# Patient Record
Sex: FEMALE | Race: WHITE | NOT HISPANIC OR LATINO | Employment: STUDENT | URBAN - METROPOLITAN AREA
[De-identification: names, ages, dates, MRNs, and addresses within clinical notes are randomized per-mention and may not be internally consistent; named-entity substitution may affect disease eponyms.]

---

## 2017-03-01 ENCOUNTER — TRANSFERRED RECORDS (OUTPATIENT)
Dept: HEALTH INFORMATION MANAGEMENT | Facility: CLINIC | Age: 22
End: 2017-03-01

## 2017-03-01 LAB — CHLAMYDIA - HIM PATIENT REPORTED: NEGATIVE

## 2017-04-14 ENCOUNTER — ALLIED HEALTH/NURSE VISIT (OUTPATIENT)
Dept: NURSING | Facility: CLINIC | Age: 22
End: 2017-04-14
Payer: COMMERCIAL

## 2017-04-14 DIAGNOSIS — Z11.1 SCREENING EXAMINATION FOR PULMONARY TUBERCULOSIS: Primary | ICD-10-CM

## 2017-04-14 PROCEDURE — 86580 TB INTRADERMAL TEST: CPT

## 2017-04-14 PROCEDURE — 99207 ZZC NO CHARGE NURSE ONLY: CPT

## 2017-04-14 NOTE — NURSING NOTE
The patient is asked the following questions today and these are her answers:    -Have you had a mantoux administered in the past 30 days?    No  -Have you had a previous positive Mantoux.  No  -Have you received BCG in the past.  No  -Have you had a live vaccine  (MMR, Varicella, OPV, Yellow Fever) in the last 6 weeks.  No  -Have you had and active  viral or bacterial infection in the past 6 weeks.  No  -Have you received corticosteroids or immunosuppressive agents in the past 6 weeks.  No  -Have you been diagnosed with HIV?  No  -Do you have a maglinancy?  No       Ana Adam MA

## 2017-04-14 NOTE — MR AVS SNAPSHOT
"              After Visit Summary   4/14/2017    Amy Harris    MRN: [id number]           Patient Information     Date Of Birth          1995        Visit Information        Provider Department      4/14/2017 1:45 PM AN ANCILLARY [facility]        Today's Diagnoses     Screening examination for pulmonary tuberculosis    -  1       Follow-ups after your visit        Your next 10 appointments already scheduled     Apr 17, 2017  8:00 AM CDT   Nurse Only with AN [facility]CILL[facility]   [facility]    [address] [facility] [address] [phone]              Who to contact     If you have questions or need follow up information about today's clinic visit or your schedule please contact [facility] directly at [phone].  Normal or non-critical lab and imaging results will be communicated to you by MyChart, letter or phone within 4 business days after the clinic has received the results. If you do not hear from us within 7 days, please contact the clinic through MyChart or phone. If you have a critical or abnormal lab result, we will notify you by phone as soon as possible.  Submit refill requests through [employer] or call your pharmacy and they will forward the refill request to us. Please allow 3 business days for your refill to be completed.          Additional Information About Your Visit        [facility] [employer] Information     [employer] lets you send messages to your doctor, view your test results, renew your prescriptions, schedule appointments and more. To sign up, go to [email or web address] . Click on \"Log in\" on the left side of the screen, which will take you to the Welcome page. Then click on \"Sign up Now\" on the right side of the page.     You will be asked to enter the access code listed below, as well as some personal information. Please follow the directions to create your username and password.     Your access code is: " [email or web address]  Expires: 2017  1:51 PM     Your access code will  in 90 days. If you need help or a new code, please call your [place] clinic or [phone].        Care EveryWhere ID     This is your Care EveryWhere ID. This could be used by other organizations to access your [facility] medical records  [id number]         Blood Pressure from Last 3 Encounters:   16 122/72   05/19/15 111/72   03/03/15 112/68    Weight from Last 3 Encounters:   16 118 lb (53.5 kg) (32 %)*   05/19/15 117 lb (53.1 kg) (34 %)*   03/03/15 112 lb 6.4 oz (51 kg) (25 %)*     * Growth percentiles are based on [facility] 2-20 Years data.              We Performed the Following     TB INTRADERMAL TEST        Primary Care Provider Office Phone # Fax #    Samra Case PA-C [phone]       [facility] [address] [facility] [address]        Thank you!     Thank you for choosing [facility]  for your care. Our goal is always to provide you with excellent care. Hearing back from our patients is one way we can continue to improve our services. Please take a few minutes to complete the written survey that you may receive in the mail after your visit with us. Thank you!             Your Updated Medication List - Protect others around you: Learn how to safely use, store and throw away your medicines at www.disposemymeds.org.          This list is accurate as of: 17  1:51 PM.  Always use your most recent med list.                   Brand Name Dispense Instructions for use    ADVIL 200 MG capsule   Generic drug:  ibuprofen      Take 200 mg by mouth every 4 hours as needed.       fluticasone 50 MCG/ACT spray    FLONASE    1 Package    Spray 2 sprays into both nostrils daily       MAXALT PO      Take 10 mg by mouth       norgestrel-ethinyl estradiol 0.3-30 MG-MCG per tablet    LO/OVRAL    84 tablet    Take 1 tablet by mouth daily Take continuously for extended cycling.        nortriptyline 25 MG capsule    PAMELOR     Take by mouth At Bedtime       TYLENOL PO      Take  by mouth as needed.

## 2017-04-17 ENCOUNTER — ALLIED HEALTH/NURSE VISIT (OUTPATIENT)
Dept: NURSING | Facility: CLINIC | Age: 22
End: 2017-04-17
Payer: COMMERCIAL

## 2017-04-17 DIAGNOSIS — Z11.1 SCREENING EXAMINATION FOR PULMONARY TUBERCULOSIS: Primary | ICD-10-CM

## 2017-04-17 LAB
PPDINDURATION: 0 MM (ref 0–5)
PPDREDNESS: 0 MM

## 2017-04-17 PROCEDURE — 99207 ZZC NO CHARGE NURSE ONLY: CPT

## 2017-04-17 NOTE — MR AVS SNAPSHOT
"              After Visit Summary   2017    Amy Harris    MRN: [id number]           Patient Information     Date Of Birth          1995        Visit Information        Provider Department      2017 8:00 AM AN ANCILLARY [facility]        Today's Diagnoses     Screening examination for pulmonary tuberculosis    -  1       Follow-ups after your visit        Who to contact     If you have questions or need follow up information about today's clinic visit or your schedule please contact [facility] directly at [phone].  Normal or non-critical lab and imaging results will be communicated to you by MyChart, letter or phone within 4 business days after the clinic has received the results. If you do not hear from us within 7 days, please contact the clinic through [facility] or phone. If you have a critical or abnormal lab result, we will notify you by phone as soon as possible.  Submit refill requests through [employer] or call your pharmacy and they will forward the refill request to us. Please allow 3 business days for your refill to be completed.          Additional Information About Your Visit        MyChart Information     [employer] lets you send messages to your doctor, view your test results, renew your prescriptions, schedule appointments and more. To sign up, go to [email or web address] . Click on \"Log in\" on the left side of the screen, which will take you to the Welcome page. Then click on \"Sign up Now\" on the right side of the page.     You will be asked to enter the access code listed below, as well as some personal information. Please follow the directions to create your username and password.     Your access code is: [email or web address]  Expires: 2017  1:51 PM     Your access code will  in 90 days. If you need help or a new code, please call your [facility] or [phone].        Care EveryWhere ID     This is your Care EveryWhere ID. This could be used " by other organizations to access your [place] medical records  [id number]         Blood Pressure from Last 3 Encounters:   03/25/16 122/72   05/19/15 111/72   03/03/15 112/68    Weight from Last 3 Encounters:   03/25/16 118 lb (53.5 kg) (32 %)*   05/19/15 117 lb (53.1 kg) (34 %)*   03/03/15 112 lb 6.4 oz (51 kg) (25 %)*     * Growth percentiles are based on [facility] 2-20 Years data.              Today, you had the following     No orders found for display       Primary Care Provider Office Phone # Fax #    Samra Case PA-C [phone]       [facility] [address] [facility] [address]        Thank you!     Thank you for choosing [facility]  for your care. Our goal is always to provide you with excellent care. Hearing back from our patients is one way we can continue to improve our services. Please take a few minutes to complete the written survey that you may receive in the mail after your visit with us. Thank you!             Your Updated Medication List - Protect others around you: Learn how to safely use, store and throw away your medicines at www.disposemymeds.org.          This list is accurate as of: 4/17/17  8:15 AM.  Always use your most recent med list.                   Brand Name Dispense Instructions for use    ADVIL 200 MG capsule   Generic drug:  ibuprofen      Take 200 mg by mouth every 4 hours as needed.       fluticasone 50 MCG/ACT spray    FLONASE    1 Package    Spray 2 sprays into both nostrils daily       MAXALT PO      Take 10 mg by mouth       norgestrel-ethinyl estradiol 0.3-30 MG-MCG per tablet    LO/OVRAL    84 tablet    Take 1 tablet by mouth daily Take continuously for extended cycling.       nortriptyline 25 MG capsule    PAMELOR     Take by mouth At Bedtime       TYLENOL PO      Take  by mouth as needed.

## 2017-08-10 DIAGNOSIS — N94.6 DYSMENORRHEA: ICD-10-CM

## 2017-08-10 RX ORDER — NORGESTREL-ETHINYL ESTRADIOL 0.3-0.03MG
TABLET ORAL
Qty: 28 TABLET | Refills: 0 | Status: SHIPPED | OUTPATIENT
Start: 2017-08-10 | End: 2017-10-02

## 2017-08-10 NOTE — LETTER
[facility]                                             [address] [place]  [address]    August 10, 2017    Amy Harris  [address] [facility] [address]    Dear Amy,       We recently received a refill request for CRYSELLE-28 0.3-30 MG-MCG per tablet   .  We have refilled this for a one time 30 day supply only because you are due for a:    Physical  office visit      Please call at your earliest convenience so that there will not be a delay with your future refills.          Thank you,   Your [facility] Care Team  [phone]

## 2017-10-02 ENCOUNTER — TELEPHONE (OUTPATIENT)
Dept: PEDIATRICS | Facility: CLINIC | Age: 22
End: 2017-10-02

## 2017-10-02 DIAGNOSIS — N94.6 DYSMENORRHEA: ICD-10-CM

## 2017-10-02 RX ORDER — NORGESTREL-ETHINYL ESTRADIOL 0.3-0.03MG
TABLET ORAL
Qty: 28 TABLET | Refills: 0 | Status: SHIPPED | OUTPATIENT
Start: 2017-10-02 | End: 2017-10-20

## 2017-10-02 NOTE — TELEPHONE ENCOUNTER
Patient calling. Her prescription for birth control was sent in for 30 days last month, but she needs it to be for 90 days.

## 2017-10-02 NOTE — TELEPHONE ENCOUNTER
Patient is overdue to appointment. 30 day supply was sent last month because she is over due. Last visit was 3/25/16.   Spoke with patient. She goes to school in [place]. She is coming back on October 20th.   Appointment made and RN sent one month tuan refill to get her through until she is seen.     No further questions or concerns at this time.  Kalpana Leblanc RN   [facility]

## 2017-10-02 NOTE — TELEPHONE ENCOUNTER
Patient is overdue to appointment. 30 day supply was sent last month because she is over due. Last visit was 3/25/16.   Spoke with patient. She goes to school in [place]. She is coming back on October 20th.   Appointment made and RN sent one month taun refill to get her through until she is seen.     No further questions or concerns at this time.  Kalpana Leblanc RN   [facility]

## 2017-10-16 NOTE — PATIENT INSTRUCTIONS
Recheck if acne is not improving after 1.5 months or if you have side effects from medication    Recheck 6 months if doing well, sooner if needed    Pap will be due age 23      Preventive Health Recommendations  Female Ages 18 to 25     Yearly exam:     See your health care provider every year in order to  o Review health changes.   o Discuss preventive care.    o Review your medicines if your doctor has prescribed any.      You should be tested each year for STDs (sexually transmitted diseases).       After age 20, talk to your provider about how often you should have cholesterol testing.      Starting at age 21, get a Pap test every three years. If you have an abnormal result, your doctor may have you test more often.      If you are at risk for diabetes, you should have a diabetes test (fasting glucose).     Shots:     Get a flu shot each year.     Get a tetanus shot every 10 years.     Consider getting the shot (vaccine) that prevents cervical cancer (Gardasil).    Nutrition:     Eat at least 5 servings of fruits and vegetables each day.    Eat whole-grain bread, whole-wheat pasta and brown rice instead of white grains and rice.    Talk to your provider about Calcium and Vitamin D.     Lifestyle    Exercise at least 150 minutes a week each week (30 minutes a day, 5 days a week). This will help you control your weight and prevent disease.    Limit alcohol to one drink per day.    No smoking.     Wear sunscreen to prevent skin cancer.    See your dentist every six months for an exam and cleaning.

## 2017-10-16 NOTE — PROGRESS NOTES
SUBJECTIVE:   CC: Amy Harris is an 22 year old woman who presents for preventive health visit.     Healthy Habits:    Answers for HPI/ROS submitted by the patient on 10/20/2017   Annual Exam:  Getting at least 3 servings of Calcium per day:: Yes  Bi-annual eye exam:: Yes  Dental care twice a year:: Yes  Sleep apnea or symptoms of sleep apnea:: None  Frequency of exercise:: 1 day/week  Diet:: Regular (no restrictions)  Taking medications regularly:: Yes  Medication side effects:: None  Additional concerns today:: No  PHQ-2 Score: 0  Duration of exercise:: 45-60 minutes       [facility]  Will abstract.  Concerns-acne---would like to try something for her acne. Has never tried anything before prescription.        Oral contraceptive pill-  Gets period every 3 months (take continuously) no concerns                Today's PHQ-2 Score:   PHQ-2 ( 1999 Pf[employer]) 10/20/2017 3/25/2016   Q1: Little interest or pleasure in doing things 0 0   Q2: Feeling down, depressed or hopeless 0 0   PHQ-2 Score 0 0   Q1: Little interest or pleasure in doing things Not at all -   Q2: Feeling down, depressed or hopeless Not at all -   PHQ-2 Score 0 -         Abuse: Current or Past(Physical, Sexual or Emotional)- No  Do you feel safe in your environment - Yes  Social History   Substance Use Topics     Smoking status: Never Smoker     Smokeless tobacco: Never Used      Comment: Dad smokes outside     Alcohol use No     The patient does not drink >3 drinks per day nor >7 drinks per week.    Reviewed orders with patient.  Reviewed health maintenance and updated orders accordingly - Yes  BP Readings from Last 3 Encounters:   10/20/17 124/78   03/25/16 122/72   05/19/15 111/72    Wt Readings from Last 3 Encounters:   10/20/17 118 lb (53.5 kg)   03/25/16 118 lb (53.5 kg) (32 %)*   05/19/15 117 lb (53.1 kg) (34 %)*     * Growth percentiles are based on CDC 2-20 Years data.                   Patient Active Problem List   Diagnosis     Dysmenorrhea     Menorrhagia     Tension headache     Migraine headache     Past Surgical History:   Procedure Laterality Date     NO HISTORY OF SURGERY         Social History   Substance Use Topics     Smoking status: Never Smoker     Smokeless tobacco: Never Used      Comment: Dad smokes outside     Alcohol use No     Family History   Problem Relation Age of Onset     Lipids Mother      borderline     DIABETES Maternal Grandmother      Circulatory Maternal Grandfather      brain aneurysm     Lipids Father      Prostate Cancer Paternal Grandfather      Asthma Brother      C.A.D. No family hx of      Hypertension No family hx of          Current Outpatient Prescriptions   Medication Sig Dispense Refill     norgestrel-ethinyl estradiol (CRYSELLE-28) 0.3-30 MG-MCG per tablet TAKE 1 TABLET BY MOUTH ONCE DAILY. TAKE CONTINUOUSLY FOR EXTENDED CYCLING 84 tablet 3     minocycline (MINOCIN/DYNACIN) 100 MG capsule Take 1 capsule (100 mg) by mouth 2 times daily With food. 60 capsule 5     Acetaminophen (TYLENOL PO) Take  by mouth as needed.       Ibuprofen (ADVIL) 200 MG capsule Take 200 mg by mouth every 4 hours as needed.             Mammogram not appropriate for this patient based on age.    Pertinent mammograms are reviewed under the imaging tab.  History of abnormal Pap smear: NO - under age 21, PAP not appropriate for age    Reviewed and updated as needed this visit by clinical staff  Tobacco  Allergies  Meds  Med Hx  Surg Hx  Fam Hx  Soc Hx        Reviewed and updated as needed this visit by Provider        Past Medical History:   Diagnosis Date     RAD (reactive airway disease)       Past Surgical History:   Procedure Laterality Date     NO HISTORY OF SURGERY       Obstetric History     No data available          ROS:  C: NEGATIVE for fever, chills, change in weight  I: NEGATIVE for worrisome rashes, moles or lesions  E: NEGATIVE for vision changes or  "irritation  ENT: NEGATIVE for ear, mouth and throat problems  R: NEGATIVE for significant cough or SOB  B: NEGATIVE for masses, tenderness or discharge  CV: NEGATIVE for chest pain, palpitations or peripheral edema  GI: NEGATIVE for nausea, abdominal pain, heartburn, or change in bowel habits  M: NEGATIVE for significant arthralgias or myalgia  N: NEGATIVE for weakness, dizziness or paresthesias  P: NEGATIVE for changes in mood or affect    OBJECTIVE:   /78  Pulse 83  Temp 99.5  F (37.5  C) (Oral)  Ht 5' 3\" (1.6 m)  Wt 118 lb (53.5 kg)  LMP 10/08/2017 (Approximate)  SpO2 99%  Breastfeeding? No  BMI 20.9 kg/m2  EXAM:  GENERAL: healthy, alert and no distress  EYES: Eyes grossly normal to inspection, PERRL and conjunctivae and sclerae normal  HENT: ear canals and TM's normal, nose and mouth without ulcers or lesions  NECK: no adenopathy, no asymmetry, masses, or scars and thyroid normal to palpation  RESP: lungs clear to auscultation - no rales, rhonchi or wheezes  CV: regular rate and rhythm, normal S1 S2, no S3 or S4, no murmur, click or rub, no peripheral edema and peripheral pulses strong  ABDOMEN: soft, nontender, no hepatosplenomegaly, no masses and bowel sounds normal  MS: no gross musculoskeletal defects noted, no edema  SKIN: no suspicious lesions or rashes.  Face-moderate acne especially in T-zones, pustular, somewhat oily skin  NEURO: Normal strength and tone, mentation intact and speech normal  PSYCH: mentation appears normal, affect normal/bright    ASSESSMENT/PLAN:   1. Encounter for routine adult health examination with abnormal findings      2. Dysmenorrhea  Doing well recheck 1 year  - norgestrel-ethinyl estradiol (CRYSELLE-28) 0.3-30 MG-MCG per tablet; TAKE 1 TABLET BY MOUTH ONCE DAILY. TAKE CONTINUOUSLY FOR EXTENDED CYCLING  Dispense: 84 tablet; Refill: 3    3. Special screening examination for chlamydial disease    - Chlamydia trachomatis PCR    4. Acne vulgaris  Discussed side " "effects/concerns  Discussed starting with topical antibiotic but patient prefers something stronger  Will wear sunscreen and take with food  Call with side effects or concerns  See patient instructions below for more plan.    - minocycline (MINOCIN/DYNACIN) 100 MG capsule; Take 1 capsule (100 mg) by mouth 2 times daily With food.  Dispense: 60 capsule; Refill: 5    COUNSELING:   Reviewed preventive health counseling, as reflected in patient instructions       Regular exercise       Healthy diet/nutrition       Vision screening       Hearing screening         reports that she has never smoked. She has never used smokeless tobacco.    Estimated body mass index is 20.9 kg/(m^2) as calculated from the following:    Height as of this encounter: 5' 3\" (1.6 m).    Weight as of this encounter: 118 lb (53.5 kg).         Patient Instructions   Recheck if acne is not improving after 1.5 months or if you have side effects from medication    Recheck 6 months if doing well, sooner if needed    Pap will be due age 23      Preventive Health Recommendations  Female Ages 18 to 25     Yearly exam:     See your health care provider every year in order to  o Review health changes.   o Discuss preventive care.    o Review your medicines if your doctor has prescribed any.      You should be tested each year for STDs (sexually transmitted diseases).       After age 20, talk to your provider about how often you should have cholesterol testing.      Starting at age 21, get a Pap test every three years. If you have an abnormal result, your doctor may have you test more often.      If you are at risk for diabetes, you should have a diabetes test (fasting glucose).     Shots:     Get a flu shot each year.     Get a tetanus shot every 10 years.     Consider getting the shot (vaccine) that prevents cervical cancer (Gardasil).    Nutrition:     Eat at least 5 servings of fruits and vegetables each day.    Eat whole-grain bread, whole-wheat pasta " and brown rice instead of white grains and rice.    Talk to your provider about Calcium and Vitamin D.     Lifestyle    Exercise at least 150 minutes a week each week (30 minutes a day, 5 days a week). This will help you control your weight and prevent disease.    Limit alcohol to one drink per day.    No smoking.     Wear sunscreen to prevent skin cancer.    See your dentist every six months for an exam and cleaning.          Counseling Resources:  ATP IV Guidelines  Pooled Cohorts Equation Calculator  Breast Cancer Risk Calculator  FRAX Risk Assessment  ICSI Preventive Guidelines  Dietary Guidelines for Americans, 2010  USDA's MyPlate  ASA Prophylaxis  Lung CA Screening    Inga Ravi PA-C  [facility]

## 2017-10-20 ENCOUNTER — OFFICE VISIT (OUTPATIENT)
Dept: FAMILY MEDICINE | Facility: CLINIC | Age: 22
End: 2017-10-20
Payer: COMMERCIAL

## 2017-10-20 VITALS
SYSTOLIC BLOOD PRESSURE: 124 MMHG | OXYGEN SATURATION: 99 % | TEMPERATURE: 99.5 F | HEART RATE: 83 BPM | HEIGHT: 63 IN | BODY MASS INDEX: 20.91 KG/M2 | DIASTOLIC BLOOD PRESSURE: 78 MMHG | WEIGHT: 118 LBS

## 2017-10-20 DIAGNOSIS — L70.0 ACNE VULGARIS: ICD-10-CM

## 2017-10-20 DIAGNOSIS — Z00.01 ENCOUNTER FOR ROUTINE ADULT HEALTH EXAMINATION WITH ABNORMAL FINDINGS: Primary | ICD-10-CM

## 2017-10-20 DIAGNOSIS — N94.6 DYSMENORRHEA: ICD-10-CM

## 2017-10-20 DIAGNOSIS — Z11.8 SPECIAL SCREENING EXAMINATION FOR CHLAMYDIAL DISEASE: ICD-10-CM

## 2017-10-20 PROCEDURE — 87491 CHLMYD TRACH DNA AMP PROBE: CPT | Performed by: PHYSICIAN ASSISTANT

## 2017-10-20 PROCEDURE — 99395 PREV VISIT EST AGE 18-39: CPT | Performed by: PHYSICIAN ASSISTANT

## 2017-10-20 RX ORDER — MINOCYCLINE HYDROCHLORIDE 100 MG/1
100 CAPSULE ORAL 2 TIMES DAILY
Qty: 60 CAPSULE | Refills: 5 | Status: SHIPPED | OUTPATIENT
Start: 2017-10-20 | End: 2018-06-07

## 2017-10-20 NOTE — MR AVS SNAPSHOT
After Visit Summary   10/20/2017    Amy Harris    MRN: [id number]           Patient Information     Date Of Birth          1995        Visit Information        Provider Department      10/20/2017 3:20 PM Inga Ravi PA-C [facility]        Today's Diagnoses     Encounter for routine adult health examination with abnormal findings    -  1    Dysmenorrhea        Special screening examination for chlamydial disease        Acne vulgaris          Care Instructions    Recheck if acne is not improving after 1.5 months or if you have side effects from medication    Recheck 6 months if doing well, sooner if needed    Pap will be due age 23      Preventive Health Recommendations  Female Ages 18 to 25     Yearly exam:     See your health care provider every year in order to  o Review health changes.   o Discuss preventive care.    o Review your medicines if your doctor has prescribed any.      You should be tested each year for STDs (sexually transmitted diseases).       After age 20, talk to your provider about how often you should have cholesterol testing.      Starting at age 21, get a Pap test every three years. If you have an abnormal result, your doctor may have you test more often.      If you are at risk for diabetes, you should have a diabetes test (fasting glucose).     Shots:     Get a flu shot each year.     Get a tetanus shot every 10 years.     Consider getting the shot (vaccine) that prevents cervical cancer (Gardasil).    Nutrition:     Eat at least 5 servings of fruits and vegetables each day.    Eat whole-grain bread, whole-wheat pasta and brown rice instead of white grains and rice.    Talk to your provider about Calcium and Vitamin D.     Lifestyle    Exercise at least 150 minutes a week each week (30 minutes a day, 5 days a week). This will help you control your weight and prevent disease.    Limit alcohol to one drink per day.    No smoking.     Wear  "sunscreen to prevent skin cancer.    See your dentist every six months for an exam and cleaning.          Follow-ups after your visit        Who to contact     If you have questions or need follow up information about today's clinic visit or your schedule please contact [facility] directly at [phone].  Normal or non-critical lab and imaging results will be communicated to you by MyChart, letter or phone within 4 business days after the clinic has received the results. If you do not hear from us within 7 days, please contact the clinic through MyChart or phone. If you have a critical or abnormal lab result, we will notify you by phone as soon as possible.  Submit refill requests through Inxero or call your pharmacy and they will forward the refill request to us. Please allow 3 business days for your refill to be completed.          Additional Information About Your Visit        Care EveryWhere ID     This is your Care EveryWhere ID. This could be used by other organizations to access your [place] medical records  [id number]        Your Vitals Were     Pulse Temperature Height Last Period Pulse Oximetry Breastfeeding?    83 99.5  F (37.5  C) (Oral) 5' 3\" (1.6 m) 10/08/2017 (Approximate) 99% No    BMI (Body Mass Index)                   20.9 kg/m2            Blood Pressure from Last 3 Encounters:   10/20/17 124/78   03/25/16 122/72   05/19/15 111/72    Weight from Last 3 Encounters:   10/20/17 118 lb (53.5 kg)   03/25/16 118 lb (53.5 kg) (32 %)*   05/19/15 117 lb (53.1 kg) (34 %)*     * Growth percentiles are based on CDC 2-20 Years data.              We Performed the Following     Chlamydia trachomatis PCR     MIGRAINE ACTION PLAN          Today's Medication Changes          These changes are accurate as of: 10/20/17  4:05 PM.  If you have any questions, ask your nurse or doctor.               Start taking these medicines.        Dose/Directions    minocycline 100 MG capsule   Commonly known as: "  MINOCIN/DYNACIN   Used for:  Acne vulgaris   Started by:  Inga Ravi PA-C        Dose:  100 mg   Take 1 capsule (100 mg) by mouth 2 times daily With food.   Quantity:  60 capsule   Refills:  5         These medicines have changed or have updated prescriptions.        Dose/Directions    norgestrel-ethinyl estradiol 0.3-30 MG-MCG per tablet   Commonly known as:  CRYSELLE-28   This may have changed:  See the new instructions.   Used for:  Dysmenorrhea   Changed by:  Inga Ravi PA-C        TAKE 1 TABLET BY MOUTH ONCE DAILY. TAKE CONTINUOUSLY FOR EXTENDED CYCLING   Quantity:  84 tablet   Refills:  3            Where to get your medicines      These medications were sent to [place] [address] [place] [facility] [address]  [address] [facility] [address]     Phone:  [phone]     minocycline 100 MG capsule    norgestrel-ethinyl estradiol 0.3-30 MG-MCG per tablet                Primary Care Provider Office Phone # Fax #    Samra Case PA-C [phone]       [address] [facility] [address]        Equal Access to Services     TERRA [facility] : Hadii sue love hadasho Soomaali, waaxda luqadaha, qaybta kaalmada adeegyada, teddy rousseau . So [facility] [phone].    ATENCIÓN: Si habla español, tiene a flynn disposición servicios gratuitos de asistencia lingüística. [facility] [phone].    We comply with applicable federal civil rights laws and [place] laws. We do not discriminate on the basis of race, color, national origin, age, disability, sex, sexual orientation, or gender identity.            Thank you!     Thank you for choosing [facility]  for your care. Our goal is always to provide you with excellent care. Hearing back from our patients is one way we can continue to improve our services. Please take a few minutes to complete the written survey that you may receive in the mail after your visit with us.  Thank you!             Your Updated Medication List - Protect others around you: Learn how to safely use, store and throw away your medicines at www.disposemymeds.org.          This list is accurate as of: 10/20/17  4:05 PM.  Always use your most recent med list.                   Brand Name Dispense Instructions for use Diagnosis    ADVIL 200 MG capsule   Generic drug:  ibuprofen      Take 200 mg by mouth every 4 hours as needed.        minocycline 100 MG capsule    MINOCIN/DYNACIN    60 capsule    Take 1 capsule (100 mg) by mouth 2 times daily With food.    Acne vulgaris       norgestrel-ethinyl estradiol 0.3-30 MG-MCG per tablet    CRYSELLE-28    84 tablet    TAKE 1 TABLET BY MOUTH ONCE DAILY. TAKE CONTINUOUSLY FOR EXTENDED CYCLING    Dysmenorrhea       TYLENOL PO      Take  by mouth as needed.

## 2017-10-20 NOTE — Clinical Note
Please abstract the following data from this visit with this patient into the appropriate field in Epic:  Chlamydia testing done on this date: 03/01/2017 [facility] results normal.

## 2017-10-20 NOTE — LETTER
[facility]  [address] [facility] [place] [address] [phone]      October 23, 2017    Amy Harris  [address] [place] [address]            Dear Amy,    It was a pleasure to see you at your recent office visit.  Your test results are listed below. Negative chlamydia test. Recheck 1 year sooner if you change partners.         If you have any questions or concerns, please call the clinic at [phone].     Sincerely,   Inga Raiv PA-C/jessee    Results for orders placed or performed in visit on 10/20/17   Chlamydia trachomatis PCR   Result Value Ref Range    Specimen Description Urine     Chlamydia Trachomatis PCR Negative NEG^Negative

## 2017-10-20 NOTE — NURSING NOTE
"Chief Complaint   Patient presents with     Physical     AFE, Pt is not fasting and did not have labs done     Recheck Medication     birth control med refill        Initial /78  Pulse 83  Temp 99.5  F (37.5  C) (Oral)  Ht 5' 3\" (1.6 m)  Wt 118 lb (53.5 kg)  LMP 10/08/2017 (Approximate)  SpO2 99%  Breastfeeding? No  BMI 20.9 kg/m2 Estimated body mass index is 20.9 kg/(m^2) as calculated from the following:    Height as of this encounter: 5' 3\" (1.6 m).    Weight as of this encounter: 118 lb (53.5 kg).  Medication Reconciliation: complete      Breezy Tijerina MA    "

## 2017-10-22 LAB
C TRACH DNA SPEC QL NAA+PROBE: NEGATIVE
SPECIMEN SOURCE: NORMAL

## 2017-10-22 NOTE — PROGRESS NOTES
Dear Amy,      It was a pleasure to see you at your recent office visit.  Your test results are listed below. Negative chlamydia test. Recheck 1 year sooner if you change partners.         If you have any questions or concerns, please call the clinic at [phone].    Sincerely,  Inga Ravi PA-C

## 2018-04-23 ENCOUNTER — HEALTH MAINTENANCE LETTER (OUTPATIENT)
Age: 23
End: 2018-04-23

## 2018-06-06 ENCOUNTER — TELEPHONE (OUTPATIENT)
Dept: FAMILY MEDICINE | Facility: CLINIC | Age: 23
End: 2018-06-06

## 2018-06-06 ENCOUNTER — ALLIED HEALTH/NURSE VISIT (OUTPATIENT)
Dept: NURSING | Facility: CLINIC | Age: 23
End: 2018-06-06
Payer: COMMERCIAL

## 2018-06-06 DIAGNOSIS — Z11.1 SCREENING EXAMINATION FOR PULMONARY TUBERCULOSIS: Primary | ICD-10-CM

## 2018-06-06 PROCEDURE — 86580 TB INTRADERMAL TEST: CPT

## 2018-06-06 NOTE — PROGRESS NOTES
The patient is asked the following questions today and these are her answers:    -Have you had a mantoux administered in the past 30 days?    No  -Have you had a previous positive Mantoux.  No  -Have you received BCG in the past.  N/A  -Have you had a live vaccine  (MMR, Varicella, OPV, Yellow Fever) in the last 6 weeks.  No  -Have you had and active  viral or bacterial infection in the past 6 weeks.  No  -Have you received corticosteroids or immunosuppressive agents in the past 6 weeks.  No  -Have you been diagnosed with HIV?  No  -Do you have a maglinancy?  No   BRUCE Avila

## 2018-06-06 NOTE — TELEPHONE ENCOUNTER
Reason for Call:  Form, our goal is to have forms completed with 72 hours, however, some forms may require a visit or additional information.    Type of letter, form or note:  school       Who is the form from?: Patient    Where did the form come from: Patient or family brought in       What clinic location was the form placed at?: [place]    Where the form was placed: 's Box    What number is listed as a contact on the form?: [phone]       Additional comments: Patient has a [employer] Physical Exam Form and Immunization Form both that need to be signed by the provider. Patient has a mantoux read appointment on 6/8/18 @ 11:30am, she said that if they are ready that day she will pick them up then, otherwise please call her when ready and she will come in and get them. Thank you    Call taken on 6/6/2018 at 11:33 AM by Verona Canas

## 2018-06-06 NOTE — MR AVS SNAPSHOT
"              After Visit Summary   6/6/2018    Amy Harris    MRN: [id number]           Patient Information     Date Of Birth          1995        Visit Information        Provider Department      6/6/2018 11:30 AM AN ANCILLARY [facility]        Today's Diagnoses     Screening examination for pulmonary tuberculosis    -  1       Follow-ups after your visit        Your next 10 appointments already scheduled     Jun 08, 2018 11:30 AM CDT   Nurse [facility] with An Rn   [facility]    [address] [facility] [address] [phone]              Who to contact     If you have questions or need follow up information about today's clinic visit or your schedule please contact [facility] directly at [phone].  Normal or non-critical lab and imaging results will be communicated to you by MyChart, letter or phone within 4 business days after the clinic has received the results. If you do not hear from us within 7 days, please contact the clinic through MyChart or phone. If you have a critical or abnormal lab result, we will notify you by phone as soon as possible.  Submit refill requests through Sproutel or call your pharmacy and they will forward the refill request to us. Please allow 3 business days for your refill to be completed.          Additional Information About Your Visit        Aqutohart Information     Sproutel lets you send messages to your doctor, view your test results, renew your prescriptions, schedule appointments and more. To sign up, go to [email or web address] . Click on \"Log in\" on the left side of the screen, which will take you to the Welcome page. Then click on \"Sign up Now\" on the right side of the page.     You will be asked to enter the access code listed below, as well as some personal information. Please follow the directions to create your username and password.     Your access code is: [email or web address]  Expires: " 2018 11:21 AM     Your access code will  in 90 days. If you need help or a new code, please call your [place] clinic or [phone].        Care EveryWhere ID     This is your Care EveryWhere ID. This could be used by other organizations to access your [place] medical records  [id number]         Blood Pressure from Last 3 Encounters:   10/20/17 124/78   16 122/72   05/19/15 111/72    Weight from Last 3 Encounters:   10/20/17 118 lb (53.5 kg)   16 118 lb (53.5 kg) (32 %)*   05/19/15 117 lb (53.1 kg) (34 %)*     * Growth percentiles are based on [facility] 2-20 Years data.              We Performed the Following     TB INTRADERMAL TEST        Primary Care Provider Office Phone # Fax #    Samra Case PA-C [phone] [address] [facility] [address]        Equal Access to Services     TERRA PRINGLE : Hadii aad ku hadasho Soomaali, waaxda luqadaha, qaybta kaalmada adeegyada, waxay idiin haydanialn millie rousseau . So [facility] [phone].    ATENCIÓN: Si habla español, tiene a flynn disposición servicios gratuitos de asistencia lingüística. Llame al [phone].    We comply with applicable federal civil rights laws and [place] laws. We do not discriminate on the basis of race, color, national origin, age, disability, sex, sexual orientation, or gender identity.            Thank you!     Thank you for choosing [facility]  for your care. Our goal is always to provide you with excellent care. Hearing back from our patients is one way we can continue to improve our services. Please take a few minutes to complete the written survey that you may receive in the mail after your visit with us. Thank you!             Your Updated Medication List - Protect others around you: Learn how to safely use, store and throw away your medicines at www.disposemymeds.org.          This list is accurate as of 18 11:36 AM.  Always use your most recent med list.                    Brand Name Dispense Instructions for use Diagnosis    ADVIL 200 MG capsule   Generic drug:  ibuprofen      Take 200 mg by mouth every 4 hours as needed.        minocycline 100 MG capsule    MINOCIN/DYNACIN    60 capsule    Take 1 capsule (100 mg) by mouth 2 times daily With food.    Acne vulgaris       norgestrel-ethinyl estradiol 0.3-30 MG-MCG per tablet    CRYSELLE-28    84 tablet    TAKE 1 TABLET BY MOUTH ONCE DAILY. TAKE CONTINUOUSLY FOR EXTENDED CYCLING    Dysmenorrhea       TYLENOL PO      Take  by mouth as needed.

## 2018-06-06 NOTE — LETTER
June 8, 2018      Amy Harris  [address] [place] [address]        Dear ,    We are writing to inform you of your test results.    Your test results fall within the expected range(s) or remain unchanged from previous results.  Please continue with current treatment plan.    Resulted Orders   TB INTRADERMAL TEST   Result Value Ref Range    PPD Induration 0.0 0 - 5 mm    PPD Redness 0.0 mm    Impression    Mantoux results: No induration.  No swelling.  No redness.  FABIO FraireN RN        If you have any questions or concerns, please call the clinic at the number listed above.       Sincerely,        [place] Ancillary Nurse

## 2018-06-07 ENCOUNTER — MYC REFILL (OUTPATIENT)
Dept: FAMILY MEDICINE | Facility: CLINIC | Age: 23
End: 2018-06-07

## 2018-06-07 DIAGNOSIS — L70.0 ACNE VULGARIS: ICD-10-CM

## 2018-06-07 RX ORDER — MINOCYCLINE HYDROCHLORIDE 100 MG/1
100 CAPSULE ORAL 2 TIMES DAILY
Qty: 60 CAPSULE | Refills: 0 | Status: SHIPPED | OUTPATIENT
Start: 2018-06-07 | End: 2018-06-11

## 2018-06-07 NOTE — TELEPHONE ENCOUNTER
Patient still needs 2 step mantoux filled out/done.  Otherwise form is complete.  Form given to Jessie GALLO Who is seeing patient tomorrow.  Please do not give form to patient to keep until both mantoux;s have been done/fill out on form. Thankstopher

## 2018-06-07 NOTE — TELEPHONE ENCOUNTER
Message from Control[employer]:  Original authorizing provider: TRA Genao would like a refill of the following medications:  minocycline (MINOCIN/DYNACIN) 100 MG capsule [Inga Ravi PA-C]    Preferred pharmacy: [facility] [address], [address]    Comment:

## 2018-06-07 NOTE — PROGRESS NOTES
SUBJECTIVE:                                                    Amy Harris is a 23 year old female who presents to clinic today for the following health issues:    Acne med check and refill. Facial acne.   Medication is working well for acne. No concerns. No side effects.     PAP is due and pt will call back to schedule appt. She will run out of her oral contraceptive pill when she is due for her next physical but will try to schedule it asap.     No chest pain or shortness of breath. No gastroenterology upset.         Problem list and histories reviewed & adjusted, as indicated.  Additional history: as documented    Patient Active Problem List   Diagnosis     Dysmenorrhea     Menorrhagia     Tension headache     Migraine headache     Past Surgical History:   Procedure Laterality Date     NO HISTORY OF SURGERY         Social History   Substance Use Topics     Smoking status: Never Smoker     Smokeless tobacco: Never Used      Comment: Dad smokes outside     Alcohol use Yes      Comment: 1-2 drinks a week     Family History   Problem Relation Age of Onset     Lipids Mother      borderline     DIABETES Maternal Grandmother      Circulatory Maternal Grandfather      brain aneurysm     Lipids Father      Prostate Cancer Paternal Grandfather      Asthma Brother      Anxiety Disorder Sister      C.A.D. No family hx of      Hypertension No family hx of          Current Outpatient Prescriptions   Medication Sig Dispense Refill     Acetaminophen (TYLENOL PO) Take  by mouth as needed.       Ibuprofen (ADVIL) 200 MG capsule Take 200 mg by mouth every 4 hours as needed.       minocycline (MINOCIN/DYNACIN) 100 MG capsule Take 1 capsule (100 mg) by mouth 2 times daily With food. 60 capsule 11     norgestrel-ethinyl estradiol (CRYSELLE-28) 0.3-30 MG-MCG per tablet TAKE 1 TABLET BY MOUTH ONCE DAILY. TAKE CONTINUOUSLY FOR EXTENDED CYCLING 84 tablet 3     No Known Allergies    ROS:  Constitutional, HEENT, cardiovascular,  "pulmonary, gi and gu systems are negative, except as otherwise noted.    OBJECTIVE:     /72  Pulse 71  Temp 98.7  F (37.1  C) (Oral)  Resp 14  Ht 5' 3\" (1.6 m)  Wt 130 lb (59 kg)  SpO2 98%  Breastfeeding? No  BMI 23.03 kg/m2  Body mass index is 23.03 kg/(m^2).  GENERAL: healthy, alert and no distress  RESP: lungs clear to auscultation - no rales, rhonchi or wheezes  CV: regular rate and rhythm, normal S1 S2, no S3 or S4, no murmur, click or rub, no peripheral edema and peripheral pulses strong  MS: no gross musculoskeletal defects noted, no edema  SKIN: {mild pustular acne on face        ASSESSMENT/PLAN:     ASSESSMENT / PLAN:  (L70.0) Acne vulgaris  (primary encounter diagnosis)  Comment: stable recheck 1 year  Plan: minocycline (MINOCIN/DYNACIN) 100 MG capsule            Patient Instructions   Recheck with pap   Use sunscreen  Take antibiotic with food        Inga Ravi PA-C  [facility]      "

## 2018-06-08 ENCOUNTER — ALLIED HEALTH/NURSE VISIT (OUTPATIENT)
Dept: NURSING | Facility: CLINIC | Age: 23
End: 2018-06-08
Payer: COMMERCIAL

## 2018-06-08 DIAGNOSIS — Z11.1 SCREENING EXAMINATION FOR PULMONARY TUBERCULOSIS: Primary | ICD-10-CM

## 2018-06-08 DIAGNOSIS — L70.0 ACNE VULGARIS: ICD-10-CM

## 2018-06-08 LAB
PPDINDURATION: 0 MM (ref 0–5)
PPDREDNESS: 0 MM

## 2018-06-08 PROCEDURE — 99207 ZZC NO CHARGE NURSE ONLY: CPT

## 2018-06-08 RX ORDER — MINOCYCLINE HYDROCHLORIDE 100 MG/1
CAPSULE ORAL
Qty: 60 CAPSULE | Refills: 0 | OUTPATIENT
Start: 2018-06-08

## 2018-06-08 NOTE — PROGRESS NOTES
Ms. Harris,    All of your labs were normal for you.    Please contact the clinic if you have additional questions.  Thank you.    Sincerely,    Valentin Butcher PA-C

## 2018-06-08 NOTE — TELEPHONE ENCOUNTER
Patient came in for her mantoux read today.  She needs to call her school to clarify if she does need the 2 step. If so, what is the time frame in which the second mantoux needs to be placed. She will call back to make these appointments.     Gave Isabel Adam MA her paperwork. This was placed face down on her desk.  Jessie Dai, FABION RN

## 2018-06-11 ENCOUNTER — OFFICE VISIT (OUTPATIENT)
Dept: FAMILY MEDICINE | Facility: CLINIC | Age: 23
End: 2018-06-11
Payer: COMMERCIAL

## 2018-06-11 VITALS
WEIGHT: 130 LBS | BODY MASS INDEX: 23.04 KG/M2 | HEIGHT: 63 IN | DIASTOLIC BLOOD PRESSURE: 72 MMHG | RESPIRATION RATE: 14 BRPM | TEMPERATURE: 98.7 F | HEART RATE: 71 BPM | OXYGEN SATURATION: 98 % | SYSTOLIC BLOOD PRESSURE: 111 MMHG

## 2018-06-11 DIAGNOSIS — L70.0 ACNE VULGARIS: Primary | ICD-10-CM

## 2018-06-11 PROCEDURE — 99213 OFFICE O/P EST LOW 20 MIN: CPT | Performed by: PHYSICIAN ASSISTANT

## 2018-06-11 RX ORDER — MINOCYCLINE HYDROCHLORIDE 100 MG/1
100 CAPSULE ORAL 2 TIMES DAILY
Qty: 60 CAPSULE | Refills: 11 | Status: SHIPPED | OUTPATIENT
Start: 2018-06-11 | End: 2018-08-08

## 2018-06-11 NOTE — MR AVS SNAPSHOT
"              After Visit Summary   6/11/2018    Amy Harris    MRN: [id number]           Patient Information     Date Of Birth          1995        Visit Information        Provider Department      6/11/2018 10:00 AM Inga Ravi PA-C [facility]        Today's Diagnoses     Acne vulgaris    -  1      Care Instructions    Recheck with pap   Use sunscreen  Take antibiotic with food          Follow-ups after your visit        Who to contact     If you have questions or need follow up information about today's clinic visit or your schedule please contact [facility] directly at [phone].  Normal or non-critical lab and imaging results will be communicated to you by Par8ohart, letter or phone within 4 business days after the clinic has received the results. If you do not hear from us within 7 days, please contact the clinic through Par8ohart or phone. If you have a critical or abnormal lab result, we will notify you by phone as soon as possible.  Submit refill requests through [employer] or call your pharmacy and they will forward the refill request to us. Please allow 3 business days for your refill to be completed.          Additional Information About Your Visit        MyChart Information     [employer] gives you secure access to your electronic health record. If you see a primary care provider, you can also send messages to your care team and make appointments. If you have questions, please call your primary care clinic.  If you do not have a primary care provider, please call [phone] and they will assist you.        Care EveryWhere ID     This is your Care EveryWhere ID. This could be used by other organizations to access your [facility] medical records  [id number]        Your Vitals Were     Pulse Temperature Respirations Height Pulse Oximetry Breastfeeding?    71 98.7  F (37.1  C) (Oral) 14 5' 3\" (1.6 m) 98% No    BMI (Body Mass Index)                   23.03 kg/m2  "           Blood Pressure from Last 3 Encounters:   06/11/18 111/72   10/20/17 124/78   03/25/16 122/72    Weight from Last 3 Encounters:   06/11/18 130 lb (59 kg)   10/20/17 118 lb (53.5 kg)   03/25/16 118 lb (53.5 kg) (32 %)*     * Growth percentiles are based on [facility] 2-20 Years data.              Today, you had the following     No orders found for display         Where to get your medicines      These medications were sent to [facility] [address] [place] [address] [address] [facility] [address]  [address] [facility], [facility] [address]     Phone:  [phone]     minocycline 100 MG capsule          Primary Care Provider Office Phone # Fax #    Samra Case PA-C [phone] [address] [facility] [address]        Equal Access to Services     TERRA PRINGLE : Hadii aad ku hadasho Soomaali, waaxda luqadaha, qaybta kaalmada adeegyada, waxay idiin hayaan millie rousseau . So [facility] [place] [facility] [phone].    ATENCIÓN: Si habla español, tiene a flynn disposición servicios gratuitos de asistencia lingüística. Llame al [phone].    We comply with applicable federal civil rights laws and [place] laws. We do not discriminate on the basis of race, color, national origin, age, disability, sex, sexual orientation, or gender identity.            Thank you!     Thank you for choosing [facility]  for your care. Our goal is always to provide you with excellent care. Hearing back from our patients is one way we can continue to improve our services. Please take a few minutes to complete the written survey that you may receive in the mail after your visit with us. Thank you!             Your Updated Medication List - Protect others around you: Learn how to safely use, store and throw away your medicines at www.disposemymeds.org.          This list is accurate as of 6/11/18 10:38 AM.  Always use your most recent med list.                   Brand Name  Dispense Instructions for use Diagnosis    ADVIL 200 MG capsule   Generic drug:  ibuprofen      Take 200 mg by mouth every 4 hours as needed.        minocycline 100 MG capsule    MINOCIN/DYNACIN    60 capsule    Take 1 capsule (100 mg) by mouth 2 times daily With food.    Acne vulgaris       norgestrel-ethinyl estradiol 0.3-30 MG-MCG per tablet    CRYSELLE-28    84 tablet    TAKE 1 TABLET BY MOUTH ONCE DAILY. TAKE CONTINUOUSLY FOR EXTENDED CYCLING    Dysmenorrhea       TYLENOL PO      Take  by mouth as needed.

## 2018-06-11 NOTE — NURSING NOTE
"Chief Complaint   Patient presents with     Derm Problem     Acne med check and refill      Health Maintenance     orders pended        Initial /72  Pulse 71  Temp 98.7  F (37.1  C) (Oral)  Resp 14  Ht 5' 3\" (1.6 m)  Wt 130 lb (59 kg)  SpO2 98%  Breastfeeding? No  BMI 23.03 kg/m2 Estimated body mass index is 23.03 kg/(m^2) as calculated from the following:    Height as of this encounter: 5' 3\" (1.6 m).    Weight as of this encounter: 130 lb (59 kg).  Medication Reconciliation: complete      Breezy Tijerina MA    "

## 2018-06-25 ENCOUNTER — TELEPHONE (OUTPATIENT)
Dept: FAMILY MEDICINE | Facility: CLINIC | Age: 23
End: 2018-06-25

## 2018-06-25 NOTE — TELEPHONE ENCOUNTER
Patient calling. She dropped off forms to be filled out at her last visit. She is having a Mantoux done in [place] this week, and would like to have the results sent to our clinic and have this information added to this form. Please let her know if this is possible.

## 2018-06-25 NOTE — TELEPHONE ENCOUNTER
Patient is calling and would like someone to call her as she thinks she needs another Mantoux test done for School.  Please advise. Ok to leave Message.

## 2018-06-27 NOTE — TELEPHONE ENCOUNTER
Called and spoke to patient. She said that she is scheduled for next week for her 2nd mantoux and that she got it figured out.Afua Amos MA/ANTONIA

## 2018-07-09 ENCOUNTER — ALLIED HEALTH/NURSE VISIT (OUTPATIENT)
Dept: NURSING | Facility: CLINIC | Age: 23
End: 2018-07-09
Payer: COMMERCIAL

## 2018-07-09 DIAGNOSIS — Z11.1 SCREENING EXAMINATION FOR PULMONARY TUBERCULOSIS: Primary | ICD-10-CM

## 2018-07-09 PROCEDURE — 86580 TB INTRADERMAL TEST: CPT

## 2018-07-09 PROCEDURE — 99207 ZZC NO CHARGE NURSE ONLY: CPT

## 2018-07-09 NOTE — MR AVS SNAPSHOT
After Visit Summary   7/9/2018    Amy Harris    MRN: [id number]           Patient Information     Date Of Birth          1995        Visit Information        Provider Department      7/9/2018 8:00 AM AN ANCILLARY [facility]        Today's Diagnoses     Screening examination for pulmonary tuberculosis    -  1       Follow-ups after your visit        Your next 10 appointments already scheduled     Jul 11, 2018  8:15 AM CDT   [facility]urse [facility] with [facility]    [address] [facility] [address] [phone]              Who to contact     If you have questions or need follow up information about today's clinic visit or your schedule please contact [facility] directly at [phone].  Normal or non-critical lab and imaging results will be communicated to you by Frediohart, letter or phone within 4 business days after the clinic has received the results. If you do not hear from us within 7 days, please contact the clinic through Frediohart or phone. If you have a critical or abnormal lab result, we will notify you by phone as soon as possible.  Submit refill requests through [employer] or call your pharmacy and they will forward the refill request to us. Please allow 3 business days for your refill to be completed.          Additional Information About Your Visit        MyChart Information     [employer] gives you secure access to your electronic health record. If you see a primary care provider, you can also send messages to your care team and make appointments. If you have questions, please call your primary care clinic.  If you do not have a primary care provider, please call [phone] and they will assist you.        Care EveryWhere ID     This is your Care EveryWhere ID. This could be used by other organizations to access your [facility] medical records  [id number]         Blood Pressure from Last 3 Encounters:    06/11/18 111/72   10/20/17 124/78   03/25/16 122/72    Weight from Last 3 Encounters:   06/11/18 130 lb (59 kg)   10/20/17 118 lb (53.5 kg)   03/25/16 118 lb (53.5 kg) (32 %)*     * Growth percentiles are based on [facility] 2-20 Years data.              We Performed the Following     TB INTRADERMAL TEST        Primary Care Provider Office Phone # Fax #    Samra Case PA-C [phone] [address] [facility] [address]        Equal Access to Services     [facility]: Hadii aad ku hadasho Soomaali, waaxda luqadaha, qaybta kaalmada adeegyada, teddy rousseau . So [facility] [phone].    ATENCIÓN: Si habla español, tiene a flynn disposición servicios gratuitos de asistencia lingüística. [facility] [phone].    We comply with applicable federal civil rights laws and [place] laws. We do not discriminate on the basis of race, color, national origin, age, disability, sex, sexual orientation, or gender identity.            Thank you!     Thank you for choosing [facility]  for your care. Our goal is always to provide you with excellent care. Hearing back from our patients is one way we can continue to improve our services. Please take a few minutes to complete the written survey that you may receive in the mail after your visit with us. Thank you!             Your Updated Medication List - Protect others around you: Learn how to safely use, store and throw away your medicines at www.disposemymeds.org.          This list is accurate as of 7/9/18  9:47 AM.  Always use your most recent med list.                   Brand Name Dispense Instructions for use Diagnosis    ADVIL 200 MG capsule   Generic drug:  ibuprofen      Take 200 mg by mouth every 4 hours as needed.        minocycline 100 MG capsule    MINOCIN/DYNACIN    60 capsule    Take 1 capsule (100 mg) by mouth 2 times daily With food.    Acne vulgaris       norgestrel-ethinyl estradiol 0.3-30 MG-MCG per  tablet    CRYSELLE-28    84 tablet    TAKE 1 TABLET BY MOUTH ONCE DAILY. TAKE CONTINUOUSLY FOR EXTENDED CYCLING    Dysmenorrhea       TYLENOL PO      Take  by mouth as needed.

## 2018-07-11 ENCOUNTER — ALLIED HEALTH/NURSE VISIT (OUTPATIENT)
Dept: NURSING | Facility: CLINIC | Age: 23
End: 2018-07-11
Payer: COMMERCIAL

## 2018-07-11 DIAGNOSIS — Z11.1 SCREENING EXAMINATION FOR PULMONARY TUBERCULOSIS: Primary | ICD-10-CM

## 2018-07-11 LAB
PPDINDURATION: 0 MM (ref 0–5)
PPDREDNESS: 0 MM

## 2018-07-11 PROCEDURE — 99207 ZZC NO CHARGE NURSE ONLY: CPT

## 2018-08-08 ENCOUNTER — MYC REFILL (OUTPATIENT)
Dept: FAMILY MEDICINE | Facility: CLINIC | Age: 23
End: 2018-08-08

## 2018-08-08 DIAGNOSIS — L70.0 ACNE VULGARIS: ICD-10-CM

## 2018-08-08 DIAGNOSIS — N94.6 DYSMENORRHEA: ICD-10-CM

## 2018-08-08 RX ORDER — MINOCYCLINE HYDROCHLORIDE 100 MG/1
CAPSULE ORAL
Qty: 60 CAPSULE | Refills: 5 | OUTPATIENT
Start: 2018-08-08

## 2018-08-08 RX ORDER — MINOCYCLINE HYDROCHLORIDE 100 MG/1
100 CAPSULE ORAL 2 TIMES DAILY
Qty: 180 CAPSULE | Refills: 2 | Status: SHIPPED | OUTPATIENT
Start: 2018-08-08 | End: 2019-10-18

## 2018-08-08 NOTE — TELEPHONE ENCOUNTER
Message from [employer]:  Original authorizing provider: TRA Genaosay Kimberly would like a refill of the following medications:  norgestrel-ethinyl estradiol (CRYSELLE-28) 0.3-30 MG-MCG per tablet [Inga Ravi PA-C]    Preferred pharmacy: [address] [facility]    Comment:  My birth control no longer has any refills and I run out in a week.

## 2018-08-08 NOTE — TELEPHONE ENCOUNTER
Message from [employer]:  Original authorizing provider: TRA Genaosay Kimberly would like a refill of the following medications:  minocycline (MINOCIN/DYNACIN) 100 MG capsule [Inga Ravi PA-C]    Pref[id number] [place], [place] - [address] [facility]    Comment:  I went in for an appointment to get this refilled in June. I need the prescription sent to the [facility] in [facility] in [place].

## 2018-10-19 ENCOUNTER — MYC REFILL (OUTPATIENT)
Dept: FAMILY MEDICINE | Facility: CLINIC | Age: 23
End: 2018-10-19

## 2018-10-19 DIAGNOSIS — L70.0 ACNE VULGARIS: ICD-10-CM

## 2018-10-19 DIAGNOSIS — N94.6 DYSMENORRHEA: ICD-10-CM

## 2018-10-19 RX ORDER — MINOCYCLINE HYDROCHLORIDE 100 MG/1
100 CAPSULE ORAL 2 TIMES DAILY
Qty: 180 CAPSULE | Refills: 2 | Status: CANCELLED | OUTPATIENT
Start: 2018-10-19

## 2018-10-19 NOTE — TELEPHONE ENCOUNTER
Message from Guide:  Original authorizing provider: TRA Genaosay Kimberly would like a refill of the following medications:  minocycline (MINOCIN/DYNACIN) 100 MG capsule [Inga Ravi PA-C]    Preferred pharmacy: [facility] [address] [place], [place] - [address] [facility]    Comment:  I am due for a physical and Pap smear, but can t get in until November due to my insurance and availability. I will only need one more month of birth control to last until then. I run out at the end of next week otherwise. Thanks!

## 2018-11-08 DIAGNOSIS — N94.6 DYSMENORRHEA: ICD-10-CM

## 2018-11-08 RX ORDER — NORGESTREL-ETHINYL ESTRADIOL 0.3-0.03MG
TABLET ORAL
Qty: 28 TABLET | Refills: 0 | Status: SHIPPED | OUTPATIENT
Start: 2018-11-08 | End: 2018-11-21

## 2018-11-14 ENCOUNTER — MYC REFILL (OUTPATIENT)
Dept: FAMILY MEDICINE | Facility: CLINIC | Age: 23
End: 2018-11-14

## 2018-11-14 DIAGNOSIS — N94.6 DYSMENORRHEA: ICD-10-CM

## 2018-11-14 NOTE — TELEPHONE ENCOUNTER
Message from [employer]:  Original authorizing provider: TRA Genaosay Kimberly would like a refill of the following medications:  CRYSELLE-28 0.3-30 MG-MCG per tablet [Inga Ravi PA-C]    Pref[facility] [address] [place], [place] - [address] [facility]    Comment:  I need one more month as I only have til Saturday left on mine. I miscounted and thought I would be good until my appointment. My appointment for a physical and pap smear is on Wednesday, November 21st.

## 2018-11-21 ENCOUNTER — OFFICE VISIT (OUTPATIENT)
Dept: PEDIATRICS | Facility: CLINIC | Age: 23
End: 2018-11-21
Payer: COMMERCIAL

## 2018-11-21 VITALS
BODY MASS INDEX: 21 KG/M2 | HEIGHT: 64 IN | TEMPERATURE: 99.7 F | DIASTOLIC BLOOD PRESSURE: 68 MMHG | OXYGEN SATURATION: 100 % | HEART RATE: 88 BPM | WEIGHT: 123 LBS | SYSTOLIC BLOOD PRESSURE: 120 MMHG | RESPIRATION RATE: 20 BRPM

## 2018-11-21 DIAGNOSIS — N94.6 DYSMENORRHEA: ICD-10-CM

## 2018-11-21 DIAGNOSIS — Z00.00 ROUTINE GENERAL MEDICAL EXAMINATION AT A HEALTH CARE FACILITY: Primary | ICD-10-CM

## 2018-11-21 DIAGNOSIS — L20.9 ATOPIC DERMATITIS, UNSPECIFIED TYPE: ICD-10-CM

## 2018-11-21 LAB
CHOLEST SERPL-MCNC: 170 MG/DL
HDLC SERPL-MCNC: 50 MG/DL
LDLC SERPL CALC-MCNC: 97 MG/DL
NONHDLC SERPL-MCNC: 120 MG/DL
TRIGL SERPL-MCNC: 113 MG/DL

## 2018-11-21 PROCEDURE — 36415 COLL VENOUS BLD VENIPUNCTURE: CPT | Performed by: PHYSICIAN ASSISTANT

## 2018-11-21 PROCEDURE — 99395 PREV VISIT EST AGE 18-39: CPT | Performed by: PHYSICIAN ASSISTANT

## 2018-11-21 PROCEDURE — 87491 CHLMYD TRACH DNA AMP PROBE: CPT | Performed by: PHYSICIAN ASSISTANT

## 2018-11-21 PROCEDURE — 80061 LIPID PANEL: CPT | Performed by: PHYSICIAN ASSISTANT

## 2018-11-21 RX ORDER — TRIAMCINOLONE ACETONIDE 1 MG/G
CREAM TOPICAL
Qty: 80 G | Refills: 0 | Status: SHIPPED | OUTPATIENT
Start: 2018-11-21 | End: 2019-10-18

## 2018-11-21 NOTE — MR AVS SNAPSHOT
After Visit Summary   11/21/2018    Amy Harris    MRN: [id number]           Patient Information     Date Of Birth          1995        Visit Information        Provider Department      11/21/2018 11:30 AM Samra Case PA-C [facility]        Today's Diagnoses     Routine check-up    -  1    Dysmenorrhea        Atopic dermatitis, unspecified type          Care Instructions    Differin- topical gel for acne.  Use 2-3x/week up to daily.      Preventive Health Recommendations  Female Ages 21 to 25     Yearly exam:     See your health care provider every year in order to  o Review health changes.   o Discuss preventive care.    o Review your medicines if your doctor has prescribed any.      You should be tested each year for STDs (sexually transmitted diseases).       Talk to your provider about how often you should have cholesterol testing.      Get a Pap test every three years. If you have an abnormal result, your doctor may have you test more often.      If you are at risk for diabetes, you should have a diabetes test (fasting glucose).     Shots:     Get a flu shot each year.     Get a tetanus shot every 10 years.     Consider getting the shot (vaccine) that prevents cervical cancer (Gardasil).    Nutrition:     Eat at least 5 servings of fruits and vegetables each day.    Eat whole-grain bread, whole-wheat pasta and brown rice instead of white grains and rice.    Get adequate Calcium and Vitamin D.     Lifestyle    Exercise at least 150 minutes a week each week (30 minutes a day, 5 days a week). This will help you control your weight and prevent disease.    Limit alcohol to one drink per day.    No smoking.     Wear sunscreen to prevent skin cancer.    See your dentist every six months for an exam and cleaning.          Follow-ups after your visit        Who to contact     If you have questions or need follow up information about today's clinic visit or your schedule  "please contact [facility] directly at [phone].  Normal or non-critical lab and imaging results will be communicated to you by MyChart, letter or phone within 4 business days after the clinic has received the results. If you do not hear from us within 7 days, please contact the clinic through CURRENThart or phone. If you have a critical or abnormal lab result, we will notify you by phone as soon as possible.  Submit refill requests through [employer] or call your pharmacy and they will forward the refill request to us. Please allow 3 business days for your refill to be completed.          Additional Information About Your Visit        [employer] Information     [employer] gives you secure access to your electronic health record. If you see a primary care provider, you can also send messages to your care team and make appointments. If you have questions, please call your primary care clinic.  If you do not have a primary care provider, please call [phone] and they will assist you.        Care EveryWhere ID     This is your Care EveryWhere ID. This could be used by other organizations to access your [employer] medical records  [id number]        Your Vitals Were     Pulse Temperature Respirations Height Pulse Oximetry BMI (Body Mass Index)    88 99.7  F (37.6  C) (Oral) 20 5' 3.78\" (1.62 m) 100% 21.26 kg/m2       Blood Pressure from Last 3 Encounters:   11/21/18 120/68   06/11/18 111/72   10/20/17 124/78    Weight from Last 3 Encounters:   11/21/18 123 lb (55.8 kg)   06/11/18 130 lb (59 kg)   10/20/17 118 lb (53.5 kg)              We Performed the Following     Chlamydia trachomatis PCR     Lipid Profile (Chol, Trig, HDL, LDL calc)          Today's Medication Changes          These changes are accurate as of 11/21/18 12:11 PM.  If you have any questions, ask your nurse or doctor.               Start taking these medicines.        Dose/Directions    triamcinolone 0.1 % cream   Commonly known as:  KENALOG "   Used for:  Atopic dermatitis, unspecified type   Started by:  Samra Case PA-C        Apply sparingly to affected area two to three times daily as needed   Quantity:  80 g   Refills:  0            Where to get your medicines      These medications were sent to [facility] [id number] [facility] - [place], [place] - [address] [facility] [address] [facility] [address]     Phone:  [phone]     norgestrel-ethinyl estradiol 0.3-30 MG-MCG per tablet    triamcinolone 0.1 % cream                Primary Care Provider Office Phone # Fax #    Samra Case PA-C [phone]       [address] [facility] [address]        Equal Access to Services     PETER PRINGLE : Hadii sue love hadasho Soomaali, waaxda luqadaha, qaybta kaalmada adeegyada, teddy rousseau . So [facility] [phone].    ATENCIÓN: Si habla español, tiene a flynn disposición servicios gratuitos de asistencia lingüística. [facility] [phone].    We comply with applicable federal civil rights laws and [place] laws. We do not discriminate on the basis of race, color, national origin, age, disability, sex, sexual orientation, or gender identity.            Thank you!     Thank you for choosing [facility]  for your care. Our goal is always to provide you with excellent care. Hearing back from our patients is one way we can continue to improve our services. Please take a few minutes to complete the written survey that you may receive in the mail after your visit with us. Thank you!             Your Updated Medication List - Protect others around you: Learn how to safely use, store and throw away your medicines at www.disposemymeds.org.          This list is accurate as of 11/21/18 12:11 PM.  Always use your most recent med list.                   Brand Name Dispense Instructions for use Diagnosis    ADVIL 200 MG capsule   Generic drug:  ibuprofen      Take 200 mg by mouth every 4 hours as needed.         minocycline 100 MG capsule    MINOCIN/DYNACIN    180 capsule    Take 1 capsule (100 mg) by mouth 2 times daily With food.    Acne vulgaris       norgestrel-ethinyl estradiol 0.3-30 MG-MCG per tablet    CRYSELLE-28    84 tablet    TAKE 1 TABLET BY MOUTH ONCE DAILY. TAKE CONTINUOUSLY FOR EXTENDED CYCLING    Dysmenorrhea       triamcinolone 0.1 % cream    KENALOG    80 g    Apply sparingly to affected area two to three times daily as needed    Atopic dermatitis, unspecified type       TYLENOL PO      Take  by mouth as needed.

## 2018-11-21 NOTE — PATIENT INSTRUCTIONS
Differin- topical gel for acne.  Use 2-3x/week up to daily.      Preventive Health Recommendations  Female Ages 21 to 25     Yearly exam:     See your health care provider every year in order to  o Review health changes.   o Discuss preventive care.    o Review your medicines if your doctor has prescribed any.      You should be tested each year for STDs (sexually transmitted diseases).       Talk to your provider about how often you should have cholesterol testing.      Get a Pap test every three years. If you have an abnormal result, your doctor may have you test more often.      If you are at risk for diabetes, you should have a diabetes test (fasting glucose).     Shots:     Get a flu shot each year.     Get a tetanus shot every 10 years.     Consider getting the shot (vaccine) that prevents cervical cancer (Gardasil).    Nutrition:     Eat at least 5 servings of fruits and vegetables each day.    Eat whole-grain bread, whole-wheat pasta and brown rice instead of white grains and rice.    Get adequate Calcium and Vitamin D.     Lifestyle    Exercise at least 150 minutes a week each week (30 minutes a day, 5 days a week). This will help you control your weight and prevent disease.    Limit alcohol to one drink per day.    No smoking.     Wear sunscreen to prevent skin cancer.    See your dentist every six months for an exam and cleaning.    Preventive Health Recommendations  Female Ages 21 to 25     Yearly exam:     See your health care provider every year in order to  o Review health changes.   o Discuss preventive care.    o Review your medicines if your doctor has prescribed any.      You should be tested each year for STDs (sexually transmitted diseases).       Talk to your provider about how often you should have cholesterol testing.      Get a Pap test every three years. If you have an abnormal result, your doctor may have you test more often.      If you are at risk for diabetes, you should have a diabetes  test (fasting glucose).     Shots:     Get a flu shot each year.     Get a tetanus shot every 10 years.     Consider getting the shot (vaccine) that prevents cervical cancer (Gardasil).    Nutrition:     Eat at least 5 servings of fruits and vegetables each day.    Eat whole-grain bread, whole-wheat pasta and brown rice instead of white grains and rice.    Get adequate Calcium and Vitamin D.     Lifestyle    Exercise at least 150 minutes a week each week (30 minutes a day, 5 days a week). This will help you control your weight and prevent disease.    Limit alcohol to one drink per day.    No smoking.     Wear sunscreen to prevent skin cancer.    See your dentist every six months for an exam and cleaning.

## 2018-11-21 NOTE — PROGRESS NOTES
SUBJECTIVE:   CC: Amy Harris is an 23 year old woman who presents for preventive health visit.     Healthy Habits:    Do you get at least three servings of calcium containing foods daily (dairy, green leafy vegetables, etc.)? yes    Amount of exercise or daily activities, outside of work: 2 day(s) per week    Problems taking medications regularly No    Medication side effects: No    Have you had an eye exam in the past two years? yes    Do you see a dentist twice per year? yes    Do you have sleep apnea, excessive snoring or daytime drowsiness?no          Today's PHQ-2 Score:   PHQ-2 ( 1999 [employer]) 11/21/2018 6/11/2018   Q1: Little interest or pleasure in doing things 0 0   Q2: Feeling down, depressed or hopeless 0 0   PHQ-2 Score 0 0   Q1: Little interest or pleasure in doing things - -   Q2: Feeling down, depressed or hopeless - -   PHQ-2 Score - -       Abuse: Current or Past(Physical, Sexual or Emotional)- No  Do you feel safe in your environment - Yes    Social History   Substance Use Topics     Smoking status: Never Smoker     Smokeless tobacco: Never Used      Comment: Dad smokes outside     Alcohol use Yes      Comment: 1-2 drinks a week     If you drink alcohol do you typically have >3 drinks per day or >7 drinks per week? No                     Reviewed orders with patient.  Reviewed health maintenance and updated orders accordingly - Yes      Mammogram not appropriate for this patient based on age.    Pertinent mammograms are reviewed under the imaging tab.  History of abnormal Pap smear: NO - age 21-29 PAP every 3 years recommended     Reviewed and updated as needed this visit by clinical staff  Tobacco  Allergies  Meds         Reviewed and updated as needed this visit by Provider            ROS:  CONSTITUTIONAL: NEGATIVE for fever, chills, change in weight  INTEGUMENTARU/SKIN: NEGATIVE for worrisome rashes, moles or lesions  EYES: NEGATIVE for vision changes or irritation  ENT: NEGATIVE for  "ear, mouth and throat problems  RESP: NEGATIVE for significant cough or SOB  BREAST: NEGATIVE for masses, tenderness or discharge  CV: NEGATIVE for chest pain, palpitations or peripheral edema  GI: NEGATIVE for nausea, abdominal pain, heartburn, or change in bowel habits  : NEGATIVE for unusual urinary or vaginal symptoms. Periods are regular.  MUSCULOSKELETAL: NEGATIVE for significant arthralgias or myalgia  NEURO: NEGATIVE for weakness, dizziness or paresthesias  PSYCHIATRIC: NEGATIVE for changes in mood or affect    OBJECTIVE:   /68  Pulse 88  Temp 99.7  F (37.6  C) (Oral)  Resp 20  Ht 5' 3.78\" (1.62 m)  Wt 123 lb (55.8 kg)  SpO2 100%  BMI 21.26 kg/m2  EXAM:  GENERAL: healthy, alert and no distress  EYES: Eyes grossly normal to inspection, PERRL and conjunctivae and sclerae normal  HENT: ear canals and TM's normal, nose and mouth without ulcers or lesions  NECK: no adenopathy, no asymmetry, masses, or scars and thyroid normal to palpation  RESP: lungs clear to auscultation - no rales, rhonchi or wheezes  CV: regular rate and rhythm, normal S1 S2, no S3 or S4, no murmur, click or rub, no peripheral edema and peripheral pulses strong  ABDOMEN: soft, nontender, no hepatosplenomegaly, no masses and bowel sounds normal  MS: no gross musculoskeletal defects noted, no edema  SKIN: erythematous maculopapular rash on chest and arm  NEURO: Normal strength and tone, mentation intact and speech normal  PSYCH: mentation appears normal, affect normal/bright    Diagnostic Test Results:  none     ASSESSMENT/PLAN:   1. Routine general medical examination at a health care facility    - Chlamydia trachomatis PCR  - Lipid panel reflex to direct LDL Non-fasting    2. Dysmenorrhea  Refilled medication.  Advised pap smear with adult provider.  - norgestrel-ethinyl estradiol (CRYSELLE-28) 0.3-30 MG-MCG per tablet; TAKE 1 TABLET BY MOUTH ONCE DAILY. TAKE CONTINUOUSLY FOR EXTENDED CYCLING  Dispense: 84 tablet; Refill: " "5    3. Atopic dermatitis, unspecified type  Discussed possible contact dermatitis or similar issue.  Use over-the-counter scent-free and dye-free moisturizers and TMC cream if not improving.    - triamcinolone (KENALOG) 0.1 % cream; Apply sparingly to affected area two to three times daily as needed  Dispense: 80 g; Refill: 0    COUNSELING:   Reviewed preventive health counseling, as reflected in patient instructions       Regular exercise       Healthy diet/nutrition       Contraception       Safe sex practices/STD prevention    BP Readings from Last 1 Encounters:   11/21/18 120/68     Estimated body mass index is 21.26 kg/(m^2) as calculated from the following:    Height as of this encounter: 5' 3.78\" (1.62 m).    Weight as of this encounter: 123 lb (55.8 kg).           reports that she has never smoked. She has never used smokeless tobacco.      Counseling Resources:  ATP IV Guidelines  Pooled Cohorts Equation Calculator  Breast Cancer Risk Calculator  FRAX Risk Assessment  ICSI Preventive Guidelines  Dietary Guidelines for Americans, 2010  USDA's MyPlate  ASA Prophylaxis  Lung CA Screening    Samra Case PA-C  [facility]  "

## 2018-11-22 LAB
C TRACH DNA SPEC QL NAA+PROBE: NEGATIVE
SPECIMEN SOURCE: NORMAL

## 2019-10-15 NOTE — PROGRESS NOTES
"Subjective     Amy Harris is a 24 year old female who presents to clinic today for the following health issues:    HPI   Pap only      Needs refill of OCPs and pap smear. They are working well        Reviewed and updated as needed this visit by Provider         Review of Systems   ROS COMP: Constitutional, HEENT, cardiovascular, pulmonary, gi and gu systems are negative, except as otherwise noted.      Objective    /75   Pulse 77   Temp 98.3  F (36.8  C) (Oral)   Resp 14   Ht 1.6 m (5' 3\")   Wt 55.8 kg (123 lb)   LMP 08/18/2019 (Approximate)   SpO2 99%   Breastfeeding? No   BMI 21.79 kg/m    Body mass index is 21.79 kg/m .  Physical Exam   GENERAL: healthy, alert and no distress  EYES: Eyes grossly normal to inspection, PERRL and conjunctivae and sclerae normal  HENT: ear canals and TM's normal, nose and mouth without ulcers or lesions  NECK: no adenopathy, no asymmetry, masses, or scars and thyroid normal to palpation  RESP: lungs clear to auscultation - no rales, rhonchi or wheezes  CV: regular rate and rhythm, normal S1 S2, no S3 or S4, no murmur, click or rub, no peripheral edema and peripheral pulses strong  ABDOMEN: soft, nontender, no hepatosplenomegaly, no masses and bowel sounds normal   (female): normal female external genitalia, normal urethral meatus, vaginal mucosa pink, moist, well rugated, and normal cervix/adnexa/uterus without masses or discharge  MS: no gross musculoskeletal defects noted, no edema  SKIN: no suspicious lesions or rashes  NEURO: Normal strength and tone, mentation intact and speech normal  PSYCH: mentation appears normal, affect normal/bright    Diagnostic Test Results:  Labs reviewed in Epic        Assessment & Plan     1. Dysmenorrhea  Refill as is working well  - norgestrel-ethinyl estradiol (CRYSELLE-28) 0.3-30 MG-MCG tablet; TAKE 1 TABLET BY MOUTH ONCE DAILY. TAKE CONTINUOUSLY FOR EXTENDED CYCLING  Dispense: 84 tablet; Refill: 5    2. Screening for " cervical cancer  completed  - Pap imaged thin layer screen only - recommended age 21 - 24 years           No follow-ups on file.    Joan White MD  [facility]

## 2019-10-18 ENCOUNTER — OFFICE VISIT (OUTPATIENT)
Dept: FAMILY MEDICINE | Facility: CLINIC | Age: 24
End: 2019-10-18
Payer: COMMERCIAL

## 2019-10-18 VITALS
HEIGHT: 63 IN | HEART RATE: 77 BPM | BODY MASS INDEX: 21.79 KG/M2 | SYSTOLIC BLOOD PRESSURE: 113 MMHG | DIASTOLIC BLOOD PRESSURE: 75 MMHG | RESPIRATION RATE: 14 BRPM | WEIGHT: 123 LBS | TEMPERATURE: 98.3 F | OXYGEN SATURATION: 99 %

## 2019-10-18 DIAGNOSIS — N94.6 DYSMENORRHEA: Primary | ICD-10-CM

## 2019-10-18 DIAGNOSIS — Z12.4 SCREENING FOR CERVICAL CANCER: ICD-10-CM

## 2019-10-18 PROCEDURE — G0145 SCR C/V CYTO,THINLAYER,RESCR: HCPCS | Performed by: FAMILY MEDICINE

## 2019-10-18 PROCEDURE — 99213 OFFICE O/P EST LOW 20 MIN: CPT | Performed by: FAMILY MEDICINE

## 2019-10-18 ASSESSMENT — MIFFLIN-ST. JEOR: SCORE: 1287.05

## 2019-10-22 LAB
COPATH REPORT: NORMAL
PAP: NORMAL

## 2020-02-24 ENCOUNTER — HEALTH MAINTENANCE LETTER (OUTPATIENT)
Age: 25
End: 2020-02-24

## 2020-03-29 DIAGNOSIS — N94.6 DYSMENORRHEA: ICD-10-CM

## 2020-03-30 RX ORDER — NORGESTREL-ETHINYL ESTRADIOL 0.3-0.03MG
TABLET ORAL
Qty: 112 TABLET | Refills: 4 | OUTPATIENT
Start: 2020-03-30

## 2020-03-30 NOTE — TELEPHONE ENCOUNTER
She should already have plenty of refills. She just needs to contact her pharmacy.    Joan White MD

## 2020-06-29 NOTE — PROGRESS NOTES
"Amy Harris is a 25 year old female who is being evaluated via a billable video visit.      The patient has been notified of following:     \"This video visit will be conducted via a call between you and your physician/provider. We have found that certain health care needs can be provided without the need for an in-person physical exam.  This service lets us provide the care you need with a video conversation.  If a prescription is necessary we can send it directly to your pharmacy.  If lab work is needed we can place an order for that and you can then stop by our lab to have the test done at a later time.    Video visits are billed at different rates depending on your insurance coverage.  Please reach out to your insurance provider with any questions.    If during the course of the call the physician/provider feels a video visit is not appropriate, you will not be charged for this service.\"    Patient has given verbal consent for Video visit? Yes  How would you like to obtain your AVS? Karoline  Patient would like the video invitation sent by: Text to cell phone: [phone]  Will anyone else be joining your video visit? No    Subjective     Amy Harris is a 25 year old female who presents today via video visit for the following health issues:    HPI     Per Pt wants to review immunization for school.  Patient does need tdap because last booster did not have pertussis and will need for school to be updated. She can schedule this ancillary.   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   Doesn't haven't have any physical limitations.  Will need form and will bring in when she gets shots.   Needs tb test aso per patient.      Per patient phone call:  Hello,      I will be attending [employer] school this fall and need immunization titers completed for Hep B, Varicella, Measles, Mumps, and Rubella. I also need to get another Tdap immunization because I have not had one in my adult life and although I have had the " booster, I still need it regardless I guess. Would you be able to order these for me? Then I will set up an appointment to get it done, and I will drop off my form to be filled out.     Thank you,     Amy Harris      Video Start Time: 11:20 AM        Patient Active Problem List   Diagnosis     Dysmenorrhea     Menorrhagia     Tension headache     Past Surgical History:   Procedure Laterality Date     NO HISTORY OF SURGERY         Social History     Tobacco Use     Smoking status: Never Smoker     Smokeless tobacco: Never Used     Tobacco comment: Dad smokes outside   Substance Use Topics     Alcohol use: Yes     Comment: 1-2 drinks a week     Family History   Problem Relation Age of Onset     Lipids Mother         borderline     Diabetes Maternal Grandmother      Circulatory Maternal Grandfather         brain aneurysm     Lipids Father      Prostate Cancer Paternal Grandfather      Asthma Brother      Anxiety Disorder Sister      C.A.D. No family hx of      Hypertension No family hx of          Current Outpatient Medications   Medication Sig Dispense Refill     Acetaminophen (TYLENOL PO) Take  by mouth as needed.       Ibuprofen (ADVIL) 200 MG capsule Take 200 mg by mouth every 4 hours as needed.       norgestrel-ethinyl estradiol (CRYSELLE-28) 0.3-30 MG-MCG tablet TAKE 1 TABLET BY MOUTH ONCE DAILY. TAKE CONTINUOUSLY FOR EXTENDED CYCLING 84 tablet 5     No Known Allergies    Reviewed and updated as needed this visit by Provider         Review of Systems   Constitutional, HEENT, cardiovascular, pulmonary, GI, , musculoskeletal, neuro, skin, endocrine and psych systems are negative, except as otherwise noted.      Objective             Physical Exam     GENERAL: Healthy, alert and no distress  EYES: Eyes grossly normal to inspection.  No discharge or erythema, or obvious scleral/conjunctival abnormalities.  RESP: No audible wheeze, cough, or visible cyanosis.  No visible retractions or increased work of  breathing.    SKIN: Visible skin clear. No significant rash, abnormal pigmentation or lesions.  NEURO: Cranial nerves grossly intact.  Mentation and speech appropriate for age.  PSYCH: Mentation appears normal, affect normal/bright, judgement and insight intact, normal speech and appearance well-groomed.      Diagnostic Test Results:  Labs reviewed in Epic        Assessment & Plan   ASSESSMENT / PLAN:    (Z02.9) Encounter for administrative examinations  Comment:   Plan: Hepatitis B Surface Antibody, Rubella Antibody         IgG Quantitative, Mumps Immune Status, IgG,         Rubeola Antibody IgG, Varicella Zoster Virus         Antibody IgG            (Z11.1) Screening examination for pulmonary tuberculosis  Comment:   Plan: Quantiferon TB Gold Plus            Patient Instructions   Schedule Tdap booster on ancillary schedule  Schedule labs ancillary also you can do this with vaccine visit  I will follow up with labs    Send me a [employer] message when wanting to schedule physical                          Return in about 1 day (around 7/1/2020) for tdap vaccine.    Inga Ravi PA-C  [facility]          Video-Visit Details    Type of service:  Video Visit    Video End Time:11:27 AM    Originating Location (pt. Location): Home    Distant Location (provider location):  [facility]     Platform used for Video Visit: Doximalo    No follow-ups on file.       Inga Ravi PA-C

## 2020-06-30 ENCOUNTER — VIRTUAL VISIT (OUTPATIENT)
Dept: FAMILY MEDICINE | Facility: CLINIC | Age: 25
End: 2020-06-30
Payer: COMMERCIAL

## 2020-06-30 DIAGNOSIS — Z11.3 SCREENING FOR STDS (SEXUALLY TRANSMITTED DISEASES): Primary | ICD-10-CM

## 2020-06-30 DIAGNOSIS — Z02.9 ENCOUNTER FOR ADMINISTRATIVE EXAMINATIONS: ICD-10-CM

## 2020-06-30 DIAGNOSIS — Z11.1 SCREENING EXAMINATION FOR PULMONARY TUBERCULOSIS: ICD-10-CM

## 2020-06-30 PROCEDURE — 99213 OFFICE O/P EST LOW 20 MIN: CPT | Mod: 95 | Performed by: PHYSICIAN ASSISTANT

## 2020-06-30 NOTE — PATIENT INSTRUCTIONS
Schedule Tdap booster on ancillary schedule  Schedule labs ancillary also you can do this with vaccine visit  I will follow up with labs    Send me a mychart message when wanting to schedule physical

## 2020-07-06 ENCOUNTER — ANCILLARY PROCEDURE (OUTPATIENT)
Dept: GENERAL RADIOLOGY | Facility: CLINIC | Age: 25
End: 2020-07-06
Attending: PHYSICIAN ASSISTANT
Payer: COMMERCIAL

## 2020-07-06 ENCOUNTER — OFFICE VISIT (OUTPATIENT)
Dept: URGENT CARE | Facility: URGENT CARE | Age: 25
End: 2020-07-06
Payer: COMMERCIAL

## 2020-07-06 VITALS
HEART RATE: 72 BPM | DIASTOLIC BLOOD PRESSURE: 68 MMHG | BODY MASS INDEX: 23.21 KG/M2 | TEMPERATURE: 98.3 F | WEIGHT: 131 LBS | SYSTOLIC BLOOD PRESSURE: 118 MMHG

## 2020-07-06 DIAGNOSIS — Z23 ENCOUNTER FOR IMMUNIZATION: ICD-10-CM

## 2020-07-06 DIAGNOSIS — M79.644 THUMB PAIN, RIGHT: ICD-10-CM

## 2020-07-06 DIAGNOSIS — S63.681A OTHER SPRAIN OF RIGHT THUMB, INITIAL ENCOUNTER: Primary | ICD-10-CM

## 2020-07-06 DIAGNOSIS — Z02.9 ENCOUNTER FOR ADMINISTRATIVE EXAMINATIONS: ICD-10-CM

## 2020-07-06 PROCEDURE — 99213 OFFICE O/P EST LOW 20 MIN: CPT | Mod: 25 | Performed by: PHYSICIAN ASSISTANT

## 2020-07-06 PROCEDURE — 86787 VARICELLA-ZOSTER ANTIBODY: CPT | Performed by: PHYSICIAN ASSISTANT

## 2020-07-06 PROCEDURE — 90715 TDAP VACCINE 7 YRS/> IM: CPT | Performed by: PHYSICIAN ASSISTANT

## 2020-07-06 PROCEDURE — 36415 COLL VENOUS BLD VENIPUNCTURE: CPT | Performed by: PHYSICIAN ASSISTANT

## 2020-07-06 PROCEDURE — 86765 RUBEOLA ANTIBODY: CPT | Performed by: PHYSICIAN ASSISTANT

## 2020-07-06 PROCEDURE — 86735 MUMPS ANTIBODY: CPT | Performed by: PHYSICIAN ASSISTANT

## 2020-07-06 PROCEDURE — 86706 HEP B SURFACE ANTIBODY: CPT | Performed by: PHYSICIAN ASSISTANT

## 2020-07-06 PROCEDURE — 86762 RUBELLA ANTIBODY: CPT | Performed by: PHYSICIAN ASSISTANT

## 2020-07-06 PROCEDURE — 73140 X-RAY EXAM OF FINGER(S): CPT | Mod: RT

## 2020-07-06 PROCEDURE — 90471 IMMUNIZATION ADMIN: CPT | Performed by: PHYSICIAN ASSISTANT

## 2020-07-06 RX ORDER — IBUPROFEN 800 MG/1
800 TABLET, FILM COATED ORAL EVERY 8 HOURS PRN
Qty: 30 TABLET | Refills: 0 | Status: SHIPPED | OUTPATIENT
Start: 2020-07-06 | End: 2022-05-16

## 2020-07-06 ASSESSMENT — ENCOUNTER SYMPTOMS: ARTHRALGIAS: 1

## 2020-07-06 NOTE — PROGRESS NOTES
ibuprofenSUBJECTIVE:   Amy Harris is a 25 year old female presenting with a chief complaint of   Chief Complaint   Patient presents with     Thumb Discomfort     right thumb injury yesterday        She is an established patient of [facility]. Masha presents with RH1D after falling out of a boat and hitting ground.  Patient treated with ice. No previous injury.  RHD.      Review of Systems   Musculoskeletal: Positive for arthralgias.   All other systems reviewed and are negative.      Past Medical History:   Diagnosis Date     RAD (reactive airway disease)      Family History   Problem Relation Age of Onset     Lipids Mother         borderline     Diabetes Maternal Grandmother      Circulatory Maternal Grandfather         brain aneurysm     Lipids Father      Prostate Cancer Paternal Grandfather      Asthma Brother      Anxiety Disorder Sister      C.A.D. No family hx of      Hypertension No family hx of      Current Outpatient Medications   Medication Sig Dispense Refill     Acetaminophen (TYLENOL PO) Take  by mouth as needed.       Ibuprofen (ADVIL) 200 MG capsule Take 200 mg by mouth every 4 hours as needed.       ibuprofen (ADVIL/MOTRIN) 800 MG tablet Take 1 tablet (800 mg) by mouth every 8 hours as needed for moderate pain 30 tablet 0     norgestrel-ethinyl estradiol (CRYSELLE-28) 0.3-30 MG-MCG tablet TAKE 1 TABLET BY MOUTH ONCE DAILY. TAKE CONTINUOUSLY FOR EXTENDED CYCLING 84 tablet 5     Social History     Tobacco Use     Smoking status: Never Smoker     Smokeless tobacco: Never Used     Tobacco comment: Dad smokes outside   Substance Use Topics     Alcohol use: Yes     Comment: 1-2 drinks a week       OBJECTIVE  /68   Pulse 72   Temp 98.3  F (36.8  C) (Tympanic)   Wt 59.4 kg (131 lb)   BMI 23.21 kg/m      Physical Exam  Vitals signs and nursing note reviewed.   Constitutional:       Appearance: Normal appearance. She is normal weight.   Eyes:      Extraocular Movements: Extraocular movements  intact.      Conjunctiva/sclera: Conjunctivae normal.   Musculoskeletal:      Comments: Right thumb:  Negative snuff box, Small amount of ecchymosis and edema to PIP.  Tenderness to same.  Thumb opposition complete, with pain.     Skin:     General: Skin is warm.      Capillary Refill: Capillary refill takes less than 2 seconds.   Neurological:      General: No focal deficit present.      Mental Status: She is alert.   Psychiatric:         Mood and Affect: Mood normal.         Labs:  No results found for this or any previous visit (from the past 24 hour(s)).    X-Ray was done, my findings are: NAD  Xrays personally viewed by myself.        ASSESSMENT:      ICD-10-CM    1. Other sprain of right thumb, initial encounter  S63.681A Wrist/Arm Supplies Order for DME - ONLY FOR DME     ibuprofen (ADVIL/MOTRIN) 800 MG tablet   2. Thumb pain, right  M79.644 XR Finger Right G/E 2 Views        Medical Decision Making:    Differential Diagnosis:  MS Injury Pain: sprain, fracture and tendonitis    Serious Comorbid Conditions:  Adult:  None    PLAN:    Patient placed in a thumb spica splint.  Supportive care.      Followup:    If not improving or if condition worsens, follow up with your Primary Care Provider, If not improving or if conditions worsens over the next 12-24 hours, go to the Emergency Department    There are no Patient Instructions on file for this visit.

## 2020-07-07 NOTE — PATIENT INSTRUCTIONS
Patient Education     Finger Sprain  A sprain is a stretching or tearing of the ligaments that hold a joint together. There are no broken bones. Sprains take 3 to 6 weeks or more to heal.  A sprained finger may be treated with a splint or buddy tape. This is when you tape the injured finger to the one next to it for support. Minor sprains may require no additional support.  Home care    Keep your hand elevated to reduce pain and swelling. This is very important during the first 48 hours.    Apply an ice pack over the injured area for 15 to 20 minutes every 3 to 6 hours. You should do this for the first 24 to 48 hours. You can make an ice pack by filling a plastic bag that seals at the top with ice cubes and then wrapping it with a thin towel. Continue the use of ice packs for relief of pain and swelling as needed. As the ice melts, be careful to avoid getting any wrap or splint wet. After 48 hours, apply heat (warm shower or warm bath) for 15 to 20 minutes several times a day, or alternate ice and heat.    If buddy tape was applied and it becomes wet or dirty, change it. You may replace it with paper, plastic or cloth tape. Cloth tape and paper tapes must be kept dry. Apply gauze or cotton padding between the fingers, especially at the webbed space. This will help prevent the skin from getting moist and breaking down. Keep the buddy tape in place for at least 4 weeks, or as instructed by your healthcare provider.    If a splint was applied, wear it for the time advised.    You may use over-the-counter pain medicine to control pain, unless another pain medicine was prescribed. If you have chronic liver or kidney disease or ever had a stomach ulcer or gastrointestinal bleeding, talk with your healthcare provider before using these medicines.  Follow-up care  Follow up with your healthcare provider, or as directed. Finger joints will become stiff if immobile for too long. If a splint was applied, ask your healthcare  provider when it is safe to begin range-of-motion exercises.  Sometimes fractures don t show up on the first X-ray. Bruises and sprains can sometimes hurt as much as a fracture. These injuries can take time to heal completely. If your symptoms don t improve or they get worse, talk with your healthcare provider. You may need a repeat X-ray. If X-rays were taken, you will be told of any new findings that may affect your care.  When to seek medical advice  Call your healthcare provider right away if any of these occur:    Pain or swelling increases    Fingers or hand becomes cold, blue, numb, or tingly  Date Last Reviewed: 5/1/2018 2000-2019 [employer]. [address] [place] [address], [place] [address]. All rights reserved. This information is not intended as a substitute for professional medical care. Always follow your healthcare professional's instructions.

## 2020-07-08 LAB
HBV SURFACE AB SERPL IA-ACNC: 2.29 M[IU]/ML
MEV IGG SER QL IA: 3.3 AI (ref 0–0.8)
MUV IGG SER QL IA: 6.1 AI (ref 0–0.8)
RUBV IGG SERPL IA-ACNC: 29 IU/ML
VZV IGG SER QL IA: 4.1 AI (ref 0–0.8)

## 2020-07-09 ENCOUNTER — TELEPHONE (OUTPATIENT)
Dept: FAMILY MEDICINE | Facility: CLINIC | Age: 25
End: 2020-07-09

## 2020-07-09 NOTE — TELEPHONE ENCOUNTER
Inga Ravi PA-C [facility]               PLEASE CALL PATIENT:   Dear Amy,       It was a pleasure to see you at your recent office visit.  Your test results are listed below.  Hep b shows you need a booster of this, you are not immune. Please schedule. You are immune to chicken pox (varicella) and also to MMR.         If you have any questions or concerns, please call the clinic at [phone].     Sincerely,   Inga Ravi PA-C

## 2020-07-09 NOTE — TELEPHONE ENCOUNTER
Left message on answering machine for patient to call back to [phone].  Tala Melgar BSN, RN

## 2020-07-09 NOTE — RESULT ENCOUNTER NOTE
PLEASE CALL PATIENT:  Dear Amy,      It was a pleasure to see you at your recent office visit.  Your test results are listed below.  Hep b shows you need a booster of this, you are not immune. Please schedule. You are immune to chicken pox (varicella) and also to MMR.         If you have any questions or concerns, please call the clinic at [phone].    Sincerely,  Inga Ravi PA-C

## 2020-07-10 NOTE — TELEPHONE ENCOUNTER
Pt returned call and left message on triage voice mail, attempted to call pt again and left provider message as written on pt vm.  I requested call back to confirm she received my message.  Tala MCCARTHYN, RN

## 2020-07-10 NOTE — TELEPHONE ENCOUNTER
Left message on answering machine for patient to call back to [phone].  Tala Melgar BSN, RN

## 2020-07-10 NOTE — TELEPHONE ENCOUNTER
Pt received my call and requests physical appointment with Inga SHEPPARD.  Appointment made.  Tala MCCARTHYN, RN

## 2020-07-24 NOTE — PROGRESS NOTES
SUBJECTIVE:   CC: Amy Harris is an 25 year old woman who presents for preventive health visit.     Healthy Habits:    No pap due.     Concerns:  1)  White spots on arms on and off for a while now and wants to know if it is something. Dry and itchy. Brother has eczema. Has hydrocortisone which helps but not permanently.  Admits she is bad at moisturizing she forgets.     2)  Dry scalp on and off per pt would like to see if there is anything that can help it.  Tried dandruff shampoo.   Since October.   Is itchy.   Also gets dandruff.        3)  Forms for school per pt and needing booster for HEP B?  Had labs already we went over these. Not immune to hep b will get booster. See more below.   Going to [employer].         Due for std screening. Is sexually active.   Declines same partner for 7 years.         Do you get at least three servings of calcium containing foods daily (dairy, green leafy vegetables, etc.)? yes    Amount of exercise or daily activities, outside of work: 3-4 day(s) per week    Problems taking medications regularly No    Medication side effects: No    Have you had an eye exam in the past two years? yes    Do you see a dentist twice per year? yes    Do you have sleep apnea, excessive snoring or daytime drowsiness?no      Today's PHQ-2 Score:   PHQ-2 ( 1999 Pfizer) 6/30/2020 10/18/2019   Q1: Little interest or pleasure in doing things 0 0   Q2: Feeling down, depressed or hopeless 0 0   PHQ-2 Score 0 0   Q1: Little interest or pleasure in doing things - -   Q2: Feeling down, depressed or hopeless - -   PHQ-2 Score - -       Abuse: Current or Past(Physical, Sexual or Emotional)- No  Do you feel safe in your environment? Yes        Social History     Tobacco Use     Smoking status: Never Smoker     Smokeless tobacco: Never Used     Tobacco comment: Dad smokes outside   Substance Use Topics     Alcohol use: Yes     Comment: 1-2 drinks a week     If you drink alcohol do you typically  have >3 drinks per day or >7 drinks per week? No                     Reviewed orders with patient.  Reviewed health maintenance and updated orders accordingly - Yes  Lab work is in process  Labs reviewed in EPIC  BP Readings from Last 3 Encounters:   07/27/20 114/78   07/06/20 118/68   10/18/19 113/75    Wt Readings from Last 3 Encounters:   07/27/20 58.1 kg (128 lb)   07/06/20 59.4 kg (131 lb)   10/18/19 55.8 kg (123 lb)                  Patient Active Problem List   Diagnosis     Dysmenorrhea     Menorrhagia     Tension headache     Past Surgical History:   Procedure Laterality Date     NO HISTORY OF SURGERY         Social History     Tobacco Use     Smoking status: Never Smoker     Smokeless tobacco: Never Used     Tobacco comment: Dad smokes outside   Substance Use Topics     Alcohol use: Yes     Comment: 1-2 drinks a week     Family History   Problem Relation Age of Onset     Lipids Mother         borderline     Diabetes Maternal Grandmother      Circulatory Maternal Grandfather         brain aneurysm     Lipids Father      Prostate Cancer Paternal Grandfather      Asthma Brother      Anxiety Disorder Sister      C.A.D. No family hx of      Hypertension No family hx of            Mammogram not appropriate for this patient based on age.    Pertinent mammograms are reviewed under the imaging tab.  History of abnormal Pap smear: NO - age 21-29 PAP every 3 years recommended  PAP / HPV 10/18/2019   PAP NIL     Reviewed and updated as needed this visit by clinical staff  Tobacco  Allergies  Meds  Med Hx  Surg Hx  Fam Hx  Soc Hx        Reviewed and updated as needed this visit by Provider        Past Medical History:   Diagnosis Date     RAD (reactive airway disease)       Past Surgical History:   Procedure Laterality Date     NO HISTORY OF SURGERY       OB History   No obstetric history on file.       ROS:  CONSTITUTIONAL: NEGATIVE for fever, chills, change in weight  INTEGUMENTARU/SKIN: NEGATIVE for worrisome  "rashes, moles or lesions  EYES: NEGATIVE for vision changes or irritation  ENT: NEGATIVE for ear, mouth and throat problems  RESP: NEGATIVE for significant cough or SOB  BREAST: NEGATIVE for masses, tenderness or discharge  CV: NEGATIVE for chest pain, palpitations or peripheral edema  GI: NEGATIVE for nausea, abdominal pain, heartburn, or change in bowel habits  : NEGATIVE for unusual urinary or vaginal symptoms. Periods are regular.  MUSCULOSKELETAL: NEGATIVE for significant arthralgias or myalgia  NEURO: NEGATIVE for weakness, dizziness or paresthesias  PSYCHIATRIC: NEGATIVE for changes in mood or affect    OBJECTIVE:   /78   Pulse 82   Temp 98  F (36.7  C) (Tympanic)   Resp 14   Ht 1.6 m (5' 3\")   Wt 58.1 kg (128 lb)   LMP 05/27/2020 (Approximate)   SpO2 100%   Breastfeeding No   BMI 22.67 kg/m    EXAM:  GENERAL: healthy, alert and no distress  EYES: Eyes grossly normal to inspection, PERRL and conjunctivae and sclerae normal  HENT: ear canals and TM's normal, nose and mouth without ulcers or lesions  NECK: no adenopathy, no asymmetry, masses, or scars and thyroid normal to palpation  RESP: lungs clear to auscultation - no rales, rhonchi or wheezes  BREAST: normal without masses, tenderness or nipple discharge and no palpable axillary masses or adenopathy  CV: regular rate and rhythm, normal S1 S2, no S3 or S4, no murmur, click or rub, no peripheral edema and peripheral pulses strong  ABDOMEN: soft, nontender, no hepatosplenomegaly, no masses and bowel sounds normal  MS: no gross musculoskeletal defects noted, no edema  SKIN: no suspicious lesions arms-flexural dry patches consistent with eczema. Occipital region of scalp-dry plaques with white scale and dandruff present  NEURO: Normal strength and tone, mentation intact and speech normal  PSYCH: mentation appears normal, affect normal/bright    Diagnostic Test Results:  Labs reviewed in Epic    ASSESSMENT/PLAN:   1. Routine general medical " "examination at a health care facility    - Hepatitis B Surface Antibody; Future  - Quantiferon-TB Gold Plus    2. Screening for STDs (sexually transmitted diseases)  declined    3. Eczema, unspecified type  See below we went over everything in detail  - triamcinolone (KENALOG) 0.1 % external cream; Apply topically 2 times daily To affected area for up to 2 weeks or until gone. Do not apply on face.  Dispense: 60 g; Refill: 0  - clobetasol (TEMOVATE) 0.05 % external solution; Apply topically 2 times daily To scalp for up to 3 weeks or until rash is gone. Do not put on face.  Dispense: 50 mL; Refill: 1    4. Need for hepatitis B vaccination    - HEPATITIS B VACCINE, ADULT, IM    COUNSELING:   Reviewed preventive health counseling, as reflected in patient instructions       Regular exercise       Healthy diet/nutrition       Vision screening       Hearing screening    Estimated body mass index is 22.67 kg/m  as calculated from the following:    Height as of this encounter: 1.6 m (5' 3\").    Weight as of this encounter: 58.1 kg (128 lb).         reports that she has never smoked. She has never used smokeless tobacco.     Patient Instructions     We can recheck hep b titer in two months lab only future lab pended    Use over the counter aquaphor at night daily regularly  Also use cerave or eucerin advanced repair cream twice daily on any area that is prone to eczema  If things do not improve in a month let me know and I will refer you to dermatology  Avoid scents and hot showers        Preventive Health Recommendations  Female Ages 21 to 25     Yearly exam:     See your health care provider every year in order to  o Review health changes.   o Discuss preventive care.    o Review your medicines if your doctor has prescribed any.      You should be tested each year for STDs (sexually transmitted diseases).       Talk to your provider about how often you should have cholesterol testing.      Get a Pap test every three " years. If you have an abnormal result, your doctor may have you test more often.      If you are at risk for diabetes, you should have a diabetes test (fasting glucose).     Shots:     Get a flu shot each year.     Get a tetanus shot every 10 years.     Consider getting the shot (vaccine) that prevents cervical cancer (Gardasil).    Nutrition:     Eat at least 5 servings of fruits and vegetables each day.    Eat whole-grain bread, whole-wheat pasta and brown rice instead of white grains and rice.    Get adequate Calcium and Vitamin D.     Lifestyle    Exercise at least 150 minutes a week each week (30 minutes a day, 5 days a week). This will help you control your weight and prevent disease.    Limit alcohol to one drink per day.    No smoking.     Wear sunscreen to prevent skin cancer.    See your dentist every six months for an exam and cleaning.  Patient Education     Managing Atopic Dermatitis (Eczema)     After bathing, gently pat your skin dry (don t rub). Apply moisturizer while your skin is still damp.   To manage your symptoms and help reduce the severity and frequency, try these self-care tips:  Caring for your skin  Use a gentle, fragrance-free cleanser (or nonsoap cleanser) for bathing. Rinse well. Pat skin dry.  Take warm, not hot, baths or showers. Try to limit them to no more that 10 to 15 minutes.   Use moisturizer liberally right after you bathe, while your skin is still damp.  Avoid scratching because it will cause more damage to your skin.   Topical, over-the-counter hydrocortisone cream may help control mild symptoms.   Controlling your environment  Avoid extreme heat or cold.  Avoid very humid or very dry air.  If your home or office air is very dry, use a humidifier.  Avoid allergens, such as dust, that may be present in bedding, carpets, plush toys, or rugs.  Know that pet hair and dander can cause flare-ups.  Seeking medical treatment  Another way to keep symptoms under control is to seek  medical treatment. Talk with your healthcare provider about the type of treatment that may work best for you. Your provider may prescribe treatments such as the following:  Topical treatments to put on the skin daily  Medicines taken by mouth (oral medicines), such as antihistamines, antibiotics, or corticosteroids  In severe cases shots (injections) may be needed to control the symptoms. You may even need antibiotics if skin infections occur.  Treatments don t work the same way for every person. So if your symptoms continue or get worse, ask your healthcare provider about other treatments.  Making lifestyle choices  Manage the stress in your life.  Wear loose-fitting cotton clothing that does not bind or rub your skin.  Avoid contact with wool or other scratchy fabrics.  Use fragrance-free products.  Getting good results  Now that you know more about atopic dermatitis, the next step is up to you. Follow your healthcare provider s treatment plan and your self-care routine. This will help bring atopic dermatitis under control. If your symptoms persist, be sure to let your health care provider know.   Date Last Reviewed: 2/1/2017 2000-2019 [employer]. [address] [place] [address], [place] [address]. All rights reserved. This information is not intended as a substitute for professional medical care. Always follow your healthcare professional's instructions.               FORMS FILLED OUT FOR PATIENT ALSO.       Counseling Resources:  ATP IV Guidelines  Pooled Cohorts Equation Calculator  Breast Cancer Risk Calculator  FRAX Risk Assessment  ICSI Preventive Guidelines  Dietary Guidelines for Americans, 2010  USDA's MyPlate  ASA Prophylaxis  Lung CA Screening    Inga Ravi PA-C  [facility]

## 2020-07-24 NOTE — PATIENT INSTRUCTIONS
We can recheck hep b titer in two months lab only future lab pended    Use over the counter aquaphor at night daily regularly  Also use cerave or eucerin advanced repair cream twice daily on any area that is prone to eczema  If things do not improve in a month let me know and I will refer you to dermatology  Avoid scents and hot showers        Preventive Health Recommendations  Female Ages 21 to 25     Yearly exam:     See your health care provider every year in order to  o Review health changes.   o Discuss preventive care.    o Review your medicines if your doctor has prescribed any.      You should be tested each year for STDs (sexually transmitted diseases).       Talk to your provider about how often you should have cholesterol testing.      Get a Pap test every three years. If you have an abnormal result, your doctor may have you test more often.      If you are at risk for diabetes, you should have a diabetes test (fasting glucose).     Shots:     Get a flu shot each year.     Get a tetanus shot every 10 years.     Consider getting the shot (vaccine) that prevents cervical cancer (Gardasil).    Nutrition:     Eat at least 5 servings of fruits and vegetables each day.    Eat whole-grain bread, whole-wheat pasta and brown rice instead of white grains and rice.    Get adequate Calcium and Vitamin D.     Lifestyle    Exercise at least 150 minutes a week each week (30 minutes a day, 5 days a week). This will help you control your weight and prevent disease.    Limit alcohol to one drink per day.    No smoking.     Wear sunscreen to prevent skin cancer.    See your dentist every six months for an exam and cleaning.  Patient Education     Managing Atopic Dermatitis (Eczema)     After bathing, gently pat your skin dry (don t rub). Apply moisturizer while your skin is still damp.   To manage your symptoms and help reduce the severity and frequency, try these self-care tips:  Caring for your skin  Use a gentle,  fragrance-free cleanser (or nonsoap cleanser) for bathing. Rinse well. Pat skin dry.  Take warm, not hot, baths or showers. Try to limit them to no more that 10 to 15 minutes.   Use moisturizer liberally right after you bathe, while your skin is still damp.  Avoid scratching because it will cause more damage to your skin.   Topical, over-the-counter hydrocortisone cream may help control mild symptoms.   Controlling your environment  Avoid extreme heat or cold.  Avoid very humid or very dry air.  If your home or office air is very dry, use a humidifier.  Avoid allergens, such as dust, that may be present in bedding, carpets, plush toys, or rugs.  Know that pet hair and dander can cause flare-ups.  Seeking medical treatment  Another way to keep symptoms under control is to seek medical treatment. Talk with your healthcare provider about the type of treatment that may work best for you. Your provider may prescribe treatments such as the following:  Topical treatments to put on the skin daily  Medicines taken by mouth (oral medicines), such as antihistamines, antibiotics, or corticosteroids  In severe cases shots (injections) may be needed to control the symptoms. You may even need antibiotics if skin infections occur.  Treatments don t work the same way for every person. So if your symptoms continue or get worse, ask your healthcare provider about other treatments.  Making lifestyle choices  Manage the stress in your life.  Wear loose-fitting cotton clothing that does not bind or rub your skin.  Avoid contact with wool or other scratchy fabrics.  Use fragrance-free products.  Getting good results  Now that you know more about atopic dermatitis, the next step is up to you. Follow your healthcare provider s treatment plan and your self-care routine. This will help bring atopic dermatitis under control. If your symptoms persist, be sure to let your health care provider know.   Date Last Reviewed: 2/1/2017 2000-2019 The  [email or web address]. [address] [place] [address], [place] [address]. All rights reserved. This information is not intended as a substitute for professional medical care. Always follow your healthcare professional's instructions.

## 2020-07-27 ENCOUNTER — OFFICE VISIT (OUTPATIENT)
Dept: FAMILY MEDICINE | Facility: CLINIC | Age: 25
End: 2020-07-27
Payer: COMMERCIAL

## 2020-07-27 VITALS
HEIGHT: 63 IN | BODY MASS INDEX: 22.68 KG/M2 | OXYGEN SATURATION: 100 % | DIASTOLIC BLOOD PRESSURE: 78 MMHG | RESPIRATION RATE: 14 BRPM | HEART RATE: 82 BPM | SYSTOLIC BLOOD PRESSURE: 114 MMHG | TEMPERATURE: 98 F | WEIGHT: 128 LBS

## 2020-07-27 DIAGNOSIS — Z00.00 ROUTINE GENERAL MEDICAL EXAMINATION AT A HEALTH CARE FACILITY: Primary | ICD-10-CM

## 2020-07-27 DIAGNOSIS — Z11.3 SCREENING FOR STDS (SEXUALLY TRANSMITTED DISEASES): ICD-10-CM

## 2020-07-27 DIAGNOSIS — Z23 NEED FOR HEPATITIS B VACCINATION: ICD-10-CM

## 2020-07-27 DIAGNOSIS — L30.9 ECZEMA, UNSPECIFIED TYPE: ICD-10-CM

## 2020-07-27 PROCEDURE — 36415 COLL VENOUS BLD VENIPUNCTURE: CPT | Performed by: PHYSICIAN ASSISTANT

## 2020-07-27 PROCEDURE — 90746 HEPB VACCINE 3 DOSE ADULT IM: CPT | Performed by: PHYSICIAN ASSISTANT

## 2020-07-27 PROCEDURE — 90471 IMMUNIZATION ADMIN: CPT | Performed by: PHYSICIAN ASSISTANT

## 2020-07-27 PROCEDURE — 99213 OFFICE O/P EST LOW 20 MIN: CPT | Mod: 25 | Performed by: PHYSICIAN ASSISTANT

## 2020-07-27 PROCEDURE — 86481 TB AG RESPONSE T-CELL SUSP: CPT | Performed by: PHYSICIAN ASSISTANT

## 2020-07-27 PROCEDURE — 99395 PREV VISIT EST AGE 18-39: CPT | Mod: 25 | Performed by: PHYSICIAN ASSISTANT

## 2020-07-27 RX ORDER — CLOBETASOL PROPIONATE 0.5 MG/ML
SOLUTION TOPICAL 2 TIMES DAILY
Qty: 50 ML | Refills: 1 | Status: SHIPPED | OUTPATIENT
Start: 2020-07-27 | End: 2023-03-27

## 2020-07-27 RX ORDER — TRIAMCINOLONE ACETONIDE 1 MG/G
CREAM TOPICAL 2 TIMES DAILY
Qty: 60 G | Refills: 0 | Status: SHIPPED | OUTPATIENT
Start: 2020-07-27 | End: 2023-03-27

## 2020-07-27 ASSESSMENT — MIFFLIN-ST. JEOR: SCORE: 1304.73

## 2020-07-29 LAB
GAMMA INTERFERON BACKGROUND BLD IA-ACNC: 0 IU/ML
M TB IFN-G CD4+ BCKGRND COR BLD-ACNC: 5.23 IU/ML
M TB TUBERC IFN-G BLD QL: NEGATIVE
MITOGEN IGNF BCKGRD COR BLD-ACNC: 0 IU/ML
MITOGEN IGNF BCKGRD COR BLD-ACNC: 0.01 IU/ML

## 2020-07-29 NOTE — RESULT ENCOUNTER NOTE
Ruth Ann Reyes,       Your recent test results are attached, if you have any questions or concerns please feel free to contact me via e-mail or call [phone]. Negative quant gold/tb test.        It was a pleasure to see you at your recent office visit.      Sincerely,  Inga Ravi PA-C

## 2020-09-30 ENCOUNTER — LAB SERVICES (OUTPATIENT)
Dept: LAB | Age: 25
End: 2020-09-30

## 2020-09-30 ENCOUNTER — TRANSFERRED RECORDS (OUTPATIENT)
Dept: HEALTH INFORMATION MANAGEMENT | Facility: CLINIC | Age: 25
End: 2020-09-30

## 2020-09-30 DIAGNOSIS — Z00.00 ROUTINE GENERAL MEDICAL EXAMINATION AT A HEALTH CARE FACILITY: Primary | ICD-10-CM

## 2020-09-30 DIAGNOSIS — Z00.00 ROUTINE GENERAL MEDICAL EXAMINATION AT A HEALTH CARE FACILITY: ICD-10-CM

## 2020-09-30 LAB — HBV SURFACE AB SER QL: POSITIVE

## 2020-09-30 PROCEDURE — 36415 COLL VENOUS BLD VENIPUNCTURE: CPT | Performed by: CLINICAL MEDICAL LABORATORY

## 2020-09-30 PROCEDURE — 86706 HEP B SURFACE ANTIBODY: CPT | Performed by: CLINICAL MEDICAL LABORATORY

## 2020-12-16 DIAGNOSIS — N94.6 DYSMENORRHEA: ICD-10-CM

## 2020-12-17 RX ORDER — NORGESTREL-ETHINYL ESTRADIOL 0.3-0.03MG
TABLET ORAL
Qty: 112 TABLET | Refills: 1 | Status: SHIPPED | OUTPATIENT
Start: 2020-12-17 | End: 2021-05-13

## 2021-04-23 NOTE — PROGRESS NOTES
SUBJECTIVE:   CC: Amy Harris is an 26 year old woman who presents for preventive health visit.     Patient has been advised of split billing requirements and indicates understanding: Yes     Healthy Habits:    Pt is not fasting and did not have labs completed.    Chlamydia test is due and pt declined at this time.  Monogamous relationship for 8 years.       Up to date on pap.       Answers for HPI/ROS submitted by the patient on 5/13/2021   Annual Exam:  Frequency of exercise:: 4-5 days/week  Getting at least 3 servings of Calcium per day:: Yes  Diet:: Regular (no restrictions)  Taking medications regularly:: Yes  Medication side effects:: None  Bi-annual eye exam:: Yes  Dental care twice a year:: Yes  Sleep apnea or symptoms of sleep apnea:: None  abdominal pain: No  Blood in stool: No  Blood in urine: No  chest pain: No  chills: No  congestion: No  constipation: No  cough: No  diarrhea: No  dizziness: No  ear pain: No  eye pain: No  nervous/anxious: No  fever: No  frequency: No  genital sores: No  headaches: No  hearing loss: No  heartburn: Yes  arthralgias: No  joint swelling: No  peripheral edema: No  mood changes: No  myalgias: No  nausea: No  dysuria: No  palpitations: No  Skin sensation changes: No  sore throat: No  urgency: No  rash: No  shortness of breath: No  visual disturbance: No  weakness: No  pelvic pain: No  vaginal bleeding: No  vaginal discharge: No  tenderness: No  breast mass: No  breast discharge: No  Additional concerns today:: YES  -Discuss getting forms for school filled out.  -needs orders for TB gold.  Duration of exercise:: 30-45 minutes    Will do fasting labs next year.     Goes to [employer] school in [place]. Will be in her second year now.     Periods-regular. No concerns.  Is on chanda.   Sees derm for acne/sprionolactone.   Also on doxycyline right now.       Sees derm in [place].     Today's PHQ-2 Score:   PHQ-2 ( 1999 Pfizer) 6/30/2020 10/18/2019   Q1: Little interest  or pleasure in doing things 0 0   Q2: Feeling down, depressed or hopeless 0 0   PHQ-2 Score 0 0   Q1: Little interest or pleasure in doing things - -   Q2: Feeling down, depressed or hopeless - -   PHQ-2 Score - -       Abuse: Current or Past(Physical, Sexual or Emotional)- No  Do you feel safe in your environment? Yes        Social History     Tobacco Use     Smoking status: Never Smoker     Smokeless tobacco: Never Used     Tobacco comment: Dad smokes outside   Substance Use Topics     Alcohol use: Yes     Comment: 1-2 drinks a week     If you drink alcohol do you typically have >3 drinks per day or >7 drinks per week? No                     Reviewed orders with patient.  Reviewed health maintenance and updated orders accordingly - Yes  Lab work is in process  Labs reviewed in EPIC  BP Readings from Last 3 Encounters:   05/13/21 116/81   07/27/20 114/78   07/06/20 118/68    Wt Readings from Last 3 Encounters:   05/13/21 53.1 kg (117 lb)   07/27/20 58.1 kg (128 lb)   07/06/20 59.4 kg (131 lb)                  Patient Active Problem List   Diagnosis     Dysmenorrhea     Menorrhagia     Tension headache     Past Surgical History:   Procedure Laterality Date     NO HISTORY OF SURGERY         Social History     Tobacco Use     Smoking status: Never Smoker     Smokeless tobacco: Never Used     Tobacco comment: Dad smokes outside   Substance Use Topics     Alcohol use: Yes     Comment: 3-4 drinks a week     Family History   Problem Relation Age of Onset     Lipids Mother         borderline     Diabetes Maternal Grandmother      Circulatory Maternal Grandfather         brain aneurysm     Lipids Father      Hyperlipidemia Father      Prostate Cancer Paternal Grandfather      Asthma Brother      Anxiety Disorder Sister      C.A.D. No family hx of      Hypertension No family hx of          Current Outpatient Medications   Medication Sig Dispense Refill     Acetaminophen (TYLENOL PO) Take  by mouth as needed.       clobetasol  (TEMOVATE) 0.05 % external solution Apply topically 2 times daily To scalp for up to 3 weeks or until rash is gone. Do not put on face. 50 mL 1     doxycycline hyclate (VIBRA-TABS) 100 MG tablet Take 100 mg by mouth 2 times daily       drospirenone-ethinyl estradiol (IDANIA) 3-0.02 MG tablet Take 1 tablet by mouth daily       Ibuprofen (ADVIL) 200 MG capsule Take 200 mg by mouth every 4 hours as needed.       spironolactone (ALDACTONE) 100 MG tablet Take 100 mg by mouth daily       triamcinolone (KENALOG) 0.1 % external cream Apply topically 2 times daily To affected area for up to 2 weeks or until gone. Do not apply on face. 60 g 0     ibuprofen (ADVIL/MOTRIN) 800 MG tablet Take 1 tablet (800 mg) by mouth every 8 hours as needed for moderate pain (Patient not taking: Reported on 5/13/2021) 30 tablet 0     No Known Allergies        Pertinent mammograms are reviewed under the imaging tab.  History of abnormal Pap smear: NO - age 21-29 PAP every 3 years recommended  PAP / HPV 10/18/2019   PAP NIL     Reviewed and updated as needed this visit by clinical staff                 Reviewed and updated as needed this visit by Provider                Past Medical History:   Diagnosis Date     RAD (reactive airway disease)       Past Surgical History:   Procedure Laterality Date     NO HISTORY OF SURGERY       OB History   No obstetric history on file.         ROS:  CONSTITUTIONAL: NEGATIVE for fever, chills, change in weight  INTEGUMENTARU/SKIN: NEGATIVE for worrisome rashes, moles or lesions  EYES: NEGATIVE for vision changes or irritation  ENT: NEGATIVE for ear, mouth and throat problems  RESP: NEGATIVE for significant cough or SOB  BREAST: NEGATIVE for masses, tenderness or discharge  CV: NEGATIVE for chest pain, palpitations or peripheral edema  GI: NEGATIVE for nausea, abdominal pain, heartburn, or change in bowel habits  : NEGATIVE for unusual urinary or vaginal symptoms. Periods are regular.  MUSCULOSKELETAL: NEGATIVE  "for significant arthralgias or myalgia  NEURO: NEGATIVE for weakness, dizziness or paresthesias  PSYCHIATRIC: NEGATIVE for changes in mood or affect    OBJECTIVE:   /81   Pulse 109   Temp 98.6  F (37  C) (Tympanic)   Resp 14   Ht 1.6 m (5' 3\")   Wt 53.1 kg (117 lb)   LMP 04/13/2021 (Approximate)   SpO2 99%   Breastfeeding No   BMI 20.73 kg/m    EXAM:  GENERAL: healthy, alert and no distress  EYES: Eyes grossly normal to inspection, PERRL and conjunctivae and sclerae normal  HENT: ear canals and TM's normal, nose and mouth without ulcers or lesions  NECK: no adenopathy, no asymmetry, masses, or scars and thyroid normal to palpation  RESP: lungs clear to auscultation - no rales, rhonchi or wheezes  BREAST: normal without masses, tenderness or nipple discharge and no palpable axillary masses or adenopathy  CV: regular rate and rhythm, normal S1 S2, no S3 or S4, no murmur, click or rub, no peripheral edema and peripheral pulses strong  ABDOMEN: soft, nontender, no hepatosplenomegaly, no masses and bowel sounds normal  MS: no gross musculoskeletal defects noted, no edema  SKIN: no suspicious lesions or rashes  NEURO: Normal strength and tone, mentation intact and speech normal  PSYCH: mentation appears normal, affect normal/bright    Diagnostic Test Results:  Labs reviewed in Epic    ASSESSMENT/PLAN:       ICD-10-CM    1. Routine general medical examination at a health care facility  Z00.00    2. Screening examination for pulmonary tuberculosis  Z11.1 Quantiferon-TB Gold Plus       Patient has been advised of split billing requirements and indicates understanding: Yes  COUNSELING:   Reviewed preventive health counseling, as reflected in patient instructions       Regular exercise    Estimated body mass index is 22.67 kg/m  as calculated from the following:    Height as of 7/27/20: 1.6 m (5' 3\").    Weight as of 7/27/20: 58.1 kg (128 lb).        She reports that she has never smoked. She has never used " smokeless tobacco.      Counseling Resources:  ATP IV Guidelines  Pooled Cohorts Equation Calculator  Breast Cancer Risk Calculator  BRCA-Related Cancer Risk Assessment: FHS-7 Tool  FRAX Risk Assessment  ICSI Preventive Guidelines  Dietary Guidelines for Americans, 2010  USDA's MyPlate  ASA Prophylaxis  Lung CA Screening    [facility]

## 2021-05-13 ENCOUNTER — OFFICE VISIT (OUTPATIENT)
Dept: FAMILY MEDICINE | Facility: CLINIC | Age: 26
End: 2021-05-13
Payer: COMMERCIAL

## 2021-05-13 VITALS
TEMPERATURE: 98.6 F | OXYGEN SATURATION: 99 % | HEART RATE: 109 BPM | WEIGHT: 117 LBS | DIASTOLIC BLOOD PRESSURE: 81 MMHG | RESPIRATION RATE: 14 BRPM | BODY MASS INDEX: 20.73 KG/M2 | SYSTOLIC BLOOD PRESSURE: 116 MMHG | HEIGHT: 63 IN

## 2021-05-13 DIAGNOSIS — Z11.1 SCREENING EXAMINATION FOR PULMONARY TUBERCULOSIS: ICD-10-CM

## 2021-05-13 DIAGNOSIS — Z00.00 ROUTINE GENERAL MEDICAL EXAMINATION AT A HEALTH CARE FACILITY: Primary | ICD-10-CM

## 2021-05-13 PROCEDURE — 86481 TB AG RESPONSE T-CELL SUSP: CPT | Performed by: PHYSICIAN ASSISTANT

## 2021-05-13 PROCEDURE — 36415 COLL VENOUS BLD VENIPUNCTURE: CPT | Performed by: PHYSICIAN ASSISTANT

## 2021-05-13 PROCEDURE — 99395 PREV VISIT EST AGE 18-39: CPT | Performed by: PHYSICIAN ASSISTANT

## 2021-05-13 RX ORDER — SPIRONOLACTONE 100 MG/1
100 TABLET, FILM COATED ORAL DAILY
COMMUNITY
End: 2022-05-16

## 2021-05-13 RX ORDER — DOXYCYCLINE HYCLATE 100 MG
100 TABLET ORAL 2 TIMES DAILY
COMMUNITY
End: 2022-05-16

## 2021-05-13 RX ORDER — DROSPIRENONE AND ETHINYL ESTRADIOL 0.02-3(28)
1 KIT ORAL EVERY MORNING
COMMUNITY
End: 2023-05-08

## 2021-05-13 ASSESSMENT — ENCOUNTER SYMPTOMS
SHORTNESS OF BREATH: 0
HEARTBURN: 1
CONSTIPATION: 0
BREAST MASS: 0
NAUSEA: 0
PALPITATIONS: 0
HEMATOCHEZIA: 0
ARTHRALGIAS: 0
NERVOUS/ANXIOUS: 0
WEAKNESS: 0
FREQUENCY: 0
SORE THROAT: 0
HEADACHES: 0
EYE PAIN: 0
FEVER: 0
MYALGIAS: 0
CHILLS: 0
HEMATURIA: 0
ABDOMINAL PAIN: 0
DIARRHEA: 0
JOINT SWELLING: 0
DYSURIA: 0
PARESTHESIAS: 0
COUGH: 0
DIZZINESS: 0

## 2021-05-13 ASSESSMENT — MIFFLIN-ST. JEOR: SCORE: 1249.84

## 2021-05-14 LAB
GAMMA INTERFERON BACKGROUND BLD IA-ACNC: 0 IU/ML
M TB IFN-G CD4+ BCKGRND COR BLD-ACNC: 8.03 IU/ML
M TB TUBERC IFN-G BLD QL: NEGATIVE
MITOGEN IGNF BCKGRD COR BLD-ACNC: 0 IU/ML
MITOGEN IGNF BCKGRD COR BLD-ACNC: 0.02 IU/ML

## 2021-05-14 NOTE — RESULT ENCOUNTER NOTE
Ruth Ann Reyes,       Your recent test results are attached, if you have any questions or concerns please feel free to contact me via e-mail or call [phone]. Tb gold negative for tb.       It was a pleasure to see you at your recent office visit.      Sincerely,  Inga Ravi PA-C

## 2021-09-08 ENCOUNTER — MYC MEDICAL ADVICE (OUTPATIENT)
Dept: FAMILY MEDICINE | Facility: CLINIC | Age: 26
End: 2021-09-08

## 2022-03-31 ENCOUNTER — MYC MEDICAL ADVICE (OUTPATIENT)
Dept: FAMILY MEDICINE | Facility: CLINIC | Age: 27
End: 2022-03-31
Payer: COMMERCIAL

## 2022-04-22 ENCOUNTER — MYC MEDICAL ADVICE (OUTPATIENT)
Dept: FAMILY MEDICINE | Facility: CLINIC | Age: 27
End: 2022-04-22
Payer: COMMERCIAL

## 2022-05-13 NOTE — PATIENT INSTRUCTIONS
Call imaging to schedule ultrasound of lump [phone]     Preventive Health Recommendations  Female Ages 21 to 25     Yearly exam:   See your health care provider every year in order to  Review health changes.   Discuss preventive care.    Review your medicines if your doctor has prescribed any.    You should be tested each year for STDs (sexually transmitted diseases).     Talk to your provider about how often you should have cholesterol testing.    Get a Pap test every three years. If you have an abnormal result, your doctor may have you test more often.    If you are at risk for diabetes, you should have a diabetes test (fasting glucose).     Shots:   Get a flu shot each year.   Get a tetanus shot every 10 years.   Consider getting the shot (vaccine) that prevents cervical cancer (Gardasil).    Nutrition:   Eat at least 5 servings of fruits and vegetables each day.  Eat whole-grain bread, whole-wheat pasta and brown rice instead of white grains and rice.  Get adequate Calcium and Vitamin D.     Lifestyle  Exercise at least 150 minutes a week each week (30 minutes a day, 5 days a week). This will help you control your weight and prevent disease.  Limit alcohol to one drink per day.  No smoking.   Wear sunscreen to prevent skin cancer.  See your dentist every six months for an exam and cleaning.

## 2022-05-13 NOTE — PROGRESS NOTES
SUBJECTIVE:   CC: Amy Harris is an 27 year old woman who presents for preventive health visit.     Patient has been advised of split billing requirements and indicates understanding: Yes    Pt is fasting.  Menorrhagia- on chanda. Works well. No concerns. Gets through derm.   Also on spironolactone for this as well. Will check potassium today.   PAP is due.  Std screening?  Not needed    - Lump on the neck per pt growing. Possible lymph node?  R middle clavicular area.  Not sick. Not painful. Noticed in December. Has not grown. No unintentional weight loss, night sweats, or associated symptoms.     - TB gold testing needed as well.         Healthy Habits:     Getting at least 3 servings of Calcium per day:  Yes    Bi-annual eye exam:  Yes    Dental care twice a year:  Yes    Sleep apnea or symptoms of sleep apnea:  None    Diet:  Regular (no restrictions)    Frequency of exercise:  2-3 days/week    Duration of exercise:  30-45 minutes    Taking medications regularly:  Yes    Medication side effects:  None    PHQ-2 Total Score: 0    Additional concerns today:  Yes              Today's PHQ-2 Score:   PHQ-2 ( 1999 Pfizer) 5/13/2021   Q1: Little interest or pleasure in doing things 0   Q2: Feeling down, depressed or hopeless 0   PHQ-2 Score 0   PHQ-2 Total Score (12-17 Years)- Positive if 3 or more points; Administer PHQ-A if positive 0   Q1: Little interest or pleasure in doing things Not at all   Q2: Feeling down, depressed or hopeless Not at all   PHQ-2 Score 0       Abuse: Current or Past (Physical, Sexual or Emotional) - No  Do you feel safe in your environment? Yes    Have you ever done Advance Care Planning? (For example, a Health Directive, POLST, or a discussion with a medical provider or your loved ones about your wishes): No, advance care planning information given to patient to review.  Patient declined advance care planning discussion at this time.    Social History     Tobacco Use     Smoking status:  Never Smoker     Smokeless tobacco: Never Used     Tobacco comment: Dad smokes outside   Substance Use Topics     Alcohol use: Yes     Comment: 3-4 drinks a week     If you drink alcohol do you typically have >3 drinks per day or >7 drinks per week? No    Alcohol Use 5/13/2021   Prescreen: >3 drinks/day or >7 drinks/week? No   Prescreen: >3 drinks/day or >7 drinks/week? -   No flowsheet data found.    Reviewed orders with patient.  Reviewed health maintenance and updated orders accordingly - Yes      Breast Cancer Screening:  Any new diagnosis of family breast, ovarian, or bowel cancer? No    FHS-7: No flowsheet data found.  click delete button to remove this line now      History of abnormal Pap smear: NO - age 21-29 PAP every 3 years recommended  PAP / HPV 10/18/2019   PAP (Historical) NIL     Reviewed and updated as needed this visit by clinical staff                    Reviewed and updated as needed this visit by Provider                   Past Medical History:   Diagnosis Date     RAD (reactive airway disease)       Past Surgical History:   Procedure Laterality Date     NO HISTORY OF SURGERY       OB History   No obstetric history on file.       Review of Systems   Constitutional: Negative for chills and fever.   HENT: Negative for congestion, ear pain, hearing loss and sore throat.    Eyes: Negative for pain and visual disturbance.   Respiratory: Negative for cough and shortness of breath.    Cardiovascular: Negative for chest pain, palpitations and peripheral edema.   Gastrointestinal: Negative for abdominal pain, constipation, diarrhea, heartburn, hematochezia and nausea.   Breasts:  Negative for tenderness, breast mass and discharge.   Genitourinary: Negative for dysuria, frequency, genital sores, hematuria, pelvic pain, urgency, vaginal bleeding and vaginal discharge.   Musculoskeletal: Negative for arthralgias, joint swelling and myalgias.   Skin: Negative for rash.   Neurological: Negative for dizziness,  "weakness, headaches and paresthesias.   Psychiatric/Behavioral: Negative for mood changes. The patient is not nervous/anxious.        OBJECTIVE:   /76   Pulse 99   Temp 98.1  F (36.7  C) (Tympanic)   Resp 14   Ht 1.6 m (5' 3\")   Wt 54 kg (119 lb)   LMP  (Approximate)   SpO2 99%   Breastfeeding No   BMI 21.08 kg/m    Physical Exam  GENERAL: healthy, alert and no distress  EYES: Eyes grossly normal to inspection, PERRL and conjunctivae and sclerae normal  HENT: ear canals and TM's normal, nose and mouth without ulcers or lesions  NECK: {:normal thyroid. Right side of neck towards midline of clavicle(superior to clavicle) there is a firm feeling area patient is feeling. ? Tendon/muscle to me but difficult to tell. Will get imaging.   RESP: lungs clear to auscultation - no rales, rhonchi or wheezes  BREAST: normal without masses, tenderness or nipple discharge and no palpable axillary masses or adenopathy  CV: regular rate and rhythm, normal S1 S2, no S3 or S4, no murmur, click or rub, no peripheral edema and peripheral pulses strong  ABDOMEN: soft, nontender, no hepatosplenomegaly, no masses and bowel sounds normal   (female): normal female external genitalia, normal urethral meatus, vaginal mucosa pink, moist, well rugated, and normal cervix with some bleeding after swabbing,  without masses or discharge  MS: no gross musculoskeletal defects noted, no edema  SKIN: no suspicious lesions or rashes  NEURO: Normal strength and tone, mentation intact and speech normal  PSYCH: mentation appears normal, affect normal/bright      ASSESSMENT/PLAN:   (Z00.00) Routine general medical examination at a health care facility  (primary encounter diagnosis)  Comment:   Plan: Basic metabolic panel  (Ca, Cl, CO2, Creat,         Gluc, K, Na, BUN)            (N92.0) Menorrhagia with regular cycle  Comment: better on ocp   Plan:     (R22.1) Localized swelling, mass and lump, neck  Comment:  Feels like normal muscle/tendon " "area to me but due to patient symptoms will get US to make sure. F/u ct/ent in future if continues to be bothersome. I will f/u wtih US  Plan: US Head Neck Soft Tissue            (Z71.84) Travel advice encounter  Comment:   Plan: gave typhoid already had hep a      (Z12.4) Cervical cancer screening  Comment:   Plan: Pap Screen reflex to HPV if ASCUS - recommend         age 25 - 29            (Z11.1) Screening examination for pulmonary tuberculosis  Comment:   Plan: Quantiferon TB Gold Plus            (Z13.220) Screening, lipid  Comment:   Plan: Lipid panel reflex to direct LDL Fasting              Patient has been advised of split billing requirements and indicates understanding: Yes    COUNSELING:  Reviewed preventive health counseling, as reflected in patient instructions       Regular exercise       Healthy diet/nutrition    Estimated body mass index is 20.73 kg/m  as calculated from the following:    Height as of 5/13/21: 1.6 m (5' 3\").    Weight as of 5/13/21: 53.1 kg (117 lb).        She reports that she has never smoked. She has never used smokeless tobacco.    Patient Instructions   Call imaging to schedule ultrasound of lump [phone]     Preventive Health Recommendations  Female Ages 21 to 25     Yearly exam:     See your health care provider every year in order to  o Review health changes.   o Discuss preventive care.    o Review your medicines if your doctor has prescribed any.      You should be tested each year for STDs (sexually transmitted diseases).       Talk to your provider about how often you should have cholesterol testing.      Get a Pap test every three years. If you have an abnormal result, your doctor may have you test more often.      If you are at risk for diabetes, you should have a diabetes test (fasting glucose).     Shots:     Get a flu shot each year.     Get a tetanus shot every 10 years.     Consider getting the shot (vaccine) that prevents cervical cancer " (Gardasil).    Nutrition:     Eat at least 5 servings of fruits and vegetables each day.    Eat whole-grain bread, whole-wheat pasta and brown rice instead of white grains and rice.    Get adequate Calcium and Vitamin D.     Lifestyle    Exercise at least 150 minutes a week each week (30 minutes a day, 5 days a week). This will help you control your weight and prevent disease.    Limit alcohol to one drink per day.    No smoking.     Wear sunscreen to prevent skin cancer.    See your dentist every six months for an exam and cleaning.        Counseling Resources:  ATP IV Guidelines  Pooled Cohorts Equation Calculator  Breast Cancer Risk Calculator  BRCA-Related Cancer Risk Assessment: FHS-7 Tool  FRAX Risk Assessment  ICSI Preventive Guidelines  Dietary Guidelines for Americans, 2010  USDA's MyPlate  ASA Prophylaxis  Lung CA Screening    [facility]

## 2022-05-16 ENCOUNTER — MYC MEDICAL ADVICE (OUTPATIENT)
Dept: FAMILY MEDICINE | Facility: CLINIC | Age: 27
End: 2022-05-16

## 2022-05-16 ENCOUNTER — OFFICE VISIT (OUTPATIENT)
Dept: FAMILY MEDICINE | Facility: CLINIC | Age: 27
End: 2022-05-16
Payer: COMMERCIAL

## 2022-05-16 VITALS
TEMPERATURE: 98.1 F | HEART RATE: 99 BPM | BODY MASS INDEX: 21.09 KG/M2 | DIASTOLIC BLOOD PRESSURE: 76 MMHG | RESPIRATION RATE: 14 BRPM | SYSTOLIC BLOOD PRESSURE: 115 MMHG | WEIGHT: 119 LBS | HEIGHT: 63 IN | OXYGEN SATURATION: 99 %

## 2022-05-16 DIAGNOSIS — Z12.4 CERVICAL CANCER SCREENING: ICD-10-CM

## 2022-05-16 DIAGNOSIS — R22.1 LOCALIZED SWELLING, MASS AND LUMP, NECK: ICD-10-CM

## 2022-05-16 DIAGNOSIS — Z71.84 TRAVEL ADVICE ENCOUNTER: ICD-10-CM

## 2022-05-16 DIAGNOSIS — Z00.00 ROUTINE GENERAL MEDICAL EXAMINATION AT A HEALTH CARE FACILITY: Primary | ICD-10-CM

## 2022-05-16 DIAGNOSIS — Z11.1 SCREENING EXAMINATION FOR PULMONARY TUBERCULOSIS: ICD-10-CM

## 2022-05-16 DIAGNOSIS — Z13.220 SCREENING, LIPID: ICD-10-CM

## 2022-05-16 DIAGNOSIS — N92.0 MENORRHAGIA WITH REGULAR CYCLE: ICD-10-CM

## 2022-05-16 LAB
ANION GAP SERPL CALCULATED.3IONS-SCNC: 8 MMOL/L (ref 3–14)
BUN SERPL-MCNC: 10 MG/DL (ref 7–30)
CALCIUM SERPL-MCNC: 9.6 MG/DL (ref 8.5–10.1)
CHLORIDE BLD-SCNC: 103 MMOL/L (ref 94–109)
CHOLEST SERPL-MCNC: 186 MG/DL
CO2 SERPL-SCNC: 24 MMOL/L (ref 20–32)
CREAT SERPL-MCNC: 0.62 MG/DL (ref 0.52–1.04)
FASTING STATUS PATIENT QL REPORTED: YES
GFR SERPL CREATININE-BSD FRML MDRD: >90 ML/MIN/1.73M2
GLUCOSE BLD-MCNC: 85 MG/DL (ref 70–99)
HDLC SERPL-MCNC: 68 MG/DL
LDLC SERPL CALC-MCNC: 101 MG/DL
NONHDLC SERPL-MCNC: 118 MG/DL
POTASSIUM BLD-SCNC: 4.1 MMOL/L (ref 3.4–5.3)
SODIUM SERPL-SCNC: 135 MMOL/L (ref 133–144)
TRIGL SERPL-MCNC: 87 MG/DL

## 2022-05-16 PROCEDURE — 36415 COLL VENOUS BLD VENIPUNCTURE: CPT | Performed by: PHYSICIAN ASSISTANT

## 2022-05-16 PROCEDURE — 80048 BASIC METABOLIC PNL TOTAL CA: CPT | Performed by: PHYSICIAN ASSISTANT

## 2022-05-16 PROCEDURE — 90471 IMMUNIZATION ADMIN: CPT | Performed by: PHYSICIAN ASSISTANT

## 2022-05-16 PROCEDURE — 99213 OFFICE O/P EST LOW 20 MIN: CPT | Mod: 25 | Performed by: PHYSICIAN ASSISTANT

## 2022-05-16 PROCEDURE — 80061 LIPID PANEL: CPT | Performed by: PHYSICIAN ASSISTANT

## 2022-05-16 PROCEDURE — 86481 TB AG RESPONSE T-CELL SUSP: CPT | Performed by: PHYSICIAN ASSISTANT

## 2022-05-16 PROCEDURE — 99395 PREV VISIT EST AGE 18-39: CPT | Mod: 25 | Performed by: PHYSICIAN ASSISTANT

## 2022-05-16 PROCEDURE — 90691 TYPHOID VACCINE IM: CPT | Performed by: PHYSICIAN ASSISTANT

## 2022-05-16 PROCEDURE — G0145 SCR C/V CYTO,THINLAYER,RESCR: HCPCS | Performed by: PHYSICIAN ASSISTANT

## 2022-05-16 RX ORDER — SPIRONOLACTONE 100 MG/1
200 TABLET, FILM COATED ORAL EVERY MORNING
COMMUNITY
Start: 2022-05-16 | End: 2023-06-26

## 2022-05-16 ASSESSMENT — ENCOUNTER SYMPTOMS
ARTHRALGIAS: 0
DIARRHEA: 0
FREQUENCY: 0
PARESTHESIAS: 0
FEVER: 0
HEADACHES: 0
BREAST MASS: 0
CONSTIPATION: 0
HEMATOCHEZIA: 0
MYALGIAS: 0
NERVOUS/ANXIOUS: 0
PALPITATIONS: 0
HEARTBURN: 0
ABDOMINAL PAIN: 0
WEAKNESS: 0
JOINT SWELLING: 0
COUGH: 0
SHORTNESS OF BREATH: 0
NAUSEA: 0
DYSURIA: 0
HEMATURIA: 0
SORE THROAT: 0
CHILLS: 0
DIZZINESS: 0
EYE PAIN: 0

## 2022-05-17 LAB
QUANTIFERON MITOGEN: 1.71 IU/ML
QUANTIFERON NIL TUBE: 0 IU/ML
QUANTIFERON TB1 TUBE: 0 IU/ML
QUANTIFERON TB2 TUBE: 0

## 2022-05-17 NOTE — RESULT ENCOUNTER NOTE
Ruth Ann Reyes,       Your recent test results are attached, if you have any questions or concerns please feel free to contact me via e-mail or call [phone].  Cholesterol is to goal. Sodium and potassium normal. Blood sugar (glucose) normal.  Creatinine and GFR normal, which means kidney function is normal.            It was a pleasure to see you at your recent office visit.      Sincerely,  Inga Ravi PA-C

## 2022-05-18 LAB
BKR LAB AP GYN ADEQUACY: NORMAL
BKR LAB AP GYN INTERPRETATION: NORMAL
BKR LAB AP HPV REFLEX: NORMAL
BKR LAB AP PREVIOUS ABNORMAL: NORMAL
GAMMA INTERFERON BACKGROUND BLD IA-ACNC: 0 IU/ML
M TB IFN-G BLD-IMP: NEGATIVE
M TB IFN-G CD4+ BCKGRND COR BLD-ACNC: 1.71 IU/ML
MITOGEN IGNF BCKGRD COR BLD-ACNC: 0 IU/ML
MITOGEN IGNF BCKGRD COR BLD-ACNC: 0 IU/ML
PATH REPORT.COMMENTS IMP SPEC: NORMAL
PATH REPORT.COMMENTS IMP SPEC: NORMAL
PATH REPORT.RELEVANT HX SPEC: NORMAL

## 2022-05-19 NOTE — RESULT ENCOUNTER NOTE
Ruth Ann Reyes,       Your recent test results are attached, if you have any questions or concerns please feel free to contact me via e-mail or call [phone].  TB test negative       It was a pleasure to see you at your recent office visit.      Sincerely,  Inga Ravi PA-C

## 2022-05-23 ENCOUNTER — MYC MEDICAL ADVICE (OUTPATIENT)
Dept: FAMILY MEDICINE | Facility: CLINIC | Age: 27
End: 2022-05-23

## 2022-05-23 ENCOUNTER — ANCILLARY PROCEDURE (OUTPATIENT)
Dept: ULTRASOUND IMAGING | Facility: CLINIC | Age: 27
End: 2022-05-23
Attending: PHYSICIAN ASSISTANT
Payer: COMMERCIAL

## 2022-05-23 DIAGNOSIS — R22.1 NECK MASS: Primary | ICD-10-CM

## 2022-05-23 DIAGNOSIS — R22.1 LOCALIZED SWELLING, MASS AND LUMP, NECK: ICD-10-CM

## 2022-05-23 LAB — RADIOLOGIST FLAGS: ABNORMAL

## 2022-05-23 PROCEDURE — 76536 US EXAM OF HEAD AND NECK: CPT | Mod: TC | Performed by: RADIOLOGY

## 2022-05-23 NOTE — TELEPHONE ENCOUNTER
TC - Could you get this to the best place you think could help her.  Thank you. Irene Zuñiga R.N.

## 2022-05-23 NOTE — RESULT ENCOUNTER NOTE
PLEASE CALL PATIENT:  Dear Amy,      It was a pleasure to see you at your recent office visit.  Your test results are listed below.  Ultrasound showed nonspecific finding of a 4.6 cm right sided mass above your collar bone area. This needs to be further worked up by CT with contrast please schedule asap schedule this week please.  To get a better idea of what it is we will need contrast enhanced CT.   I will follow up once I have these results.           If you have any questions or concerns, please call the clinic at [phone].    Sincerely,  Inga Ravi PA-C

## 2022-05-26 ENCOUNTER — PATIENT OUTREACH (OUTPATIENT)
Dept: ONCOLOGY | Facility: CLINIC | Age: 27
End: 2022-05-26

## 2022-05-26 ENCOUNTER — ANCILLARY PROCEDURE (OUTPATIENT)
Dept: CT IMAGING | Facility: CLINIC | Age: 27
End: 2022-05-26
Attending: PHYSICIAN ASSISTANT
Payer: COMMERCIAL

## 2022-05-26 DIAGNOSIS — R59.1 LYMPHADENOPATHY: ICD-10-CM

## 2022-05-26 DIAGNOSIS — R22.1 NECK MASS: ICD-10-CM

## 2022-05-26 DIAGNOSIS — R22.1 NECK MASS: Primary | ICD-10-CM

## 2022-05-26 PROCEDURE — 70491 CT SOFT TISSUE NECK W/DYE: CPT | Mod: TC | Performed by: RADIOLOGY

## 2022-05-26 RX ORDER — IOPAMIDOL 755 MG/ML
100 INJECTION, SOLUTION INTRAVASCULAR ONCE
Status: COMPLETED | OUTPATIENT
Start: 2022-05-26 | End: 2022-05-26

## 2022-05-26 RX ADMIN — IOPAMIDOL 100 ML: 755 INJECTION, SOLUTION INTRAVASCULAR at 09:07

## 2022-05-26 NOTE — RESULT ENCOUNTER NOTE
Called patient today personally at 3:34 pm. Spoke for 10 minutes through [facility] phone delvin.  Uncle with non-hodkins. Otherwise no FH.   Referral information given. She will contact me with questions or if she does not hear from them soon. 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    Sincerely,  Inga Ravi PA-C

## 2022-05-31 NOTE — TELEPHONE ENCOUNTER
FUTURE VISIT INFORMATION      FUTURE VISIT INFORMATION:    Date: 6/6/2022    Time: 11 AM    [place] [facility]  REFERRAL INFORMATION:    Referring provider:  SILVER Genao    Referring providers clinic:  [facility]    Reason for visit/diagnosis: Neck Mass, Lymphadenopathy    RECORDS REQUESTED FROM:       Clinic name Comments Records Status Imaging Status   [facility] 5/16/22 - [facility]26[facility] - CT Neck  5/23/22 - US Head/Neck [facility] PAC[facility]

## 2022-06-06 ENCOUNTER — PREP FOR PROCEDURE (OUTPATIENT)
Dept: OTOLARYNGOLOGY | Facility: CLINIC | Age: 27
End: 2022-06-06

## 2022-06-06 ENCOUNTER — PRE VISIT (OUTPATIENT)
Dept: OTOLARYNGOLOGY | Facility: CLINIC | Age: 27
End: 2022-06-06
Payer: COMMERCIAL

## 2022-06-06 ENCOUNTER — ANCILLARY PROCEDURE (OUTPATIENT)
Dept: CT IMAGING | Facility: CLINIC | Age: 27
End: 2022-06-06
Attending: STUDENT IN AN ORGANIZED HEALTH CARE EDUCATION/TRAINING PROGRAM
Payer: COMMERCIAL

## 2022-06-06 ENCOUNTER — OFFICE VISIT (OUTPATIENT)
Dept: OTOLARYNGOLOGY | Facility: CLINIC | Age: 27
End: 2022-06-06
Payer: COMMERCIAL

## 2022-06-06 ENCOUNTER — PATIENT OUTREACH (OUTPATIENT)
Dept: OTOLARYNGOLOGY | Facility: CLINIC | Age: 27
End: 2022-06-06

## 2022-06-06 VITALS
DIASTOLIC BLOOD PRESSURE: 77 MMHG | WEIGHT: 119.93 LBS | BODY MASS INDEX: 21.25 KG/M2 | SYSTOLIC BLOOD PRESSURE: 119 MMHG | TEMPERATURE: 97.8 F | HEART RATE: 125 BPM | HEIGHT: 63 IN

## 2022-06-06 DIAGNOSIS — R59.1 LYMPHADENOPATHY: Primary | ICD-10-CM

## 2022-06-06 DIAGNOSIS — R59.1 LYMPHADENOPATHY: ICD-10-CM

## 2022-06-06 PROCEDURE — 71260 CT THORAX DX C+: CPT | Performed by: RADIOLOGY

## 2022-06-06 PROCEDURE — 99203 OFFICE O/P NEW LOW 30 MIN: CPT | Performed by: STUDENT IN AN ORGANIZED HEALTH CARE EDUCATION/TRAINING PROGRAM

## 2022-06-06 RX ORDER — IOPAMIDOL 755 MG/ML
58 INJECTION, SOLUTION INTRAVASCULAR ONCE
Status: COMPLETED | OUTPATIENT
Start: 2022-06-06 | End: 2022-06-06

## 2022-06-06 RX ADMIN — IOPAMIDOL 58 ML: 755 INJECTION, SOLUTION INTRAVASCULAR at 12:32

## 2022-06-06 ASSESSMENT — PAIN SCALES - GENERAL: PAINLEVEL: NO PAIN (0)

## 2022-06-06 NOTE — NURSING NOTE
Teaching Flowsheet - ENT   Relevant Diagnosis: lymphadenopathy   Teaching Topic: excisional node biopsy of right neck  Person(s) involved in teaching: Patient, mother and father     Motivation Level:  Asks Questions:   Yes  Eager to Learn:   Yes  Cooperative:   Yes  Receptive (willing/able to accept information):   Yes  Comments: Reviewed pre-op H and P,  NPO prior to  surgery,  pre-op scrub (given Hibiclens)  Reviewed post-op  cares , activity and pain.     Patient demonstrates understanding of the following:  Reason for the appointment, diagnosis and treatment plan:   Yes  Knowledge of proper use of medications and conditions for which they are ordered (with special attention to potential side effects or drug interactions):  stop aspirin products 1 week before surgery Yes  Which situations necessitate calling provider and whom to contact:   Yes  Nutritional needs and diet plan:   Yes  Pain management techniques:   Yes  Patient instructed on hand hygiene:  Yes  How and/when to access community resources:   Yes     Infection Prevention:  Patient   demonstrates understanding of the following:  Surgical procedure site care taught Yes  Signs and symptoms of infection taught Yes  Wound care taught Yes  Instructional Materials Used/Given: pre- op booklet,verbal  Instruction.    Jie Hines, RN, BSN

## 2022-06-06 NOTE — PROGRESS NOTES
June 6, 2022       Inga Ravi PA-C  [facility]   [address] [place], [place] [address]        Dear Ms. Ravi,    I had the pleasure of meeting Ms. Harris today in clinic.    HISTORY OF PRESENT ILLNESS:  As you know, she is a pleasant 27-year-old woman referred for a right neck and mediastinal mass.  Amy says she noticed this in the fall.  She eventually had an ultrasound of the neck initially on 05/23/2022 that demonstrated a right supraclavicular mass and the inferior extent could not be seen.  Therefore, she had a followup CT scan of the neck that demonstrated a large conglomerate mass involving the right lower neck and the superior mediastinum without a clear visualization of the inferior extent of this.  She is here today to discuss lymph node biopsy.    PAST MEDICAL HISTORY:  None.    PAST SURGICAL HISTORY:  None.    MEDICATIONS:     1.  Clobetasol.  2.  Birth control.  3.  Spironolactone.    ALLERGIES:  No known drug allergies.    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  She drinks alcohol occasionally.  No drug use.    FAMILY HISTORY:  None.    REVIEW OF SYSTEMS:  A 10-point review of system was performed and is negative aside from HPI.    PHYSICAL EXAMINATION:  She is alert, in no acute distress.  She is breathing comfortably on room air.  She has no stridor.  There is fullness in the right supraclavicular area and I am able to palpate a neck mass.  No other lymphadenopathy is appreciated in the neck.    ASSESSMENT AND PLAN:  A 27-year-old woman presenting with a right supraclavicular neck mass and superior mediastinal neck mass.  Based on the imaging, this is highly concerning for lymphoma.  Given the concern for lymphoma, we will bypass the typical fine needle aspirate and core needle biopsy in favor of an excisional node biopsy.  I am not able to feel it adequately today in clinic to safely do a biopsy in  clinic.  Therefore, we will do this in the operating room.  I would like to meet with the anesthesiology department preoperatively to discuss the anesthetic risks.    Thank you for allowing me to participate in the care of this patient. If you have any further questions, please do not hesitate to contact me.      Sincerely,      Jevon Peters M.D.      Head and Neck Surgical Oncology and Microvascular Reconstruction  Department of Otolaryngology - Head and Neck Surgery  [facility]        30 minutes spent on the date of the encounter in chart review, patient visit, review of tests, documentation and/or discussion with other providers about the issues documented above.

## 2022-06-06 NOTE — TELEPHONE ENCOUNTER
FUTURE VISIT INFORMATION      SURGERY INFORMATION:    Date: 22    Location: uu or    Surgeon:  Jevon Peters MD    Anesthesia Type:  general    Procedure: Excisional lymph node biopsy of right neck    Consult: ov     RECORDS REQUESTED FROM:        Primary Care Provider: Inga Ravi PA-C  - [facility]    Most recent EKG+ Tracin/16/10

## 2022-06-06 NOTE — PROGRESS NOTES
Called and spoke with patient with update that we were able to schedule surgery for Thursday 6/9 with Dr. Peters. Patient verbalized understanding. Discussed she will receive a call from pre-op nurses to confirm arrival time. Patient encouraged to call with further questions or concerns.       Jie Hines, RN, BSN

## 2022-06-06 NOTE — LETTER
6/6/2022       RE: Amy Harris  [address] [place] [address]     Dear Colleague,    Thank you for referring your patient, Amy Harris, to the [facility] NOSE AND [facility] [place] at [facility]. Please see a copy of my visit note below.    June 6, 2022       Inga Ravi PA-C  [facility]   [address] [place], [place] [address]        Dear Ms. Ravi,    I had the pleasure of meeting Ms. Harris today in clinic.    HISTORY OF PRESENT ILLNESS:  As you know, she is a pleasant 27-year-old woman referred for a right neck and mediastinal mass.  Amy says she noticed this in the fall.  She eventually had an ultrasound of the neck initially on 05/23/2022 that demonstrated a right supraclavicular mass and the inferior extent could not be seen.  Therefore, she had a followup CT scan of the neck that demonstrated a large conglomerate mass involving the right lower neck and the superior mediastinum without a clear visualization of the inferior extent of this.  She is here today to discuss lymph node biopsy.    PAST MEDICAL HISTORY:  None.    PAST SURGICAL HISTORY:  None.    MEDICATIONS:     1.  Clobetasol.  2.  Birth control.  3.  Spironolactone.    ALLERGIES:  No known drug allergies.    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  She drinks alcohol occasionally.  No drug use.    FAMILY HISTORY:  None.    REVIEW OF SYSTEMS:  A 10-point review of system was performed and is negative aside from HPI.    PHYSICAL EXAMINATION:  She is alert, in no acute distress.  She is breathing comfortably on room air.  She has no stridor.  There is fullness in the right supraclavicular area and I am able to palpate a neck mass.  No other lymphadenopathy is appreciated in the neck.    ASSESSMENT AND PLAN:  A 27-year-old woman presenting with a right supraclavicular neck  mass and superior mediastinal neck mass.  Based on the imaging, this is highly concerning for lymphoma.  Given the concern for lymphoma, we will bypass the typical fine needle aspirate and core needle biopsy in favor of an excisional node biopsy.  I am not able to feel it adequately today in clinic to safely do a biopsy in clinic.  Therefore, we will do this in the operating room.  I would like to meet with the anesthesiology department preoperatively to discuss the anesthetic risks.    Thank you for allowing me to participate in the care of this patient. If you have any further questions, please do not hesitate to contact me.      Sincerely,      Jevon Peters M.D.      Head and Neck Surgical Oncology and Microvascular Reconstruction  Department of Otolaryngology - Head and Neck Surgery  [facility]        30 minutes spent on the date of the encounter in chart review, patient visit, review of tests, documentation and/or discussion with other providers about the issues documented above.         Again, thank you for allowing me to participate in the care of your patient.      Sincerely,    Jevon Peters MD

## 2022-06-07 ENCOUNTER — PRE VISIT (OUTPATIENT)
Dept: SURGERY | Facility: CLINIC | Age: 27
End: 2022-06-07
Payer: COMMERCIAL

## 2022-06-07 ENCOUNTER — ANESTHESIA EVENT (OUTPATIENT)
Dept: SURGERY | Facility: CLINIC | Age: 27
End: 2022-06-07
Payer: COMMERCIAL

## 2022-06-07 ENCOUNTER — VIRTUAL VISIT (OUTPATIENT)
Dept: SURGERY | Facility: CLINIC | Age: 27
End: 2022-06-07
Payer: COMMERCIAL

## 2022-06-07 DIAGNOSIS — R59.1 LYMPHADENOPATHY: ICD-10-CM

## 2022-06-07 DIAGNOSIS — Z01.818 PRE-OP EXAMINATION: Primary | ICD-10-CM

## 2022-06-07 PROCEDURE — 99203 OFFICE O/P NEW LOW 30 MIN: CPT | Mod: 95 | Performed by: PHYSICIAN ASSISTANT

## 2022-06-07 ASSESSMENT — ENCOUNTER SYMPTOMS: SEIZURES: 0

## 2022-06-07 ASSESSMENT — PAIN SCALES - GENERAL: PAINLEVEL: NO PAIN (0)

## 2022-06-07 ASSESSMENT — LIFESTYLE VARIABLES: TOBACCO_USE: 0

## 2022-06-07 NOTE — PATIENT INSTRUCTIONS
Preparing for Your Surgery      Name:  Amy Harris   MRN:  [id number]   :  1995   Today's Date:  2022       Arriving for surgery:  Surgery date:  22  Arrival time:  12 Noon    Restrictions due to COVID 19       Effective 22 [facility] is implementing the following visitor policy:     1 person may accompany the patient through the Pre-Op process.      That same person may wait in the Surgery Waiting room, provided there is enough room to social distance         Inpatients are allowed 2 visitors per day for the duration of their stay.        Visitors must wear a mask.      Visitors must not be ill.      Visiting hours are 8 am to 8 pm.    [employer] parking is available for anyone with mobility limitations or disabilities.  ([place]  24 hours/ 7 days a week; [facility]  7 am- 3:30 pm, Mon- Fri)    Please come to:     [facility] [address]  [address] [place] [address]    -  Parking is available at the Patient Visitor Ramp on [facility].    -  When entering the hospital, you will be asked some Covid screening questions and directed to Patient Registration. Patient Registration will then direct you to the 3rd floor Surgery Waiting Room.  [phone]?     - ?If you are in need of directions, wheelchair or escort please stop at the Information Desk in the lobby.  Inform the information person that you are here for surgery; a wheelchair and escort to [address] will be provided.?     What can I eat or drink?  -  You may eat and drink normally for up to 8 hours before your surgery. (Until 6 am)  -  You may have clear liquids until 2 hours before surgery. (Until 12 Noon)    Examples of clear liquids:  Water  Clear broth  Juices (apple, white grape, white cranberry  and cider) without pulp  Noncarbonated, powder based beverages  (lemonade and Arias-Aid)  Sodas (Sprite, 7-Up, ginger ale and seltzer)  Coffee or tea  (without milk or cream)  Gatorade    -  No Alcohol for at least 24 hours before surgery     Which medicines can I take?  Hold Aspirin/Aspirin products for 7 days before surgery. (Excedrin Migraine)  Hold Multivitamins for 7 days before surgery.  Hold Supplements for 7 days before surgery.  Hold Ibuprofen (Advil, Motrin) for 1 day before surgery--unless otherwise directed by surgeon.  Hold Naproxen (Aleve) for 4 days before surgery.    -  PLEASE TAKE these medications the day of surgery:  Morenita, Spironolactone (Aldactone),  Acetaminophen (Tylenol) if needed    How do I prepare myself?  - Please take 2 showers before surgery using Scrubcare or Hibiclens soap.    Use this soap only from the neck to your toes.     Leave the soap on your skin for one minute--then rinse thoroughly.      You may use your own shampoo and conditioner; no other hair products.   - Please remove all jewelry and body piercings.  - No lotions, deodorants or fragrance.  - No makeup or fingernail polish.   - Bring your ID and insurance card.    -If you have a Deep Brain Stimulator, Spinal Cord Stimulator or any neuro stimulator device---you must bring the remote control to the hospital         ALL PATIENTS GOING HOME THE SAME DAY OF SURGERY ARE REQUIRED TO HAVE A RESPONSIBLE ADULT TO DRIVE AND BE IN ATTENDANCE WITH THEM FOR 24 HOURS FOLLOWING SURGERY.      Questions or Concerns:    - For any questions regarding the day of surgery or your hospital stay, please contact the Pre Admission Nursing Office at [phone].       - If you have health changes between today and your surgery please call your surgeon.       For questions after surgery please call your surgeons office.

## 2022-06-07 NOTE — PROGRESS NOTES
Amy is a 27 year old who is being evaluated via a billable video visit.      How would you like to obtain your AVS? MyChart  If the video visit is dropped, the invitation should be resent by: Text to cell phone: [phone]     HPI         Review of Systems         Objective    Vitals - Patient Reported  Pain Score: No Pain (0)        Physical Exam

## 2022-06-07 NOTE — H&P
Pre-Operative H & P     CC:  Preoperative exam to assess for increased cardiopulmonary risk while undergoing surgery and anesthesia.    Date of Encounter: 6/7/2022  Primary Care Physician:  Inga Ravi     Reason for visit:   Encounter Diagnoses   Name Primary?     Pre-op examination Yes     Lymphadenopathy        HPI  Amy Harris is a 27 year old female who presents for pre-operative H & P in preparation for  Procedure Information     Case: [id number] Date/Time: 06/09/22 1405    Procedure: Excisional lymph node biopsy of right neck (N/A Neck)    Anesthesia type: General    Diagnosis: Lymphadenopathy [R59.1]    Pre-op diagnosis: Lymphadenopathy [R59.1]    Location:  OR 09 /  OR    Providers: Jevon Peters MD          The patient is a 27 year old woman who has a past medical history significant for seasonal allergies, tension headache, menorrhagia and lymphadenopathy.  The patient presented for her routine screening physical on 5/16/2022 and noted swelling in her neck.  She underwent ultrasound and imaging with a CT scan and was found to have massive superior mediastinal lymphadenopathy which was concerning for malignancy.  Given these findings she was referred to Dr. Peters and counseled for the procedure as above.    History is obtained from the patient and chart review    Hx of abnormal bleeding or anti-platelet use: none    Menstrual history: No LMP recorded. (Menstrual status: Birth Control).:      Past Medical History  Past Medical History:   Diagnosis Date     Lymphadenopathy      Menorrhagia      Seasonal allergic rhinitis      Tension headache        Past Surgical History  Past Surgical History:   Procedure Laterality Date     wisdom teeth extraction         Prior to Admission Medications  Current Outpatient Medications   Medication Sig Dispense Refill     Acetaminophen (TYLENOL PO) Take  by mouth as needed.       aspirin-acetaminophen-caffeine (EXCEDRIN MIGRAINE) 250-250-65 MG  tablet Take 1 tablet by mouth every 6 hours as needed for headaches       clobetasol (TEMOVATE) 0.05 % external solution Apply topically 2 times daily To scalp for up to 3 weeks or until rash is gone. Do not put on face. (Patient taking differently: Apply topically 2 times daily as needed To scalp for up to 3 weeks or until rash is gone. Do not put on face.) 50 mL 1     drospirenone-ethinyl estradiol (IDANIA) 3-0.02 MG tablet Take 1 tablet by mouth every morning       Ibuprofen (ADVIL) 200 MG capsule Take 200 mg by mouth every 4 hours as needed.       spironolactone (ALDACTONE) 100 MG tablet Take 200 mg by mouth every morning Take 100 mg by mouth daily       triamcinolone (KENALOG) 0.1 % external cream Apply topically 2 times daily To affected area for up to 2 weeks or until gone. Do not apply on face. (Patient taking differently: Apply topically 2 times daily as needed To affected area for up to 2 weeks or until gone. Do not apply on face.) 60 g 0       Allergies  No Known Allergies    Social History  Social History     Socioeconomic History     Marital status: Single     Spouse name: Not on file     Number of children: Not on file     Years of education: Not on file     Highest education level: Not on file   Occupational History     Not on file   Tobacco Use     Smoking status: Never Smoker     Smokeless tobacco: Never Used     Tobacco comment: Dad smokes outside   Substance and Sexual Activity     Alcohol use: Yes     Comment: 3-4 drinks a week     Drug use: No     Sexual activity: Yes     Partners: Male     Birth control/protection: Condom, Pill   Other Topics Concern     Parent/sibling w/ CABG, MI or angioplasty before 65F 55M? No   Social History Narrative     Not on file     Social Determinants of Health     Financial Resource Strain: Not on file   Food Insecurity: Not on file   Transportation Needs: Not on file   Physical Activity: Not on file   Stress: Not on file   Social Connections: Not on file   Intimate  Partner Violence: Not on file   Housing Stability: Not on file       Family History  Family History   Problem Relation Age of Onset     Lipids Mother         borderline     Lipids Father      Hyperlipidemia Father      Anxiety Disorder Sister      Asthma Brother      Diabetes Maternal Grandmother      Circulatory Maternal Grandfather         brain aneurysm     Prostate Cancer Paternal Grandfather      C.A.D. No family hx of      Hypertension No family hx of      Anesthesia Reaction No family hx of      Bleeding Disorder No family hx of      Clotting Disorder No family hx of        Review of Systems  The complete review of systems is negative other than noted in the HPI or here.   Anesthesia Evaluation   Pt has had prior anesthetic. Type of anesthetic: wisdom teeth extraction.    No history of anesthetic complications       ROS/MED HX  ENT/Pulmonary:     (+) allergic rhinitis,  (-) tobacco use   Neurologic: Comment: Tension headache   (-) no seizures, no CVA, no TIA and migraines   Cardiovascular:  - neg cardiovascular ROS   (+) -----Previous cardiac testing   Echo: Date: Results:    Stress Test: Date: Results:    ECG Reviewed: Date: 2010 Results:  Normal sinus rhythm  Cath: Date: Results:      METS/Exercise Tolerance: >4 METS    Hematologic: Comments: Lymphadenopathy       Musculoskeletal:  - neg musculoskeletal ROS     GI/Hepatic:  - neg GI/hepatic ROS     Renal/Genitourinary: Comment: Menorrhagia       Endo:  - neg endo ROS     Psychiatric/Substance Use:  - neg psychiatric ROS     Infectious Disease:  - neg infectious disease ROS     Malignancy:  - neg malignancy ROS     Other:  - neg other ROS          Virtual visit -  No vitals were obtained    Physical Exam  Constitutional: Awake, alert, cooperative, no apparent distress, and appears stated age.  Eyes: Pupils equal  HENT: Normocephalic  Respiratory: non labored breathing   Neurologic: Awake, alert, oriented to name, place and time.   Neuropsychiatric: Calm,  cooperative. Normal affect.      Prior Labs/Diagnostic Studies   All labs and imaging personally reviewed     EKG/ stress test - if available please see in ROS above   No results found.  No flowsheet data found.      The patient's records and results personally reviewed by this provider.     Outside records reviewed from: Care Everywhere      Assessment      Amy Harris is a 27 year old female seen as a PAC referral for risk assessment and optimization for anesthesia.    Plan/Recommendations  Pt will be optimized for the proposed procedure.  See below for details on the assessment, risk, and preoperative recommendations    NEUROLOGY  - tension headache - patient takes PRN advil or excedrin. It's been over a month since she's taken them.   -Post Op delirium risk factors:  No risk identified    ENT  - No current airway concerns.  Will need to be reassessed day of surgery.  Mallampati: Unable to assess  TM: Unable to assess    CARDIAC  - No history of CAD, Hypertension and Afib  - METS (Metabolic Equivalents)  Patient performs 4 or more METS exercise without symptoms            Total Score: 0      RCRI-Very low risk: Class 1 0.4% complication rate            Total Score: 0    ~ The patient reports with everything going on she has noticed after going up a flight of stairs she feels more short of breath. She otherwise was in her normal state of health prior. She has no RCRI risk factors. For low risk procedure no further cardiac testing. The patient did have EKG in 2010 that was NSR.     PULMONARY  - Obstructive Sleep Apnea  No current risk of obstructive sleep apnea   SHAUN Low Risk            Total Score: 0      - seasonal allergies - she takes PRN OTC allergy medications. No current use. No URI symptoms.   - Tobacco History      History   Smoking Status     Never Smoker   Smokeless Tobacco     Never Used     Comment: Dad smokes outside       GI  PONV Medium Risk  Total Score: 2           1 AN PONV: Pt is Female     "1 AN PONV: Patient is not a current smoker        /RENAL  - Baseline Creatinine  0.62  ~ Menorrhagia - continue birth control    ENDOCRINE    - BMI: Estimated body mass index is 21.24 kg/m  as calculated from the following:    Height as of 6/6/22: 1.6 m (5' 3\").    Weight as of 6/6/22: 54.4 kg (119 lb 14.9 oz).  Healthy Weight (BMI 18.5-24.9)  - No history of Diabetes Mellitus    HEME  VTE Low Risk 0.26%            Total Score: 0      - No history of abnormal bleeding or antiplatelet use.  ~ Lymphadenopathy - procedure as above.     SKIN  ~ Acne - continue aldactone.     The patient is optimized for their procedure. AVS with information on surgery time/arrival time, meds and NPO status given by nursing staff. No further diagnostic testing indicated.    Please refer to the physical examination documented by the anesthesiologist in the anesthesia record on the day of surgery.    Video-Visit Details    Type of service:  Video Visit    Patient verbally consented to video service today: YES    Video Start Time: 7:57  Video End Time (time video stopped): 8:06    Originating Location (pt. Location): Home    Distant Location (provider location):  [facility] PREOPERATIVE ASSESSMENT CENTER     Mode of Communication:  Video Conference via [employer]  On the day of service:     Prep time: 7 minutes  Visit time: 9 minutes  Documentation time: 8 minutes  ------------------------------------------  Total time: 24 minutes      Fifi Ronquillo PA-C  Preoperative Assessment Center  [facility]  Clinic and Surgery Center  Phone: [phone]  Fax: [phone]  "

## 2022-06-09 ENCOUNTER — ANESTHESIA (OUTPATIENT)
Dept: SURGERY | Facility: CLINIC | Age: 27
End: 2022-06-09
Payer: COMMERCIAL

## 2022-06-09 ENCOUNTER — HOSPITAL ENCOUNTER (OUTPATIENT)
Facility: CLINIC | Age: 27
Discharge: HOME | End: 2022-06-09
Attending: STUDENT IN AN ORGANIZED HEALTH CARE EDUCATION/TRAINING PROGRAM | Admitting: STUDENT IN AN ORGANIZED HEALTH CARE EDUCATION/TRAINING PROGRAM
Payer: COMMERCIAL

## 2022-06-09 VITALS
DIASTOLIC BLOOD PRESSURE: 58 MMHG | BODY MASS INDEX: 20.36 KG/M2 | HEART RATE: 89 BPM | HEIGHT: 64 IN | TEMPERATURE: 98 F | SYSTOLIC BLOOD PRESSURE: 93 MMHG | RESPIRATION RATE: 16 BRPM | WEIGHT: 119.27 LBS | OXYGEN SATURATION: 99 %

## 2022-06-09 DIAGNOSIS — R59.1 LYMPHADENOPATHY OF HEAD AND NECK: Primary | ICD-10-CM

## 2022-06-09 LAB — GLUCOSE BLDC GLUCOMTR-MCNC: 101 MG/DL (ref 70–99)

## 2022-06-09 PROCEDURE — 38510 BIOPSY/REMOVAL LYMPH NODES: CPT | Mod: RT | Performed by: STUDENT IN AN ORGANIZED HEALTH CARE EDUCATION/TRAINING PROGRAM

## 2022-06-09 PROCEDURE — 999N000141 HC STATISTIC PRE-PROCEDURE NURSING ASSESSMENT: Performed by: STUDENT IN AN ORGANIZED HEALTH CARE EDUCATION/TRAINING PROGRAM

## 2022-06-09 PROCEDURE — 272N000001 HC OR GENERAL SUPPLY STERILE: Performed by: STUDENT IN AN ORGANIZED HEALTH CARE EDUCATION/TRAINING PROGRAM

## 2022-06-09 PROCEDURE — 250N000009 HC RX 250: Performed by: STUDENT IN AN ORGANIZED HEALTH CARE EDUCATION/TRAINING PROGRAM

## 2022-06-09 PROCEDURE — 258N000003 HC RX IP 258 OP 636: Performed by: ANESTHESIOLOGY

## 2022-06-09 PROCEDURE — 250N000011 HC RX IP 250 OP 636: Performed by: ANESTHESIOLOGY

## 2022-06-09 PROCEDURE — 370N000017 HC ANESTHESIA TECHNICAL FEE, PER MIN: Performed by: STUDENT IN AN ORGANIZED HEALTH CARE EDUCATION/TRAINING PROGRAM

## 2022-06-09 PROCEDURE — 250N000009 HC RX 250: Performed by: ANESTHESIOLOGY

## 2022-06-09 PROCEDURE — 88264 CHROMOSOME ANALYSIS 20-25: CPT | Performed by: STUDENT IN AN ORGANIZED HEALTH CARE EDUCATION/TRAINING PROGRAM

## 2022-06-09 PROCEDURE — 88341 IMHCHEM/IMCYTCHM EA ADD ANTB: CPT | Mod: 26 | Performed by: PATHOLOGY

## 2022-06-09 PROCEDURE — 88161 CYTOPATH SMEAR OTHER SOURCE: CPT | Mod: TC,XU | Performed by: STUDENT IN AN ORGANIZED HEALTH CARE EDUCATION/TRAINING PROGRAM

## 2022-06-09 PROCEDURE — 88307 TISSUE EXAM BY PATHOLOGIST: CPT | Mod: 26 | Performed by: PATHOLOGY

## 2022-06-09 PROCEDURE — 360N000075 HC SURGERY LEVEL 2, PER MIN: Performed by: STUDENT IN AN ORGANIZED HEALTH CARE EDUCATION/TRAINING PROGRAM

## 2022-06-09 PROCEDURE — 88342 IMHCHEM/IMCYTCHM 1ST ANTB: CPT | Mod: TC,XU | Performed by: STUDENT IN AN ORGANIZED HEALTH CARE EDUCATION/TRAINING PROGRAM

## 2022-06-09 PROCEDURE — 710N000012 HC RECOVERY PHASE 2, PER MINUTE: Performed by: STUDENT IN AN ORGANIZED HEALTH CARE EDUCATION/TRAINING PROGRAM

## 2022-06-09 PROCEDURE — 88189 FLOWCYTOMETRY/READ 16 & >: CPT | Performed by: PATHOLOGY

## 2022-06-09 PROCEDURE — 88185 FLOWCYTOMETRY/TC ADD-ON: CPT | Performed by: STUDENT IN AN ORGANIZED HEALTH CARE EDUCATION/TRAINING PROGRAM

## 2022-06-09 PROCEDURE — 82962 GLUCOSE BLOOD TEST: CPT

## 2022-06-09 PROCEDURE — 88342 IMHCHEM/IMCYTCHM 1ST ANTB: CPT | Mod: 26 | Performed by: PATHOLOGY

## 2022-06-09 PROCEDURE — 88184 FLOWCYTOMETRY/ TC 1 MARKER: CPT | Performed by: PATHOLOGY

## 2022-06-09 RX ORDER — ONDANSETRON 2 MG/ML
4 INJECTION INTRAMUSCULAR; INTRAVENOUS EVERY 30 MIN PRN
Status: CANCELLED | OUTPATIENT
Start: 2022-06-09

## 2022-06-09 RX ORDER — HYDROCODONE BITARTRATE AND ACETAMINOPHEN 5; 325 MG/1; MG/1
1-2 TABLET ORAL EVERY 4 HOURS PRN
Qty: 10 TABLET | Refills: 0 | Status: SHIPPED | OUTPATIENT
Start: 2022-06-09 | End: 2022-06-27

## 2022-06-09 RX ORDER — LIDOCAINE HYDROCHLORIDE AND EPINEPHRINE 10; 10 MG/ML; UG/ML
INJECTION, SOLUTION INFILTRATION; PERINEURAL PRN
Status: DISCONTINUED | OUTPATIENT
Start: 2022-06-09 | End: 2022-06-09 | Stop reason: HOSPADM

## 2022-06-09 RX ORDER — DEXAMETHASONE SODIUM PHOSPHATE 10 MG/ML
10 INJECTION, SOLUTION INTRAMUSCULAR; INTRAVENOUS ONCE
Status: DISCONTINUED | OUTPATIENT
Start: 2022-06-09 | End: 2022-06-09 | Stop reason: HOSPADM

## 2022-06-09 RX ORDER — HYDROMORPHONE HCL IN WATER/PF 6 MG/30 ML
0.2 PATIENT CONTROLLED ANALGESIA SYRINGE INTRAVENOUS EVERY 5 MIN PRN
Status: CANCELLED | OUTPATIENT
Start: 2022-06-09

## 2022-06-09 RX ORDER — ONDANSETRON 4 MG/1
4 TABLET, ORALLY DISINTEGRATING ORAL EVERY 30 MIN PRN
Status: CANCELLED | OUTPATIENT
Start: 2022-06-09

## 2022-06-09 RX ORDER — LIDOCAINE 40 MG/G
CREAM TOPICAL
Status: DISCONTINUED | OUTPATIENT
Start: 2022-06-09 | End: 2022-06-09 | Stop reason: HOSPADM

## 2022-06-09 RX ORDER — DEXMEDETOMIDINE HYDROCHLORIDE 4 UG/ML
INJECTION, SOLUTION INTRAVENOUS PRN
Status: DISCONTINUED | OUTPATIENT
Start: 2022-06-09 | End: 2022-06-09

## 2022-06-09 RX ORDER — MINERAL OIL/HYDROPHIL PETROLAT
OINTMENT (GRAM) TOPICAL 3 TIMES DAILY
Qty: 50 G | Refills: 0 | Status: SHIPPED | OUTPATIENT
Start: 2022-06-09 | End: 2023-03-27

## 2022-06-09 RX ORDER — SODIUM CHLORIDE, SODIUM LACTATE, POTASSIUM CHLORIDE, CALCIUM CHLORIDE 600; 310; 30; 20 MG/100ML; MG/100ML; MG/100ML; MG/100ML
INJECTION, SOLUTION INTRAVENOUS CONTINUOUS
Status: DISCONTINUED | OUTPATIENT
Start: 2022-06-09 | End: 2022-06-09 | Stop reason: HOSPADM

## 2022-06-09 RX ORDER — PHENYLEPHRINE HCL IN 0.9% NACL 50MG/250ML
.5-1.25 PLASTIC BAG, INJECTION (ML) INTRAVENOUS CONTINUOUS
Status: DISCONTINUED | OUTPATIENT
Start: 2022-06-09 | End: 2022-06-09 | Stop reason: HOSPADM

## 2022-06-09 RX ORDER — FENTANYL CITRATE 50 UG/ML
25 INJECTION, SOLUTION INTRAMUSCULAR; INTRAVENOUS EVERY 5 MIN PRN
Status: CANCELLED | OUTPATIENT
Start: 2022-06-09

## 2022-06-09 RX ORDER — SODIUM CHLORIDE, SODIUM LACTATE, POTASSIUM CHLORIDE, CALCIUM CHLORIDE 600; 310; 30; 20 MG/100ML; MG/100ML; MG/100ML; MG/100ML
INJECTION, SOLUTION INTRAVENOUS CONTINUOUS PRN
Status: DISCONTINUED | OUTPATIENT
Start: 2022-06-09 | End: 2022-06-09

## 2022-06-09 RX ORDER — DEXMEDETOMIDINE HYDROCHLORIDE 4 UG/ML
.1-1.2 INJECTION, SOLUTION INTRAVENOUS CONTINUOUS
Status: DISCONTINUED | OUTPATIENT
Start: 2022-06-09 | End: 2022-06-09 | Stop reason: HOSPADM

## 2022-06-09 RX ORDER — SODIUM CHLORIDE, SODIUM LACTATE, POTASSIUM CHLORIDE, CALCIUM CHLORIDE 600; 310; 30; 20 MG/100ML; MG/100ML; MG/100ML; MG/100ML
INJECTION, SOLUTION INTRAVENOUS CONTINUOUS
Status: CANCELLED | OUTPATIENT
Start: 2022-06-09

## 2022-06-09 RX ADMIN — DEXMEDETOMIDINE HYDROCHLORIDE 0.7 MCG/KG/HR: 400 INJECTION INTRAVENOUS at 13:45

## 2022-06-09 RX ADMIN — DEXMEDETOMIDINE 8 MCG: 100 INJECTION, SOLUTION, CONCENTRATE INTRAVENOUS at 13:50

## 2022-06-09 RX ADMIN — DEXMEDETOMIDINE 12 MCG: 100 INJECTION, SOLUTION, CONCENTRATE INTRAVENOUS at 13:44

## 2022-06-09 RX ADMIN — PHENYLEPHRINE HYDROCHLORIDE 50 MCG: 10 INJECTION INTRAVENOUS at 14:29

## 2022-06-09 RX ADMIN — PHENYLEPHRINE HYDROCHLORIDE 50 MCG: 10 INJECTION INTRAVENOUS at 14:38

## 2022-06-09 RX ADMIN — PHENYLEPHRINE HYDROCHLORIDE 50 MCG: 10 INJECTION INTRAVENOUS at 13:58

## 2022-06-09 RX ADMIN — SODIUM CHLORIDE, POTASSIUM CHLORIDE, SODIUM LACTATE AND CALCIUM CHLORIDE: 600; 310; 30; 20 INJECTION, SOLUTION INTRAVENOUS at 13:44

## 2022-06-09 NOTE — BRIEF OP NOTE
[facility]    Brief Operative Note    Pre-operative diagnosis: Lymphadenopathy [R59.1]  Post-operative diagnosis Same as pre-operative diagnosis    Procedure: Procedure(s):  Excisional lymph node biopsy of right neck  Surgeon: Surgeon(s) and Role:     * Jevon Peters MD - Primary  Anesthesia: General   Estimated Blood Loss: Less than 10 ml    Drains: None  Specimens:   ID Type Source Tests Collected by Time Destination   1 : Right supraclavicular lymph node Tissue Lymph Node(s) SURGICAL PATHOLOGY EXAM Jevon Peters MD 6/9/2022  2:37 PM      Findings:   None.  Complications: None.  Implants: * No implants in log *      Stanislaw Torres MD  ENT resident

## 2022-06-09 NOTE — ANESTHESIA POSTPROCEDURE EVALUATION
Patient: Amy Harris    Procedure: Procedure(s):  Excisional lymph node biopsy of right neck       Anesthesia Type:  MAC    Note:  Disposition: Outpatient   Postop Pain Control: Uneventful            Sign Out: Well controlled pain   PONV: No   Neuro/Psych: Uneventful            Sign Out: Acceptable/Baseline neuro status   Airway/Respiratory: Uneventful            Sign Out: Acceptable/Baseline resp. status   CV/Hemodynamics: Uneventful            Sign Out: Acceptable CV status; No obvious hypovolemia; No obvious fluid overload   Other NRE: NONE   DID A NON-ROUTINE EVENT OCCUR? No           Last vitals:  Vitals Value Taken Time   /57 06/09/22 1503   Temp 36.7  C (98.1  F) 06/09/22 1503   Pulse 89 06/09/22 1503   Resp 16 06/09/22 1503   SpO2 99 % 06/09/22 1505   Vitals shown include unvalidated device data.    Electronically Signed By: Ana Reynoso MD  June 9, 2022  3:06 PM

## 2022-06-09 NOTE — OR NURSING
Patient demonstrated a negative at home rapid COVID-19 antigen test result by photo on their smart phone. Anesthesia team and OR circulator made aware of negative result during hand-off.

## 2022-06-09 NOTE — OP NOTE
Procedure Date: 2022    SURGEON:  Jevon Peters MD    ASSISTANT:  Stanislaw Torres MD    PREOPERATIVE DIAGNOSIS:  Mediastinal mass.    POSTOPERATIVE DIAGNOSIS:  Mediastinal mass.    PROCEDURE:  Excisional biopsy of right supraclavicular mass.    ANESTHESIA:  Local MAC.    ESTIMATED BLOOD LOSS:  Less than 5 mL.    SPECIMEN:  Right supraclavicular mass for lymphoma evaluation.    COMPLICATIONS:  None.    INDICATIONS:  Amy Leiva is a pleasant 27-year-old woman referred to me for a large mediastinal mass with a small supraclavicular associated node.  I was asked to perform excisional node biopsy for workup.  After full discussion of risks and benefits of the procedure, informed consent was obtained.    OPERATIVE COURSE:  The patient was brought to the operating room and placed on the operating table supine.  Neck anesthesia was induced.  Lidocaine 1% with 1:100,000 epinephrine was used in the planned incision in the supraclavicular fossa.  The patient was prepped and draped in the standard fashion.  A timeout was performed to identify the patient and correctness of the procedure.  A 15 blade was used to make an incision through the skin and subcutaneous tissue.  The platysma muscle was divided.  Standard subplatysmal flaps were elevated superiorly and inferiorly.  The fascia of the sternocleidomastoid muscle was opened posteriorly and retracted anteriorly, which exposed the mass.  The mass was removed in a pericapsular plane.  Hemostasis was obtained using bipolar cautery.  The wound was irrigated.  The wound was closed with 3-0 Vicryl for the platysmal layer and 4-0 Monocryl for the skin.  The patient tolerated the procedure well.  She was subsequently awakened and transferred to the PACU in stable condition.    Jevon Peters MD        D: 2022   T: 2022   [facility]    Name:     AMY LEIVA  MRN:      [id number]        Account:        [id number]   :      1995            Procedure Date: 06/09/2022     Document: [id number]

## 2022-06-09 NOTE — DISCHARGE INSTRUCTIONS
[facility]  Same-Day Surgery   Adult Discharge Orders & Instructions     For 24 hours after surgery    Get plenty of rest.  A responsible adult must stay with you for at least 24 hours after you leave the hospital.   Do not drive or use heavy equipment.  If you have weakness or tingling, don't drive or use heavy equipment until this feeling goes away.  Do not drink alcohol.  Avoid strenuous or risky activities.  Ask for help when climbing stairs.   You may feel lightheaded.  IF so, sit for a few minutes before standing.  Have someone help you get up.   If you have nausea (feel sick to your stomach): Drink only clear liquids such as apple juice, ginger ale, broth or 7-Up.  Rest may also help.  Be sure to drink enough fluids.  Move to a regular diet as you feel able.  You may have a slight fever. Call the doctor if your fever is over 100 F (37.7 C) (taken under the tongue) or lasts longer than 24 hours.  You may have a dry mouth, a sore throat, muscle aches or trouble sleeping.  These should go away after 24 hours.  Do not make important or legal decisions.   Call your doctor for any of the followin.  Signs of infection (fever, growing tenderness at the surgery site, a large amount of drainage or bleeding, severe pain, foul-smelling drainage, redness, swelling).    2. It has been over 8 to 10 hours since surgery and you are still not able to urinate (pass water).    3.  Headache for over 24 hours.    To contact a doctor, call DR Rosado's office at [phone] (Monday - Friday 8:00-4:30) or:    '   [phone] and ask for the resident on call for Otolaryngology (answered 24 hours a day)  '   Emergency Department:    [facility]: [phone]       (TTY for hearing impaired: [phone])    [facility]: [phone]       (TTY for hearing impaired: [phone])

## 2022-06-09 NOTE — ANESTHESIA PREPROCEDURE EVALUATION
Anesthesia Pre-Procedure Evaluation    Patient: Amy Harris   MRN: [id number] : 1995        Procedure : Procedure(s):  Excisional lymph node biopsy of right neck          Past Medical History:   Diagnosis Date     Lymphadenopathy      Menorrhagia      Seasonal allergic rhinitis      Tension headache       Past Surgical History:   Procedure Laterality Date     wisdom teeth extraction        No Known Allergies   Social History     Tobacco Use     Smoking status: Never Smoker     Smokeless tobacco: Never Used     Tobacco comment: Dad smokes outside   Substance Use Topics     Alcohol use: Yes     Comment: 3-4 drinks a week      Wt Readings from Last 1 Encounters:   22 54.1 kg (119 lb 4.3 oz)        Anesthesia Evaluation   Pt has not had prior anesthetic         ROS/MED HX  ENT/Pulmonary: Comment: Large anterior mediastinal mass measuring 13.6cm length x 15 cm wide, overlapping with about 2 cm of trachea, inferior border ends 3 cm above marie.  Airway normal diameter. The mass is causing compression of Right Atrium and vascular structures.   Patient sleeps with one pillow. She experiences shortness of breath during moderate physical activity.       Neurologic:  - neg neurologic ROS     Cardiovascular:       METS/Exercise Tolerance: 3 - Able to walk 1-2 blocks without stopping    Hematologic:  - neg hematologic  ROS     Musculoskeletal:  - neg musculoskeletal ROS     GI/Hepatic:  - neg GI/hepatic ROS     Renal/Genitourinary:  - neg Renal ROS     Endo:  - neg endo ROS     Psychiatric/Substance Use:  - neg psychiatric ROS     Infectious Disease:  - neg infectious disease ROS     Malignancy: Comment: See ENT/Pulmonary. Dx work up pending      Other:  - neg other ROS          Physical Exam    Airway        Mallampati: II   TM distance: > 3 FB   Neck ROM: full   Mouth opening: > 3 cm    Respiratory Devices and Support         Dental  no notable dental history         Cardiovascular   cardiovascular exam  normal          Pulmonary   pulmonary exam normal                OUTSIDE LABS:  CBC:   Lab Results   Component Value Date    WBC 8.2 12/15/2011    WBC 4.3 12/16/2010    HGB 13.7 03/31/2014    HGB 14.4 12/15/2011    HCT 40.5 12/15/2011    HCT 41.6 12/16/2010     12/15/2011     12/16/2010     BMP:   Lab Results   Component Value Date     05/16/2022     12/16/2010    POTASSIUM 4.1 05/16/2022    POTASSIUM 4.3 12/16/2010    CHLORIDE 103 05/16/2022    CHLORIDE 99 12/16/2010    CO2 24 05/16/2022    CO2 28 12/16/2010    BUN 10 05/16/2022    BUN 12 12/16/2010    CR 0.62 05/16/2022    CR 0.65 12/16/2010     (H) 06/09/2022    GLC 85 05/16/2022     COAGS:   Lab Results   Component Value Date    PTT 33 12/15/2011    INR 1.04 12/15/2011     POC: No results found for: BGM, HCG, HCGS  HEPATIC:   Lab Results   Component Value Date    ALBUMIN 4.4 12/16/2010    PROTTOTAL 7.8 12/16/2010    ALT 19 12/16/2010    AST 26 12/16/2010    ALKPHOS 215 12/16/2010    BILITOTAL 1.0 12/16/2010     OTHER:   Lab Results   Component Value Date    NICOLAS 9.6 05/16/2022    TSH 0.57 06/12/2015       Anesthesia Plan    ASA Status:  2   NPO Status:  NPO Appropriate    Anesthesia Type: MAC.              Consents    Anesthesia Plan(s) and associated risks, benefits, and realistic alternatives discussed. Questions answered and patient/representative(s) expressed understanding.    - Discussed:     - Discussed with:  Patient      - Extended Intubation/Ventilatory Support Discussed: Yes.      - Patient is DNR/DNI Status: No    Use of blood products discussed: No .     Postoperative Care    Pain management: IV analgesics, Oral pain medications.   PONV prophylaxis: Ondansetron (or other 5HT-3), Dexamethasone or Solumedrol     Comments:              PAC Discussion and Assessment    ASA Classification: 2    Anesthetic techniques and relevant risks discussed: MAC with GA as backup  Invasive monitoring and risk discussed:  Yes    Possibility and Risk of blood transfusion discussed: No  NPO instructions given: No solids 6 hours before surgery, stop clear liquids 2 hours before surgery    Needs early admission to pre-op area: No                                             Ana Reynoso MD

## 2022-06-09 NOTE — ANESTHESIA CARE TRANSFER NOTE
Patient: Amy Harris    Procedure: Procedure(s):  Excisional lymph node biopsy of right neck       Diagnosis: Lymphadenopathy [R59.1]  Diagnosis Additional Information: No value filed.    Anesthesia Type:   MAC     Note:    Oropharynx: oropharynx clear of all foreign objects  Level of Consciousness: awake  Oxygen Supplementation: room air    Independent Airway: airway patency satisfactory and stable  Dentition: dentition unchanged  Vital Signs Stable: post-procedure vital signs reviewed and stable  Report to RN Given: handoff report given  Patient transferred to: Phase II    Handoff Report: Identifed the Patient, Identified the Reponsible Provider, Reviewed the pertinent medical history, Discussed the surgical course, Reviewed Intra-OP anesthesia mangement and issues during anesthesia, Set expectations for post-procedure period and Allowed opportunity for questions and acknowledgement of understanding      Vitals:  Vitals Value Taken Time   /57 06/09/22 1459   Temp     Pulse 90 06/09/22 1459   Resp     SpO2 100 % 06/09/22 1501   Vitals shown include unvalidated device data.    Electronically Signed By: YAJAIRA Cruz CRNA  June 9, 2022  3:03 PM

## 2022-06-14 ENCOUNTER — PATIENT OUTREACH (OUTPATIENT)
Dept: ONCOLOGY | Facility: CLINIC | Age: 27
End: 2022-06-14
Payer: COMMERCIAL

## 2022-06-14 DIAGNOSIS — C81.10 NODULAR SCLEROSIS CLASSICAL HODGKIN LYMPHOMA (H): Primary | ICD-10-CM

## 2022-06-14 DIAGNOSIS — C81.12 NODULAR SCLEROSIS HODGKIN LYMPHOMA OF INTRATHORACIC LYMPH NODES (H): Primary | ICD-10-CM

## 2022-06-14 LAB
PATH REPORT.COMMENTS IMP SPEC: ABNORMAL
PATH REPORT.COMMENTS IMP SPEC: NORMAL
PATH REPORT.COMMENTS IMP SPEC: YES
PATH REPORT.FINAL DX SPEC: ABNORMAL
PATH REPORT.FINAL DX SPEC: NORMAL
PATH REPORT.GROSS SPEC: ABNORMAL
PATH REPORT.MICROSCOPIC SPEC OTHER STN: ABNORMAL
PATH REPORT.MICROSCOPIC SPEC OTHER STN: NORMAL
PATH REPORT.RELEVANT HX SPEC: ABNORMAL
PATH REPORT.RELEVANT HX SPEC: NORMAL
PHOTO IMAGE: ABNORMAL

## 2022-06-14 NOTE — PROGRESS NOTES
New Patient Hematology / Oncology Nurse Navigator Note     Referring provider: [facility] [place]   Jevon Peters MD  Otolaryngology       Evaluation for :  Authorizing Provider:  Jevon Peters MD  Collected:           06/09/2022 02:37 PM           Ordering Location:      MAIN OR                 Received:            06/09/2022 02:50 PM           Pathologist:           Mandy Thomas MD                                                            Specimen:    Lymph Node(s), Right supraclavicular lymph node                                            Final Diagnosis   LYMPH NODE(S), SUPRACLAVICULAR, RIGHT, EXCISIONAL BIOPSY:  - Nodular sclerosis classic Hodgkin lymphoma  - See comment     Clinical History (per Nurse review of records provided):      6/9/22 path report -- BOOKMARKED    5/26/22 and 6/6/22 CT reports -- BOOKMARKED    6/6/22 ENT office visit note -- BOOKMARKED    Records Location: [facility]     Referral updates and Plan:   June 14, 2022 OUTGOING CALL to pt: Introduced my role as nurse navigator with [facility] Hematology/Oncology dept and that we have recd the referral to oncology from Dr Peters  Pt confirms they are aware of the referral and ready to schedule, tearful after getting the call from Dr Peters with the diagnosis today.    NN Triage of sx:  Feels like food/pills are getting stuck in her esophagus and needs water to wash them down, mary bread.  Sometimes SOB when walking and talking fast or going up stairs.Takes Spironolactone 200 mg / day  Skin is super itchy for the past months but no rash but noticing spots on skin not sure what they are (red circles)  Chest pain with any sips of alcohol over the past year  A couple of episodes of night sweats in the fall, none since  Denies swelling in her neck UE or fingers.    We discussed that there are many types of lymphoma and in addition to bx a complete work up usually includes lab, PET, echo,  PFTs and sometimes BMBx in order to characterize the stage and guide tx.    Parents live close by ([place] / [place]), in [place] school and lives in [place], [place] . 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 School is aware of her medical situation.  She would like to consider options for coordinated care later during school if possible, prefers onc consult and initiation of tx at [facility] as first step.   She will stay with her parents at their homes.  Explained to Amy that she will receive a call from me and our scheduling intake team later today to coordinate appts.  Pt voiced understanding of above instructions and information and denied further questions    Addendum:   Notified Amy that Dr Mallory Mendez has reviewed her chart and above sx and anticipates need for direct admit Friday at [facility] for chemo initiation due to urgency and complexity of coordination of diagnostics needed  And that we have asked PET auth team to expedite PET auth so that scan can be moved to prior to consult, TBD  Amy states she will be driving to [place] tomorrow 6/15 and arriving around 1pm    Future Appointments   Date Time Provider Department Center   6/16/2022  2:45 PM Mallory Mendez MD [facility]   6/18/2022  8:00 AM [address]   6/20/2022 10:00 AM Jevon Peters MD [facility]       Susie Monroe, RN, BSN, OCN  Hematology/Oncology Nurse Navigator   [facility]  [phone]

## 2022-06-15 ENCOUNTER — HOSPITAL ENCOUNTER (OUTPATIENT)
Age: 27
End: 2022-06-15
Attending: STUDENT IN AN ORGANIZED HEALTH CARE EDUCATION/TRAINING PROGRAM
Payer: COMMERCIAL

## 2022-06-15 ENCOUNTER — ANCILLARY PROCEDURE (OUTPATIENT)
Dept: CARDIOLOGY | Facility: CLINIC | Age: 27
End: 2022-06-15
Attending: STUDENT IN AN ORGANIZED HEALTH CARE EDUCATION/TRAINING PROGRAM
Payer: COMMERCIAL

## 2022-06-15 DIAGNOSIS — C81.12 NODULAR SCLEROSIS HODGKIN LYMPHOMA OF INTRATHORACIC LYMPH NODES (H): ICD-10-CM

## 2022-06-15 LAB
CULTURE HARVEST COMPLETE DATE: NORMAL
INTERPRETATION: NORMAL
LVEF ECHO: NORMAL

## 2022-06-15 PROCEDURE — 93306 TTE W/DOPPLER COMPLETE: CPT | Performed by: INTERNAL MEDICINE

## 2022-06-15 NOTE — TELEPHONE ENCOUNTER
RECORDS STATUS - ALL OTHER DIAGNOSIS      RECORDS RECEIVED FROM: [facility]   DATE RECEIVED: 6/15   NOTES STATUS DETAILS   OFFICE NOTE from referring provider Jevon Pascual MD in [facility]: 6/6/22   DISCHARGE SUMMARY from hospital [facility] 6/9/22   OPERATIVE REPORT [facility] 6/9/22: Excisional Lymph Node Bx   MEDICATION LIST [facility]    LABS     PATHOLOGY REPORTS [facility] 6/9/22   ANYTHING RELATED TO DIAGNOSIS Epic 6/9/22   GENONOMIC TESTING     TYPE:     IMAGING (NEED IMAGES & REPORT)     CT SCANS PACS 6/6/22, 5/26/22: Epic   ULTRASOUND PACS 5/23/22: [facility]   PET Scheduled @  6/18/22: [facility]

## 2022-06-16 ENCOUNTER — PRE VISIT (OUTPATIENT)
Dept: ONCOLOGY | Facility: CLINIC | Age: 27
End: 2022-06-16
Payer: COMMERCIAL

## 2022-06-16 ENCOUNTER — ONCOLOGY VISIT (OUTPATIENT)
Dept: ONCOLOGY | Facility: CLINIC | Age: 27
End: 2022-06-16
Attending: STUDENT IN AN ORGANIZED HEALTH CARE EDUCATION/TRAINING PROGRAM
Payer: COMMERCIAL

## 2022-06-16 ENCOUNTER — PATIENT OUTREACH (OUTPATIENT)
Dept: ONCOLOGY | Facility: CLINIC | Age: 27
End: 2022-06-16

## 2022-06-16 VITALS
HEIGHT: 64 IN | BODY MASS INDEX: 20.66 KG/M2 | OXYGEN SATURATION: 97 % | DIASTOLIC BLOOD PRESSURE: 81 MMHG | HEART RATE: 110 BPM | SYSTOLIC BLOOD PRESSURE: 122 MMHG | WEIGHT: 121 LBS | TEMPERATURE: 98.8 F

## 2022-06-16 DIAGNOSIS — C81.12 NODULAR SCLEROSIS HODGKIN LYMPHOMA OF INTRATHORACIC LYMPH NODES (H): ICD-10-CM

## 2022-06-16 DIAGNOSIS — C81.10 NODULAR SCLEROSIS CLASSICAL HODGKIN LYMPHOMA (H): Primary | ICD-10-CM

## 2022-06-16 DIAGNOSIS — C81.10 NODULAR SCLEROSIS CLASSICAL HODGKIN LYMPHOMA (H): ICD-10-CM

## 2022-06-16 LAB
ALBUMIN SERPL-MCNC: 3.2 G/DL (ref 3.4–5)
ALP SERPL-CCNC: 94 U/L (ref 40–150)
ALT SERPL W P-5'-P-CCNC: 15 U/L (ref 0–50)
ANION GAP SERPL CALCULATED.3IONS-SCNC: 9 MMOL/L (ref 3–14)
AST SERPL W P-5'-P-CCNC: 14 U/L (ref 0–45)
BASOPHILS # BLD AUTO: 0 10E3/UL (ref 0–0.2)
BASOPHILS NFR BLD AUTO: 1 %
BILIRUB SERPL-MCNC: 0.3 MG/DL (ref 0.2–1.3)
BUN SERPL-MCNC: 9 MG/DL (ref 7–30)
CALCIUM SERPL-MCNC: 9.2 MG/DL (ref 8.5–10.1)
CHLORIDE BLD-SCNC: 100 MMOL/L (ref 94–109)
CO2 SERPL-SCNC: 27 MMOL/L (ref 20–32)
CREAT SERPL-MCNC: 0.59 MG/DL (ref 0.52–1.04)
EOSINOPHIL # BLD AUTO: 0.2 10E3/UL (ref 0–0.7)
EOSINOPHIL NFR BLD AUTO: 3 %
ERYTHROCYTE [DISTWIDTH] IN BLOOD BY AUTOMATED COUNT: 12.4 % (ref 10–15)
ERYTHROCYTE [SEDIMENTATION RATE] IN BLOOD BY WESTERGREN METHOD: 63 MM/HR (ref 0–20)
GFR SERPL CREATININE-BSD FRML MDRD: >90 ML/MIN/1.73M2
GLUCOSE BLD-MCNC: 107 MG/DL (ref 70–99)
HCT VFR BLD AUTO: 36.9 % (ref 35–47)
HGB BLD-MCNC: 11.8 G/DL (ref 11.7–15.7)
IMM GRANULOCYTES # BLD: 0 10E3/UL
IMM GRANULOCYTES NFR BLD: 0 %
LDH SERPL L TO P-CCNC: 252 U/L (ref 81–234)
LYMPHOCYTES # BLD AUTO: 0.2 10E3/UL (ref 0.8–5.3)
LYMPHOCYTES NFR BLD AUTO: 4 %
MCH RBC QN AUTO: 25.9 PG (ref 26.5–33)
MCHC RBC AUTO-ENTMCNC: 32 G/DL (ref 31.5–36.5)
MCV RBC AUTO: 81 FL (ref 78–100)
MONOCYTES # BLD AUTO: 0.8 10E3/UL (ref 0–1.3)
MONOCYTES NFR BLD AUTO: 14 %
NEUTROPHILS # BLD AUTO: 4.3 10E3/UL (ref 1.6–8.3)
NEUTROPHILS NFR BLD AUTO: 78 %
NRBC # BLD AUTO: 0 10E3/UL
NRBC BLD AUTO-RTO: 0 /100
PLATELET # BLD AUTO: 388 10E3/UL (ref 150–450)
POTASSIUM BLD-SCNC: 3.5 MMOL/L (ref 3.4–5.3)
PROT SERPL-MCNC: 8.5 G/DL (ref 6.8–8.8)
RBC # BLD AUTO: 4.55 10E6/UL (ref 3.8–5.2)
SODIUM SERPL-SCNC: 136 MMOL/L (ref 133–144)
URATE SERPL-MCNC: 3.6 MG/DL (ref 2.6–6)
WBC # BLD AUTO: 5.5 10E3/UL (ref 4–11)

## 2022-06-16 PROCEDURE — 87340 HEPATITIS B SURFACE AG IA: CPT | Performed by: STUDENT IN AN ORGANIZED HEALTH CARE EDUCATION/TRAINING PROGRAM

## 2022-06-16 PROCEDURE — 36415 COLL VENOUS BLD VENIPUNCTURE: CPT | Performed by: STUDENT IN AN ORGANIZED HEALTH CARE EDUCATION/TRAINING PROGRAM

## 2022-06-16 PROCEDURE — 85018 HEMOGLOBIN: CPT | Performed by: STUDENT IN AN ORGANIZED HEALTH CARE EDUCATION/TRAINING PROGRAM

## 2022-06-16 PROCEDURE — 99205 OFFICE O/P NEW HI 60 MIN: CPT | Performed by: STUDENT IN AN ORGANIZED HEALTH CARE EDUCATION/TRAINING PROGRAM

## 2022-06-16 PROCEDURE — 86704 HEP B CORE ANTIBODY TOTAL: CPT | Performed by: STUDENT IN AN ORGANIZED HEALTH CARE EDUCATION/TRAINING PROGRAM

## 2022-06-16 PROCEDURE — 82310 ASSAY OF CALCIUM: CPT | Performed by: STUDENT IN AN ORGANIZED HEALTH CARE EDUCATION/TRAINING PROGRAM

## 2022-06-16 PROCEDURE — 83615 LACTATE (LD) (LDH) ENZYME: CPT | Performed by: STUDENT IN AN ORGANIZED HEALTH CARE EDUCATION/TRAINING PROGRAM

## 2022-06-16 PROCEDURE — G0463 HOSPITAL OUTPT CLINIC VISIT: HCPCS

## 2022-06-16 PROCEDURE — 85652 RBC SED RATE AUTOMATED: CPT | Performed by: STUDENT IN AN ORGANIZED HEALTH CARE EDUCATION/TRAINING PROGRAM

## 2022-06-16 PROCEDURE — 84550 ASSAY OF BLOOD/URIC ACID: CPT | Performed by: STUDENT IN AN ORGANIZED HEALTH CARE EDUCATION/TRAINING PROGRAM

## 2022-06-16 PROCEDURE — 82232 ASSAY OF BETA-2 PROTEIN: CPT | Performed by: STUDENT IN AN ORGANIZED HEALTH CARE EDUCATION/TRAINING PROGRAM

## 2022-06-16 PROCEDURE — 86803 HEPATITIS C AB TEST: CPT | Performed by: STUDENT IN AN ORGANIZED HEALTH CARE EDUCATION/TRAINING PROGRAM

## 2022-06-16 PROCEDURE — 86706 HEP B SURFACE ANTIBODY: CPT | Performed by: STUDENT IN AN ORGANIZED HEALTH CARE EDUCATION/TRAINING PROGRAM

## 2022-06-16 PROCEDURE — 85049 AUTOMATED PLATELET COUNT: CPT | Performed by: STUDENT IN AN ORGANIZED HEALTH CARE EDUCATION/TRAINING PROGRAM

## 2022-06-16 PROCEDURE — 87389 HIV-1 AG W/HIV-1&-2 AB AG IA: CPT | Performed by: STUDENT IN AN ORGANIZED HEALTH CARE EDUCATION/TRAINING PROGRAM

## 2022-06-16 ASSESSMENT — PAIN SCALES - GENERAL: PAINLEVEL: NO PAIN (0)

## 2022-06-16 NOTE — PROGRESS NOTES
"[facility]: Cancer Care Initial Note                                    Discussion with Patient:                                                        Met with Amy and family after office visit/consult with Dr. Mendez. Introduced self as RN Care Coordinator. Went over contact information for [facility]. Discussed roles of RNCC, MD, BI, nurse triage line, and clinic coordinators. Discussed how to get in contact with different team members via [employer] for non emergency questions and at [phone] (which has options to talk with a Nurse available 24/7). Provided overview of supportive services at [facility] including SW, Cancer Rehab, Cancer Survivorship, oncology dietitian, and oncology behavioral health providers (psychology, psychiatry, counseling).    Discussed chemo education and what to expect at infusion appointment. Provided [facility] Guidebook folder with printed literature on chemo side effects. Reviewed sections of [facility] Cancer Care Guidebook, including \"Getting Ready for Chemotherapy\". Reviewed possible side effects, when to contact your doctor or health care provider, and self care tips sections.     Fertility preservation was discussed. RNCC called and LVM with [facility] fertility clinic, [phone]. Will follow-up again tomorrow.    RNCC discussed wig resources with patient. Gave patient local resources for wigs.    Auth to Discuss PHI form completed- form placed in scanning.      Patient voiced understanding and appreciation of above information and denies any further questions.        Assessment:                                                      Initial  Current living arrangement:: I live in a private home with family  Informal Support system:: Family  Bed or wheelchair confined:: No  Mobility Status: Independent  Transportation means:: Family  Medication adherence problem (GOAL):: No  Knowledgeable about how to use meds:: Yes    Plan of Care Education Review: "   Assessment completed with:: Patient    Current living arrangement  I live in a private home with family    Plan of Care Education   Yearly learning assessment completed?: Yes (see Education tab)  Diagnosis:: Hodgkins lymphoma  Does patient understand diagnosis?: Yes  Does patient understand treatment plan/regimen?: Yes  Vascular access:: Port;Peripheral IV (Will request port placement.)  Side effect education:: Diarrhea/Constipation;Fatigue;Hair loss;Infection;Infertility;Lab value monitoring (anemia, neutropenia, thrombocytopenia);Mouth sores;Nausea/Vomiting  Supportive services:: Social work  Transportation means:: Family  Safety/self care at home reviewed with patient:: Yes  Informal Support system:: Family  Coping - concerns/fears reviewed with patient:: Yes  Plan of Care:: Lab appointment  When to call provider:: Bleeding;Increased shortness of breath;New/worsening pain;Shaking chills;Temperature >100.4F;Uncontrolled diarrhea/constipation;Uncontrolled nausea/vomiting    Evaluation of Learning  Patient Education Provided: Yes  Readiness:: Eager  Method:: Booklet/Handout;Literature;Explanation  Response:: Verbalizes understanding           Intervention/Education provided during outreach:                                                       Further POC:   - scheduled for a PET scan on 6/18  - scheduled for labs and infusion on 6/20     Patient verbalizes understanding and has no questions or concerns at this time. Has contact information for [facility] if he/she has any further needs.    Patient to follow up as scheduled at next appt    FABIO ReyesN, RN  RN Care Coordinator   [facility]   [address] [place] [id number]   [phone]

## 2022-06-16 NOTE — LETTER
6/16/2022         RE: Amy Harris  [address] [place] [address]        Dear Colleague,    Thank you for referring your patient, Amy Harris, to the [facility]. Please see a copy of my visit note below.    Date of visit: 6/16/22    Subjective:  Amy is a 27 year old female with a new diagnosis of HL who presents to [facility] care.    HPI:  She has a past medical history of acne treated with spironolactone who is otherwise healthy with no notable past surgical history. She first noticed heartburn-like symptoms when drinking alcohol a year ago and has noticed progressive dysphagia, where now dry solids get caught when swallowing although it is not painful. She has had to stop eating mid-meal but denies appetite change or weight loss. She developed night sweats x2 in Fall 2021 where she woke up with a drenched shirt. Denies current night sweats, fever, chills. During that time, she first noticed an enlarged lymph node in her right clavicular region and as it was not bothersome or painful, did not pursue medical attention at that time. She noticed no other lumps or bumps at that time. She has also been increasingly itchy in all parts of her body for the past 2 months. Over the past two weeks, she has noticed a fast heart rate without chest pain or palpitations and shortness of breath on exertion like carrying laundry upstairs or walking and talking at the same time. She denies lower extremity or upper extremity edema. Her energy level has been slowly declining, where she sleeps for longer at night and needing to rest for 4 hours in the afternoon.     She had a routine physical on 5/16/22 where she brought attention to her growing mass on her right shoulder. 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 She is concerned today about what treatment would entail and how the treatment would affect her  "fertility.    ROS  12-point ROS negative unless specified above.    Past medical history  Acne    Past surgical history  Boyle teeth surgery    Family history  Paternal grandfather, uncle - prostate cancer  Paternal uncle - NHL, diagnosed age 50  Maternal great grandmother - breast cancer  No other medical illnesses in family.    Social history  In [place] school in [place] currently. Living with parents at home for treatment, will take a leave of absence. Previously worked as X-ray tech for 2-3 years. Never smoker. 1-2 glasses of wine/week. No other substance use.    Objective:    /81 (BP Location: Left arm, Patient Position: Sitting, Cuff Size: Adult Regular)   Pulse 110   Temp 98.8  F (37.1  C) (Oral)   Ht 1.632 m (5' 4.25\")   Wt 54.9 kg (121 lb)   LMP 06/09/2022   SpO2 97%   BMI 20.61 kg/m       Physical Exam   GENERAL:  Alert, oriented, in no apparent distress  SKIN:  No rashes or lesions  EYE: no icterus/pallor  ENT: no throat erythema, no enlarged tonsills, no ulcers or mucositis in the mouth. Scar from biopsy on right clavicle. Protrusion seen on base of neck.  LYMPHATIC: no abnormal lymph nodes palable in cervical, axillary, supraclavicular or inguinal area.  RESP:  CTAB, no wheezes, no crackles, no cough  CV:  Tachycardia, regular rhythm, S1 S2 normal No murmur.  GI:  Abdomen soft nontender no hepato or splenomegaly  MUSCULOSKELETAL:  No visible joint redness or swelling. No MELY bilaterally.  NEURO:  No gross weakness gait normal    Lab Results   Component Value Date    WBC 5.5 06/16/2022    HGB 11.8 06/16/2022    HCT 36.9 06/16/2022     06/16/2022     06/16/2022    POTASSIUM 3.5 06/16/2022    CHLORIDE 100 06/16/2022    CO2 27 06/16/2022    BUN 9 06/16/2022    CR 0.59 06/16/2022     (H) 06/16/2022    AST 14 06/16/2022    ALT 15 06/16/2022    ALKPHOS 94 06/16/2022    BILITOTAL 0.3 06/16/2022    INR 1.04 12/15/2011     CT Chest w/ contrast 6/6/22  IMPRESSION: Extensive anterior " superior mediastinal apparent  lymphadenopathy and right low neck lymphadenopathy redemonstrated when  compared with recent neck CT, not significantly changed in overall  appearance. Unchanged short interval appearance of 2 mm left apical  subpleural nodule, possibly scar. Close attention on follow-up recommended.    Assessment/Plan:  1. Classic Hodgkin's lymphoma:   Diagnosis confirmed via pathology for right supraclavicular excision. Final staging pending results of PET CT on 6/18/22. Currently at least stage II. We discussed ABVD v. AVD-brentuximab vedotin, with the former treatment to be offered if early stage (stage II) versus the latter treatment if PET reveals stage III or stage IV disease. We went over potential side effects of these treatments, including heart disease from adriamycin, pulmonary fibrosis from bleomycin, nausea, diarrhea, cytopenias, hair loss, fatigue, and neuropathy. She is scheduled to begin chemotherapy 6/20. She also expressed interest in fertility preservation and understands that there may not be enough time to safely undergo cryopreservation before starting therapy. We went over the lack of strong data on fertility with these treatments but that natural pregnancy in the future is possible for many patients who receive these medications. We will look into the potential of fertility treatments to happen next week but if the process will take longer, then we recommend we proceed with treatment as originally scheduled. She agreed with this plan.  -Echo 6/15/22 showing normal EF, not concerning  -EKG today-shows normal sinus rhythm.  -PFTs 6/17/22  -PET CT 6/18/22  -labs today: HIV, HBV testing, HCV, ESR, beta 2 microglobulin, uric acid, LDH, CMP, CBC  -plan for 6 cycles of treatment, repeat PETCT after cycle 2 and 6. Treatment regimen pending PET scan results.  - Due to compression of vital organs and symptomatic nature of disease, treatment is time sensitive. Fertility preservation was  discussed and it was emphasized that we cannot wait on treatment more than a week, patient wishes to see if fertiity preservation can be accomplished before treatment, we will try for that.      Genaro Mccall, MS4  [employer]    I saw and examined the patient with medical student, I discused the findings and agree with the above note.    Total time spent on date of service in review of medical records, review of labs, history taking, physical exam, discussion of assessment and plan, counseling and patient education is 60 minutes.      Mallory Mendez MD  Attending Physician  Pager [phone]

## 2022-06-16 NOTE — NURSING NOTE
"Oncology Rooming Note    June 16, 2022 2:44 PM   Amy Harris is a 27 year old female who presents for:    Chief Complaint   Patient presents with     Oncology Clinic Visit     AEH Nodular Sclerosis classical hodgkin lymphoma      Initial Vitals: /81 (BP Location: Left arm, Patient Position: Sitting, Cuff Size: Adult Regular)   Pulse 110   Temp 98.8  F (37.1  C) (Oral)   Ht 1.632 m (5' 4.25\")   Wt 54.9 kg (121 lb)   LMP 06/09/2022   SpO2 97%   BMI 20.61 kg/m   Estimated body mass index is 20.61 kg/m  as calculated from the following:    Height as of this encounter: 1.632 m (5' 4.25\").    Weight as of this encounter: 54.9 kg (121 lb). Body surface area is 1.58 meters squared.  No Pain (0) Comment: Data Unavailable   Patient's last menstrual period was 06/09/2022.  Allergies reviewed: Yes  Medications reviewed: Yes    Medications: Medication refills not needed today.  Pharmacy name entered into EPIC:    [place], [place] - [address] [facility] [address] [place], [place] - [address] [facility] [place], [place] [address] [facility] [address] [facility]  [facility] [address] [place], [place] [address] [facility] [address] [address]  [id number] - [place], [place] - [address]  [facility] [id number] [place] [facility] [address]    Clinical concerns: New pt.       Jarvis Decker            "

## 2022-06-16 NOTE — PROGRESS NOTES
Date of visit: 6/16/22    Subjective:  Amy is a 27 year old female with a new diagnosis of HL who presents to [facility] care.    HPI:  She has a past medical history of acne treated with spironolactone who is otherwise healthy with no notable past surgical history. She first noticed heartburn-like symptoms when drinking alcohol a year ago and has noticed progressive dysphagia, where now dry solids get caught when swallowing although it is not painful. She has had to stop eating mid-meal but denies appetite change or weight loss. She developed night sweats x2 in Fall 2021 where she woke up with a drenched shirt. Denies current night sweats, fever, chills. During that time, she first noticed an enlarged lymph node in her right clavicular region and as it was not bothersome or painful, did not pursue medical attention at that time. She noticed no other lumps or bumps at that time. She has also been increasingly itchy in all parts of her body for the past 2 months. Over the past two weeks, she has noticed a fast heart rate without chest pain or palpitations and shortness of breath on exertion like carrying laundry upstairs or walking and talking at the same time. She denies lower extremity or upper extremity edema. Her energy level has been slowly declining, where she sleeps for longer at night and needing to rest for 4 hours in the afternoon.     She had a routine physical on 5/16/22 where she brought attention to her growing mass on her right shoulder. 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 She is concerned today about what treatment would entail and how the treatment would affect her fertility.    ROS  12-point ROS negative unless specified above.    Past medical history  Acne    Past surgical history  [place] teeth surgery    Family history  Paternal grandfather, uncle - prostate cancer  Paternal uncle - NHL, diagnosed age 50  Maternal  "great grandmother - breast cancer  No other medical illnesses in family.    Social history  In [place] school in [place] currently. Living with parents at home for treatment, will take a leave of absence. Previously worked as X-ray tech for 2-3 years. Never smoker. 1-2 glasses of wine/week. No other substance use.    Objective:    /81 (BP Location: Left arm, Patient Position: Sitting, Cuff Size: Adult Regular)   Pulse 110   Temp 98.8  F (37.1  C) (Oral)   Ht 1.632 m (5' 4.25\")   Wt 54.9 kg (121 lb)   LMP 06/09/2022   SpO2 97%   BMI 20.61 kg/m       Physical Exam   GENERAL:  Alert, oriented, in no apparent distress  SKIN:  No rashes or lesions  EYE: no icterus/pallor  ENT: no throat erythema, no enlarged tonsills, no ulcers or mucositis in the mouth. Scar from biopsy on right clavicle. Protrusion seen on base of neck.  LYMPHATIC: no abnormal lymph nodes palable in cervical, axillary, supraclavicular or inguinal area.  RESP:  CTAB, no wheezes, no crackles, no cough  CV:  Tachycardia, regular rhythm, S1 S2 normal No murmur.  GI:  Abdomen soft nontender no hepato or splenomegaly  MUSCULOSKELETAL:  No visible joint redness or swelling. No MELY bilaterally.  NEURO:  No gross weakness gait normal    Lab Results   Component Value Date    WBC 5.5 06/16/2022    HGB 11.8 06/16/2022    HCT 36.9 06/16/2022     06/16/2022     06/16/2022    POTASSIUM 3.5 06/16/2022    CHLORIDE 100 06/16/2022    CO2 27 06/16/2022    BUN 9 06/16/2022    CR 0.59 06/16/2022     (H) 06/16/2022    AST 14 06/16/2022    ALT 15 06/16/2022    ALKPHOS 94 06/16/2022    BILITOTAL 0.3 06/16/2022    INR 1.04 12/15/2011     CT Chest w/ contrast 6/6/22  IMPRESSION: Extensive anterior superior mediastinal apparent  lymphadenopathy and right low neck lymphadenopathy redemonstrated when  compared with recent neck CT, not significantly changed in overall  appearance. Unchanged short interval appearance of 2 mm left apical  subpleural " nodule, possibly scar. Close attention on follow-up recommended.    Assessment/Plan:  1. Classic Hodgkin's lymphoma:   Diagnosis confirmed via pathology for right supraclavicular excision. Final staging pending results of PET CT on 6/18/22. Currently at least stage II. We discussed ABVD v. AVD-brentuximab vedotin, with the former treatment to be offered if early stage (stage II) versus the latter treatment if PET reveals stage III or stage IV disease. We went over potential side effects of these treatments, including heart disease from adriamycin, pulmonary fibrosis from bleomycin, nausea, diarrhea, cytopenias, hair loss, fatigue, and neuropathy. She is scheduled to begin chemotherapy 6/20. She also expressed interest in fertility preservation and understands that there may not be enough time to safely undergo cryopreservation before starting therapy. We went over the lack of strong data on fertility with these treatments but that natural pregnancy in the future is possible for many patients who receive these medications. We will look into the potential of fertility treatments to happen next week but if the process will take longer, then we recommend we proceed with treatment as originally scheduled. She agreed with this plan.  -Echo 6/15/22 showing normal EF, not concerning  -EKG today-shows normal sinus rhythm.  -PFTs 6/17/22  -PET CT 6/18/22  -labs today: HIV, HBV testing, HCV, ESR, beta 2 microglobulin, uric acid, LDH, CMP, CBC  -plan for 6 cycles of treatment, repeat PETCT after cycle 2 and 6. Treatment regimen pending PET scan results.  - Due to compression of vital organs and symptomatic nature of disease, treatment is time sensitive. Fertility preservation was discussed and it was emphasized that we cannot wait on treatment more than a week, patient wishes to see if fertiity preservation can be accomplished before treatment, we will try for that.      Genaro Mccall, MS4  [facility]  School    I saw and examined the patient with medical student, I discused the findings and agree with the above note.    Total time spent on date of service in review of medical records, review of labs, history taking, physical exam, discussion of assessment and plan, counseling and patient education is 60 minutes.    Mallory Mendez MD  Attending Physician  Pager [phone]

## 2022-06-16 NOTE — NURSING NOTE
Clinic RN william labs via venipuncture in right AC with 21g butterfly. Pt tolerated without incident.     Emily Groves RN

## 2022-06-17 ENCOUNTER — HOSPITAL ENCOUNTER (OUTPATIENT)
Facility: AMBULATORY SURGERY CENTER | Age: 27
End: 2022-06-17
Attending: RADIOLOGY
Payer: COMMERCIAL

## 2022-06-17 ENCOUNTER — PATIENT OUTREACH (OUTPATIENT)
Dept: ONCOLOGY | Facility: CLINIC | Age: 27
End: 2022-06-17

## 2022-06-17 DIAGNOSIS — C81.12 NODULAR SCLEROSIS HODGKIN LYMPHOMA OF INTRATHORACIC LYMPH NODES (H): ICD-10-CM

## 2022-06-17 LAB
ATRIAL RATE - MUSE: 100 BPM
B2 MICROGLOB TUMOR MARKER SER-MCNC: 2.4 MG/L
DIASTOLIC BLOOD PRESSURE - MUSE: NORMAL MMHG
HBV CORE AB SERPL QL IA: NONREACTIVE
HBV SURFACE AB SERPL IA-ACNC: 67.97 M[IU]/ML
HBV SURFACE AG SERPL QL IA: NONREACTIVE
HCV AB SERPL QL IA: NONREACTIVE
HIV 1+2 AB+HIV1 P24 AG SERPL QL IA: NONREACTIVE
INTERPRETATION ECG - MUSE: NORMAL
P AXIS - MUSE: 68 DEGREES
PR INTERVAL - MUSE: 130 MS
QRS DURATION - MUSE: 84 MS
QT - MUSE: 336 MS
QTC - MUSE: 433 MS
R AXIS - MUSE: 78 DEGREES
SYSTOLIC BLOOD PRESSURE - MUSE: NORMAL MMHG
T AXIS - MUSE: 56 DEGREES
VENTRICULAR RATE- MUSE: 100 BPM

## 2022-06-17 PROCEDURE — 94726 PLETHYSMOGRAPHY LUNG VOLUMES: CPT | Performed by: INTERNAL MEDICINE

## 2022-06-17 PROCEDURE — 94060 EVALUATION OF WHEEZING: CPT | Performed by: INTERNAL MEDICINE

## 2022-06-17 PROCEDURE — 94729 DIFFUSING CAPACITY: CPT | Performed by: INTERNAL MEDICINE

## 2022-06-17 RX ORDER — PREDNISONE 20 MG/1
60 TABLET ORAL DAILY
Qty: 45 TABLET | Refills: 0 | Status: SHIPPED | OUTPATIENT
Start: 2022-06-17 | End: 2022-07-02

## 2022-06-17 RX ORDER — SULFAMETHOXAZOLE/TRIMETHOPRIM 800-160 MG
TABLET ORAL
Qty: 24 TABLET | Refills: 11 | Status: SHIPPED | OUTPATIENT
Start: 2022-06-17 | End: 2022-11-14

## 2022-06-17 RX ORDER — ACYCLOVIR 400 MG/1
400 TABLET ORAL EVERY 12 HOURS
Qty: 60 TABLET | Refills: 4 | Status: SHIPPED | OUTPATIENT
Start: 2022-06-17 | End: 2022-10-17

## 2022-06-17 RX ORDER — ALLOPURINOL 300 MG/1
300 TABLET ORAL DAILY
Qty: 30 TABLET | Refills: 1 | Status: SHIPPED | OUTPATIENT
Start: 2022-06-17 | End: 2022-07-11

## 2022-06-17 NOTE — PROGRESS NOTES
[facility]: Cancer Care Short Note                                                                                          Outgoing Call: RNCC contacted patient to update her on getting an appointment with [facility] fertility clinic. An appointment has been made for Monday, 6/20 at 12:45pm. [facility] will reach out to patient for further information. Medical records have been faxed to facility: [phone]    RNCC spoke with Dr. Mendez who would like to delay treatment for 1 week in order for patient to receive fertility consult and port placement. A request was made to reschedule infusion appointment for 6/27 and to schedule port placement for next week. Patient has been made aware that these are getting scheduled and we will reach out to her on times and dates.     FABIO ReyesN, RN  RN Care Coordinator   [phone]

## 2022-06-17 NOTE — PROGRESS NOTES
Ordering Clinic:  [facility] procedure #:  Procedure: Port Placement  Reason for procedure: Hodgkin's lymphoma  Arrival date: 6/24/22  Arrival time: 0715  Covid-19 testing requirements explained: Y  NPO explained: Y                 Does patient have transportation available pre and post procedure?   Y  Check-in procedure explained: Y  Allergies reviewed: NKA  Blood thinners: NA  Labs: per orders    Results:___________________________  H&P:  EPIC  H&P requested?:   Letter sent/email?: my chart - scheduled with Elizabet at [facility].   Does patient require /language? N    PMH:

## 2022-06-18 ENCOUNTER — HOSPITAL ENCOUNTER (OUTPATIENT)
Dept: PET IMAGING | Facility: CLINIC | Age: 27
Discharge: HOME | End: 2022-06-18
Attending: STUDENT IN AN ORGANIZED HEALTH CARE EDUCATION/TRAINING PROGRAM
Payer: COMMERCIAL

## 2022-06-18 ENCOUNTER — HOSPITAL ENCOUNTER (OUTPATIENT)
Dept: PET IMAGING | Facility: CLINIC | Age: 27
Setting detail: NUCLEAR MEDICINE
Discharge: HOME | End: 2022-06-18
Attending: STUDENT IN AN ORGANIZED HEALTH CARE EDUCATION/TRAINING PROGRAM | Admitting: STUDENT IN AN ORGANIZED HEALTH CARE EDUCATION/TRAINING PROGRAM
Payer: COMMERCIAL

## 2022-06-18 DIAGNOSIS — C81.12 NODULAR SCLEROSIS HODGKIN LYMPHOMA OF INTRATHORACIC LYMPH NODES (H): ICD-10-CM

## 2022-06-18 PROCEDURE — 74177 CT ABD & PELVIS W/CONTRAST: CPT

## 2022-06-18 PROCEDURE — 70491 CT SOFT TISSUE NECK W/DYE: CPT | Mod: 26 | Performed by: RADIOLOGY

## 2022-06-18 PROCEDURE — 70491 CT SOFT TISSUE NECK W/DYE: CPT

## 2022-06-18 PROCEDURE — 78816 PET IMAGE W/CT FULL BODY: CPT | Mod: 26 | Performed by: RADIOLOGY

## 2022-06-18 PROCEDURE — A9552 F18 FDG: HCPCS | Performed by: STUDENT IN AN ORGANIZED HEALTH CARE EDUCATION/TRAINING PROGRAM

## 2022-06-18 PROCEDURE — 250N000011 HC RX IP 250 OP 636: Performed by: STUDENT IN AN ORGANIZED HEALTH CARE EDUCATION/TRAINING PROGRAM

## 2022-06-18 PROCEDURE — 343N000001 HC RX 343: Performed by: STUDENT IN AN ORGANIZED HEALTH CARE EDUCATION/TRAINING PROGRAM

## 2022-06-18 PROCEDURE — 78816 PET IMAGE W/CT FULL BODY: CPT | Mod: PI

## 2022-06-18 PROCEDURE — 74177 CT ABD & PELVIS W/CONTRAST: CPT | Mod: 26 | Performed by: RADIOLOGY

## 2022-06-18 PROCEDURE — 71260 CT THORAX DX C+: CPT | Mod: 26 | Performed by: RADIOLOGY

## 2022-06-18 RX ORDER — IOPAMIDOL 755 MG/ML
0-135 INJECTION, SOLUTION INTRAVASCULAR ONCE
Status: COMPLETED | OUTPATIENT
Start: 2022-06-18 | End: 2022-06-18

## 2022-06-18 RX ADMIN — IOPAMIDOL 74 ML: 755 INJECTION, SOLUTION INTRAVENOUS at 08:15

## 2022-06-18 RX ADMIN — FLUDEOXYGLUCOSE F-18 10.05 MCI.: 500 INJECTION, SOLUTION INTRAVENOUS at 07:54

## 2022-06-20 ENCOUNTER — TRANSFERRED RECORDS (OUTPATIENT)
Dept: ONCOLOGY | Facility: CLINIC | Age: 27
End: 2022-06-20

## 2022-06-20 LAB
DLCOCOR-%PRED-PRE: 104 %
DLCOCOR-PRE: 23.59 ML/MIN/MMHG
DLCOUNC-%PRED-PRE: 99 %
DLCOUNC-PRE: 22.34 ML/MIN/MMHG
DLCOUNC-PRED: 22.56 ML/MIN/MMHG
ERV-%PRED-PRE: 49 %
ERV-PRE: 0.73 L
ERV-PRED: 1.46 L
EXPTIME-PRE: 7.85 SEC
FEF2575-%PRED-POST: 73 %
FEF2575-%PRED-PRE: 52 %
FEF2575-POST: 2.78 L/SEC
FEF2575-PRE: 1.99 L/SEC
FEF2575-PRED: 3.8 L/SEC
FEFMAX-%PRED-PRE: 86 %
FEFMAX-PRE: 6.02 L/SEC
FEFMAX-PRED: 6.97 L/SEC
FEV1-%PRED-PRE: 76 %
FEV1-PRE: 2.53 L
FEV1FEV6-PRE: 76 %
FEV1FEV6-PRED: 86 %
FEV1FVC-PRE: 76 %
FEV1FVC-PRED: 87 %
FEV1SVC-PRE: 79 %
FEV1SVC-PRED: 82 %
FIFMAX-PRE: 5.83 L/SEC
FRCPLETH-%PRED-PRE: 102 %
FRCPLETH-PRE: 2.76 L
FRCPLETH-PRED: 2.68 L
FVC-%PRED-PRE: 86 %
FVC-PRE: 3.32 L
FVC-PRED: 3.85 L
IC-%PRED-PRE: 96 %
IC-PRE: 2.49 L
IC-PRED: 2.58 L
RVPLETH-%PRED-PRE: 149 %
RVPLETH-PRE: 2.03 L
RVPLETH-PRED: 1.35 L
TLCPLETH-%PRED-PRE: 105 %
TLCPLETH-PRE: 5.25 L
TLCPLETH-PRED: 4.98 L
VA-%PRED-PRE: 90 %
VA-PRE: 4.38 L
VC-%PRED-PRE: 79 %
VC-PRE: 3.22 L
VC-PRED: 4.04 L

## 2022-06-21 ENCOUNTER — PATIENT OUTREACH (OUTPATIENT)
Dept: CARE COORDINATION | Facility: CLINIC | Age: 27
End: 2022-06-21
Payer: COMMERCIAL

## 2022-06-21 NOTE — PROGRESS NOTES
Social Work Intervention  [facility]    Data/Intervention:    Patient Name:  Amy Harris  /Age:  1995 (27 year old)    Visit Type: telephone, Sling [employer]  Referral Source: Oncology Clinic  Reason for Referral:  Insurance concerns    Collaborated With:    -Patient    Psychosocial Information/Concerns:   spoke with Patient on the phone today. Currently on parent's insurance, will lose it when she turns 28 in 2023. She reports almost meeting her annual deductible, so no rush to get alternative insurance until .   She is also ooking for assistance paying household bills, currently a grad student (will be withdrawing for the  due to treatment) currently not working.    Intervention/Education/Resources Provided:  Per Patient's request,  sent Patient a Renrenmoneyt message with resources attached:  [employer] information, [facility] Emergency Financial Assistance renny, Leukemia and Lymphoma Society renny information.    Assessment/Plan:   will remain available for support as needed.    Provided patient/family with contact information and availability.    Eliza Olea [facility]  Clinical , Outpatient Specialty Clinics  Direct Phone: [phone]

## 2022-06-23 ENCOUNTER — VIRTUAL VISIT (OUTPATIENT)
Dept: ONCOLOGY | Facility: CLINIC | Age: 27
End: 2022-06-23
Attending: STUDENT IN AN ORGANIZED HEALTH CARE EDUCATION/TRAINING PROGRAM
Payer: COMMERCIAL

## 2022-06-23 DIAGNOSIS — C81.10 NODULAR SCLEROSIS CLASSICAL HODGKIN LYMPHOMA (H): Primary | ICD-10-CM

## 2022-06-23 PROCEDURE — 99214 OFFICE O/P EST MOD 30 MIN: CPT | Mod: 95 | Performed by: STUDENT IN AN ORGANIZED HEALTH CARE EDUCATION/TRAINING PROGRAM

## 2022-06-23 PROCEDURE — G0463 HOSPITAL OUTPT CLINIC VISIT: HCPCS | Mod: PN,RTG | Performed by: STUDENT IN AN ORGANIZED HEALTH CARE EDUCATION/TRAINING PROGRAM

## 2022-06-23 RX ORDER — LORAZEPAM 2 MG/ML
0.5 INJECTION INTRAMUSCULAR EVERY 4 HOURS PRN
Status: CANCELLED | OUTPATIENT
Start: 2022-06-27

## 2022-06-23 RX ORDER — HEPARIN SODIUM,PORCINE 10 UNIT/ML
5 VIAL (ML) INTRAVENOUS
Status: CANCELLED | OUTPATIENT
Start: 2022-06-27

## 2022-06-23 RX ORDER — DIPHENHYDRAMINE HYDROCHLORIDE 50 MG/ML
50 INJECTION INTRAMUSCULAR; INTRAVENOUS
Status: CANCELLED
Start: 2022-06-27

## 2022-06-23 RX ORDER — ACETAMINOPHEN 325 MG/1
650 TABLET ORAL
Status: CANCELLED | OUTPATIENT
Start: 2022-06-23

## 2022-06-23 RX ORDER — DIPHENHYDRAMINE HCL 25 MG
50 CAPSULE ORAL
Status: CANCELLED
Start: 2022-06-27

## 2022-06-23 RX ORDER — MEPERIDINE HYDROCHLORIDE 25 MG/ML
25 INJECTION INTRAMUSCULAR; INTRAVENOUS; SUBCUTANEOUS EVERY 30 MIN PRN
Status: CANCELLED | OUTPATIENT
Start: 2022-06-27

## 2022-06-23 RX ORDER — ALBUTEROL SULFATE 90 UG/1
1-2 AEROSOL, METERED RESPIRATORY (INHALATION)
Status: CANCELLED
Start: 2022-06-27

## 2022-06-23 RX ORDER — METHYLPREDNISOLONE SODIUM SUCCINATE 125 MG/2ML
125 INJECTION, POWDER, LYOPHILIZED, FOR SOLUTION INTRAMUSCULAR; INTRAVENOUS
Status: CANCELLED
Start: 2022-06-27

## 2022-06-23 RX ORDER — PALONOSETRON 0.05 MG/ML
0.25 INJECTION, SOLUTION INTRAVENOUS ONCE
Status: CANCELLED
Start: 2022-06-27

## 2022-06-23 RX ORDER — ALBUTEROL SULFATE 0.83 MG/ML
2.5 SOLUTION RESPIRATORY (INHALATION)
Status: CANCELLED | OUTPATIENT
Start: 2022-06-27

## 2022-06-23 RX ORDER — HEPARIN SODIUM (PORCINE) LOCK FLUSH IV SOLN 100 UNIT/ML 100 UNIT/ML
5 SOLUTION INTRAVENOUS
Status: CANCELLED | OUTPATIENT
Start: 2022-06-27

## 2022-06-23 RX ORDER — DOXORUBICIN HYDROCHLORIDE 2 MG/ML
25 INJECTION, SOLUTION INTRAVENOUS ONCE
Status: CANCELLED | OUTPATIENT
Start: 2022-06-27

## 2022-06-23 RX ORDER — ACETAMINOPHEN 325 MG/1
650 TABLET ORAL
Status: CANCELLED
Start: 2022-06-27

## 2022-06-23 RX ORDER — EPINEPHRINE 1 MG/ML
0.3 INJECTION, SOLUTION INTRAMUSCULAR; SUBCUTANEOUS EVERY 5 MIN PRN
Status: CANCELLED | OUTPATIENT
Start: 2022-06-27

## 2022-06-23 RX ORDER — NALOXONE HYDROCHLORIDE 0.4 MG/ML
0.2 INJECTION, SOLUTION INTRAMUSCULAR; INTRAVENOUS; SUBCUTANEOUS
Status: CANCELLED | OUTPATIENT
Start: 2022-06-27

## 2022-06-23 NOTE — LETTER
6/23/2022         RE: Amy Harris  [address] [place] [address]        Dear Colleague,    Thank you for referring your patient, Amy Harris, to the [facility]. Please see a copy of my visit note below.    Chief complaint: This is a 27-year-old female coming in for follow-up of Hodgkin lymphoma    HPI:  Oncology History Overview Note   She has a past medical history of acne treated with spironolactone who is otherwise healthy with no notable past surgical history. She first noticed heartburn-like symptoms when drinking alcohol a year ago and has noticed progressive dysphagia, where now dry solids get caught when swallowing although it is not painful. She has had to stop eating mid-meal but denies appetite change or weight loss. She developed night sweats x2 in Fall 2021 where she woke up with a drenched shirt. Denies current night sweats, fever, chills. During that time, she first noticed an enlarged lymph node in her right clavicular region and as it was not bothersome or painful, did not pursue medical attention at that time. She noticed no other lumps or bumps at that time. She has also been increasingly itchy in all parts of her body for the past 2 months. Over the past two weeks, she has noticed a fast heart rate without chest pain or palpitations and shortness of breath on exertion like carrying laundry upstairs or walking and talking at the same time. She denies lower extremity or upper extremity edema. Her energy level has been slowly declining, where she sleeps for longer at night and needing to rest for 4 hours in the afternoon.      She had a routine physical on 5/16/22 where she brought attention to her growing mass on her right shoulder. 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       She udnerwent ECHO which showed EF of >50%.  PET scan done on 6/18/2022 showed hypermetabolic  mediastinal mass with SUV max of 14.7 and 15.6 x 14.8 x 7.8 cm in size.  Mediastinal mass extends to right level 4 lateral to right thyroid lobe.  There is associated right retroclavicular lymphadenopathy.  Associated bone marrow activity is seen in the axial skeleton consistent with bone marrow infiltration.  Her [place] stage is stage IV.    She consulted with fertility specialist.  We do not have time to pursue pretreatment quality preservation.  She was advised to return in 6 months after completing treatment at that time fertility preservation would be considered.    Due to her ongoing itching, exertional dyspnea, I had prescribed prednisone 60 mg daily and allopurinol to prevent tumor lysis.       Nodular sclerosis classical Hodgkin lymphoma (H)   6/16/2022 Initial Diagnosis    Nodular sclerosis classical Hodgkin lymphoma (H)     6/27/2022 -  Chemotherapy    OP ONC Lymphoma and CLL - A + AVD (Brentuximab vedotin / DOXOrubicin / vinBLAStine / Dacarbazine)   Plan Provider: Mallory Mendez MD  Treatment goal: Curative  Line of treatment: [No plan line of treatment]       She is seen via virtual visit today.  Her shortness of breath and itching are remarkably improved after starting steroids.  No new symptoms.  She does not notice any new lumps and bumps.  She is ready to begin treatment on Monday.    Review of system: 8 point review of systems was negative except pertinent positives listed above.    Labs:     Assessment and plan:    1) nodular sclerosis classical Hodgkin lymphoma stage IV, IPI score of 3:  -We discussed results of [place] 1 trial with her.  In newly diagnosed advanced age disease, AVD brentuximab vedotin has improved progression free and overall survival compared to ABVD.  Benefit of brentuximab vedotin based treatment seems more pronounced in stage IV disease and disease with IPI score of 4-6.  -According to recent data presented at [facility], brentuximab vedotin + AVD was also associated with  better fertility outcomes, more number of pregnancies happened in BV plus AVD arm, although study was not powered to detect this difference.  -I do believe that AVD plus BV  will be highly efficacious in her case as she has stage IV disease and will be more favorable from fertility standpoint compared to bleomycin based regimen.  -We will plan to start AVD plus BV next Monday 6/27.  -I discussed that she will need to be on acyclovir and Bactrim.  -She will need to be on allopurinol for first month of therapy to prevent tumor lysis syndrome.  -As we are starting chemotherapy, we will stop prednisone.  If her symptoms recur during first month of chemotherapy, we can restart prednisone.  -She will have as needed antiemetics for breakthrough nausea.    Total time spent on date of service in review of medical records, review of labs, history taking, physical exam, discussion of assessment and plan, counseling and patient education is 30 minutes.      Mallory Mendez MD  Attending Physician  Pager [phone]

## 2022-06-23 NOTE — PROGRESS NOTES
Amy is a 27 year old who is being evaluated via a billable video visit.      How would you like to obtain your AVS? MyChart  If the video visit is dropped, the invitation should be resent by: Text to cell phone: [phone]  Will anyone else be joining your video visit? Yes: father, sent text link.     Jade August, Virtual Facilitator/LPN      Video-Visit Details    Video Start Time: 1:15    Type of service:  Video Visit    Video End Time:1:45    Originating Location (pt. Location): Home    Distant Location (provider location):  [facility] CANCER [facility]     Platform used for Video Visit: Medisas    Chief complaint: This is a 27-year-old female coming in for follow-up of Hodgkin lymphoma    HPI:  Oncology History Overview Note   She has a past medical history of acne treated with spironolactone who is otherwise healthy with no notable past surgical history. She first noticed heartburn-like symptoms when drinking alcohol a year ago and has noticed progressive dysphagia, where now dry solids get caught when swallowing although it is not painful. She has had to stop eating mid-meal but denies appetite change or weight loss. She developed night sweats x2 in Fall 2021 where she woke up with a drenched shirt. Denies current night sweats, fever, chills. During that time, she first noticed an enlarged lymph node in her right clavicular region and as it was not bothersome or painful, did not pursue medical attention at that time. She noticed no other lumps or bumps at that time. She has also been increasingly itchy in all parts of her body for the past 2 months. Over the past two weeks, she has noticed a fast heart rate without chest pain or palpitations and shortness of breath on exertion like carrying laundry upstairs or walking and talking at the same time. She denies lower extremity or upper extremity edema. Her energy level has been slowly declining, where she sleeps for longer at night and  needing to rest for 4 hours in the afternoon.      She had a routine physical on 5/16/22 where she brought attention to her growing mass on her right shoulder. 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       She udnerwent ECHO which showed EF of >50%.  PET scan done on 6/18/2022 showed hypermetabolic mediastinal mass with SUV max of 14.7 and 15.6 x 14.8 x 7.8 cm in size.  Mediastinal mass extends to right level 4 lateral to right thyroid lobe.  There is associated right retroclavicular lymphadenopathy.  Associated bone marrow activity is seen in the axial skeleton consistent with bone marrow infiltration.  Her [place] stage is stage IV.    She consulted with fertility specialist.  We do not have time to pursue pretreatment quality preservation.  She was advised to return in 6 months after completing treatment at that time fertility preservation would be considered.    Due to her ongoing itching, exertional dyspnea, I had prescribed prednisone 60 mg daily and allopurinol to prevent tumor lysis.       Nodular sclerosis classical Hodgkin lymphoma (H)   6/16/2022 Initial Diagnosis    Nodular sclerosis classical Hodgkin lymphoma (H)     6/27/2022 -  Chemotherapy    OP ONC Lymphoma and CLL - A + AVD (Brentuximab vedotin / DOXOrubicin / vinBLAStine / Dacarbazine)   Plan Provider: Mallory Mendez MD  Treatment goal: Curative  Line of treatment: [No plan line of treatment]       She is seen via virtual visit today.  Her shortness of breath and itching are remarkably improved after starting steroids.  No new symptoms.  She does not notice any new lumps and bumps.  She is ready to begin treatment on Monday.    Review of system: 8 point review of systems was negative except pertinent positives listed above.    Labs:     Assessment and plan:    1) nodular sclerosis classical Hodgkin lymphoma stage IV, IPI score of 3:  -We discussed results of [place] 1  trial with her.  In newly diagnosed advanced age disease, AVD brentuximab vedotin has improved progression free and overall survival compared to ABVD.  Benefit of brentuximab vedotin based treatment seems more pronounced in stage IV disease and disease with IPI score of 4-6.  -According to recent data presented at [facility], brentuximab vedotin + AVD was also associated with better fertility outcomes, more number of pregnancies happened in BV plus AVD arm, although study was not powered to detect this difference.  -I do believe that AVD plus BV  will be highly efficacious in her case as she has stage IV disease and will be more favorable from fertility standpoint compared to bleomycin based regimen.  -We will plan to start AVD plus BV next Monday 6/27.  -I discussed that she will need to be on acyclovir and Bactrim.  -She will need to be on allopurinol for first month of therapy to prevent tumor lysis syndrome.  -As we are starting chemotherapy, we will stop prednisone.  If her symptoms recur during first month of chemotherapy, we can restart prednisone.  -She will have as needed antiemetics for breakthrough nausea.    Total time spent on date of service in review of medical records, review of labs, history taking, physical exam, discussion of assessment and plan, counseling and patient education is 30 minutes.    Mallory Mendez MD  Attending Physician  Pager [phone]

## 2022-06-23 NOTE — NURSING NOTE
Chief Complaint   Patient presents with     Video Visit     Nodular sclerosis classical Hodgkins lymphoma       Patient confirms medications and allergies are accurate via patients echeck in completion, and or denies any changes since last reviewed/verified.     Jade August, Virtual Facilitator/LPN

## 2022-06-24 ENCOUNTER — HOSPITAL ENCOUNTER (OUTPATIENT)
Facility: CLINIC | Age: 27
Discharge: HOME | End: 2022-06-24
Attending: RADIOLOGY | Admitting: RADIOLOGY
Payer: COMMERCIAL

## 2022-06-24 ENCOUNTER — PATIENT OUTREACH (OUTPATIENT)
Dept: CARE COORDINATION | Facility: CLINIC | Age: 27
End: 2022-06-24

## 2022-06-24 ENCOUNTER — APPOINTMENT (OUTPATIENT)
Dept: INTERVENTIONAL RADIOLOGY/VASCULAR | Facility: CLINIC | Age: 27
End: 2022-06-24
Attending: STUDENT IN AN ORGANIZED HEALTH CARE EDUCATION/TRAINING PROGRAM
Payer: COMMERCIAL

## 2022-06-24 ENCOUNTER — PATIENT OUTREACH (OUTPATIENT)
Dept: ONCOLOGY | Facility: CLINIC | Age: 27
End: 2022-06-24

## 2022-06-24 VITALS
RESPIRATION RATE: 18 BRPM | SYSTOLIC BLOOD PRESSURE: 111 MMHG | DIASTOLIC BLOOD PRESSURE: 75 MMHG | TEMPERATURE: 97.2 F | HEART RATE: 78 BPM | OXYGEN SATURATION: 98 %

## 2022-06-24 DIAGNOSIS — C81.12 NODULAR SCLEROSIS HODGKIN LYMPHOMA OF INTRATHORACIC LYMPH NODES (H): ICD-10-CM

## 2022-06-24 DIAGNOSIS — C81.10 NODULAR SCLEROSIS CLASSICAL HODGKIN LYMPHOMA (H): ICD-10-CM

## 2022-06-24 PROCEDURE — C1769 GUIDE WIRE: HCPCS

## 2022-06-24 PROCEDURE — 76937 US GUIDE VASCULAR ACCESS: CPT

## 2022-06-24 PROCEDURE — 36561 INSERT TUNNELED CV CATH: CPT

## 2022-06-24 PROCEDURE — 250N000011 HC RX IP 250 OP 636: Performed by: NURSE PRACTITIONER

## 2022-06-24 PROCEDURE — 77001 FLUOROGUIDE FOR VEIN DEVICE: CPT

## 2022-06-24 PROCEDURE — 250N000011 HC RX IP 250 OP 636

## 2022-06-24 PROCEDURE — 272N000604 HC WOUND GLUE CR3

## 2022-06-24 PROCEDURE — 99153 MOD SED SAME PHYS/QHP EA: CPT

## 2022-06-24 PROCEDURE — 250N000009 HC RX 250

## 2022-06-24 PROCEDURE — 272N000504 HC NEEDLE CR4

## 2022-06-24 PROCEDURE — C1788 PORT, INDWELLING, IMP: HCPCS

## 2022-06-24 PROCEDURE — 99152 MOD SED SAME PHYS/QHP 5/>YRS: CPT

## 2022-06-24 RX ORDER — FENTANYL CITRATE 50 UG/ML
INJECTION, SOLUTION INTRAMUSCULAR; INTRAVENOUS
Status: COMPLETED
Start: 2022-06-24 | End: 2022-06-24

## 2022-06-24 RX ORDER — NALOXONE HYDROCHLORIDE 0.4 MG/ML
0.4 INJECTION, SOLUTION INTRAMUSCULAR; INTRAVENOUS; SUBCUTANEOUS
Status: DISCONTINUED | OUTPATIENT
Start: 2022-06-24 | End: 2022-06-24

## 2022-06-24 RX ORDER — NALOXONE HYDROCHLORIDE 0.4 MG/ML
0.4 INJECTION, SOLUTION INTRAMUSCULAR; INTRAVENOUS; SUBCUTANEOUS
Status: DISCONTINUED | OUTPATIENT
Start: 2022-06-24 | End: 2022-06-24 | Stop reason: HOSPADM

## 2022-06-24 RX ORDER — NALOXONE HYDROCHLORIDE 0.4 MG/ML
0.2 INJECTION, SOLUTION INTRAMUSCULAR; INTRAVENOUS; SUBCUTANEOUS
Status: DISCONTINUED | OUTPATIENT
Start: 2022-06-24 | End: 2022-06-24

## 2022-06-24 RX ORDER — CEFAZOLIN SODIUM 2 G/100ML
2 INJECTION, SOLUTION INTRAVENOUS
Status: DISCONTINUED | OUTPATIENT
Start: 2022-06-24 | End: 2022-06-24

## 2022-06-24 RX ORDER — FLUMAZENIL 0.1 MG/ML
0.2 INJECTION, SOLUTION INTRAVENOUS
Status: DISCONTINUED | OUTPATIENT
Start: 2022-06-24 | End: 2022-06-24 | Stop reason: HOSPADM

## 2022-06-24 RX ORDER — NALOXONE HYDROCHLORIDE 0.4 MG/ML
0.2 INJECTION, SOLUTION INTRAMUSCULAR; INTRAVENOUS; SUBCUTANEOUS
Status: DISCONTINUED | OUTPATIENT
Start: 2022-06-24 | End: 2022-06-24 | Stop reason: HOSPADM

## 2022-06-24 RX ORDER — DEXAMETHASONE 4 MG/1
8 TABLET ORAL
Qty: 12 TABLET | Refills: 5 | Status: SHIPPED | OUTPATIENT
Start: 2022-06-26 | End: 2022-09-19

## 2022-06-24 RX ORDER — PROCHLORPERAZINE MALEATE 10 MG
10 TABLET ORAL EVERY 6 HOURS PRN
Qty: 30 TABLET | Refills: 5 | Status: SHIPPED | OUTPATIENT
Start: 2022-06-26 | End: 2023-03-27

## 2022-06-24 RX ORDER — CEFAZOLIN SODIUM 2 G/100ML
INJECTION, SOLUTION INTRAVENOUS
Status: COMPLETED
Start: 2022-06-24 | End: 2022-06-24

## 2022-06-24 RX ORDER — HEPARIN SODIUM (PORCINE) LOCK FLUSH IV SOLN 100 UNIT/ML 100 UNIT/ML
SOLUTION INTRAVENOUS
Status: COMPLETED
Start: 2022-06-24 | End: 2022-06-24

## 2022-06-24 RX ORDER — CEFAZOLIN SODIUM/WATER 2 G/20 ML
2 SYRINGE (ML) INTRAVENOUS
Status: DISCONTINUED | OUTPATIENT
Start: 2022-06-24 | End: 2022-06-24 | Stop reason: HOSPADM

## 2022-06-24 RX ORDER — ONDANSETRON 8 MG/1
8 TABLET, FILM COATED ORAL EVERY 8 HOURS PRN
Qty: 30 TABLET | Refills: 5 | Status: SHIPPED | OUTPATIENT
Start: 2022-06-26 | End: 2023-03-27

## 2022-06-24 RX ORDER — FENTANYL CITRATE 50 UG/ML
25-50 INJECTION, SOLUTION INTRAMUSCULAR; INTRAVENOUS EVERY 5 MIN PRN
Status: DISCONTINUED | OUTPATIENT
Start: 2022-06-24 | End: 2022-06-24 | Stop reason: HOSPADM

## 2022-06-24 RX ORDER — LIDOCAINE HYDROCHLORIDE 10 MG/ML
INJECTION, SOLUTION EPIDURAL; INFILTRATION; INTRACAUDAL; PERINEURAL
Status: COMPLETED
Start: 2022-06-24 | End: 2022-06-24

## 2022-06-24 RX ADMIN — FENTANYL CITRATE 50 MCG: 50 INJECTION, SOLUTION INTRAMUSCULAR; INTRAVENOUS at 08:19

## 2022-06-24 RX ADMIN — MIDAZOLAM HYDROCHLORIDE 1.5 MG: 1 INJECTION, SOLUTION INTRAMUSCULAR; INTRAVENOUS at 08:19

## 2022-06-24 RX ADMIN — FENTANYL CITRATE 25 MCG: 50 INJECTION, SOLUTION INTRAMUSCULAR; INTRAVENOUS at 08:24

## 2022-06-24 RX ADMIN — Medication 500 UNITS: at 08:34

## 2022-06-24 RX ADMIN — LIDOCAINE HYDROCHLORIDE 18 ML: 10 INJECTION, SOLUTION EPIDURAL; INFILTRATION; INTRACAUDAL; PERINEURAL at 08:21

## 2022-06-24 RX ADMIN — FENTANYL CITRATE 50 MCG: 50 INJECTION, SOLUTION INTRAMUSCULAR; INTRAVENOUS at 08:22

## 2022-06-24 RX ADMIN — FENTANYL CITRATE 50 MCG: 50 INJECTION, SOLUTION INTRAMUSCULAR; INTRAVENOUS at 08:27

## 2022-06-24 RX ADMIN — CEFAZOLIN SODIUM 2 G: 2 INJECTION, SOLUTION INTRAVENOUS at 08:12

## 2022-06-24 RX ADMIN — MIDAZOLAM HYDROCHLORIDE 0.5 MG: 1 INJECTION, SOLUTION INTRAMUSCULAR; INTRAVENOUS at 08:22

## 2022-06-24 RX ADMIN — MIDAZOLAM HYDROCHLORIDE 0.5 MG: 1 INJECTION, SOLUTION INTRAMUSCULAR; INTRAVENOUS at 08:27

## 2022-06-24 NOTE — PROGRESS NOTES
[facility]: Cancer Care Plan of Care Education Note                                    Discussion with Patient:                                                      Discussed chemo education on AVD+ Brentuximab and what to expect at infusion appointment. Discussed visitor policy and what to bring at infusion appointment. Reviewed treatment schedule including length, lab monitoring, and prn medications.    Reviewed possible side effects, when to contact your doctor or health care provider, and self care tips.     Will send patient specific treatment drug information via Jaunt.      Assessment:                                                      Assessment completed with:: Patient    Current living arrangement  I live in a private home with family    Plan of Care Education   Yearly learning assessment completed?: Yes (see Education tab)  Diagnosis:: Hodgkin's Lymphoma  Does patient understand diagnosis?: Yes  Tx plan/regimen:: AVD + Brentuximab  Does patient understand treatment plan/regimen?: Yes  Preparing for treatment:: Reviewed treatment preparation information with patient (vascular access, day of chemo, visitor policy, what to bring, etc.)  Vascular access:: Port  Side effect education:: Diarrhea/Constipation;Fatigue;Hair loss;Infection;Infertility;Lab value monitoring (anemia, neutropenia, thrombocytopenia);Mouth sores;Mylosuppression;Nausea/Vomiting;Neuropathy;Sexual health;Skin changes;Urinary  Transportation means:: Family;Regular car  Safety/self care at home reviewed with patient:: Yes  Informal Support system:: Family  Plan of Care:: BI follow-up appointment  When to call provider:: Bleeding;Increased shortness of breath;New/worsening pain;Shaking chills;Temperature >100.4F;Uncontrolled diarrhea/constipation;Uncontrolled nausea/vomiting    Evaluation of Learning  Patient Education Provided: Yes  Readiness:: Eager  Method:: Literature  Response:: Verbalizes  understanding      Intervention/Education provided during outreach:                                                       Patient voiced understanding and appreciation of above information and denies any further questions.    Mirella Lau, BSN, RN  RN Care Coordinator   [phone]

## 2022-06-24 NOTE — PLAN OF CARE
Goal Outcome Evaluation:    Pt A&Ox4, denies pain. VSS. Port dressing CDI. Tolearted PO. CMS intact. Pt and mom received and verbalized understanding of discharge instructions. All belongings returned to patient. PIV removed. Pt taken out of hospital via wheelchair, mom providing transportation home.

## 2022-06-24 NOTE — DISCHARGE INSTRUCTIONS
Going Home after Your Port Is Inserted  _______________________________________    Patient Name: Amy Harris  Today's Date: June 24, 2022    The doctor who inserted your port was:  Dr. Parham at [facility])      Have your port site and/or neck wound checked on:      Go to:  Interventional Radiology    Your port may be accessed right away. A nurse needs to flush your port every 30 days or after each use.     When you get home:  You should have an adult with you for the first 6 hours.  No driving or drinking alcohol until tomorrow. You may still have side effects from the medicine you received today (you may feel drowsy, unsteady or forgetful).   You may go back to your regular diet today.   If you take aspirin or Plavix, you may begin taking it again tomorrow. You may restart all other medicines today. Use pain medicine as directed.  Avoid heavy lifting or the overuse of your shoulder for three days.  Caring for the port site and/or neck wound:  Keep the port site clean and dry. Cover the site with plastic before taking a shower. Do this until the site has healed.   Keep the bandage on your port site for three days. Change it if it gets wet or dirty. After three days, you may use Band-Aids until the wound has healed.  For a neck wound, leave the tape on for three days. You may cover it with a Band-Aid for comfort.   If you have oozing or bleeding from the port site or from the cut in your neck:   Put direct pressure on the wound for 5 to 10 minutes with a gauze pad.  If you still have bleeding after 10 minutes, call your doctor.  If you are bleeding a lot and can't control it with direct pressure, call 911.    Call your doctor if you have:  Bleeding from a wound after 10 minutes of direct pressure.  Swelling in your neck or over your port site.  Signs of infection: a fever over 100 F (37.8 C) under the tongue; the site is red, tender or draining.

## 2022-06-24 NOTE — PROGRESS NOTES
Social Work Follow-Up  [facility] [place]    Data/Intervention:  Patient Name:  Amy Harris  /Age:  1995 (27 year old)    Reason for Follow-Up:  Patient requested SW apply for [employer] Emergency Financial Assistance renny and [facility] Urgent Need renny on her behalf.    Collaborated With:    -Patient, via [employer]  -[employer], via online portal  -[facility] via telephone.    Intervention/Education/Resources Provided:  Social completed applications for both grants mentioned above. Patient is aware of application completion and next steps.     Assessment/Plan:  [employer] and [facility] will contact Patient directly with application status and next steps.    Previously provided patient/family with writer's contact information and availability.       Eliza Olea [facility]  Clinical , Outpatient Specialty Clinics  Direct Phone: [phone]

## 2022-06-24 NOTE — IP AVS SNAPSHOT
After Visit Summary Template Not Found    This Print Group is only intended to be used in the After Visit Summary and can only be used in a report that uses a released After Visit Summary Template.                       MRN:[id number]                          After Visit Summary   6/24/2022    Amy Harris   MRN: [id number]           Visit Information        Department      6/24/2022  7:12 AM [facility] Interventional Radiology          Review of your medicines      UNREVIEWED medicines. 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 Do not put on face.  Quantity: 50 mL  Refills: 1     drospirenone-ethinyl estradiol 3-0.02 MG tablet  Commonly known as: IDANIA      Dose: 1 tablet  Take 1 tablet by mouth every morning  Refills: 0     HYDROcodone-acetaminophen 5-325 MG tablet  Commonly known as: NORCO  Used for:  Lymphadenopathy of head and neck      Dose: 1-2 tablet  Take 1-2 tablets by mouth every 4 hours as needed for moderate to severe pain  Quantity: 10 tablet  Refills: 0     mineral oil-hydrophilic petrolatum external ointment  Used for: Lymphadenopathy of head and neck      Apply topically 3 times daily  Quantity: 50 g  Refills: 0     predniSONE 20 MG tablet  Commonly known as: DELTASONE  Used for: Nodular sclerosis classical Hodgkin lymphoma (H), 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)      Dose: 60 mg  Take 3 tablets (60 mg) by mouth daily for 15 days  Quantity: 45 tablet  Refills: 0     spironolactone 100 MG tablet  Commonly known as: ALDACTONE      Dose: 200 mg  Take 200 mg by mouth every morning Take 100 mg by mouth daily  Refills: 0     sulfamethoxazole-trimethoprim 800-160 MG tablet  Commonly known as: BACTRIM DS  Used for: Nodular sclerosis classical Hodgkin lymphoma (H), 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 Do not apply on face.  Quantity: 60 g  Refills: 0     TYLENOL PO      Take  by mouth as needed.  Refills: 0              Protect others around you: Learn how to safely use, store and throw away your medicines at www.disposemymeds.org.       Follow-ups after your visit       Your next 10 appointments already scheduled    Jun 24, 2022  8:00 AM  (Arrive by 7:15 AM)  IR CHEST PORT PLACEMENT > 5 YRS OF AGE with BEVERLYIRZARA Rosenthal RH IR RAD  [facility] Interventional Radiology ([facility] ) [address]  [facility] [id number] [phone]   How do I prepare for my exam? (Food and drink instructions)  Adults and Children over 2 years old: No  eating for 8 hours before your procedure. You may have clear liquids up until 2 hours beforehand. These include water, clear tea, black coffee and fruit juice without pulp.  Children under 2 years old:  No eating or formula for 6 hours before your procedure. You may have clear liquids up until 2 hours beforehand. No breast milk for 4 hours before your procedure.    How do I prepare for my exam? (Other instructions)  We will call you to talk about your procedure and answer your questions. We will tell you what time to arrive. We will also ask about any medicines you are taking.  You will need to have a history and physical within 30 days before this procedure.    What should I wear: Please wear loose clothing, such as a sweat suit or jogging clothes. You will be asked to undress and put on a hospital gown.    How long does the exam take: You should expect to be at the facility for approximately 4 hours    What should I bring: Please bring any scans or X-rays taken at other hospitals, if similar tests were done. Also bring a list of your medicines, including vitamins, minerals and over-the-counter drugs. It is safest to leave personal items at home.    Do I need a :  Yes, you may not drive or take a bus or taxi by yourself. You will need an adult to take you home. It is recommended that a responsible adult remain with you for 6 hours.    What do I need to tell my doctor:  Tell your doctor if:  * You have ever had an allergic reaction to X-ray dye (contrast fluid).  * If there's any chance you are pregnant.    What should I do after the exam:  Rest and do quiet activities for 24 hours. Avoid any heavy activity (lifting, vacuuming, exercise) on the day of the exam.  Do not make any major decisions and ensure you have a responsible adult with you for the remainder of the day.   Do not drive or use dangerous machines or tools for 24 hours.  Eat small, frequent meals to prevent nausea and vomiting.   Drink liquids as  directed. Do not drink alcohol or take medicines that make you drowsy.  You should be able to return to your everyday activities the next day.    Who should I call with questions: If you have any questions, please call the Imaging Department where you will have your exam. Directions, parking instructions, and other information are available on our website, [email or web address] [employer].       Jun 27, 2022  9:45 AM  Lab Peripheral with VIDA SAPP LAB DRAW  [facility] ([facility] ) [address] [place] [id number]  [phone]        Jun 27, 2022 10:15 AM  (Arrive by 10:00 AM)  Return Visit with YAJAIRA Sky CNP  [facility] ) [address] [place] [id number]  [phone]        Jun 27, 2022 11:00 AM  Infusion 180 with  ONC INFUSION NURSE,  18 ONC  [facility] ([facility] ) [address] [place] [id number]  [phone]           Care Instructions       Further instructions from your care team         Going Home after Your Port Is Inserted  _______________________________________    Patient Name: Amy Harris  Today's Date: June 24, 2022    The doctor who inserted your port was:  Dr. Parham at [facility])      Have your port site and/or neck wound checked on:      Go to:  Interventional Radiology    Your port may be accessed right away. A nurse needs to flush your port every 30 days or after each use.     When you get home:  You should have an adult with you for the first 6 hours.  No driving or drinking alcohol until tomorrow. You may still have side effects from the medicine you received today (you may feel drowsy, unsteady or forgetful).   You may go back to your regular diet today.   If you take aspirin or Plavix, you may begin taking it again  tomorrow. You may restart all other medicines today. Use pain medicine as directed.  Avoid heavy lifting or the overuse of your shoulder for three days.  Caring for the port site and/or neck wound:  Keep the port site clean and dry. Cover the site with plastic before taking a shower. Do this until the site has healed.   Keep the bandage on your port site for three days. Change it if it gets wet or dirty. After three days, you may use Band-Aids until the wound has healed.  For a neck wound, leave the tape on for three days. You may cover it with a Band-Aid for comfort.   If you have oozing or bleeding from the port site or from the cut in your neck:   Put direct pressure on the wound for 5 to 10 minutes with a gauze pad.  If you still have bleeding after 10 minutes, call your doctor.  If you are bleeding a lot and can't control it with direct pressure, call 911.    Call your doctor if you have:  Bleeding from a wound after 10 minutes of direct pressure.  Swelling in your neck or over your port site.  Signs of infection: a fever over 100 F (37.8 C) under the tongue; the site is red, tender or draining.      Additional Information About Your Visit       [employer] Information    [employer] gives you secure access to your electronic health record. If you see a primary care provider, you can also send messages to your care team and make appointments. If you have questions, please call your primary care clinic.  If you do not have a primary care provider, please call [phone] and they will assist you.       Care EveryWhere ID    This is your Care EveryWhere ID. This could be used by other organizations to access your [facility] medical records  [id number]       Your Vitals Were  Most recent update: 6/24/2022  7:25 AM    Pulse   72    Temperature   97.8  F (36.6  C) (Temporal)    Respirations   18    Last Period   06/09/2022    Pulse Oximetry   99%          Primary Care Provider  Inga Ravi PA-C Office Phone  #  [phone] Fax #  [phone]      Equal Access to Services    PETER PRINGLE : Hadii sue love yanick Soarnold, wacristhianda luqadaha, qaybta kaalmada gilberto, teddy rachaelin hayaaanders millie lamb lacristoferanders velvet. So [facility] [phone].    ATENCIÓN: Si habla español, tiene a flynn disposición servicios gratuitos de asistencia lingüística. Llame al [phone].    We comply with applicable federal and state civil rights laws, including the [place] Human Rights Act. We do not discriminate on the basis of race, color, creed, Jewish, national origin, marital status, age, disability, sex, sexual orientation, or gender identity.    If you would like an itemization of your charges they will now be available in [employer] 30 days after discharge. To access the itemized statements in [employer] go to billing/billing summary. From there select view account. There will be multiple tabs showing an overview of your account, detail, payments, and communications. From the communications tab you can see your monthly statements, your itemized statements, and any billing letters generated for your account. If you do not have a [employer] account and need help getting access please contact [employer] support at [phone].  If you would prefer to have your itemized statements mailed please contact our automated itemized bill request line at [phone] option  2.       Thank you!    Thank you for choosing [facility] for your care. Our goal is always to provide you with excellent care. Hearing back from our patients is one way we can continue to improve our services. Please take a few minutes to complete the written survey that you may receive in the mail after you visit. If you would like to speak to someone directly about your visit please contact Patient Relations at [phone]. Thank you!              Medication List      ASK your doctor about these medications          Morning Afternoon Evening Bedtime As Needed    acyclovir 400 MG  tablet  Also known as: ZOVIRAX  INSTRUCTIONS: Take 1 tablet (400 mg) by mouth every 12 hours for 30 days                     Advil 200 MG capsule  INSTRUCTIONS: Take 200 mg by mouth every 4 hours as needed.  Generic drug: ibuprofen                     allopurinol 300 MG tablet  Also known as: ZYLOPRIM  INSTRUCTIONS: Take 1 tablet (300 mg) by mouth daily for 30 days                     aspirin-acetaminophen-caffeine 250-250-65 MG tablet  Also known as: EXCEDRIN MIGRAINE  INSTRUCTIONS: Take 1 tablet by mouth every 6 hours as needed for headaches                     clobetasol 0.05 % external solution  Also known as: TEMOVATE  INSTRUCTIONS: Apply topically 2 times daily To scalp for up to 3 weeks or until rash is gone. Do not put on face.                     drospirenone-ethinyl estradiol 3-0.02 MG tablet  Also known as: IDANIA  INSTRUCTIONS: Take 1 tablet by mouth every morning                     HYDROcodone-acetaminophen 5-325 MG tablet  Also known as: NORCO  INSTRUCTIONS: Take 1-2 tablets by mouth every 4 hours as needed for moderate to severe pain                     mineral oil-hydrophilic petrolatum external ointment  INSTRUCTIONS: Apply topically 3 times daily                     predniSONE 20 MG tablet  Also known as: DELTASONE  INSTRUCTIONS: Take 3 tablets (60 mg) by mouth daily for 15 days                     spironolactone 100 MG tablet  Also known as: ALDACTONE  INSTRUCTIONS: Take 200 mg by mouth every morning Take 100 mg by mouth daily                     sulfamethoxazole-trimethoprim 800-160 MG tablet  Also known as: BACTRIM DS  INSTRUCTIONS: 1 tablet twice daily three times a week                     triamcinolone 0.1 % external cream  Also known as: KENALOG  INSTRUCTIONS: Apply topically 2 times daily To affected area for up to 2 weeks or until gone. Do not apply on face.                     TYLENOL PO  INSTRUCTIONS: Take  by mouth as needed.

## 2022-06-24 NOTE — IP AVS SNAPSHOT
[facility] Interventional Radiology  [address]  [facility] [address]  Phone: [phone]  Fax: [phone]                                      After Visit Summary   6/24/2022    Amy Harris   MRN: [id number]           After Visit Summary Signature Page    I have received my discharge instructions, and my questions have been answered. I have discussed any challenges I see with this plan with the nurse or doctor.    ..........................................................................................................................................  Patient/Patient Representative Signature      ..........................................................................................................................................  Patient Representative Print Name and Relationship to Patient    ..................................................               ................................................  Date                                   Time    ..........................................................................................................................................  Reviewed by Signature/Title    ...................................................              ..............................................  Date                                               Time          22EPIC Rev 08/18

## 2022-06-24 NOTE — IR NOTE
Interventional Radiology Pre-Procedure Sedation Assessment   Time of Assessment: 8:14 AM    Expected Level: Moderate Sedation    Indication: Sedation is required for the following type of Procedure: Venous Access    Sedation and procedural consent: Risks, benefits and alternatives were discussed with Patient    PO Intake: Appropriately NPO for procedure    ASA Class: Class 2 - MILD SYSTEMIC DISEASE, NO ACUTE PROBLEMS, NO FUNCTIONAL LIMITATIONS.    Mallampati: Grade 2:  Soft palate, base of uvula, tonsillar pillars, and portion of posterior pharyngeal wall visible    Lungs: Lungs Clear with good breath sounds bilaterally    Heart: Normal heart sounds and rate    History and physical reviewed and no updates needed. I have reviewed the lab findings, diagnostic data, medications, and the plan for sedation. I have determined this patient to be an appropriate candidate for the planned sedation and procedure and have reassessed the patient IMMEDIATELY PRIOR to sedation and procedure.    Alejandro Parham MD

## 2022-06-27 ENCOUNTER — INFUSION THERAPY VISIT (OUTPATIENT)
Dept: ONCOLOGY | Facility: CLINIC | Age: 27
End: 2022-06-27
Attending: NURSE PRACTITIONER
Payer: COMMERCIAL

## 2022-06-27 ENCOUNTER — APPOINTMENT (OUTPATIENT)
Dept: LAB | Facility: CLINIC | Age: 27
End: 2022-06-27
Attending: NURSE PRACTITIONER
Payer: COMMERCIAL

## 2022-06-27 ENCOUNTER — TELEPHONE (OUTPATIENT)
Dept: ONCOLOGY | Facility: CLINIC | Age: 27
End: 2022-06-27

## 2022-06-27 VITALS
WEIGHT: 120.3 LBS | HEART RATE: 101 BPM | OXYGEN SATURATION: 97 % | RESPIRATION RATE: 16 BRPM | DIASTOLIC BLOOD PRESSURE: 72 MMHG | SYSTOLIC BLOOD PRESSURE: 118 MMHG | BODY MASS INDEX: 20.54 KG/M2 | HEIGHT: 64 IN | TEMPERATURE: 98.3 F

## 2022-06-27 DIAGNOSIS — C81.12 NODULAR SCLEROSIS HODGKIN LYMPHOMA OF INTRATHORACIC LYMPH NODES (H): ICD-10-CM

## 2022-06-27 DIAGNOSIS — C81.10 NODULAR SCLEROSIS CLASSICAL HODGKIN LYMPHOMA (H): ICD-10-CM

## 2022-06-27 DIAGNOSIS — C81.10 NODULAR SCLEROSIS CLASSICAL HODGKIN LYMPHOMA (H): Primary | ICD-10-CM

## 2022-06-27 LAB
ALBUMIN SERPL-MCNC: 3.5 G/DL (ref 3.4–5)
ALP SERPL-CCNC: 73 U/L (ref 40–150)
ALT SERPL W P-5'-P-CCNC: 23 U/L (ref 0–50)
ANION GAP SERPL CALCULATED.3IONS-SCNC: 11 MMOL/L (ref 3–14)
AST SERPL W P-5'-P-CCNC: 15 U/L (ref 0–45)
BASOPHILS # BLD AUTO: 0 10E3/UL (ref 0–0.2)
BASOPHILS NFR BLD AUTO: 0 %
BILIRUB SERPL-MCNC: 0.3 MG/DL (ref 0.2–1.3)
BUN SERPL-MCNC: 16 MG/DL (ref 7–30)
CALCIUM SERPL-MCNC: 9.5 MG/DL (ref 8.5–10.1)
CHLORIDE BLD-SCNC: 98 MMOL/L (ref 94–109)
CO2 SERPL-SCNC: 25 MMOL/L (ref 20–32)
CREAT SERPL-MCNC: 0.6 MG/DL (ref 0.52–1.04)
EOSINOPHIL # BLD AUTO: 0.1 10E3/UL (ref 0–0.7)
EOSINOPHIL NFR BLD AUTO: 1 %
ERYTHROCYTE [DISTWIDTH] IN BLOOD BY AUTOMATED COUNT: 13.3 % (ref 10–15)
GFR SERPL CREATININE-BSD FRML MDRD: >90 ML/MIN/1.73M2
GLUCOSE BLD-MCNC: 116 MG/DL (ref 70–99)
HCG UR QL: NEGATIVE
HCT VFR BLD AUTO: 41.4 % (ref 35–47)
HGB BLD-MCNC: 13.3 G/DL (ref 11.7–15.7)
IMM GRANULOCYTES # BLD: 0.2 10E3/UL
IMM GRANULOCYTES NFR BLD: 3 %
LYMPHOCYTES # BLD AUTO: 0.4 10E3/UL (ref 0.8–5.3)
LYMPHOCYTES NFR BLD AUTO: 5 %
MCH RBC QN AUTO: 26.3 PG (ref 26.5–33)
MCHC RBC AUTO-ENTMCNC: 32.1 G/DL (ref 31.5–36.5)
MCV RBC AUTO: 82 FL (ref 78–100)
MONOCYTES # BLD AUTO: 0.9 10E3/UL (ref 0–1.3)
MONOCYTES NFR BLD AUTO: 13 %
NEUTROPHILS # BLD AUTO: 5.2 10E3/UL (ref 1.6–8.3)
NEUTROPHILS NFR BLD AUTO: 78 %
NRBC # BLD AUTO: 0 10E3/UL
NRBC BLD AUTO-RTO: 0 /100
PLATELET # BLD AUTO: 454 10E3/UL (ref 150–450)
POTASSIUM BLD-SCNC: 3.2 MMOL/L (ref 3.4–5.3)
PROT SERPL-MCNC: 8 G/DL (ref 6.8–8.8)
RBC # BLD AUTO: 5.06 10E6/UL (ref 3.8–5.2)
SODIUM SERPL-SCNC: 134 MMOL/L (ref 133–144)
WBC # BLD AUTO: 6.8 10E3/UL (ref 4–11)

## 2022-06-27 PROCEDURE — 99215 OFFICE O/P EST HI 40 MIN: CPT | Performed by: NURSE PRACTITIONER

## 2022-06-27 PROCEDURE — 250N000011 HC RX IP 250 OP 636: Performed by: NURSE PRACTITIONER

## 2022-06-27 PROCEDURE — 250N000011 HC RX IP 250 OP 636: Performed by: STUDENT IN AN ORGANIZED HEALTH CARE EDUCATION/TRAINING PROGRAM

## 2022-06-27 PROCEDURE — G0463 HOSPITAL OUTPT CLINIC VISIT: HCPCS

## 2022-06-27 PROCEDURE — 80053 COMPREHEN METABOLIC PANEL: CPT | Performed by: STUDENT IN AN ORGANIZED HEALTH CARE EDUCATION/TRAINING PROGRAM

## 2022-06-27 PROCEDURE — 250N000013 HC RX MED GY IP 250 OP 250 PS 637: Performed by: NURSE PRACTITIONER

## 2022-06-27 PROCEDURE — 96413 CHEMO IV INFUSION 1 HR: CPT

## 2022-06-27 PROCEDURE — 81025 URINE PREGNANCY TEST: CPT | Performed by: NURSE PRACTITIONER

## 2022-06-27 PROCEDURE — 250N000013 HC RX MED GY IP 250 OP 250 PS 637: Performed by: STUDENT IN AN ORGANIZED HEALTH CARE EDUCATION/TRAINING PROGRAM

## 2022-06-27 PROCEDURE — 96367 TX/PROPH/DG ADDL SEQ IV INF: CPT

## 2022-06-27 PROCEDURE — 258N000003 HC RX IP 258 OP 636: Performed by: STUDENT IN AN ORGANIZED HEALTH CARE EDUCATION/TRAINING PROGRAM

## 2022-06-27 PROCEDURE — 96417 CHEMO IV INFUS EACH ADDL SEQ: CPT

## 2022-06-27 PROCEDURE — 96372 THER/PROPH/DIAG INJ SC/IM: CPT | Performed by: STUDENT IN AN ORGANIZED HEALTH CARE EDUCATION/TRAINING PROGRAM

## 2022-06-27 PROCEDURE — 85025 COMPLETE CBC W/AUTO DIFF WBC: CPT | Performed by: STUDENT IN AN ORGANIZED HEALTH CARE EDUCATION/TRAINING PROGRAM

## 2022-06-27 PROCEDURE — 96411 CHEMO IV PUSH ADDL DRUG: CPT

## 2022-06-27 PROCEDURE — 96375 TX/PRO/DX INJ NEW DRUG ADDON: CPT

## 2022-06-27 PROCEDURE — 96377 APPLICATON ON-BODY INJECTOR: CPT | Mod: 59

## 2022-06-27 RX ORDER — ACETAMINOPHEN 325 MG/1
650 TABLET ORAL
Status: DISCONTINUED | OUTPATIENT
Start: 2022-06-27 | End: 2022-06-27 | Stop reason: HOSPADM

## 2022-06-27 RX ORDER — PALONOSETRON 0.05 MG/ML
0.25 INJECTION, SOLUTION INTRAVENOUS ONCE
Status: COMPLETED | OUTPATIENT
Start: 2022-06-27 | End: 2022-06-27

## 2022-06-27 RX ORDER — DIPHENHYDRAMINE HCL 25 MG
50 CAPSULE ORAL
Status: DISCONTINUED | OUTPATIENT
Start: 2022-06-27 | End: 2022-06-27 | Stop reason: HOSPADM

## 2022-06-27 RX ORDER — DOXORUBICIN HYDROCHLORIDE 2 MG/ML
25 INJECTION, SOLUTION INTRAVENOUS ONCE
Status: COMPLETED | OUTPATIENT
Start: 2022-06-27 | End: 2022-06-27

## 2022-06-27 RX ORDER — HEPARIN SODIUM (PORCINE) LOCK FLUSH IV SOLN 100 UNIT/ML 100 UNIT/ML
5 SOLUTION INTRAVENOUS
Status: DISCONTINUED | OUTPATIENT
Start: 2022-06-27 | End: 2022-06-27 | Stop reason: HOSPADM

## 2022-06-27 RX ORDER — HEPARIN SODIUM (PORCINE) LOCK FLUSH IV SOLN 100 UNIT/ML 100 UNIT/ML
5 SOLUTION INTRAVENOUS ONCE
Status: COMPLETED | OUTPATIENT
Start: 2022-06-27 | End: 2022-06-27

## 2022-06-27 RX ORDER — LIDOCAINE/PRILOCAINE 2.5 %-2.5%
CREAM (GRAM) TOPICAL PRN
Qty: 30 G | Refills: 3 | Status: SHIPPED | OUTPATIENT
Start: 2022-06-27 | End: 2023-03-27

## 2022-06-27 RX ORDER — POTASSIUM CHLORIDE 1500 MG/1
40 TABLET, EXTENDED RELEASE ORAL ONCE
Status: COMPLETED | OUTPATIENT
Start: 2022-06-27 | End: 2022-06-27

## 2022-06-27 RX ADMIN — SODIUM CHLORIDE 250 ML: 9 INJECTION, SOLUTION INTRAVENOUS at 11:37

## 2022-06-27 RX ADMIN — SODIUM CHLORIDE, PRESERVATIVE FREE 5 ML: 5 INJECTION INTRAVENOUS at 10:18

## 2022-06-27 RX ADMIN — Medication 5 ML: at 14:08

## 2022-06-27 RX ADMIN — VINBLASTINE SULFATE 9.5 MG: 1 INJECTION INTRAVENOUS at 12:39

## 2022-06-27 RX ADMIN — DACARBAZINE 600 MG: 200 INJECTION, POWDER, FOR SOLUTION INTRAVENOUS at 12:51

## 2022-06-27 RX ADMIN — DEXAMETHASONE SODIUM PHOSPHATE: 10 INJECTION, SOLUTION INTRAMUSCULAR; INTRAVENOUS at 11:50

## 2022-06-27 RX ADMIN — ACETAMINOPHEN 650 MG: 325 TABLET ORAL at 13:04

## 2022-06-27 RX ADMIN — DOXORUBICIN HYDROCHLORIDE 40 MG: 2 INJECTION, SOLUTION INTRAVENOUS at 12:27

## 2022-06-27 RX ADMIN — BRENTUXIMAB VEDOTIN 66 MG: 50 INJECTION, POWDER, LYOPHILIZED, FOR SOLUTION INTRAVENOUS at 13:25

## 2022-06-27 RX ADMIN — POTASSIUM CHLORIDE 40 MEQ: 1500 TABLET, EXTENDED RELEASE ORAL at 11:47

## 2022-06-27 RX ADMIN — DIPHENHYDRAMINE HYDROCHLORIDE 50 MG: 25 CAPSULE ORAL at 12:46

## 2022-06-27 RX ADMIN — PALONOSETRON HYDROCHLORIDE 0.25 MG: 0.25 INJECTION INTRAVENOUS at 11:47

## 2022-06-27 RX ADMIN — PEGFILGRASTIM 6 MG: KIT SUBCUTANEOUS at 13:30

## 2022-06-27 ASSESSMENT — PAIN SCALES - GENERAL: PAINLEVEL: NO PAIN (0)

## 2022-06-27 NOTE — TELEPHONE ENCOUNTER
PA Initiation    Medication: Lidocaine-prilocaine -- PA pending  Insurance Company:  [employer]  Pharmacy Filling the Rx:  [facility]  Filling Pharmacy Phone:  On file  Filling Pharmacy Fax:  On file  Start Date: 6/27/2022

## 2022-06-27 NOTE — NURSING NOTE
"Chief Complaint   Patient presents with     Port Draw     Labs drawn via port by RN. Vitals taken.     Labs drawn via port by RN. Port accessed with 20G 3/4\" gripper needle. Flushed with NS and heparin. Pt tolerated well. Unable to get urine sample in lab, specimen cup sent with patient. Vitals taken. Pt checked in for next appointment.    Summer Keating RN  "

## 2022-06-27 NOTE — NURSING NOTE
"Oncology Rooming Note    June 27, 2022 10:45 AM   Amy Harris is a 27 year old female who presents for:    Chief Complaint   Patient presents with     Port Draw     Labs drawn via port by RN. Vitals taken.     Oncology Clinic Visit     [facility] RETURN - NODULAR SCLEROSIS CLASSICAL HODGKIN LYMPHOMA     Initial Vitals: /72 (BP Location: Right arm, Patient Position: Sitting, Cuff Size: Adult Regular)   Pulse 101   Temp 98.3  F (36.8  C) (Oral)   Resp 16   Ht 1.632 m (5' 4.25\")   Wt 54.6 kg (120 lb 4.8 oz)   LMP 06/09/2022   SpO2 97%   BMI 20.49 kg/m   Estimated body mass index is 20.49 kg/m  as calculated from the following:    Height as of this encounter: 1.632 m (5' 4.25\").    Weight as of this encounter: 54.6 kg (120 lb 4.8 oz). Body surface area is 1.57 meters squared.  No Pain (0) Comment: Data Unavailable   Patient's last menstrual period was 06/09/2022.  Allergies reviewed: Yes  Medications reviewed: Yes    Medications: Medication refills not needed today.  Pharmacy name entered into EPIC:    [place], [place] - [address] [address] [place], [place] - [address] [facility]    Clinical concerns: No new concerns. Digna was notified.      Jackson Santoyo LPN            "

## 2022-06-27 NOTE — PATIENT INSTRUCTIONS
You can remove your Neulasta patch at 5:30pm on Tuesday June 28th. Remember to keep it dry and away from electronics.    [facility] Triage and after hours / weekends / holidays:  [phone]    Please call the triage or after hours line if you experience a temperature greater than or equal to 100.4, shaking chills, have uncontrolled nausea, vomiting and/or diarrhea, dizziness, shortness of breath, chest pain, bleeding, unexplained bruising, or if you have any other new/concerning symptoms, questions or concerns.      If you are having any concerning symptoms or wish to speak to a provider before your next infusion visit, please call your care coordinator or triage to notify them so we can adequately serve you.     If you need a refill on a narcotic prescription or other medication, please call before your infusion appointment.                         Lab Results:  Recent Results (from the past 12 hour(s))   Comprehensive metabolic panel    Collection Time: 06/27/22 10:14 AM   Result Value Ref Range    Sodium 134 133 - 144 mmol/L    Potassium 3.2 (L) 3.4 - 5.3 mmol/L    Chloride 98 94 - 109 mmol/L    Carbon Dioxide (CO2) 25 20 - 32 mmol/L    Anion Gap 11 3 - 14 mmol/L    Urea Nitrogen 16 7 - 30 mg/dL    Creatinine 0.60 0.52 - 1.04 mg/dL    Calcium 9.5 8.5 - 10.1 mg/dL    Glucose 116 (H) 70 - 99 mg/dL    Alkaline Phosphatase 73 40 - 150 U/L    AST 15 0 - 45 U/L    ALT 23 0 - 50 U/L    Protein Total 8.0 6.8 - 8.8 g/dL    Albumin 3.5 3.4 - 5.0 g/dL    Bilirubin Total 0.3 0.2 - 1.3 mg/dL    GFR Estimate >90 >60 mL/min/1.73m2   CBC with platelets and differential    Collection Time: 06/27/22 10:14 AM   Result Value Ref Range    WBC Count 6.8 4.0 - 11.0 10e3/uL    RBC Count 5.06 3.80 - 5.20 10e6/uL    Hemoglobin 13.3 11.7 - 15.7 g/dL    Hematocrit 41.4 35.0 - 47.0 %    MCV 82 78 - 100 fL    MCH 26.3 (L) 26.5 - 33.0 pg    MCHC 32.1 31.5 - 36.5 g/dL    RDW 13.3 10.0 - 15.0 %    Platelet Count 454 (H) 150 - 450 10e3/uL    %  Neutrophils 78 %    % Lymphocytes 5 %    % Monocytes 13 %    % Eosinophils 1 %    % Basophils 0 %    % Immature Granulocytes 3 %    NRBCs per 100 WBC 0 <1 /100    Absolute Neutrophils 5.2 1.6 - 8.3 10e3/uL    Absolute Lymphocytes 0.4 (L) 0.8 - 5.3 10e3/uL    Absolute Monocytes 0.9 0.0 - 1.3 10e3/uL    Absolute Eosinophils 0.1 0.0 - 0.7 10e3/uL    Absolute Basophils 0.0 0.0 - 0.2 10e3/uL    Absolute Immature Granulocytes 0.2 <=0.4 10e3/uL    Absolute NRBCs 0.0 10e3/uL

## 2022-06-27 NOTE — PROGRESS NOTES
Infusion Nursing Note:  Amy Harris presents today for Cycle 1 Day 1 Doxorubicin, Vinblastine, Dacarbazine and Brentuximab + Neulasta.    Patient seen by provider today: Yes: Aleena Sawyer NP   present during visit today: Not Applicable.    Note: Pt presents to infusion feeling well. She offers no new concerns following her provider appointment.    K+ low at 3.2 today.   TORB Aleena Sawyer NP/Shantel Groves RN on 6/27/22 at 1100:  - ok to replace per protocol. 40 mEq K+ given PO.    Pt new to infusion today. Teaching completed previously by Mirella Lau RNCC and reinforced by this RN. All medications reviewed and questions answered. Pt oriented to infusion room, call light, bathrooms and unit routines. Pt aware to call [facility] with chills and/or temperature >100.4F, uncontrolled nausea/vomiting/diarrhea, shortness of breath, chest pain, bleeding or any questions/concerns. Reviewed this information with patient's family.      Intravenous Access:  Implanted Port.    Treatment Conditions:  Lab Results   Component Value Date    HGB 13.3 06/27/2022    WBC 6.8 06/27/2022    ANEU 6.9 12/15/2011    ANEUTAUTO 5.2 06/27/2022     (H) 06/27/2022      Lab Results   Component Value Date     06/27/2022    POTASSIUM 3.2 (L) 06/27/2022    CR 0.60 06/27/2022    NICOLAS 9.5 06/27/2022    BILITOTAL 0.3 06/27/2022    ALBUMIN 3.5 06/27/2022    ALT 23 06/27/2022    AST 15 06/27/2022     Results reviewed, labs MET treatment parameters, ok to proceed with treatment.  ECHO/MUGA completed 6/15/22  EF 65-70%.    Post Infusion Assessment:  Patient tolerated infusion without incident.  Blood return noted pre and post infusion.  Site patent and intact, free from redness, edema or discomfort.  No evidence of extravasations.  Access discontinued per protocol.     Neulasta Onpro On-Body injector applied to LEFT arm at 1330.  Writer discussed Neulasta injection would start tomorrow 6/28 at 1630, approximately 27  hours after application applied today. Written and Verbal instruction reviewed with patient. Pt instructed when the dose delivery starts, it will take about 45 minutes to complete. Pt can remove device at 1730.  Pt aware Neulasta Onpro On-Body should have green flashing light and to call triage or on-call MD if injector flashes red or appears to be leaking. Pt aware to keep Onpro On-Body Neulasta 4 inches away from electrical equipment and to avoid showering 4 hours prior to injection.     Discharge Plan:   Prescription refills given for Zofran, Compazine, and Dexamethasone.  Discharge instructions reviewed with: Patient.  Patient and/or family verbalized understanding of discharge instructions and all questions answered.  Copy of AVS reviewed with patient and/or family.  Patient will return 7/11/22 for next appointment.  Patient discharged in stable condition accompanied by: self.  Departure Mode: Ambulatory.      Shantel Groves RN

## 2022-06-27 NOTE — LETTER
6/27/2022         RE: Amy Harris  [address] [place] [address]        Dear Colleague,    Thank you for referring your patient, Amy Harris, to the [facility]. Please see a copy of my visit note below.    [facility]  Date of visit: 06/27/22      Reason for Visit: follow up HL      Oncology HPI:   She has a past medical history of acne treated with spironolactone who is otherwise healthy with no notable past surgical history. She first noticed heartburn-like symptoms when drinking alcohol a year ago and has noticed progressive dysphagia, where now dry solids get caught when swallowing although it is not painful. She has had to stop eating mid-meal but denies appetite change or weight loss. She developed night sweats x2 in Fall 2021 where she woke up with a drenched shirt. Denies current night sweats, fever, chills. During that time, she first noticed an enlarged lymph node in her right clavicular region and as it was not bothersome or painful, did not pursue medical attention at that time. She noticed no other lumps or bumps at that time. She has also been increasingly itchy in all parts of her body for the past 2 months. Over the past two weeks, she has noticed a fast heart rate without chest pain or palpitations and shortness of breath on exertion like carrying laundry upstairs or walking and talking at the same time. She denies lower extremity or upper extremity edema. Her energy level has been slowly declining, where she sleeps for longer at night and needing to rest for 4 hours in the afternoon.      She had a routine physical on 5/16/22 where she brought attention to her growing mass on her right shoulder. 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         She underwent ECHO which showed EF of >50%.  PET scan done on 6/18/2022 showed  hypermetabolic mediastinal mass with SUV max of 14.7 and 15.6 x 14.8 x 7.8 cm in size.  Mediastinal mass extends to right level 4 lateral to right thyroid lobe.  There is associated right retroclavicular lymphadenopathy.  Associated bone marrow activity is seen in the axial skeleton consistent with bone marrow infiltration.  Her [place] stage is stage IV.     She consulted with fertility specialist.  We do not have time to pursue pretreatment quality preservation.  She was advised to return in 6 months after completing treatment at that time fertility preservation would be considered.     Due to her ongoing itching, exertional dyspnea, I had prescribed prednisone 60 mg daily and allopurinol to prevent tumor lysis.         Nodular sclerosis classical Hodgkin lymphoma (H)    6/16/2022 Initial Diagnosis      Nodular sclerosis classical Hodgkin lymphoma (H)       6/27/2022 -  Chemotherapy      OP ONC Lymphoma and CLL - A + AVD (Brentuximab vedotin / DOXOrubicin / vinBLAStine / Dacarbazine)   Plan Provider: Mallory Mendez MD  Treatment goal: Curative  Line of treatment: [No plan line of treatment]            Interval history:   Doing well. No new complaints. Accompanied by family today. SOB and itching are improved. She took last dose of prednisone yesterday.     No pain. No recent infections or fevers. ROS neg.         Current Outpatient Medications   Medication Sig Dispense Refill     Acetaminophen (TYLENOL PO) Take  by mouth as needed.       acyclovir (ZOVIRAX) 400 MG tablet Take 1 tablet (400 mg) by mouth every 12 hours for 30 days 60 tablet 4     allopurinol (ZYLOPRIM) 300 MG tablet Take 1 tablet (300 mg) by mouth daily for 30 days 30 tablet 1     aspirin-acetaminophen-caffeine (EXCEDRIN MIGRAINE) 250-250-65 MG tablet Take 1 tablet by mouth every 6 hours as needed for headaches       clobetasol (TEMOVATE) 0.05 % external solution Apply topically 2 times daily To scalp for up to 3 weeks or until rash is gone. Do  not put on face. (Patient taking differently: Apply topically 2 times daily as needed To scalp for up to 3 weeks or until rash is gone. Do not put on face.) 50 mL 1     dexamethasone (DECADRON) 4 MG tablet Take 2 tablets (8 mg) by mouth daily (with breakfast) for 3 days following chemotherapy (Days 2, 3, 4 and 16, 17, 18) 12 tablet 5     drospirenone-ethinyl estradiol (IDANIA) 3-0.02 MG tablet Take 1 tablet by mouth every morning       HYDROcodone-acetaminophen (NORCO) 5-325 MG tablet Take 1-2 tablets by mouth every 4 hours as needed for moderate to severe pain 10 tablet 0     Ibuprofen (ADVIL) 200 MG capsule Take 200 mg by mouth every 4 hours as needed.       mineral oil-hydrophilic petrolatum (AQUAPHOR) external ointment Apply topically 3 times daily 50 g 0     ondansetron (ZOFRAN) 8 MG tablet Take 1 tablet (8 mg) by mouth every 8 hours as needed for nausea (vomiting) 30 tablet 5     predniSONE (DELTASONE) 20 MG tablet Take 3 tablets (60 mg) by mouth daily for 15 days 45 tablet 0     prochlorperazine (COMPAZINE) 10 MG tablet Take 1 tablet (10 mg) by mouth every 6 hours as needed (Nausea/Vomiting) 30 tablet 5     spironolactone (ALDACTONE) 100 MG tablet Take 200 mg by mouth every morning Take 100 mg by mouth daily       sulfamethoxazole-trimethoprim (BACTRIM DS) 800-160 MG tablet 1 tablet twice daily three times a week 24 tablet 11     triamcinolone (KENALOG) 0.1 % external cream Apply topically 2 times daily To affected area for up to 2 weeks or until gone. Do not apply on face. (Patient taking differently: Apply topically 2 times daily as needed To affected area for up to 2 weeks or until gone. Do not apply on face.) 60 g 0       No Known Allergies      Physical Exam:  /72 (BP Location: Right arm, Patient Position: Sitting, Cuff Size: Adult Regular)   Pulse 101   Temp 98.3  F (36.8  C) (Oral)   Resp 16   Wt 54.6 kg (120 lb 4.8 oz)   LMP 06/09/2022   SpO2 97%   BMI 20.49 kg/m    General: No acute  distress.  HEENT: EOMI, PERRL. Sclerae are anicteric. Oral mucosa is pink and moist with no lesions or thrush.   Lymph: Neck is supple with no lymphadenopathy in the cervical or supraclavicular areas. Heart: Regular rate and rhythm.   Lungs: Clear to auscultation bilaterally.   Abdomen: Bowel sounds present, soft, nontender with no palpable hepatosplenomegaly or masses.   Extremities: No lower extremity edema noted bilaterally.   Neuro: Alert and oriented x3, CN grossly intact, steady gait  Skin: No rashes, petechiae, or bruising noted on exposed skin. Healing biopsy incision to right clavicular. Port in place, no erythema, drainage.         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 If her symptoms recur during first month of chemotherapy, we can restart prednisone.      Ppx  - Continue acyclovir and Bactrim ppx      # CINV  - PRN antiemetics for breakthrough nausea.        40 minutes spent on the date of the encounter doing chart review, review of  test results, interpretation of tests, patient visit, documentation and discussion with other provider(s)         Again, thank you for allowing me to participate in the care of your patient.      Sincerely,    YAJAIRA Guardado CNP

## 2022-06-27 NOTE — PROGRESS NOTES
[facility]  Date of visit: 06/27/22      Reason for Visit: follow up HL      Oncology HPI:   She has a past medical history of acne treated with spironolactone who is otherwise healthy with no notable past surgical history. She first noticed heartburn-like symptoms when drinking alcohol a year ago and has noticed progressive dysphagia, where now dry solids get caught when swallowing although it is not painful. She has had to stop eating mid-meal but denies appetite change or weight loss. She developed night sweats x2 in Fall 2021 where she woke up with a drenched shirt. Denies current night sweats, fever, chills. During that time, she first noticed an enlarged lymph node in her right clavicular region and as it was not bothersome or painful, did not pursue medical attention at that time. She noticed no other lumps or bumps at that time. She has also been increasingly itchy in all parts of her body for the past 2 months. Over the past two weeks, she has noticed a fast heart rate without chest pain or palpitations and shortness of breath on exertion like carrying laundry upstairs or walking and talking at the same time. She denies lower extremity or upper extremity edema. Her energy level has been slowly declining, where she sleeps for longer at night and needing to rest for 4 hours in the afternoon.      She had a routine physical on 5/16/22 where she brought attention to her growing mass on her right shoulder. 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         She underwent ECHO which showed EF of >50%.  PET scan done on 6/18/2022 showed hypermetabolic mediastinal mass with SUV max of 14.7 and 15.6 x 14.8 x 7.8 cm in size.  Mediastinal mass extends to right level 4 lateral to right thyroid lobe.  There is associated right retroclavicular lymphadenopathy.  Associated bone marrow activity is seen in the  axial skeleton consistent with bone marrow infiltration.  Her [place] stage is stage IV.     She consulted with fertility specialist.  We do not have time to pursue pretreatment quality preservation.  She was advised to return in 6 months after completing treatment at that time fertility preservation would be considered.     Due to her ongoing itching, exertional dyspnea, I had prescribed prednisone 60 mg daily and allopurinol to prevent tumor lysis.         Nodular sclerosis classical Hodgkin lymphoma (H)    6/16/2022 Initial Diagnosis      Nodular sclerosis classical Hodgkin lymphoma (H)       6/27/2022 -  Chemotherapy      OP ONC Lymphoma and CLL - A + AVD (Brentuximab vedotin / DOXOrubicin / vinBLAStine / Dacarbazine)   Plan Provider: Mallory Mendez MD  Treatment goal: Curative  Line of treatment: [No plan line of treatment]            Interval history:   Doing well. No new complaints. Accompanied by family today. SOB and itching are improved. She took last dose of prednisone yesterday.     No pain. No recent infections or fevers. ROS neg.         Current Outpatient Medications   Medication Sig Dispense Refill     Acetaminophen (TYLENOL PO) Take  by mouth as needed.       acyclovir (ZOVIRAX) 400 MG tablet Take 1 tablet (400 mg) by mouth every 12 hours for 30 days 60 tablet 4     allopurinol (ZYLOPRIM) 300 MG tablet Take 1 tablet (300 mg) by mouth daily for 30 days 30 tablet 1     aspirin-acetaminophen-caffeine (EXCEDRIN MIGRAINE) 250-250-65 MG tablet Take 1 tablet by mouth every 6 hours as needed for headaches       clobetasol (TEMOVATE) 0.05 % external solution Apply topically 2 times daily To scalp for up to 3 weeks or until rash is gone. Do not put on face. (Patient taking differently: Apply topically 2 times daily as needed To scalp for up to 3 weeks or until rash is gone. Do not put on face.) 50 mL 1     dexamethasone (DECADRON) 4 MG tablet Take 2 tablets (8 mg) by mouth daily (with breakfast) for 3 days  following chemotherapy (Days 2, 3, 4 and 16, 17, 18) 12 tablet 5     drospirenone-ethinyl estradiol (IDANIA) 3-0.02 MG tablet Take 1 tablet by mouth every morning       HYDROcodone-acetaminophen (NORCO) 5-325 MG tablet Take 1-2 tablets by mouth every 4 hours as needed for moderate to severe pain 10 tablet 0     Ibuprofen (ADVIL) 200 MG capsule Take 200 mg by mouth every 4 hours as needed.       mineral oil-hydrophilic petrolatum (AQUAPHOR) external ointment Apply topically 3 times daily 50 g 0     ondansetron (ZOFRAN) 8 MG tablet Take 1 tablet (8 mg) by mouth every 8 hours as needed for nausea (vomiting) 30 tablet 5     predniSONE (DELTASONE) 20 MG tablet Take 3 tablets (60 mg) by mouth daily for 15 days 45 tablet 0     prochlorperazine (COMPAZINE) 10 MG tablet Take 1 tablet (10 mg) by mouth every 6 hours as needed (Nausea/Vomiting) 30 tablet 5     spironolactone (ALDACTONE) 100 MG tablet Take 200 mg by mouth every morning Take 100 mg by mouth daily       sulfamethoxazole-trimethoprim (BACTRIM DS) 800-160 MG tablet 1 tablet twice daily three times a week 24 tablet 11     triamcinolone (KENALOG) 0.1 % external cream Apply topically 2 times daily To affected area for up to 2 weeks or until gone. Do not apply on face. (Patient taking differently: Apply topically 2 times daily as needed To affected area for up to 2 weeks or until gone. Do not apply on face.) 60 g 0       No Known Allergies      Physical Exam:  /72 (BP Location: Right arm, Patient Position: Sitting, Cuff Size: Adult Regular)   Pulse 101   Temp 98.3  F (36.8  C) (Oral)   Resp 16   Wt 54.6 kg (120 lb 4.8 oz)   LMP 06/09/2022   SpO2 97%   BMI 20.49 kg/m    General: No acute distress.  HEENT: EOMI, PERRL. Sclerae are anicteric. Oral mucosa is pink and moist with no lesions or thrush.   Lymph: Neck is supple with no lymphadenopathy in the cervical or supraclavicular areas. Heart: Regular rate and rhythm.   Lungs: Clear to auscultation bilaterally.    Abdomen: Bowel sounds present, soft, nontender with no palpable hepatosplenomegaly or masses.   Extremities: No lower extremity edema noted bilaterally.   Neuro: Alert and oriented x3, CN grossly intact, steady gait  Skin: No rashes, petechiae, or bruising noted on exposed skin. Healing biopsy incision to right clavicular. Port in place, no erythema, drainage.         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 If her symptoms recur during first month of chemotherapy, we can restart prednisone.      Ppx  - Continue acyclovir and Bactrim ppx      # CINV  - PRN antiemetics for breakthrough nausea.          Aleena Sawyer ACNP-BC    40 minutes spent on the date of the encounter doing chart review, review of test results, interpretation of tests, patient visit, documentation and discussion with other provider(s)

## 2022-06-28 ENCOUNTER — PATIENT OUTREACH (OUTPATIENT)
Dept: ONCOLOGY | Facility: CLINIC | Age: 27
End: 2022-06-28

## 2022-06-28 NOTE — TELEPHONE ENCOUNTER
Prior Authorization Approval    Authorization Effective Date: 6/28/2022  Authorization Expiration Date: 6/28/2023  Medication: Lidocaine-prilocaine -- PA approved  Approved Dose/Quantity: 30 grams for 15 days  Reference #: [id number]   Insurance Company:  [employer]  Expected CoPay:       CoPay Card Available:      Foundation Assistance Needed:    Which Pharmacy is filling the prescription (Not needed for infusion/clinic administered):  [facility]  Pharmacy Notified:  yes  Patient Notified:

## 2022-06-28 NOTE — PROGRESS NOTES
[facility]: Cancer Care Short Note                                    Discussion with Patient:                                                      Outgoing Call: RNCC contacted patient to inform her that her PA was approved for her Emla cream. Patient had transfered the prescription from [facility] pharmacy to her local pharmacy.     RNCC also confirmed that patient had received recent letters for her medical leave.     Patient is wondering if oncology can manger her birth control prescription refills. RNCC informed patient to ask this question to Aleena Sawyer at her next appointment.     Assessment:                                                      Assessment completed with:: Patient    Constitutional  Fatigue: Absent or within normal limits  Fever: Absent or within normal limits    Respiratory  Cough: Absent or within normal limits  Dyspnea: Absent or within normal limits    Gastrointestinal  Anorexia: Absent or within normal limits  Nausea: Loss of appetite without alteration in eating habits (Feels a little nauseous if stomach is empty)  Vomiting: Absent or within normal limits  Dehydration: Absent or within normal limits  Mucositis Oral: Absent or within normal limits  Constipation: Absent or within normal limits  Diarrhea: Absent or within normal limits    Genitourinary  Patient Reported Genitourinary Symptoms?: No    Integumentary  Rash Maculo-Papular: Absent or within normal limits    Pain  Patient Reported Pain?: Yes, is currently well-managed  Pain Score: No Pain (0)    Patient Coping  Accepting    Clinic Utilization  Patient Aware of Next Appointment?: Yes    Other Patient Concerns  Other Patient Reported Concerns: No        Intervention/Education provided during outreach:                                                       Patient is aware of her upcoming appointments and has no further questions at this time.     Patient to follow up as scheduled at next appt    Signature:  Mirella  MARGARITO Lau

## 2022-07-01 ENCOUNTER — PATIENT OUTREACH (OUTPATIENT)
Dept: ONCOLOGY | Facility: CLINIC | Age: 27
End: 2022-07-01

## 2022-07-01 NOTE — PROGRESS NOTES
[facility]: Cancer Care Long Assessment    Discussion with Patient:                                                      RNCC received message from BI Aleena Alcantaraallison to follow-up on patient's reported symptoms via [employer]. RNCC called patient and discussed symptoms. Pt feeling more fatigued that normal and a little SOB on exertion. Also having some reflux recently. Took tums for refulx. Has not had a BM in a week. RN advised pt to increase water intake and begin taking Mirilax for constipation. Patient aslo having blurry distal vision. No other neuro symptoms or concerns.                                 Dates of Treament:                                                      Infusion given in last 28 days     Administered MAR Action Medication Dose Rate Visit    06/27/2022 12:27 Given DOXOrubicin (ADRIAMYCIN) injection 40 mg 40 mg   Infusion Therapy Visit on 06/27/2022 in [facility]    06/27/2022 12:39 New Bag vinBLAStine (VELBAN) 9.5 mg in sodium chloride 0.9 % 37 mL infusion 9.5 mg   Infusion Therapy Visit on 06/27/2022 in [facility]    06/27/2022 12:51 New Bag dacarbazine (DTIC) 600 mg in sodium chloride 0.9 % 335 mL infusion 600 mg 670 mL/hr Infusion Therapy Visit on 06/27/2022 in [facility]    06/27/2022 13:25 New Bag brentuximab (ADCETRIS) 66 mg in sodium chloride 0.9 % 123 mL infusion 66 mg 246 mL/hr Infusion Therapy Visit on 06/27/2022 in [facility]          Assessment:                                                      Assessment completed with:: Patient    Constitutional  Patient Reported Constitutional Symptoms?: Yes  Fatigue: Fatigue relieved by rest  Fever: Absent or within normal limits  Chills: Absent or within normal limits  Weight Gain: Absent or within normal limits  Weight Loss: Absent or within normal limits  Hot Flashes: Absent or within normal  limits    Neurosensory  Patient Reported Neurosensory Symptoms?: No    Eye Disorders  Patient Reported Eye Disorders?: Yes  Blurred Vision: Intervention not indicated (Blurry vision for distal things.)  Dry Eye: Absent or within normal limits  Eye Pain: Absent or within normal limits  Watering Eyes: Absent or within normal limits    Ear Disorders  Ear Disorders  Patient Reported Ear Disorder?: No    Cardiovascular  Patient Reported Cardiovascular Symptoms?: No    Respiratory  Patient Reported Respiratory Symptoms?: Yes  Cough: Absent or within normal limits  Dyspnea: Shortness of breath with moderate exertion (A little short of breath and light headed with movement and heat)    Gastrointestinal  Patient Reported Gastrointestinal Symptoms?: Yes  Anorexia: Absent or within normal limits  Nausea: Absent or within normal limits  Vomiting: Absent or within normal limits  Dehydration: Absent or within normal limits  Dysgeusia: Absent or within normal limits  Dysphagia: Absent or within normal limits  Mucositis Oral: Absent or within normal limits  Esophagitis: Absent or within normal limits  Constipation: Occasional or intermittent symptoms OR occasional use of stool softeners, laxatives, dietary modification, or enema  Diarrhea: Absent or within normal limits  Pharyngitis: Absent or within normal limits  Dry Mouth: Symptomatic (e.g., dry or thick saliva) without significant dietary alteration OR unstimulated saliva flow greater than 0.2 mL/min    Genitourinary  Patient Reported Genitourinary Symptoms?: No    Lymph System Disorders  Patient Reported Lymph System Disorders?: No    Musculoskeletal and Connective Tissue Disorders  Patient Reported Musculoskeletal or Connective Tissue Disorders?: No    Integumentary  Patient Reported Integumentary Symptoms?: No  Rash Maculo-Papular: Absent or within normal limits    Pain  Patient Reported Pain?: No    Patient Coping  Accepting    Clinic Utilization  Patient Aware of Next  Appointment?: Yes    Other Patient Concerns  Other Patient Reported Concerns: Yes, see notes (Having more reflux lately.)        Intervention/Education provided during outreach:                                                       RNCC relayed symptoms to BI Aleena Sawyer.     Patient encouraged to call triage with any new or worsening symptoms.          Signature:  Mirella Lau RN

## 2022-07-03 ENCOUNTER — NURSE TRIAGE (OUTPATIENT)
Dept: NURSING | Facility: CLINIC | Age: 27
End: 2022-07-03

## 2022-07-03 NOTE — TELEPHONE ENCOUNTER
Nurse Triage SBAR    Is this a 2nd Level Triage? YES, LICENSED PRACTITIONER REVIEW IS REQUIRED    Situation: Reporting upper abdominal pain 3/10 crampy constant pressure.  Patient hasn't had bowel movement for seven days.     Background: Started chemotherapy last week for Hodgkin lymphoma.  Started OTC Miralax recommended by the oncology care team Friday morning two days ago.     Assessment: Denies vomiting, dizziness, fever, chest pain.    Protocol Recommended Disposition:   See HCP Within 4 Hours (Or PCP Triage)    Recommendation:  Patient not wanting to come in and doesn't feel is necessary.  She is asking what else can be done for constipation.    Paged to provider  Dr Christiano Martines on call paged via answering service to [phone] for secondary triage as Miralax was recommended from their team.  Provider recommended patient be seen today in ER to rule out blockage before taking other medication.    Provider Recommendation Follow Up:   Reached patient/caregiver. Informed of provider's recommendations. Patient verbalized understanding and says she will talk to her parents before deciding to come in or not.      Lyndsey Handy RN  [facility]            Does the patient meet one of the following criteria for ADS visit consideration? 16+ years old, with an [facility] PCP     TIP  Providers, please consider if this condition is appropriate for management at one of our Acute and Diagnostic Services sites.     If patient is a good candidate, please use dotphrase <dot>triageresponse and select Refer to ADS to document.    Reason for Disposition    [1] Constant abdominal pain AND [2] present > 2 hours    Additional Information    Negative: Sounds like a life-threatening emergency to the triager    Negative: [1] Neutropenia known or suspected (e.g., recent cancer chemotherapy) AND [2] fever > 100.4 F (38.0 C)    Negative: Patient sounds very sick or weak to the triager    Negative: [1] Vomiting AND [2]  abdomen looks much more swollen than usual    Negative: [1] Vomiting AND [2] contains bile (green color)    Negative: [1] Abdominal pain (i.e., mild or intermittent) AND [2] fever    Negative: [1] Abdominal pain (i.e., mild or intermittent) AND [2] abdomen looks much more swollen than usual    Protocols used: CANCER - CONSTIPATION-A-AH

## 2022-07-05 ENCOUNTER — LAB (OUTPATIENT)
Dept: LAB | Facility: CLINIC | Age: 27
End: 2022-07-05
Payer: COMMERCIAL

## 2022-07-05 DIAGNOSIS — C81.10 NODULAR SCLEROSIS CLASSICAL HODGKIN LYMPHOMA (H): ICD-10-CM

## 2022-07-05 DIAGNOSIS — C81.10 NODULAR SCLEROSIS CLASSICAL HODGKIN LYMPHOMA (H): Primary | ICD-10-CM

## 2022-07-05 LAB
ALBUMIN SERPL-MCNC: 3.6 G/DL (ref 3.4–5)
ALP SERPL-CCNC: 90 U/L (ref 40–150)
ALT SERPL W P-5'-P-CCNC: 47 U/L (ref 0–50)
ANION GAP SERPL CALCULATED.3IONS-SCNC: 7 MMOL/L (ref 3–14)
AST SERPL W P-5'-P-CCNC: 17 U/L (ref 0–45)
BASOPHILS # BLD MANUAL: 0.1 10E3/UL (ref 0–0.2)
BASOPHILS NFR BLD MANUAL: 1 %
BILIRUB SERPL-MCNC: 0.1 MG/DL (ref 0.2–1.3)
BUN SERPL-MCNC: 9 MG/DL (ref 7–30)
CALCIUM SERPL-MCNC: 9.6 MG/DL (ref 8.5–10.1)
CHLORIDE BLD-SCNC: 98 MMOL/L (ref 94–109)
CO2 SERPL-SCNC: 24 MMOL/L (ref 20–32)
CREAT SERPL-MCNC: 0.56 MG/DL (ref 0.52–1.04)
EOSINOPHIL # BLD MANUAL: 0.2 10E3/UL (ref 0–0.7)
EOSINOPHIL NFR BLD MANUAL: 2 %
ERYTHROCYTE [DISTWIDTH] IN BLOOD BY AUTOMATED COUNT: 12.6 % (ref 10–15)
GFR SERPL CREATININE-BSD FRML MDRD: >90 ML/MIN/1.73M2
GLUCOSE BLD-MCNC: 100 MG/DL (ref 70–99)
HCT VFR BLD AUTO: 38.1 % (ref 35–47)
HGB BLD-MCNC: 13 G/DL (ref 11.7–15.7)
LYMPHOCYTES # BLD MANUAL: 2 10E3/UL (ref 0.8–5.3)
LYMPHOCYTES NFR BLD MANUAL: 24 %
MCH RBC QN AUTO: 26.7 PG (ref 26.5–33)
MCHC RBC AUTO-ENTMCNC: 34.1 G/DL (ref 31.5–36.5)
MCV RBC AUTO: 78 FL (ref 78–100)
MONOCYTES # BLD MANUAL: 0.7 10E3/UL (ref 0–1.3)
MONOCYTES NFR BLD MANUAL: 9 %
NEUTROPHILS # BLD MANUAL: 5.3 10E3/UL (ref 1.6–8.3)
NEUTROPHILS NFR BLD MANUAL: 64 %
PLAT MORPH BLD: NORMAL
PLATELET # BLD AUTO: 297 10E3/UL (ref 150–450)
POTASSIUM BLD-SCNC: 4 MMOL/L (ref 3.4–5.3)
PROT SERPL-MCNC: 7.7 G/DL (ref 6.8–8.8)
RBC # BLD AUTO: 4.87 10E6/UL (ref 3.8–5.2)
RBC MORPH BLD: NORMAL
SODIUM SERPL-SCNC: 129 MMOL/L (ref 133–144)
WBC # BLD AUTO: 8.3 10E3/UL (ref 4–11)

## 2022-07-05 PROCEDURE — 36415 COLL VENOUS BLD VENIPUNCTURE: CPT

## 2022-07-05 PROCEDURE — 85027 COMPLETE CBC AUTOMATED: CPT

## 2022-07-05 PROCEDURE — 80053 COMPREHEN METABOLIC PANEL: CPT

## 2022-07-05 PROCEDURE — 85007 BL SMEAR W/DIFF WBC COUNT: CPT

## 2022-07-06 ENCOUNTER — VIRTUAL VISIT (OUTPATIENT)
Dept: ONCOLOGY | Facility: CLINIC | Age: 27
End: 2022-07-06
Attending: NURSE PRACTITIONER
Payer: COMMERCIAL

## 2022-07-06 DIAGNOSIS — C81.10 NODULAR SCLEROSIS CLASSICAL HODGKIN LYMPHOMA (H): Primary | ICD-10-CM

## 2022-07-06 PROCEDURE — G0463 HOSPITAL OUTPT CLINIC VISIT: HCPCS | Mod: PN,RTG | Performed by: NURSE PRACTITIONER

## 2022-07-06 PROCEDURE — 99215 OFFICE O/P EST HI 40 MIN: CPT | Mod: 95 | Performed by: NURSE PRACTITIONER

## 2022-07-06 NOTE — LETTER
7/6/2022         RE: Amy Harris  [address] [place] [address]        Dear Colleague,    Thank you for referring your patient, Amy Harris, to the [facility]. Please see a copy of my visit note below.    Amy is a 27 year old who is being evaluated via a billable video visit.      How would you like to obtain your AVS? MyChart  If the video visit is dropped, the invitation should be resent by: Text to cell phone: [phone]  Will anyone else be joining your video visit? No Yes, dad Dariel will be joining from another computer and mom Nicole is there with her for the visit.   Katelyn Collazo        Video-Visit Details    Video Start Time: 0855    Type of service:  Video Visit    Video End Time:0905    Originating Location (pt. Location): Home    Distant Location (provider location):  [facility]     Platform used for Video Visit:         [facility] [place]  Date of visit: 07/6/22      Reason for Visit: follow up HL      Oncology HPI:   She has a past medical history of acne treated with spironolactone who is otherwise healthy with no notable past surgical history. She first noticed heartburn-like symptoms when drinking alcohol a year ago and has noticed progressive dysphagia, where now dry solids get caught when swallowing although it is not painful. She has had to stop eating mid-meal but denies appetite change or weight loss. She developed night sweats x2 in Fall 2021 where she woke up with a drenched shirt. Denies current night sweats, fever, chills. During that time, she first noticed an enlarged lymph node in her right clavicular region and as it was not bothersome or painful, did not pursue medical attention at that time. She noticed no other lumps or bumps at that time. She has also been increasingly itchy in all parts of her body for the past 2 months. Over the past two weeks, she has noticed a  fast heart rate without chest pain or palpitations and shortness of breath on exertion like carrying laundry upstairs or walking and talking at the same time. She denies lower extremity or upper extremity edema. Her energy level has been slowly declining, where she sleeps for longer at night and needing to rest for 4 hours in the afternoon.      She had a routine physical on 5/16/22 where she brought attention to her growing mass on her right shoulder. 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         She underwent ECHO which showed EF of >50%.  PET scan done on 6/18/2022 showed hypermetabolic mediastinal mass with SUV max of 14.7 and 15.6 x 14.8 x 7.8 cm in size.  Mediastinal mass extends to right level 4 lateral to right thyroid lobe.  There is associated right retroclavicular lymphadenopathy.  Associated bone marrow activity is seen in the axial skeleton consistent with bone marrow infiltration.  Her Pierce stage is stage IV.     She consulted with fertility specialist.  We do not have time to pursue pretreatment quality preservation.  She was advised to return in 6 months after completing treatment at that time fertility preservation would be considered.     Due to her ongoing itching, exertional dyspnea, I had prescribed prednisone 60 mg daily and allopurinol to prevent tumor lysis.         Nodular sclerosis classical Hodgkin lymphoma (H)    6/16/2022 Initial Diagnosis      Nodular sclerosis classical Hodgkin lymphoma (H)       6/27/2022 -  Chemotherapy      OP ONC Lymphoma and CLL - A + AVD (Brentuximab vedotin / DOXOrubicin / vinBLAStine / Dacarbazine)   Plan Provider: Mallory Mendez MD  Treatment goal: Curative  Line of treatment: [No plan line of treatment]            Interval history:   Amy presents for follow up ~1 week post first infusion.     She is still struggling with constipation-- was in ER over the weekend, CT was  "neg for obstruction, c/w constipation. Had brandie a week prior to ER visit, given enema which was successful however feels she did not fully empty. Since this visit she has not had another BM. She has up titrated her miralax to twice a day, senna 2 tabs twice a day. She is passing gas, no vomiting however nausea intermittently. When she presses on her stomach she has tenderness.     Appetite is decreased-- partly due to nausea and partly due to feeling \"full\". Hydrating well. Mild intermittent headaches. Had some lightheadedness last week days 3-4 post infusion.     Mouth sores are better.   Neulasta pain resolved after about a day and a half. The ibuprofen only mildly helped.     Feels her MCP joints are achy-- last week she had pain here and was unable to open water bottle. This is better now.       Generally feels achiness/ stiff-- has been laying around more this week. Interested in a massage.           Current Outpatient Medications   Medication Sig Dispense Refill     Acetaminophen (TYLENOL PO) Take  by mouth as needed.       acyclovir (ZOVIRAX) 400 MG tablet Take 1 tablet (400 mg) by mouth every 12 hours for 30 days 60 tablet 4     allopurinol (ZYLOPRIM) 300 MG tablet Take 1 tablet (300 mg) by mouth daily for 30 days 30 tablet 1     aspirin-acetaminophen-caffeine (EXCEDRIN MIGRAINE) 250-250-65 MG tablet Take 1 tablet by mouth every 6 hours as needed for headaches       clobetasol (TEMOVATE) 0.05 % external solution Apply topically 2 times daily To scalp for up to 3 weeks or until rash is gone. Do not put on face. (Patient taking differently: Apply topically 2 times daily as needed To scalp for up to 3 weeks or until rash is gone. Do not put on face.) 50 mL 1     dexamethasone (DECADRON) 4 MG tablet Take 2 tablets (8 mg) by mouth daily (with breakfast) for 3 days following chemotherapy (Days 2, 3, 4 and 16, 17, 18) 12 tablet 5     drospirenone-ethinyl estradiol (IDANIA) 3-0.02 MG tablet Take 1 tablet by mouth every " morning       Ibuprofen (ADVIL) 200 MG capsule Take 200 mg by mouth every 4 hours as needed.       lidocaine-prilocaine (EMLA) 2.5-2.5 % external cream Apply topically as needed for moderate pain 30 g 3     mineral oil-hydrophilic petrolatum (AQUAPHOR) external ointment Apply topically 3 times daily 50 g 0     ondansetron (ZOFRAN) 8 MG tablet Take 1 tablet (8 mg) by mouth every 8 hours as needed for nausea (vomiting) 30 tablet 5     prochlorperazine (COMPAZINE) 10 MG tablet Take 1 tablet (10 mg) by mouth every 6 hours as needed (Nausea/Vomiting) 30 tablet 5     spironolactone (ALDACTONE) 100 MG tablet Take 200 mg by mouth every morning Take 100 mg by mouth daily       sulfamethoxazole-trimethoprim (BACTRIM DS) 800-160 MG tablet 1 tablet twice daily three times a week 24 tablet 11     triamcinolone (KENALOG) 0.1 % external cream Apply topically 2 times daily To affected area for up to 2 weeks or until gone. Do not apply on face. (Patient taking differently: Apply topically 2 times daily as needed To affected area for up to 2 weeks or until gone. Do not apply on face.) 60 g 0       No Known Allergies      Video physical exam  General: Patient appears well in no acute distress.   Skin: No visualized rash or lesions on visualized skin  Eyes: EOMI, no erythema, sclera icterus or discharge noted  Resp: Appears to be breathing comfortably without accessory muscle usage, speaking in full sentences, no cough  MSK: Appears to have normal range of motion based on visualized movements  Neurologic: No apparent tremors, facial movements symmetric  Psych: affect pleasant, alert and oriented    The rest of a comprehensive physical examination is deferred due to PHE (public health emergency) video restrictions          Labs:   I personally reviewed the following labs:    Most Recent 3 CBC's:  Recent Labs   Lab Test 06/27/22  1014 06/16/22  1613   WBC 6.8 5.5   HGB 13.3 11.8   MCV 82 81   * 388     Most Recent 3 BMP's:  Recent  Labs   Lab Test 06/27/22  1014 06/16/22  1613 06/09/22  1205 05/16/22  0921    136  --  135   POTASSIUM 3.2* 3.5  --  4.1   CHLORIDE 98 100  --  103   CO2 25 27  --  24   BUN 16 9  --  10   CR 0.60 0.59  --  0.62   ANIONGAP 11 9  --  8   NICOLAS 9.5 9.2  --  9.6   * 107* 101* 85     Most Recent 2 LFT's:  Recent Labs   Lab Test 06/27/22  1014 06/16/22  1613   AST 15 14   ALT 23 15   ALKPHOS 73 94   BILITOTAL 0.3 0.3           Imaging: n/a      Impression/plan:       # Nodular sclerosis classical Hodgkin lymphoma stage IV, IPI score of 3:  - Plan to proceed with AVD brentuximab vedotin, C1D1 = 6/27  - Today is C1D10 (7/6) AVD + BV with neulasta (onpro) support  - Return to see BI on D1 and D15, will see her again in 1 week for toxicity assessment  - Will plan to complete 2 cycles and restage with PETCT  - Continue allopurinol through cycle 1    - Stopped her pre-phase steroids (last dose taken 6/26). If her symptoms recur during first month of chemotherapy, we can restart prednisone.      Ppx  - Continue acyclovir and Bactrim ppx      # Constipation  This started prior to her first infusion, however worsened post chemo. Enema x1 in ER over the weekend however has not had BM since. +flatus. No vomiting, mild nausea.   - Continue miralax BID and senna 1-2 tab BID  - Will trial mag citrate      # Blurry vision-- resolved. This occurred post chemo, improved with eye drops.       # Neulasta induced bony pain-- alternated tylenol and ibuprofen which helped only a bit. Pain lasted 1 and a half days, now resolved.       # CINV  - PRN antiemetics for breakthrough nausea      40 minutes spent on the date of the encounter doing chart review, review of test results, interpretation of tests, patient visit, documentation and discussion with other provider(s)         Again, thank you for allowing me to participate in the care of your patient.      Sincerely,    Aleena Sawyer, YAJAIRA CNP

## 2022-07-08 DIAGNOSIS — C81.10 NODULAR SCLEROSIS CLASSICAL HODGKIN LYMPHOMA (H): Primary | ICD-10-CM

## 2022-07-08 RX ORDER — PALONOSETRON 0.05 MG/ML
0.25 INJECTION, SOLUTION INTRAVENOUS ONCE
Status: CANCELLED
Start: 2022-07-11

## 2022-07-08 RX ORDER — LORAZEPAM 2 MG/ML
0.5 INJECTION INTRAMUSCULAR EVERY 4 HOURS PRN
Status: CANCELLED | OUTPATIENT
Start: 2022-07-11

## 2022-07-08 RX ORDER — MEPERIDINE HYDROCHLORIDE 25 MG/ML
25 INJECTION INTRAMUSCULAR; INTRAVENOUS; SUBCUTANEOUS EVERY 30 MIN PRN
Status: CANCELLED | OUTPATIENT
Start: 2022-07-11

## 2022-07-08 RX ORDER — EPINEPHRINE 1 MG/ML
0.3 INJECTION, SOLUTION, CONCENTRATE INTRAVENOUS EVERY 5 MIN PRN
Status: CANCELLED | OUTPATIENT
Start: 2022-07-11

## 2022-07-08 RX ORDER — DOXORUBICIN HYDROCHLORIDE 2 MG/ML
25 INJECTION, SOLUTION INTRAVENOUS ONCE
Status: CANCELLED | OUTPATIENT
Start: 2022-07-11

## 2022-07-08 RX ORDER — DIPHENHYDRAMINE HCL 25 MG
50 CAPSULE ORAL
Status: CANCELLED
Start: 2022-07-11

## 2022-07-08 RX ORDER — ACETAMINOPHEN 325 MG/1
650 TABLET ORAL
Status: CANCELLED
Start: 2022-07-11

## 2022-07-08 RX ORDER — ALBUTEROL SULFATE 0.83 MG/ML
2.5 SOLUTION RESPIRATORY (INHALATION)
Status: CANCELLED | OUTPATIENT
Start: 2022-07-11

## 2022-07-08 RX ORDER — NALOXONE HYDROCHLORIDE 0.4 MG/ML
0.2 INJECTION, SOLUTION INTRAMUSCULAR; INTRAVENOUS; SUBCUTANEOUS
Status: CANCELLED | OUTPATIENT
Start: 2022-07-11

## 2022-07-08 RX ORDER — ALBUTEROL SULFATE 90 UG/1
1-2 AEROSOL, METERED RESPIRATORY (INHALATION)
Status: CANCELLED
Start: 2022-07-11

## 2022-07-08 RX ORDER — HEPARIN SODIUM (PORCINE) LOCK FLUSH IV SOLN 100 UNIT/ML 100 UNIT/ML
5 SOLUTION INTRAVENOUS
Status: CANCELLED | OUTPATIENT
Start: 2022-07-11

## 2022-07-08 RX ORDER — HEPARIN SODIUM,PORCINE 10 UNIT/ML
5 VIAL (ML) INTRAVENOUS
Status: CANCELLED | OUTPATIENT
Start: 2022-07-11

## 2022-07-08 RX ORDER — DIPHENHYDRAMINE HYDROCHLORIDE 50 MG/ML
50 INJECTION INTRAMUSCULAR; INTRAVENOUS
Status: CANCELLED
Start: 2022-07-11

## 2022-07-11 ENCOUNTER — APPOINTMENT (OUTPATIENT)
Dept: LAB | Facility: CLINIC | Age: 27
End: 2022-07-11
Attending: NURSE PRACTITIONER
Payer: COMMERCIAL

## 2022-07-11 ENCOUNTER — ONCOLOGY VISIT (OUTPATIENT)
Dept: ONCOLOGY | Facility: CLINIC | Age: 27
End: 2022-07-11
Attending: NURSE PRACTITIONER
Payer: COMMERCIAL

## 2022-07-11 VITALS
WEIGHT: 119.3 LBS | OXYGEN SATURATION: 99 % | BODY MASS INDEX: 20.32 KG/M2 | TEMPERATURE: 98.5 F | RESPIRATION RATE: 18 BRPM | SYSTOLIC BLOOD PRESSURE: 111 MMHG | DIASTOLIC BLOOD PRESSURE: 66 MMHG | HEART RATE: 110 BPM

## 2022-07-11 DIAGNOSIS — C81.10 NODULAR SCLEROSIS CLASSICAL HODGKIN LYMPHOMA (H): Primary | ICD-10-CM

## 2022-07-11 LAB
ALBUMIN SERPL-MCNC: 3.4 G/DL (ref 3.4–5)
ALP SERPL-CCNC: 90 U/L (ref 40–150)
ALT SERPL W P-5'-P-CCNC: 33 U/L (ref 0–50)
ANION GAP SERPL CALCULATED.3IONS-SCNC: 9 MMOL/L (ref 3–14)
AST SERPL W P-5'-P-CCNC: 20 U/L (ref 0–45)
BASOPHILS # BLD MANUAL: 0 10E3/UL (ref 0–0.2)
BASOPHILS NFR BLD MANUAL: 0 %
BILIRUB SERPL-MCNC: 0.2 MG/DL (ref 0.2–1.3)
BUN SERPL-MCNC: 14 MG/DL (ref 7–30)
CALCIUM SERPL-MCNC: 9 MG/DL (ref 8.5–10.1)
CHLORIDE BLD-SCNC: 99 MMOL/L (ref 94–109)
CO2 SERPL-SCNC: 26 MMOL/L (ref 20–32)
CREAT SERPL-MCNC: 0.78 MG/DL (ref 0.52–1.04)
EOSINOPHIL # BLD MANUAL: 0 10E3/UL (ref 0–0.7)
EOSINOPHIL NFR BLD MANUAL: 0 %
ERYTHROCYTE [DISTWIDTH] IN BLOOD BY AUTOMATED COUNT: 13.2 % (ref 10–15)
GFR SERPL CREATININE-BSD FRML MDRD: >90 ML/MIN/1.73M2
GLUCOSE BLD-MCNC: 99 MG/DL (ref 70–99)
HCT VFR BLD AUTO: 37.9 % (ref 35–47)
HGB BLD-MCNC: 12.2 G/DL (ref 11.7–15.7)
LYMPHOCYTES # BLD MANUAL: 0.8 10E3/UL (ref 0.8–5.3)
LYMPHOCYTES NFR BLD MANUAL: 11 %
MCH RBC QN AUTO: 25.8 PG (ref 26.5–33)
MCHC RBC AUTO-ENTMCNC: 32.2 G/DL (ref 31.5–36.5)
MCV RBC AUTO: 80 FL (ref 78–100)
METAMYELOCYTES # BLD MANUAL: 0.1 10E3/UL
METAMYELOCYTES NFR BLD MANUAL: 2 %
MONOCYTES # BLD MANUAL: 1 10E3/UL (ref 0–1.3)
MONOCYTES NFR BLD MANUAL: 14 %
NEUTROPHILS # BLD MANUAL: 5.1 10E3/UL (ref 1.6–8.3)
NEUTROPHILS NFR BLD MANUAL: 72 %
PLAT MORPH BLD: ABNORMAL
PLATELET # BLD AUTO: 256 10E3/UL (ref 150–450)
POTASSIUM BLD-SCNC: 3.5 MMOL/L (ref 3.4–5.3)
PROMYELOCYTES # BLD MANUAL: 0.1 10E3/UL
PROMYELOCYTES NFR BLD MANUAL: 1 %
PROT SERPL-MCNC: 7.3 G/DL (ref 6.8–8.8)
RBC # BLD AUTO: 4.72 10E6/UL (ref 3.8–5.2)
RBC MORPH BLD: ABNORMAL
SODIUM SERPL-SCNC: 134 MMOL/L (ref 133–144)
WBC # BLD AUTO: 7.1 10E3/UL (ref 4–11)

## 2022-07-11 PROCEDURE — 96377 APPLICATON ON-BODY INJECTOR: CPT | Mod: 59

## 2022-07-11 PROCEDURE — 96367 TX/PROPH/DG ADDL SEQ IV INF: CPT

## 2022-07-11 PROCEDURE — 250N000011 HC RX IP 250 OP 636: Performed by: NURSE PRACTITIONER

## 2022-07-11 PROCEDURE — 96413 CHEMO IV INFUSION 1 HR: CPT

## 2022-07-11 PROCEDURE — 96411 CHEMO IV PUSH ADDL DRUG: CPT

## 2022-07-11 PROCEDURE — 96375 TX/PRO/DX INJ NEW DRUG ADDON: CPT

## 2022-07-11 PROCEDURE — 258N000003 HC RX IP 258 OP 636: Performed by: STUDENT IN AN ORGANIZED HEALTH CARE EDUCATION/TRAINING PROGRAM

## 2022-07-11 PROCEDURE — 96417 CHEMO IV INFUS EACH ADDL SEQ: CPT

## 2022-07-11 PROCEDURE — 96372 THER/PROPH/DIAG INJ SC/IM: CPT | Performed by: STUDENT IN AN ORGANIZED HEALTH CARE EDUCATION/TRAINING PROGRAM

## 2022-07-11 PROCEDURE — 80053 COMPREHEN METABOLIC PANEL: CPT | Performed by: STUDENT IN AN ORGANIZED HEALTH CARE EDUCATION/TRAINING PROGRAM

## 2022-07-11 PROCEDURE — 250N000011 HC RX IP 250 OP 636: Performed by: STUDENT IN AN ORGANIZED HEALTH CARE EDUCATION/TRAINING PROGRAM

## 2022-07-11 PROCEDURE — 85027 COMPLETE CBC AUTOMATED: CPT | Performed by: STUDENT IN AN ORGANIZED HEALTH CARE EDUCATION/TRAINING PROGRAM

## 2022-07-11 PROCEDURE — 99214 OFFICE O/P EST MOD 30 MIN: CPT | Performed by: NURSE PRACTITIONER

## 2022-07-11 PROCEDURE — 85007 BL SMEAR W/DIFF WBC COUNT: CPT | Performed by: STUDENT IN AN ORGANIZED HEALTH CARE EDUCATION/TRAINING PROGRAM

## 2022-07-11 RX ORDER — HEPARIN SODIUM (PORCINE) LOCK FLUSH IV SOLN 100 UNIT/ML 100 UNIT/ML
5 SOLUTION INTRAVENOUS
Status: DISCONTINUED | OUTPATIENT
Start: 2022-07-11 | End: 2022-07-11 | Stop reason: HOSPADM

## 2022-07-11 RX ORDER — PALONOSETRON 0.05 MG/ML
0.25 INJECTION, SOLUTION INTRAVENOUS ONCE
Status: COMPLETED | OUTPATIENT
Start: 2022-07-11 | End: 2022-07-11

## 2022-07-11 RX ORDER — HEPARIN SODIUM (PORCINE) LOCK FLUSH IV SOLN 100 UNIT/ML 100 UNIT/ML
5 SOLUTION INTRAVENOUS ONCE
Status: COMPLETED | OUTPATIENT
Start: 2022-07-11 | End: 2022-07-11

## 2022-07-11 RX ORDER — TRAMADOL HYDROCHLORIDE 50 MG/1
50 TABLET ORAL EVERY 6 HOURS PRN
Qty: 10 TABLET | Refills: 0 | Status: SHIPPED | OUTPATIENT
Start: 2022-07-11 | End: 2022-07-14

## 2022-07-11 RX ORDER — DOXORUBICIN HYDROCHLORIDE 2 MG/ML
25 INJECTION, SOLUTION INTRAVENOUS ONCE
Status: COMPLETED | OUTPATIENT
Start: 2022-07-11 | End: 2022-07-11

## 2022-07-11 RX ADMIN — BRENTUXIMAB VEDOTIN 66 MG: 50 INJECTION, POWDER, LYOPHILIZED, FOR SOLUTION INTRAVENOUS at 15:30

## 2022-07-11 RX ADMIN — DOXORUBICIN HYDROCHLORIDE 40 MG: 2 INJECTION, SOLUTION INTRAVENOUS at 14:36

## 2022-07-11 RX ADMIN — PALONOSETRON HYDROCHLORIDE 0.25 MG: 0.25 INJECTION INTRAVENOUS at 13:52

## 2022-07-11 RX ADMIN — Medication 5 ML: at 16:14

## 2022-07-11 RX ADMIN — DEXAMETHASONE SODIUM PHOSPHATE: 10 INJECTION, SOLUTION INTRAMUSCULAR; INTRAVENOUS at 13:52

## 2022-07-11 RX ADMIN — SODIUM CHLORIDE 600 MG: 9 INJECTION, SOLUTION INTRAVENOUS at 14:55

## 2022-07-11 RX ADMIN — SODIUM CHLORIDE 250 ML: 9 INJECTION, SOLUTION INTRAVENOUS at 13:52

## 2022-07-11 RX ADMIN — PEGFILGRASTIM 6 MG: KIT SUBCUTANEOUS at 15:33

## 2022-07-11 RX ADMIN — VINBLASTINE SULFATE 10 MG: 1 INJECTION INTRAVENOUS at 14:47

## 2022-07-11 RX ADMIN — Medication 5 ML: at 12:16

## 2022-07-11 ASSESSMENT — PAIN SCALES - GENERAL: PAINLEVEL: NO PAIN (0)

## 2022-07-11 NOTE — PROGRESS NOTES
Infusion Nursing Note:  Amy Harris presents today for Cycle 1 Day 15 Adriamycin, Vinblastine, Dacarbazine, Brentuximab and Neulasta OnPro.    Patient seen by provider today: Yes: Aleena Sawyer NP.    present during visit today: Not Applicable.    Note: N/A.    Intravenous Access:  Implanted Port.    Treatment Conditions:  Lab Results   Component Value Date    HGB 12.2 07/11/2022    WBC 7.1 07/11/2022    ANEU 5.3 07/05/2022    ANEUTAUTO 5.2 06/27/2022     07/11/2022      Lab Results   Component Value Date     07/11/2022    POTASSIUM 3.5 07/11/2022    CR 0.78 07/11/2022    NICOLAS 9.0 07/11/2022    BILITOTAL 0.2 07/11/2022    ALBUMIN 3.4 07/11/2022    ALT 33 07/11/2022    AST 20 07/11/2022     Prelim ANC: 4.6.  Results reviewed, labs MET treatment parameters, ok to proceed with treatment.  ECHO/MUGA completed 6/15/22  EF 65-70%.    Post Infusion Assessment:  Patient tolerated infusion without incident.  Blood return noted pre and post infusion.  Blood return noted pre during and post Vinblastine infusion to gravity.  Blood return noted during administration of Adriamycin every 2 cc.  Site patent and intact, free from redness, edema or discomfort.  No evidence of extravasations.  Access discontinued per protocol.     Neulasta On Pro- On Body injector applied to patient today on the left upper arm at 1530 with light facing down. Writer discussed Neulasta injection would start tomorrow at 1730, approximately 27 hours after application applied today.  Written and Verbal instruction reviewed with patient.  Pt instructed when the dose delivery starts, it will take about 45 minutes to complete. Pt aware Neulasta Onpro On-Body should have green flashing light and to call triage or on-call MD if injector flashes red or appears to be leaking. Pt aware to keep Onpro On-Body Neualsta 4 inches away from electrical equipment and to avoid showering 4 hours prior to injection. Neulasta Onpro Lot number:  [id number]    Discharge Plan:   Prescription refills given for Tramadol.  AVS to patient via [employer].  Patient will return 7/25 for next appointment.   Patient discharged in stable condition accompanied by: self.  Departure Mode: Ambulatory.  Face to Face time: 0.      Kimberly Navarro RN

## 2022-07-11 NOTE — NURSING NOTE
Chief Complaint   Patient presents with     Port Draw     Labs drawn via port by RN in lab. VS taken.      Port accessed with 20 gauge 3/4 inch flat needle and labs drawn by RN.  Port flushed with saline and heparin.  Pt tolerated well.  VS taken.  Pt checked in for next appt.    Inga Bowman RN

## 2022-07-11 NOTE — PROGRESS NOTES
[facility] [place]  Date of visit: 07/11/22      Reason for Visit: follow up HL      Oncology HPI:   She has a past medical history of acne treated with spironolactone who is otherwise healthy with no notable past surgical history. She first noticed heartburn-like symptoms when drinking alcohol a year ago and has noticed progressive dysphagia, where now dry solids get caught when swallowing although it is not painful. She has had to stop eating mid-meal but denies appetite change or weight loss. She developed night sweats x2 in Fall 2021 where she woke up with a drenched shirt. Denies current night sweats, fever, chills. During that time, she first noticed an enlarged lymph node in her right clavicular region and as it was not bothersome or painful, did not pursue medical attention at that time. She noticed no other lumps or bumps at that time. She has also been increasingly itchy in all parts of her body for the past 2 months. Over the past two weeks, she has noticed a fast heart rate without chest pain or palpitations and shortness of breath on exertion like carrying laundry upstairs or walking and talking at the same time. She denies lower extremity or upper extremity edema. Her energy level has been slowly declining, where she sleeps for longer at night and needing to rest for 4 hours in the afternoon.      She had a routine physical on 5/16/22 where she brought attention to her growing mass on her right shoulder. 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         She underwent ECHO which showed EF of >50%.  PET scan done on 6/18/2022 showed hypermetabolic mediastinal mass with SUV max of 14.7 and 15.6 x 14.8 x 7.8 cm in size.  Mediastinal mass extends to right level 4 lateral to right thyroid lobe.  There is associated right retroclavicular lymphadenopathy.  Associated bone marrow activity is seen in the  axial skeleton consistent with bone marrow infiltration.  Her Amber stage is stage IV.     She consulted with fertility specialist.  We do not have time to pursue pretreatment quality preservation.  She was advised to return in 6 months after completing treatment at that time fertility preservation would be considered.     Due to her ongoing itching, exertional dyspnea, I had prescribed prednisone 60 mg daily and allopurinol to prevent tumor lysis.         Nodular sclerosis classical Hodgkin lymphoma (H)    6/16/2022 Initial Diagnosis      Nodular sclerosis classical Hodgkin lymphoma (H)       6/27/2022 -  Chemotherapy      OP ONC Lymphoma and CLL - A + AVD (Brentuximab vedotin / DOXOrubicin / vinBLAStine / Dacarbazine)   Plan Provider: Mallory Mendez MD  Treatment goal: Curative  Line of treatment: [No plan line of treatment]            Interval history:   Amy presents for follow up prior to C1D15    She is feeling much better today. Energy is stable, eating well. Bowels are moving-- she continues to take her stool meds. No pain-- no fevers or chills. No headaches or dizziness. She had one day of chest heaviness last week and shortness of breath-- these symptoms resolved today.         Current Outpatient Medications   Medication Sig Dispense Refill     traMADol (ULTRAM) 50 MG tablet Take 1 tablet (50 mg) by mouth every 6 hours as needed for severe pain 10 tablet 0     Acetaminophen (TYLENOL PO) Take  by mouth as needed. (Patient not taking: Reported on 7/11/2022)       acyclovir (ZOVIRAX) 400 MG tablet Take 1 tablet (400 mg) by mouth every 12 hours for 30 days 60 tablet 4     aspirin-acetaminophen-caffeine (EXCEDRIN MIGRAINE) 250-250-65 MG tablet Take 1 tablet by mouth every 6 hours as needed for headaches (Patient not taking: Reported on 7/11/2022)       clobetasol (TEMOVATE) 0.05 % external solution Apply topically 2 times daily To scalp for up to 3 weeks or until rash is gone. Do not put on face.  (Patient not taking: Reported on 7/11/2022) 50 mL 1     dexamethasone (DECADRON) 4 MG tablet Take 2 tablets (8 mg) by mouth daily (with breakfast) for 3 days following chemotherapy (Days 2, 3, 4 and 16, 17, 18) 12 tablet 5     drospirenone-ethinyl estradiol (IDANIA) 3-0.02 MG tablet Take 1 tablet by mouth every morning       lidocaine-prilocaine (EMLA) 2.5-2.5 % external cream Apply topically as needed for moderate pain 30 g 3     mineral oil-hydrophilic petrolatum (AQUAPHOR) external ointment Apply topically 3 times daily 50 g 0     ondansetron (ZOFRAN) 8 MG tablet Take 1 tablet (8 mg) by mouth every 8 hours as needed for nausea (vomiting) (Patient not taking: Reported on 7/11/2022) 30 tablet 5     prochlorperazine (COMPAZINE) 10 MG tablet Take 1 tablet (10 mg) by mouth every 6 hours as needed (Nausea/Vomiting) (Patient not taking: Reported on 7/11/2022) 30 tablet 5     spironolactone (ALDACTONE) 100 MG tablet Take 200 mg by mouth every morning Take 100 mg by mouth daily       sulfamethoxazole-trimethoprim (BACTRIM DS) 800-160 MG tablet 1 tablet twice daily three times a week 24 tablet 11     triamcinolone (KENALOG) 0.1 % external cream Apply topically 2 times daily To affected area for up to 2 weeks or until gone. Do not apply on face. (Patient not taking: Reported on 7/11/2022) 60 g 0       No Known Allergies      Physical Exam:  /66 (BP Location: Right arm, Patient Position: Sitting, Cuff Size: Adult Regular)   Pulse 110   Temp 98.5  F (36.9  C) (Oral)   Resp 18   Wt 54.1 kg (119 lb 4.8 oz)   LMP 06/09/2022   SpO2 99%   BMI 20.32 kg/m    General: No acute distress.  HEENT: EOMI, PERRL. Sclerae are anicteric. Oral mucosa is pink and moist with no lesions or thrush.   Lymph: Neck is supple with no lymphadenopathy in the cervical or supraclavicular areas.   Heart: Regular rate and rhythm.   Lungs: Clear to auscultation bilaterally.   Abdomen: Bowel sounds present, soft, nontender with no palpable  hepatosplenomegaly or masses.   Extremities: No lower extremity edema noted bilaterally.   Neuro: Alert and oriented x3, CN grossly intact, steady gait  Skin: No rashes, petechiae, or bruising noted on exposed skin. Port to right chest wall- upper incision with small reddened scab on distal end--?suture surfacing-- slight erythema here            Labs:   I personally reviewed the following labs:    Most Recent 3 CBC's:  Recent Labs   Lab Test 07/11/22  1224 07/05/22  1206 06/27/22  1014   WBC 7.1 8.3 6.8   HGB 12.2 13.0 13.3   MCV 80 78 82    297 454*     Most Recent 3 BMP's:  Recent Labs   Lab Test 07/11/22  1224 07/05/22  1206 06/27/22  1014    129* 134   POTASSIUM 3.5 4.0 3.2*   CHLORIDE 99 98 98   CO2 26 24 25   BUN 14 9 16   CR 0.78 0.56 0.60   ANIONGAP 9 7 11   NICOLAS 9.0 9.6 9.5   GLC 99 100* 116*     Most Recent 2 LFT's:  Recent Labs   Lab Test 07/11/22  1224 07/05/22  1206   AST 20 17   ALT 33 47   ALKPHOS 90 90   BILITOTAL 0.2 0.1*           Imaging: n/a      Impression/plan:       # Nodular sclerosis classical Hodgkin lymphoma stage IV, IPI score of 3:  - Plan to proceed with AVD brentuximab vedotin, C1D1 = 6/27  - Today is C1D15 (7/11) AVD + BV with neulasta (onpro) support  - Return to see BI on D1 and D15  - Will plan to complete 2 cycles and restage with PETCT  - Continue allopurinol through cycle 1    - Stopped her pre-phase steroids (last dose taken 6/26). If her symptoms recur during first month of chemotherapy, we can restart prednisone.      Ppx  - Continue acyclovir and Bactrim ppx      # Constipation  This started prior to her first infusion, however worsened post chemo. Enema x1 in ER over the weekend however has not had BM since. +flatus. No vomiting, mild nausea. Mag citrate eventually given and was successful.   - Continue miralax BID and senna 1-2 tab BID      # Blurry vision-- resolved. This occurred post chemo, improved with eye drops.       # Neulasta induced bony pain--  alternated tylenol and ibuprofen which helped only a bit. Pain lasted 1 and a half days, now resolved.   - Will trial tramadol PRN this cycle      # CINV  - PRN antiemetics for breakthrough nausea          Aleena Sawyer ACNP-BC    35 minutes spent on the date of the encounter doing chart review, review of test results, interpretation of tests, patient visit, documentation and discussion with other provider(s)

## 2022-07-18 DIAGNOSIS — C81.10 NODULAR SCLEROSIS CLASSICAL HODGKIN LYMPHOMA (H): Primary | ICD-10-CM

## 2022-07-20 ENCOUNTER — PATIENT OUTREACH (OUTPATIENT)
Dept: ONCOLOGY | Facility: CLINIC | Age: 27
End: 2022-07-20

## 2022-07-20 NOTE — PROGRESS NOTES
[facility]: Cancer Care                                                                                        Called patient per request of Aleena Sawyer BI due to worsening stomach pains and nausea. Patient did not answer. Voicemail left for patient to return call.     Vero Shepherd RN  RN Care Cordinator    [facility]   [address] [place] [id number]  Phone [phone] [facility] [address]  Fax [phone]

## 2022-07-21 NOTE — PROGRESS NOTES
[facility]  Date of visit: Jul 25, 2022  Oncologist: Dr. Mallory Mendez      Reason for Visit: follow up HL      Oncology HPI:   She has a past medical history of acne treated with spironolactone who is otherwise healthy with no notable past surgical history. She first noticed heartburn-like symptoms when drinking alcohol a year ago and has noticed progressive dysphagia, where now dry solids get caught when swallowing although it is not painful. She has had to stop eating mid-meal but denies appetite change or weight loss. She developed night sweats x2 in Fall 2021 where she woke up with a drenched shirt. Denies current night sweats, fever, chills. During that time, she first noticed an enlarged lymph node in her right clavicular region and as it was not bothersome or painful, did not pursue medical attention at that time. She noticed no other lumps or bumps at that time. She has also been increasingly itchy in all parts of her body for the past 2 months. Over the past two weeks, she has noticed a fast heart rate without chest pain or palpitations and shortness of breath on exertion like carrying laundry upstairs or walking and talking at the same time. She denies lower extremity or upper extremity edema. Her energy level has been slowly declining, where she sleeps for longer at night and needing to rest for 4 hours in the afternoon.      She had a routine physical on 5/16/22 where she brought attention to her growing mass on her right shoulder. 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         She underwent ECHO which showed EF of >50%.  PET scan done on 6/18/2022 showed hypermetabolic mediastinal mass with SUV max of 14.7 and 15.6 x 14.8 x 7.8 cm in size.  Mediastinal mass extends to right level 4 lateral to right thyroid lobe.  There is associated right retroclavicular lymphadenopathy.  Associated bone  marrow activity is seen in the axial skeleton consistent with bone marrow infiltration.  Her Morrison stage is stage IV.     She consulted with fertility specialist.  We do not have time to pursue pretreatment quality preservation.  She was advised to return in 6 months after completing treatment at that time fertility preservation would be considered.     Due to her ongoing itching, exertional dyspnea, I had prescribed prednisone 60 mg daily and allopurinol to prevent tumor lysis.         Nodular sclerosis classical Hodgkin lymphoma (H)    6/16/2022 Initial Diagnosis      Nodular sclerosis classical Hodgkin lymphoma (H)       6/27/2022 -  Chemotherapy      OP ONC Lymphoma and CLL - A + AVD (Brentuximab vedotin / DOXOrubicin / vinBLAStine / Dacarbazine)   Plan Provider: Mallory Mendez MD  Treatment goal: Curative  Line of treatment: [No plan line of treatment]            Interval history:   Amy presents for follow up prior to C2D1 treatment. She is here with her mom.   She is doing fine today. Reports C1D15 chemo went better than C1D1.   She had bone pain from neulasta. Reports claritin helped.   Her appetite is good. She endorses constipation. Previously taking senna/miralax. Most recently took mag citrate. She had Rx for milk of magnesia, but she has not picked this up yet. Last BM was yesterday. A little loose, not watery. She denies nausea/vomiting.   No bleeding. No fevers or chills or NS. No new lumps or bumps.   Breathing is overall stable. She reports her HR has been elevated the last couple of months, but she is asymptomatic. No CP or palpitations.   ROS is otherwise negative.       Physical Exam:  Vital Signs 7/25/2022   Systolic 117   Diastolic 76   Pulse 107   Temperature 98.3   Respirations 16   Weight (LB) 116 lb 11.2 oz   Height    BMI (Calculated)    Pain Score 0   O2 99     General: No acute distress. Young, pleasant female.   HEENT: EOMI, PERRL. Sclerae are anicteric. Oral exam deferred.    Lymph: Neck is supple with no lymphadenopathy in the cervical or supraclavicular areas.   Heart: Regular rate and rhythm.   Lungs: Clear to auscultation bilaterally.   Abdomen: Bowel sounds present, soft, nontender with no palpable hepatosplenomegaly or masses.   Extremities: No lower extremity edema noted bilaterally.   Neuro: Alert and oriented x3, CN grossly intact, steady gait  Skin: No rashes, petechiae, or bruising noted on exposed skin. Port to right chest wall- upper incision, mildly erythematous, no drainage, does not appear infected       Labs:   I personally reviewed the following labs:   Latest Reference Range & Units 07/25/22 11:17   Sodium 133 - 144 mmol/L 137   Potassium 3.4 - 5.3 mmol/L 3.8   Chloride 94 - 109 mmol/L 106   Carbon Dioxide 20 - 32 mmol/L 23   Urea Nitrogen 7 - 30 mg/dL 12   Creatinine 0.52 - 1.04 mg/dL 0.54   GFR Estimate >60 mL/min/1.73m2 >90   Calcium 8.5 - 10.1 mg/dL 8.4 (L)   Anion Gap 3 - 14 mmol/L 8   Albumin 3.4 - 5.0 g/dL 3.4   Protein Total 6.8 - 8.8 g/dL 6.9   Alkaline Phosphatase 40 - 150 U/L 100   ALT 0 - 50 U/L 24   AST 0 - 45 U/L 13   Bilirubin Total 0.2 - 1.3 mg/dL 0.1 (L)   Glucose 70 - 99 mg/dL 104 (H)   WBC 4.0 - 11.0 10e3/uL 16.0 (H)   Hemoglobin 11.7 - 15.7 g/dL 11.1 (L)   Hematocrit 35.0 - 47.0 % 33.7 (L)   Platelet Count 150 - 450 10e3/uL 277   RBC Count 3.80 - 5.20 10e6/uL 4.19   MCV 78 - 100 fL 80   MCH 26.5 - 33.0 pg 26.5   MCHC 31.5 - 36.5 g/dL 32.9   RDW 10.0 - 15.0 % 14.5   % Neutrophils % 80   % Lymphocytes % 16   % Monocytes % 4   % Eosinophils % 0   % Basophils % 0   Absolute Basophils 0.0 - 0.2 10e3/uL 0.0   Absolute Neutrophil 1.6 - 8.3 10e3/uL 12.8 (H)   Absolute Lymphocytes 0.8 - 5.3 10e3/uL 2.6   Absolute Monocytes 0.0 - 1.3 10e3/uL 0.6   Absolute Eosinophils 0.0 - 0.7 10e3/uL 0.0   RBC Morphology  Confirmed RBC Indices   Platelet Morphology Automated Count Confirmed. Platelet morphology is normal.  Automated Count Confirmed. Platelet morphology is  normal.         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 Discussed that dex and emend have DDI with her OCP, decreasing the effectiveness of her OCP. Advised her to use condoms for added protection if sexually active.   - Return to see BI on D1 and D15  - Will plan to complete 2 cycles and restage with PETCT  - Continue allopurinol through cycle 1    - Stopped her pre-phase steroids (last dose taken 6/26). If her symptoms recur during first month of chemotherapy, we can restart prednisone.      Ppx  - Continue acyclovir and Bactrim ppx      # Leukocytosis  - No signs/sxs of infection at this time. Likely related to recent Neulasta.   - As we expect her counts to drop with chemotherapy today, will continue with Neulasta for now. Can consider holding it with next cycle if WBC increasing.       # Constipation  This started prior to her first infusion, however worsened post chemo. Enema x1 in ER (7/3/22) however has not had BM since. +flatus. No vomiting, mild nausea. Mag citrate eventually given and was successful.   - Discussed not taking mag citrate as it was recently recalled. Continue miralax BID and senna 1-2 tab BID. She also has an Rx for milk of magnesium to use PRN that she has not picked up yet. Hold for loose stools.       # Blurry vision-- resolved. This occurred post chemo, improved with eye drops.       # Neulasta induced bony pain-- alternated tylenol and ibuprofen which helped only a bit. Pain lasted 1 and a half days, now resolved.   - She had bone pain again after last cycle. Relieved with claritin.   - Tramadol PRN.       # CINV  - PRN antiemetics for breakthrough nausea.       RTC in 2 weeks for labs, BI visit, and C2D15 chemotherapy.       45 minutes spent on the date of the encounter doing chart review, review of test  results, interpretation of tests, patient visit and documentation     Anita Roque PA-C  [facility] [place] [id number] [place] [address] [phone]

## 2022-07-25 ENCOUNTER — INFUSION THERAPY VISIT (OUTPATIENT)
Dept: ONCOLOGY | Facility: CLINIC | Age: 27
End: 2022-07-25
Attending: NURSE PRACTITIONER
Payer: COMMERCIAL

## 2022-07-25 ENCOUNTER — APPOINTMENT (OUTPATIENT)
Dept: LAB | Facility: CLINIC | Age: 27
End: 2022-07-25
Attending: NURSE PRACTITIONER
Payer: COMMERCIAL

## 2022-07-25 VITALS
OXYGEN SATURATION: 99 % | DIASTOLIC BLOOD PRESSURE: 76 MMHG | HEART RATE: 107 BPM | RESPIRATION RATE: 16 BRPM | WEIGHT: 116.7 LBS | BODY MASS INDEX: 19.87 KG/M2 | SYSTOLIC BLOOD PRESSURE: 117 MMHG | TEMPERATURE: 98.3 F

## 2022-07-25 DIAGNOSIS — C81.10 NODULAR SCLEROSIS CLASSICAL HODGKIN LYMPHOMA (H): Primary | ICD-10-CM

## 2022-07-25 LAB
ALBUMIN SERPL-MCNC: 3.4 G/DL (ref 3.4–5)
ALP SERPL-CCNC: 100 U/L (ref 40–150)
ALT SERPL W P-5'-P-CCNC: 24 U/L (ref 0–50)
ANION GAP SERPL CALCULATED.3IONS-SCNC: 8 MMOL/L (ref 3–14)
AST SERPL W P-5'-P-CCNC: 13 U/L (ref 0–45)
BASOPHILS # BLD MANUAL: 0 10E3/UL (ref 0–0.2)
BASOPHILS NFR BLD MANUAL: 0 %
BILIRUB SERPL-MCNC: 0.1 MG/DL (ref 0.2–1.3)
BUN SERPL-MCNC: 12 MG/DL (ref 7–30)
CALCIUM SERPL-MCNC: 8.4 MG/DL (ref 8.5–10.1)
CHLORIDE BLD-SCNC: 106 MMOL/L (ref 94–109)
CO2 SERPL-SCNC: 23 MMOL/L (ref 20–32)
CREAT SERPL-MCNC: 0.54 MG/DL (ref 0.52–1.04)
EOSINOPHIL # BLD MANUAL: 0 10E3/UL (ref 0–0.7)
EOSINOPHIL NFR BLD MANUAL: 0 %
ERYTHROCYTE [DISTWIDTH] IN BLOOD BY AUTOMATED COUNT: 14.5 % (ref 10–15)
GFR SERPL CREATININE-BSD FRML MDRD: >90 ML/MIN/1.73M2
GLUCOSE BLD-MCNC: 104 MG/DL (ref 70–99)
HCT VFR BLD AUTO: 33.7 % (ref 35–47)
HGB BLD-MCNC: 11.1 G/DL (ref 11.7–15.7)
LYMPHOCYTES # BLD MANUAL: 2.6 10E3/UL (ref 0.8–5.3)
LYMPHOCYTES NFR BLD MANUAL: 16 %
MCH RBC QN AUTO: 26.5 PG (ref 26.5–33)
MCHC RBC AUTO-ENTMCNC: 32.9 G/DL (ref 31.5–36.5)
MCV RBC AUTO: 80 FL (ref 78–100)
MONOCYTES # BLD MANUAL: 0.6 10E3/UL (ref 0–1.3)
MONOCYTES NFR BLD MANUAL: 4 %
NEUTROPHILS # BLD MANUAL: 12.8 10E3/UL (ref 1.6–8.3)
NEUTROPHILS NFR BLD MANUAL: 80 %
PLAT MORPH BLD: ABNORMAL
PLATELET # BLD AUTO: 277 10E3/UL (ref 150–450)
POTASSIUM BLD-SCNC: 3.8 MMOL/L (ref 3.4–5.3)
PROT SERPL-MCNC: 6.9 G/DL (ref 6.8–8.8)
RBC # BLD AUTO: 4.19 10E6/UL (ref 3.8–5.2)
RBC MORPH BLD: ABNORMAL
SODIUM SERPL-SCNC: 137 MMOL/L (ref 133–144)
WBC # BLD AUTO: 16 10E3/UL (ref 4–11)

## 2022-07-25 PROCEDURE — 96372 THER/PROPH/DIAG INJ SC/IM: CPT | Performed by: PHYSICIAN ASSISTANT

## 2022-07-25 PROCEDURE — 96413 CHEMO IV INFUSION 1 HR: CPT

## 2022-07-25 PROCEDURE — 84450 TRANSFERASE (AST) (SGOT): CPT | Performed by: PHYSICIAN ASSISTANT

## 2022-07-25 PROCEDURE — 250N000011 HC RX IP 250 OP 636: Performed by: PHYSICIAN ASSISTANT

## 2022-07-25 PROCEDURE — G0463 HOSPITAL OUTPT CLINIC VISIT: HCPCS

## 2022-07-25 PROCEDURE — 96411 CHEMO IV PUSH ADDL DRUG: CPT

## 2022-07-25 PROCEDURE — 96417 CHEMO IV INFUS EACH ADDL SEQ: CPT

## 2022-07-25 PROCEDURE — 99215 OFFICE O/P EST HI 40 MIN: CPT | Performed by: PHYSICIAN ASSISTANT

## 2022-07-25 PROCEDURE — 96377 APPLICATON ON-BODY INJECTOR: CPT | Mod: 59

## 2022-07-25 PROCEDURE — 258N000003 HC RX IP 258 OP 636: Performed by: PHYSICIAN ASSISTANT

## 2022-07-25 PROCEDURE — 96375 TX/PRO/DX INJ NEW DRUG ADDON: CPT

## 2022-07-25 PROCEDURE — 85027 COMPLETE CBC AUTOMATED: CPT | Performed by: PHYSICIAN ASSISTANT

## 2022-07-25 PROCEDURE — 84155 ASSAY OF PROTEIN SERUM: CPT | Performed by: PHYSICIAN ASSISTANT

## 2022-07-25 PROCEDURE — 96367 TX/PROPH/DG ADDL SEQ IV INF: CPT

## 2022-07-25 PROCEDURE — 82947 ASSAY GLUCOSE BLOOD QUANT: CPT | Performed by: PHYSICIAN ASSISTANT

## 2022-07-25 PROCEDURE — 85007 BL SMEAR W/DIFF WBC COUNT: CPT | Performed by: PHYSICIAN ASSISTANT

## 2022-07-25 RX ORDER — DOXORUBICIN HYDROCHLORIDE 2 MG/ML
25 INJECTION, SOLUTION INTRAVENOUS ONCE
Status: COMPLETED | OUTPATIENT
Start: 2022-07-25 | End: 2022-07-25

## 2022-07-25 RX ORDER — DOXORUBICIN HYDROCHLORIDE 2 MG/ML
25 INJECTION, SOLUTION INTRAVENOUS ONCE
Status: CANCELLED | OUTPATIENT
Start: 2022-07-25

## 2022-07-25 RX ORDER — HEPARIN SODIUM,PORCINE 10 UNIT/ML
5 VIAL (ML) INTRAVENOUS
Status: CANCELLED | OUTPATIENT
Start: 2022-07-25

## 2022-07-25 RX ORDER — HEPARIN SODIUM (PORCINE) LOCK FLUSH IV SOLN 100 UNIT/ML 100 UNIT/ML
5 SOLUTION INTRAVENOUS
Status: CANCELLED | OUTPATIENT
Start: 2022-07-25

## 2022-07-25 RX ORDER — HEPARIN SODIUM (PORCINE) LOCK FLUSH IV SOLN 100 UNIT/ML 100 UNIT/ML
5 SOLUTION INTRAVENOUS
Status: DISCONTINUED | OUTPATIENT
Start: 2022-07-25 | End: 2022-07-25 | Stop reason: HOSPADM

## 2022-07-25 RX ORDER — ACETAMINOPHEN 325 MG/1
650 TABLET ORAL
Status: CANCELLED
Start: 2022-07-25

## 2022-07-25 RX ORDER — MEPERIDINE HYDROCHLORIDE 25 MG/ML
25 INJECTION INTRAMUSCULAR; INTRAVENOUS; SUBCUTANEOUS EVERY 30 MIN PRN
Status: CANCELLED | OUTPATIENT
Start: 2022-07-25

## 2022-07-25 RX ORDER — DIPHENHYDRAMINE HYDROCHLORIDE 50 MG/ML
50 INJECTION INTRAMUSCULAR; INTRAVENOUS
Status: CANCELLED
Start: 2022-07-25

## 2022-07-25 RX ORDER — PALONOSETRON 0.05 MG/ML
0.25 INJECTION, SOLUTION INTRAVENOUS ONCE
Status: CANCELLED
Start: 2022-07-25

## 2022-07-25 RX ORDER — HEPARIN SODIUM (PORCINE) LOCK FLUSH IV SOLN 100 UNIT/ML 100 UNIT/ML
5 SOLUTION INTRAVENOUS ONCE
Status: COMPLETED | OUTPATIENT
Start: 2022-07-25 | End: 2022-07-25

## 2022-07-25 RX ORDER — ALBUTEROL SULFATE 90 UG/1
1-2 AEROSOL, METERED RESPIRATORY (INHALATION)
Status: CANCELLED
Start: 2022-07-25

## 2022-07-25 RX ORDER — DIPHENHYDRAMINE HCL 25 MG
50 CAPSULE ORAL
Status: CANCELLED
Start: 2022-07-25

## 2022-07-25 RX ORDER — LORAZEPAM 2 MG/ML
0.5 INJECTION INTRAMUSCULAR EVERY 4 HOURS PRN
Status: CANCELLED | OUTPATIENT
Start: 2022-07-25

## 2022-07-25 RX ORDER — NALOXONE HYDROCHLORIDE 0.4 MG/ML
0.2 INJECTION, SOLUTION INTRAMUSCULAR; INTRAVENOUS; SUBCUTANEOUS
Status: CANCELLED | OUTPATIENT
Start: 2022-07-25

## 2022-07-25 RX ORDER — PALONOSETRON 0.05 MG/ML
0.25 INJECTION, SOLUTION INTRAVENOUS ONCE
Status: COMPLETED | OUTPATIENT
Start: 2022-07-25 | End: 2022-07-25

## 2022-07-25 RX ORDER — METHYLPREDNISOLONE SODIUM SUCCINATE 125 MG/2ML
125 INJECTION, POWDER, LYOPHILIZED, FOR SOLUTION INTRAMUSCULAR; INTRAVENOUS
Status: CANCELLED
Start: 2022-07-25

## 2022-07-25 RX ORDER — ALBUTEROL SULFATE 0.83 MG/ML
2.5 SOLUTION RESPIRATORY (INHALATION)
Status: CANCELLED | OUTPATIENT
Start: 2022-07-25

## 2022-07-25 RX ORDER — EPINEPHRINE 1 MG/ML
0.3 INJECTION, SOLUTION INTRAMUSCULAR; SUBCUTANEOUS EVERY 5 MIN PRN
Status: CANCELLED | OUTPATIENT
Start: 2022-07-25

## 2022-07-25 RX ADMIN — DACARBAZINE 600 MG: 200 INJECTION, POWDER, FOR SOLUTION INTRAVENOUS at 13:38

## 2022-07-25 RX ADMIN — VINBLASTINE SULFATE 10 MG: 1 INJECTION INTRAVENOUS at 13:21

## 2022-07-25 RX ADMIN — SODIUM CHLORIDE 250 ML: 9 INJECTION, SOLUTION INTRAVENOUS at 12:34

## 2022-07-25 RX ADMIN — PEGFILGRASTIM 6 MG: KIT SUBCUTANEOUS at 13:43

## 2022-07-25 RX ADMIN — Medication 5 ML: at 14:49

## 2022-07-25 RX ADMIN — DOXORUBICIN HYDROCHLORIDE 40 MG: 2 INJECTION, SOLUTION INTRAVENOUS at 13:12

## 2022-07-25 RX ADMIN — BRENTUXIMAB VEDOTIN 66 MG: 50 INJECTION, POWDER, LYOPHILIZED, FOR SOLUTION INTRAVENOUS at 14:12

## 2022-07-25 RX ADMIN — DEXAMETHASONE SODIUM PHOSPHATE: 10 INJECTION, SOLUTION INTRAMUSCULAR; INTRAVENOUS at 12:40

## 2022-07-25 RX ADMIN — PALONOSETRON HYDROCHLORIDE 0.25 MG: 0.25 INJECTION INTRAVENOUS at 12:34

## 2022-07-25 RX ADMIN — Medication 5 ML: at 11:17

## 2022-07-25 ASSESSMENT — PAIN SCALES - GENERAL: PAINLEVEL: NO PAIN (0)

## 2022-07-25 NOTE — LETTER
7/25/2022         RE: Amy ERIBERTO Kimberly  [address] [place] [address]      [facility]  Date of visit: Jul 25, 2022  Oncologist: Dr. Mallory Mendez      Reason for Visit: follow up       Oncology HPI:   She has a past medical history of acne treated with spironolactone who is otherwise healthy with no notable past surgical history. She first noticed heartburn-like symptoms when drinking alcohol a year ago and has noticed progressive dysphagia, where now dry solids get caught when swallowing although it is not painful. She has had to stop eating mid-meal but denies appetite change or weight loss. She developed night sweats x2 in Fall 2021 where she woke up with a drenched shirt. Denies current night sweats, fever, chills. During that time, she first noticed an enlarged lymph node in her right clavicular region and as it was not bothersome or painful, did not pursue medical attention at that time. She noticed no other lumps or bumps at that time. She has also been increasingly itchy in all parts of her body for the past 2 months. Over the past two weeks, she has noticed a fast heart rate without chest pain or palpitations and shortness of breath on exertion like carrying laundry upstairs or walking and talking at the same time. She denies lower extremity or upper extremity edema. Her energy level has been slowly declining, where she sleeps for longer at night and needing to rest for 4 hours in the afternoon.      She had a routine physical on 5/16/22 where she brought attention to her growing mass on her right shoulder. 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         She underwent ECHO which showed EF of >50%.  PET scan done on 6/18/2022 showed hypermetabolic mediastinal mass with SUV max of 14.7 and 15.6 x 14.8 x 7.8 cm in size.  Mediastinal mass extends to right level 4 lateral to right thyroid  lobe.  There is associated right retroclavicular lymphadenopathy.  Associated bone marrow activity is seen in the axial skeleton consistent with bone marrow infiltration.  Her [place] stage is stage IV.     She consulted with fertility specialist.  We do not have time to pursue pretreatment quality preservation.  She was advised to return in 6 months after completing treatment at that time fertility preservation would be considered.     Due to her ongoing itching, exertional dyspnea, I had prescribed prednisone 60 mg daily and allopurinol to prevent tumor lysis.         Nodular sclerosis classical Hodgkin lymphoma (H)    6/16/2022 Initial Diagnosis      Nodular sclerosis classical Hodgkin lymphoma (H)       6/27/2022 -  Chemotherapy      OP ONC Lymphoma and CLL - A + AVD (Brentuximab vedotin / DOXOrubicin / vinBLAStine / Dacarbazine)   Plan Provider: Mallory Mendez MD  Treatment goal: Curative  Line of treatment: [No plan line of treatment]            Interval history:   Amy presents for follow up prior to C2D1 treatment. She is here with her mom.   She is doing fine today. Reports C1D15 chemo went better than C1D1.   She had bone pain from neulasta. Reports claritin helped.   Her appetite is good. She endorses constipation. Previously taking senna/miralax. Most recently took mag citrate. She had Rx for milk of magnesia, but she has not picked this up yet. Last BM was yesterday. A little loose, not watery. She denies nausea/vomiting.   No bleeding. No fevers or chills or NS. No new lumps or bumps.   Breathing is overall stable. She reports her HR has been elevated the last couple of months, but she is asymptomatic. No CP or palpitations.   ROS is otherwise negative.       Physical Exam:  Vital Signs 7/25/2022   Systolic 117   Diastolic 76   Pulse 107   Temperature 98.3   Respirations 16   Weight (LB) 116 lb 11.2 oz   Height    BMI (Calculated)    Pain Score 0   O2 99     General: No acute distress. Young,  pleasant female.   HEENT: EOMI, PERRL. Sclerae are anicteric. Oral exam deferred.   Lymph: Neck is supple with no lymphadenopathy in the cervical or supraclavicular areas.   Heart: Regular rate and rhythm.   Lungs: Clear to auscultation bilaterally.   Abdomen: Bowel sounds present, soft, nontender with no palpable hepatosplenomegaly or masses.   Extremities: No lower extremity edema noted bilaterally.   Neuro: Alert and oriented x3, CN grossly intact, steady gait  Skin: No rashes, petechiae, or bruising noted on exposed skin. Port to right chest wall- upper incision, mildly erythematous, no drainage, does not appear infected       Labs:   I personally reviewed the following labs:   Latest Reference Range & Units 07/25/22 11:17   Sodium 133 - 144 mmol/L 137   Potassium 3.4 - 5.3 mmol/L 3.8   Chloride 94 - 109 mmol/L 106   Carbon Dioxide 20 - 32 mmol/L 23   Urea Nitrogen 7 - 30 mg/dL 12   Creatinine 0.52 - 1.04 mg/dL 0.54   GFR Estimate >60 mL/min/1.73m2 >90   Calcium 8.5 - 10.1 mg/dL 8.4 (L)   Anion Gap 3 - 14 mmol/L 8   Albumin 3.4 - 5.0 g/dL 3.4   Protein Total 6.8 - 8.8 g/dL 6.9   Alkaline Phosphatase 40 - 150 U/L 100   ALT 0 - 50 U/L 24   AST 0 - 45 U/L 13   Bilirubin Total 0.2 - 1.3 mg/dL 0.1 (L)   Glucose 70 - 99 mg/dL 104 (H)   WBC 4.0 - 11.0 10e3/uL 16.0 (H)   Hemoglobin 11.7 - 15.7 g/dL 11.1 (L)   Hematocrit 35.0 - 47.0 % 33.7 (L)   Platelet Count 150 - 450 10e3/uL 277   RBC Count 3.80 - 5.20 10e6/uL 4.19   MCV 78 - 100 fL 80   MCH 26.5 - 33.0 pg 26.5   MCHC 31.5 - 36.5 g/dL 32.9   RDW 10.0 - 15.0 % 14.5   % Neutrophils % 80   % Lymphocytes % 16   % Monocytes % 4   % Eosinophils % 0   % Basophils % 0   Absolute Basophils 0.0 - 0.2 10e3/uL 0.0   Absolute Neutrophil 1.6 - 8.3 10e3/uL 12.8 (H)   Absolute Lymphocytes 0.8 - 5.3 10e3/uL 2.6   Absolute Monocytes 0.0 - 1.3 10e3/uL 0.6   Absolute Eosinophils 0.0 - 0.7 10e3/uL 0.0   RBC Morphology  Confirmed RBC Indices   Platelet Morphology Automated Count Confirmed.  Platelet morphology is normal.  Automated Count Confirmed. Platelet morphology is normal.         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 Discussed that dex and emend have DDI with her OCP, decreasing the effectiveness of her OCP. Advised her to use condoms for added protection if sexually active.   - Return to see BI on D1 and D15  - Will plan to complete 2 cycles and restage with PETCT  - Continue allopurinol through cycle 1    - Stopped her pre-phase steroids (last dose taken 6/26). If her symptoms recur during first month of chemotherapy, we can restart prednisone.      Ppx  - Continue acyclovir and Bactrim ppx      # Leukocytosis  - No signs/sxs of infection at this time. Likely related to recent Neulasta.   - As we expect her counts to drop with chemotherapy today, will continue with Neulasta for now. Can consider holding it with next cycle if WBC increasing.       # Constipation  This started prior to her first infusion, however worsened post chemo. Enema x1 in ER (7/3/22) however has not had BM since. +flatus. No vomiting, mild nausea. Mag citrate eventually given and was successful.   - Discussed not taking mag citrate as it was recently recalled. Continue miralax BID and senna 1-2 tab BID. She also has an Rx for milk of magnesium to use PRN that she has not picked up yet. Hold for loose stools.       # Blurry vision-- resolved. This occurred post chemo, improved with eye drops.       # Neulasta induced bony pain-- alternated tylenol and ibuprofen which helped only a bit. Pain lasted 1 and a half days, now resolved.   - She had bone pain again after last cycle. Relieved with claritin.   - Tramadol PRN.       # CINV  - PRN antiemetics for breakthrough nausea.       RTC in 2 weeks for labs, BI visit, and C2D15 chemotherapy.       45  minutes spent on the date of the encounter doing chart review, review of test results, interpretation of tests, patient visit and documentation         Anita Roque PA-C

## 2022-07-25 NOTE — PROGRESS NOTES
Infusion Nursing Note:  Amy Harris presents today for cycle 2 day 1 of doxorubicin, vinblastine, dacarbazine, and brentuximab and neulasta onpro.    Patient seen by provider today: Yes: Anita Roque PA-C   present during visit today: Not Applicable.    Note: Pt generally feeling well today. No new s/s of infection or complaints of pain/discomfort. Pt wishes to proceed with planned treatment.    Intravenous Access:  Implanted Port.    Treatment Conditions:  Lab Results   Component Value Date    HGB 11.1 (L) 07/25/2022    WBC 16.0 (H) 07/25/2022    ANEU 12.8 (H) 07/25/2022    ANEUTAUTO 5.2 06/27/2022     07/25/2022      Lab Results   Component Value Date     07/25/2022    POTASSIUM 3.8 07/25/2022    CR 0.54 07/25/2022    NICOLAS 8.4 (L) 07/25/2022    BILITOTAL 0.1 (L) 07/25/2022    ALBUMIN 3.4 07/25/2022    ALT 24 07/25/2022    AST 13 07/25/2022     Results reviewed, labs MET treatment parameters, ok to proceed with treatment.  Echo completed 6/14/22 - EF 65-70%.    Post Infusion Assessment:  Patient tolerated infusion without incident.  Patient tolerated injection without incident.  Blood return noted pre and post infusion.  Blood return noted during administration every 5 cc.  Site patent and intact, free from redness, edema or discomfort.  No evidence of extravasations.  Access discontinued per protocol.     Neulasta Onpro On-Body injector applied to left arm at 1:45pm.  Writer discussed Neulasta injection would start tomorrow 7/26/22 at 4:45pm, approximately 27 hours after application applied today.  Written and Verbal instruction reviewed with patient.  Pt instructed when the dose delivery starts, it will take about 45 minutes to complete.  Pt aware Neulasta Onpro On-Body should have green flashing light and to call triage or on-call MD if injector flashes red or appears to be leaking. Pt aware to keep Onpro On-Body Neulasta 4 inches away from electrical equipment and to avoid  Patient Education     Foot Contusion  You have a contusion. This is also called a bruise. There is swelling and some bleeding under the skin, but no broken bones. This injury generally takes a few days to a few weeks to heal.  During that time, the bruise will typically change in color from reddish, to purple-blue, to greenish-yellow, then to yellow-brown.  Home care  · Elevate the foot to reduce pain and swelling. As much as possible, sit or lie down with the foot raised about the level of your heart. This is especially important during the first 48 hours.  · Ice the foot to help reduce pain and swelling. Wrap a cold source (ice pack or ice cubes in a plastic bag) in a thin towel. Apply to the bruised area for 20 minutes every 1 to 2 hours the first day. Continue this 3 to 4 times a day until the pain and swelling goes away.  · Unless another medicine was prescribed, you can take acetaminophen, ibuprofen, or naproxen to control pain. (If you have chronic liver or kidney disease or ever had a stomach ulcer or gastrointestinal bleeding, talk with your healthcare provider before using these medicines.)  Follow up  Follow up with your healthcare provider or our staff as advised. Call if you are not improving within 1 to 2 weeks.  When to seek medical advice   Call your healthcare provider right away if you have any of the following:  · Increased pain or swelling  · Foot or leg becomes cold, blue, numb or tingly  · Signs of infection: Warmth, drainage, or increased redness or pain around the bruise  · Inability to move the injured foot   · Frequent bruising for unknown reasons  Date Last Reviewed: 2/1/2017 © 2000-2018 [employer]. [address] [place] [address], [place] [address]. All rights reserved. This information is not intended as a substitute for professional medical care. Always follow your healthcare professional's instructions.         - Ice, elevated and rest the foot.   - Take Ibuprofen/Advil as  needed for pain.   - You can alternate ice with heat in 3-4 days to help with symptoms.   - Contusions may take 3-4 weeks to fully recover. IF symptoms fail to improve or worsen in 3-4 weeks, recommend follow up with your primary care provider.    showering 4 hours prior to injection.    Discharge Plan:   Prescription refills given for dexamethasone.  Discharge instructions reviewed with: Patient.  Patient and/or family verbalized understanding of discharge instructions and all questions answered.  AVS to patient via VCET.  Patient will return 08/08/22 for next appointment.   Patient discharged in stable condition accompanied by: self.  Departure Mode: Ambulatory.      Emily Meese, RN

## 2022-07-25 NOTE — NURSING NOTE
"Chief Complaint   Patient presents with     Port Draw     Labs drawn via port by RN. VS taken.     Port accessed with 20g 3/4\" power needle and labs drawn by rn.  Port flushed with NS and heparin.  Pt tolerated well.  VS taken.  Pt checked in for next appt.    Damien Solis RN  "

## 2022-07-25 NOTE — NURSING NOTE
"Oncology Rooming Note    July 25, 2022 11:25 AM   Amy Harris is a 27 year old female who presents for:    Chief Complaint   Patient presents with     Port Draw     Labs drawn via port by RN. VS taken.     Oncology Clinic Visit     Rtn for nodular sclerosis classical hodgkin lymphoma     Initial Vitals: /76   Pulse 107   Temp 98.3  F (36.8  C) (Oral)   Resp 16   Wt 52.9 kg (116 lb 11.2 oz)   LMP 06/09/2022   SpO2 99%   BMI 19.87 kg/m   Estimated body mass index is 19.87 kg/m  as calculated from the following:    Height as of 6/27/22: 1.632 m (5' 4.25\").    Weight as of this encounter: 52.9 kg (116 lb 11.2 oz). Body surface area is 1.55 meters squared.  No Pain (0) Comment: Data Unavailable   Patient's last menstrual period was 06/09/2022.  Allergies reviewed: Yes  Medications reviewed: Yes    Medications: MEDICATION REFILLS NEEDED TODAY. Provider was notified.     Acyclovir  Milk of Magnesia  1st floor pharmacy  Bring to infusion    Pharmacy name entered into EPIC:    [place], [place] - [address] [facility] [address] [place], [place] - [address] [facility]    Clinical concerns: Pt would like to discuss Bactrim and if she needs      Steph Carter, EMT            "

## 2022-08-08 ENCOUNTER — APPOINTMENT (OUTPATIENT)
Dept: LAB | Facility: CLINIC | Age: 27
End: 2022-08-08
Attending: NURSE PRACTITIONER
Payer: COMMERCIAL

## 2022-08-08 ENCOUNTER — ONCOLOGY VISIT (OUTPATIENT)
Dept: ONCOLOGY | Facility: CLINIC | Age: 27
End: 2022-08-08
Attending: NURSE PRACTITIONER
Payer: COMMERCIAL

## 2022-08-08 VITALS
DIASTOLIC BLOOD PRESSURE: 72 MMHG | SYSTOLIC BLOOD PRESSURE: 110 MMHG | WEIGHT: 120 LBS | RESPIRATION RATE: 16 BRPM | BODY MASS INDEX: 20.44 KG/M2 | TEMPERATURE: 98.8 F | OXYGEN SATURATION: 100 % | HEART RATE: 92 BPM

## 2022-08-08 DIAGNOSIS — C81.10 NODULAR SCLEROSIS CLASSICAL HODGKIN LYMPHOMA (H): Primary | ICD-10-CM

## 2022-08-08 LAB
ALBUMIN SERPL-MCNC: 3.3 G/DL (ref 3.4–5)
ALP SERPL-CCNC: 74 U/L (ref 40–150)
ALT SERPL W P-5'-P-CCNC: 24 U/L (ref 0–50)
ANION GAP SERPL CALCULATED.3IONS-SCNC: 10 MMOL/L (ref 3–14)
AST SERPL W P-5'-P-CCNC: 17 U/L (ref 0–45)
BASOPHILS # BLD MANUAL: 0.1 10E3/UL (ref 0–0.2)
BASOPHILS NFR BLD MANUAL: 1 %
BILIRUB SERPL-MCNC: 0.3 MG/DL (ref 0.2–1.3)
BUN SERPL-MCNC: 10 MG/DL (ref 7–30)
CALCIUM SERPL-MCNC: 8.7 MG/DL (ref 8.5–10.1)
CHLORIDE BLD-SCNC: 106 MMOL/L (ref 94–109)
CO2 SERPL-SCNC: 23 MMOL/L (ref 20–32)
CREAT SERPL-MCNC: 0.62 MG/DL (ref 0.52–1.04)
EOSINOPHIL # BLD MANUAL: 0.1 10E3/UL (ref 0–0.7)
EOSINOPHIL NFR BLD MANUAL: 1 %
ERYTHROCYTE [DISTWIDTH] IN BLOOD BY AUTOMATED COUNT: 18.8 % (ref 10–15)
GFR SERPL CREATININE-BSD FRML MDRD: >90 ML/MIN/1.73M2
GLUCOSE BLD-MCNC: 91 MG/DL (ref 70–99)
HCT VFR BLD AUTO: 34.3 % (ref 35–47)
HGB BLD-MCNC: 10.8 G/DL (ref 11.7–15.7)
LYMPHOCYTES # BLD MANUAL: 2.3 10E3/UL (ref 0.8–5.3)
LYMPHOCYTES NFR BLD MANUAL: 20 %
MCH RBC QN AUTO: 26.7 PG (ref 26.5–33)
MCHC RBC AUTO-ENTMCNC: 31.5 G/DL (ref 31.5–36.5)
MCV RBC AUTO: 85 FL (ref 78–100)
METAMYELOCYTES # BLD MANUAL: 0.1 10E3/UL
METAMYELOCYTES NFR BLD MANUAL: 1 %
MONOCYTES # BLD MANUAL: 0.6 10E3/UL (ref 0–1.3)
MONOCYTES NFR BLD MANUAL: 5 %
MYELOCYTES # BLD MANUAL: 0.3 10E3/UL
MYELOCYTES NFR BLD MANUAL: 3 %
NEUTROPHILS # BLD MANUAL: 7.9 10E3/UL (ref 1.6–8.3)
NEUTROPHILS NFR BLD MANUAL: 69 %
PLAT MORPH BLD: ABNORMAL
PLATELET # BLD AUTO: 311 10E3/UL (ref 150–450)
POTASSIUM BLD-SCNC: 3.6 MMOL/L (ref 3.4–5.3)
PROT SERPL-MCNC: 6.6 G/DL (ref 6.8–8.8)
RBC # BLD AUTO: 4.04 10E6/UL (ref 3.8–5.2)
RBC MORPH BLD: ABNORMAL
SODIUM SERPL-SCNC: 139 MMOL/L (ref 133–144)
WBC # BLD AUTO: 11.4 10E3/UL (ref 4–11)

## 2022-08-08 PROCEDURE — 250N000011 HC RX IP 250 OP 636: Performed by: NURSE PRACTITIONER

## 2022-08-08 PROCEDURE — 99207 PR NO BILLABLE SERVICE THIS VISIT: CPT

## 2022-08-08 PROCEDURE — 96372 THER/PROPH/DIAG INJ SC/IM: CPT | Performed by: NURSE PRACTITIONER

## 2022-08-08 PROCEDURE — G0463 HOSPITAL OUTPT CLINIC VISIT: HCPCS

## 2022-08-08 PROCEDURE — 96413 CHEMO IV INFUSION 1 HR: CPT

## 2022-08-08 PROCEDURE — 85007 BL SMEAR W/DIFF WBC COUNT: CPT

## 2022-08-08 PROCEDURE — 96411 CHEMO IV PUSH ADDL DRUG: CPT

## 2022-08-08 PROCEDURE — 36591 DRAW BLOOD OFF VENOUS DEVICE: CPT

## 2022-08-08 PROCEDURE — 82040 ASSAY OF SERUM ALBUMIN: CPT

## 2022-08-08 PROCEDURE — 96375 TX/PRO/DX INJ NEW DRUG ADDON: CPT

## 2022-08-08 PROCEDURE — 99215 OFFICE O/P EST HI 40 MIN: CPT | Performed by: NURSE PRACTITIONER

## 2022-08-08 PROCEDURE — 96417 CHEMO IV INFUS EACH ADDL SEQ: CPT

## 2022-08-08 PROCEDURE — 258N000003 HC RX IP 258 OP 636: Performed by: NURSE PRACTITIONER

## 2022-08-08 PROCEDURE — 96377 APPLICATON ON-BODY INJECTOR: CPT | Mod: 59

## 2022-08-08 PROCEDURE — 96367 TX/PROPH/DG ADDL SEQ IV INF: CPT

## 2022-08-08 PROCEDURE — 85027 COMPLETE CBC AUTOMATED: CPT

## 2022-08-08 PROCEDURE — 80053 COMPREHEN METABOLIC PANEL: CPT

## 2022-08-08 RX ORDER — MEPERIDINE HYDROCHLORIDE 25 MG/ML
25 INJECTION INTRAMUSCULAR; INTRAVENOUS; SUBCUTANEOUS EVERY 30 MIN PRN
Status: CANCELLED | OUTPATIENT
Start: 2022-08-08

## 2022-08-08 RX ORDER — PALONOSETRON 0.05 MG/ML
0.25 INJECTION, SOLUTION INTRAVENOUS ONCE
Status: CANCELLED
Start: 2022-08-08

## 2022-08-08 RX ORDER — HEPARIN SODIUM (PORCINE) LOCK FLUSH IV SOLN 100 UNIT/ML 100 UNIT/ML
5 SOLUTION INTRAVENOUS
Status: CANCELLED | OUTPATIENT
Start: 2022-08-08

## 2022-08-08 RX ORDER — HEPARIN SODIUM,PORCINE 10 UNIT/ML
5 VIAL (ML) INTRAVENOUS
Status: CANCELLED | OUTPATIENT
Start: 2022-08-08

## 2022-08-08 RX ORDER — DOXORUBICIN HYDROCHLORIDE 2 MG/ML
25 INJECTION, SOLUTION INTRAVENOUS ONCE
Status: COMPLETED | OUTPATIENT
Start: 2022-08-08 | End: 2022-08-08

## 2022-08-08 RX ORDER — LORAZEPAM 2 MG/ML
0.5 INJECTION INTRAMUSCULAR EVERY 4 HOURS PRN
Status: CANCELLED | OUTPATIENT
Start: 2022-08-08

## 2022-08-08 RX ORDER — DOXORUBICIN HYDROCHLORIDE 2 MG/ML
25 INJECTION, SOLUTION INTRAVENOUS ONCE
Status: CANCELLED | OUTPATIENT
Start: 2022-08-08

## 2022-08-08 RX ORDER — ALBUTEROL SULFATE 0.83 MG/ML
2.5 SOLUTION RESPIRATORY (INHALATION)
Status: CANCELLED | OUTPATIENT
Start: 2022-08-08

## 2022-08-08 RX ORDER — NALOXONE HYDROCHLORIDE 0.4 MG/ML
0.2 INJECTION, SOLUTION INTRAMUSCULAR; INTRAVENOUS; SUBCUTANEOUS
Status: CANCELLED | OUTPATIENT
Start: 2022-08-08

## 2022-08-08 RX ORDER — ALBUTEROL SULFATE 90 UG/1
1-2 AEROSOL, METERED RESPIRATORY (INHALATION)
Status: CANCELLED
Start: 2022-08-08

## 2022-08-08 RX ORDER — DIPHENHYDRAMINE HYDROCHLORIDE 50 MG/ML
50 INJECTION INTRAMUSCULAR; INTRAVENOUS
Status: CANCELLED
Start: 2022-08-08

## 2022-08-08 RX ORDER — ACETAMINOPHEN 325 MG/1
650 TABLET ORAL
Status: CANCELLED
Start: 2022-08-08

## 2022-08-08 RX ORDER — PALONOSETRON 0.05 MG/ML
0.25 INJECTION, SOLUTION INTRAVENOUS ONCE
Status: COMPLETED | OUTPATIENT
Start: 2022-08-08 | End: 2022-08-08

## 2022-08-08 RX ORDER — HEPARIN SODIUM (PORCINE) LOCK FLUSH IV SOLN 100 UNIT/ML 100 UNIT/ML
5 SOLUTION INTRAVENOUS ONCE
Status: COMPLETED | OUTPATIENT
Start: 2022-08-08 | End: 2022-08-08

## 2022-08-08 RX ORDER — METHYLPREDNISOLONE SODIUM SUCCINATE 125 MG/2ML
125 INJECTION, POWDER, LYOPHILIZED, FOR SOLUTION INTRAMUSCULAR; INTRAVENOUS
Status: CANCELLED
Start: 2022-08-08

## 2022-08-08 RX ORDER — DIPHENHYDRAMINE HCL 25 MG
50 CAPSULE ORAL
Status: CANCELLED
Start: 2022-08-08

## 2022-08-08 RX ORDER — EPINEPHRINE 1 MG/ML
0.3 INJECTION, SOLUTION INTRAMUSCULAR; SUBCUTANEOUS EVERY 5 MIN PRN
Status: CANCELLED | OUTPATIENT
Start: 2022-08-08

## 2022-08-08 RX ORDER — HEPARIN SODIUM (PORCINE) LOCK FLUSH IV SOLN 100 UNIT/ML 100 UNIT/ML
5 SOLUTION INTRAVENOUS
Status: DISCONTINUED | OUTPATIENT
Start: 2022-08-08 | End: 2022-08-08 | Stop reason: HOSPADM

## 2022-08-08 RX ADMIN — PALONOSETRON HYDROCHLORIDE 0.25 MG: 0.25 INJECTION INTRAVENOUS at 12:31

## 2022-08-08 RX ADMIN — DOXORUBICIN HYDROCHLORIDE 40 MG: 2 INJECTION, SOLUTION INTRAVENOUS at 13:16

## 2022-08-08 RX ADMIN — PEGFILGRASTIM 6 MG: KIT SUBCUTANEOUS at 14:24

## 2022-08-08 RX ADMIN — SODIUM CHLORIDE, PRESERVATIVE FREE 5 ML: 5 INJECTION INTRAVENOUS at 11:12

## 2022-08-08 RX ADMIN — Medication 5 ML: at 14:45

## 2022-08-08 RX ADMIN — VINBLASTINE SULFATE 10 MG: 1 INJECTION INTRAVENOUS at 13:22

## 2022-08-08 RX ADMIN — BRENTUXIMAB VEDOTIN 66 MG: 50 INJECTION, POWDER, LYOPHILIZED, FOR SOLUTION INTRAVENOUS at 14:07

## 2022-08-08 RX ADMIN — SODIUM CHLORIDE 250 ML: 9 INJECTION, SOLUTION INTRAVENOUS at 12:28

## 2022-08-08 RX ADMIN — DEXAMETHASONE SODIUM PHOSPHATE: 10 INJECTION, SOLUTION INTRAMUSCULAR; INTRAVENOUS at 12:47

## 2022-08-08 RX ADMIN — DACARBAZINE 600 MG: 200 INJECTION, POWDER, FOR SOLUTION INTRAVENOUS at 13:30

## 2022-08-08 ASSESSMENT — PAIN SCALES - GENERAL: PAINLEVEL: NO PAIN (0)

## 2022-08-08 NOTE — PATIENT INSTRUCTIONS
Contact Numbers  [facility]: [phone]    After Hours:  [phone]  Triage: [phone]    Please call the [facility] Triage line if you experience a temperature greater than or equal to 100.5, shaking chills, have uncontrolled nausea, vomiting and/or diarrhea, dizziness, shortness of breath, chest pain, bleeding, unexplained bruising, or if you have any other new/concerning symptoms, questions or concerns.     If it is after hours, weekends, or holidays, please call the main hospital  at  [phone] and ask to speak to the Oncology doctor on call.     If you are having any concerning symptoms or wish to speak to a provider before your next infusion visit, please call your care coordinator or triage to notify them so we can adequately serve you.     If you need a refill on a narcotic prescription or other medication, please call triage before your infusion appointment.       August 2022 Sunday Monday Tuesday Wednesday Thursday Friday Saturday        1     2     3     4     5     6       7     8    LAB PERIPHERAL  10:45 AM   (15 min.)   [facility]   [facility]    RETURN  11:00 AM   (45 min.)   Aleena Sawyer APRN CNP   [facility]    ONC INFUSION 4 HR (240 MIN)  12:00 PM   (240 min.)    ONC INFUSION NURSE   [facility] 9     10     11     12     13       14     15     16     17     18     19    PET ONCOLOGY WHOLE BODY   7:30 AM   (30 min.)   [id number]   [facility] 20       21     22    LAB PERIPHERAL  11:45 AM   (15 min.)   [facility]   [facility]    RETURN  12:00 PM   (30 min.)   Mallory Mendez MD   [facility]    ONC INFUSION 4 HR (240 MIN)   1:00 PM   (240 min.)    ONC INFUSION NURSE   [facility] 23     24     25     26     27       28     29     30     31                                   September 2022 Sunday Monday Tuesday Wednesday Thursday Friday Saturday                       1     2     3       4     5     6    LAB PERIPHERAL   8:15 AM   (15 min.)    [facility]   [facility]    RETURN   8:45 AM   (45 min.)   Anita Roque PA-C   [facility]    ONC INFUSION 4 HR (240 MIN)  10:00 AM   (240 min.)    ONC INFUSION NURSE   [facility] 7     8     9     10       11     12     13     14     15     16     17       18     19    LAB PERIPHERAL   9:45 AM   (15 min.)    [facility] DRAW   [facility]    RETURN  10:00 AM   (45 min.)   Aleena Sawyer APRN CNP   [facility]    ONC INFUSION 4 HR (240 MIN)  11:30 AM   (240 min.)    ONC INFUSION NURSE   [facility] 20     21     22     23     24       25     26     27     28     29     30                            Lab Results:  Recent Results (from the past 12 hour(s))   Comprehensive metabolic panel    Collection Time: 08/08/22 11:05 AM   Result Value Ref Range    Sodium 139 133 - 144 mmol/L    Potassium 3.6 3.4 - 5.3 mmol/L    Chloride 106 94 - 109 mmol/L    Carbon Dioxide (CO2) 23 20 - 32 mmol/L    Anion Gap 10 3 - 14 mmol/L    Urea Nitrogen 10 7 - 30 mg/dL    Creatinine 0.62 0.52 - 1.04 mg/dL    Calcium 8.7 8.5 - 10.1 mg/dL    Glucose 91 70 - 99 mg/dL    Alkaline Phosphatase 74 40 - 150 U/L    AST 17 0 - 45 U/L    ALT 24 0 - 50 U/L    Protein Total 6.6 (L) 6.8 - 8.8 g/dL    Albumin 3.3 (L) 3.4 - 5.0 g/dL    Bilirubin Total 0.3 0.2 - 1.3 mg/dL    GFR Estimate >90 >60 mL/min/1.73m2   CBC with platelets and differential    Collection Time: 08/08/22 11:05 AM   Result Value Ref Range    WBC Count 11.4 (H) 4.0 - 11.0 10e3/uL    RBC Count 4.04 3.80 - 5.20 10e6/uL    Hemoglobin 10.8 (L) 11.7 - 15.7 g/dL    Hematocrit 34.3 (L) 35.0 - 47.0 %    MCV 85 78 -  100 fL    MCH 26.7 26.5 - 33.0 pg    MCHC 31.5 31.5 - 36.5 g/dL    RDW 18.8 (H) 10.0 - 15.0 %    Platelet Count 311 150 - 450 10e3/uL   Manual Differential    Collection Time: 08/08/22 11:05 AM   Result Value Ref Range    % Neutrophils 69 %    % Lymphocytes 20 %    % Monocytes 5 %    % Eosinophils 1 %    % Basophils 1 %    % Metamyelocytes 1 %    % Myelocytes 3 %    Absolute Neutrophils 7.9 1.6 - 8.3 10e3/uL    Absolute Lymphocytes 2.3 0.8 - 5.3 10e3/uL    Absolute Monocytes 0.6 0.0 - 1.3 10e3/uL    Absolute Eosinophils 0.1 0.0 - 0.7 10e3/uL    Absolute Basophils 0.1 0.0 - 0.2 10e3/uL    Absolute Metamyelocytes 0.1 (H) <=0.0 10e3/uL    Absolute Myelocytes 0.3 (H) <=0.0 10e3/uL    RBC Morphology Confirmed RBC Indices     Platelet Assessment  Automated Count Confirmed. Platelet morphology is normal.     Automated Count Confirmed. Platelet morphology is normal.

## 2022-08-08 NOTE — PROGRESS NOTES
[facility]  Date of visit: Aug 8, 2022  Oncologist: Dr. Mallory Mendez      Reason for Visit: follow up HL      Oncology HPI:   She has a past medical history of acne treated with spironolactone who is otherwise healthy with no notable past surgical history. She first noticed heartburn-like symptoms when drinking alcohol a year ago and has noticed progressive dysphagia, where now dry solids get caught when swallowing although it is not painful. She has had to stop eating mid-meal but denies appetite change or weight loss. She developed night sweats x2 in Fall 2021 where she woke up with a drenched shirt. Denies current night sweats, fever, chills. During that time, she first noticed an enlarged lymph node in her right clavicular region and as it was not bothersome or painful, did not pursue medical attention at that time. She noticed no other lumps or bumps at that time. She has also been increasingly itchy in all parts of her body for the past 2 months. Over the past two weeks, she has noticed a fast heart rate without chest pain or palpitations and shortness of breath on exertion like carrying laundry upstairs or walking and talking at the same time. She denies lower extremity or upper extremity edema. Her energy level has been slowly declining, where she sleeps for longer at night and needing to rest for 4 hours in the afternoon.      She had a routine physical on 5/16/22 where she brought attention to her growing mass on her right shoulder. 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         She underwent ECHO which showed EF of >50%.  PET scan done on 6/18/2022 showed hypermetabolic mediastinal mass with SUV max of 14.7 and 15.6 x 14.8 x 7.8 cm in size.  Mediastinal mass extends to right level 4 lateral to right thyroid lobe.  There is associated right retroclavicular lymphadenopathy.  Associated bone  marrow activity is seen in the axial skeleton consistent with bone marrow infiltration.  Her [place] stage is stage IV.     She consulted with fertility specialist.  We do not have time to pursue pretreatment quality preservation.  She was advised to return in 6 months after completing treatment at that time fertility preservation would be considered.     Due to her ongoing itching, exertional dyspnea, I had prescribed prednisone 60 mg daily and allopurinol to prevent tumor lysis.         Nodular sclerosis classical Hodgkin lymphoma (H)    6/16/2022 Initial Diagnosis      Nodular sclerosis classical Hodgkin lymphoma (H)       6/27/2022 -  Chemotherapy      OP ONC Lymphoma and CLL - A + AVD (Brentuximab vedotin / DOXOrubicin / vinBLAStine / Dacarbazine)   Plan Provider: Mallory Mendez MD  Treatment goal: Curative  Line of treatment: [No plan line of treatment]            Interval history:   Amy is here today with her mom for follow up.  She says this last infusion has been the best so far-- side effects minimal, she had some nausea but did not need anti emetics. Constipation is better, she took an aloe drink which helped. She started claritin for bony pain secondary to her neulasta and this prevented her discomfort.     She has noted a small piece of something poking from her port incision. This is not tender however rubs when she tries to sleep.     Appetite is good. No fevers or infectious concerns. No headaches or dizziness. She does have some fatigue.        Physical Exam:  /72   Pulse 92   Temp 98.8  F (37.1  C)   Resp 16   Wt 54.4 kg (120 lb)   LMP 06/09/2022   SpO2 100%   BMI 20.44 kg/m    General: No acute distress. Young, pleasant female.   HEENT: EOMI, PERRL. Sclerae are anicteric.   Lymph: Neck is supple with no lymphadenopathy in the cervical or supraclavicular areas.   Heart: Regular rate and rhythm.   Lungs: Clear to auscultation bilaterally.   Abdomen: Bowel sounds present, soft,  nontender with no palpable hepatosplenomegaly or masses.   Extremities: No lower extremity edema noted bilaterally.   Neuro: Alert and oriented x3, CN grossly intact, steady gait  Skin: No rashes, petechiae, or bruising noted on exposed skin. Port to right chest wall- upper incision, mildly erythematous, no drainage, does not appear infected. Small colorless appendage poking out-- sniped this      Labs:     I personally reviewed the following labs:    Most Recent 3 CBC's:  Recent Labs   Lab Test 08/08/22  1105 07/25/22  1117 07/11/22  1224   WBC 11.4* 16.0* 7.1   HGB 10.8* 11.1* 12.2   MCV 85 80 80    277 256     Most Recent 3 BMP's:  Recent Labs   Lab Test 08/08/22  1105 07/25/22  1117 07/11/22  1224    137 134   POTASSIUM 3.6 3.8 3.5   CHLORIDE 106 106 99   CO2 23 23 26   BUN 10 12 14   CR 0.62 0.54 0.78   ANIONGAP 10 8 9   NICOLAS 8.7 8.4* 9.0   GLC 91 104* 99     Most Recent 2 LFT's:  Recent Labs   Lab Test 08/08/22  1105 07/25/22  1117   AST 17 13   ALT 24 24   ALKPHOS 74 100   BILITOTAL 0.3 0.1*       Imaging: n/a        Impression/plan:       # 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 If her symptoms recur during first month of chemotherapy, we can restart prednisone.      Ppx  - Continue acyclovir and Bactrim ppx      # Leukocytosis  - No signs/sxs of infection at this time. Likely related to recent Neulasta.   - As we expect her counts to drop with chemotherapy today, will continue with Neulasta for now. Can consider holding it with next cycle if WBC increasing.       # Constipation  This started prior to her first infusion, however worsened post chemo. Enema x1 in ER (7/3/22) however has not had BM since. +flatus. No  vomiting, mild nausea. Mag citrate eventually given and was successful.   - Continue miralax BID and senna 1-2 tab BID. She also has an Rx for milk of magnesium to use PRN that she has not picked up yet  - has tried an aloe vera tea which was effective-- advised to caution with herbal remedies and to let us know ingredients prior to using agin      # Blurry vision-- resolved. This occurred post chemo, improved with eye drops.       # Neulasta induced bony pain-- alternated tylenol and ibuprofen which helped only a bit. Pain lasted 1 and a half days, now resolved.   - She had bone pain again after last cycle. Relieved with claritin.   - Tramadol PRN.       # CINV  - PRN antiemetics for breakthrough nausea.       Aleena Sawyer [facility]          45 minutes spent on the date of the encounter doing chart review, review of test results, interpretation of tests, patient visit and documentation

## 2022-08-08 NOTE — LETTER
8/8/2022         RE: Amy Harris  [address] [place] [address]        Dear Colleague,    Thank you for referring your patient, Amy Harris, to the [facility]. Please see a copy of my visit note below.    [facility] [place]  Date of visit: Aug 8, 2022  Oncologist: Dr. Mallory Mendez      Reason for Visit: follow up HL      Oncology HPI:   She has a past medical history of acne treated with spironolactone who is otherwise healthy with no notable past surgical history. She first noticed heartburn-like symptoms when drinking alcohol a year ago and has noticed progressive dysphagia, where now dry solids get caught when swallowing although it is not painful. She has had to stop eating mid-meal but denies appetite change or weight loss. She developed night sweats x2 in Fall 2021 where she woke up with a drenched shirt. Denies current night sweats, fever, chills. During that time, she first noticed an enlarged lymph node in her right clavicular region and as it was not bothersome or painful, did not pursue medical attention at that time. She noticed no other lumps or bumps at that time. She has also been increasingly itchy in all parts of her body for the past 2 months. Over the past two weeks, she has noticed a fast heart rate without chest pain or palpitations and shortness of breath on exertion like carrying laundry upstairs or walking and talking at the same time. She denies lower extremity or upper extremity edema. Her energy level has been slowly declining, where she sleeps for longer at night and needing to rest for 4 hours in the afternoon.      She had a routine physical on 5/16/22 where she brought attention to her growing mass on her right shoulder. 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         She underwent ECHO which showed EF of >50%.  PET scan done on  6/18/2022 showed hypermetabolic mediastinal mass with SUV max of 14.7 and 15.6 x 14.8 x 7.8 cm in size.  Mediastinal mass extends to right level 4 lateral to right thyroid lobe.  There is associated right retroclavicular lymphadenopathy.  Associated bone marrow activity is seen in the axial skeleton consistent with bone marrow infiltration.  Her [place] stage is stage IV.     She consulted with fertility specialist.  We do not have time to pursue pretreatment quality preservation.  She was advised to return in 6 months after completing treatment at that time fertility preservation would be considered.     Due to her ongoing itching, exertional dyspnea, I had prescribed prednisone 60 mg daily and allopurinol to prevent tumor lysis.         Nodular sclerosis classical Hodgkin lymphoma (H)    6/16/2022 Initial Diagnosis      Nodular sclerosis classical Hodgkin lymphoma (H)       6/27/2022 -  Chemotherapy      OP ONC Lymphoma and CLL - A + AVD (Brentuximab vedotin / DOXOrubicin / vinBLAStine / Dacarbazine)   Plan Provider: Mallory Mendez MD  Treatment goal: Curative  Line of treatment: [No plan line of treatment]            Interval history:   Amy is here today with her mom for follow up.  She says this last infusion has been the best so far-- side effects minimal, she had some nausea but did not need anti emetics. Constipation is better, she took an aloe drink which helped. She started claritin for bony pain secondary to her neulasta and this prevented her discomfort.     She has noted a small piece of something poking from her port incision. This is not tender however rubs when she tries to sleep.     Appetite is good. No fevers or infectious concerns. No headaches or dizziness. She does have some fatigue.        Physical Exam:  /72   Pulse 92   Temp 98.8  F (37.1  C)   Resp 16   Wt 54.4 kg (120 lb)   LMP 06/09/2022   SpO2 100%   BMI 20.44 kg/m    General: No acute distress. Young, pleasant female.    HEENT: EOMI, PERRL. Sclerae are anicteric.   Lymph: Neck is supple with no lymphadenopathy in the cervical or supraclavicular areas.   Heart: Regular rate and rhythm.   Lungs: Clear to auscultation bilaterally.   Abdomen: Bowel sounds present, soft, nontender with no palpable hepatosplenomegaly or masses.   Extremities: No lower extremity edema noted bilaterally.   Neuro: Alert and oriented x3, CN grossly intact, steady gait  Skin: No rashes, petechiae, or bruising noted on exposed skin. Port to right chest wall- upper incision, mildly erythematous, no drainage, does not appear infected. Small colorless appendage poking out-- sniped this      Labs:     I personally reviewed the following labs:    Most Recent 3 CBC's:  Recent Labs   Lab Test 08/08/22  1105 07/25/22  1117 07/11/22  1224   WBC 11.4* 16.0* 7.1   HGB 10.8* 11.1* 12.2   MCV 85 80 80    277 256     Most Recent 3 BMP's:  Recent Labs   Lab Test 08/08/22  1105 07/25/22  1117 07/11/22  1224    137 134   POTASSIUM 3.6 3.8 3.5   CHLORIDE 106 106 99   CO2 23 23 26   BUN 10 12 14   CR 0.62 0.54 0.78   ANIONGAP 10 8 9   NICOLAS 8.7 8.4* 9.0   GLC 91 104* 99     Most Recent 2 LFT's:  Recent Labs   Lab Test 08/08/22  1105 07/25/22  1117   AST 17 13   ALT 24 24   ALKPHOS 74 100   BILITOTAL 0.3 0.1*       Imaging: n/a        Impression/plan:       # 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 If her symptoms recur during first month of chemotherapy, we can restart prednisone.      Ppx  - Continue acyclovir and Bactrim ppx      # Leukocytosis  - No signs/sxs of infection at this time. Likely related to recent Neulasta.   - As we expect her counts to drop with chemotherapy today,  will continue with Neulasta for now. Can consider holding it with next cycle if WBC increasing.       # Constipation  This started prior to her first infusion, however worsened post chemo. Enema x1 in ER (7/3/22) however has not had BM since. +flatus. No vomiting, mild nausea. Mag citrate eventually given and was successful.   - Continue miralax BID and senna 1-2 tab BID. She also has an Rx for milk of magnesium to use PRN that she has not picked up yet  - has tried an aloe vera tea which was effective-- advised to caution with herbal remedies and to let us know ingredients prior to using agin      # Blurry vision-- resolved. This occurred post chemo, improved with eye drops.       # Neulasta induced bony pain-- alternated tylenol and ibuprofen which helped only a bit. Pain lasted 1 and a half days, now resolved.   - She had bone pain again after last cycle. Relieved with claritin.   - Tramadol PRN.       # CINV  - PRN antiemetics for breakthrough nausea.         45 minutes spent on the date of the encounter doing chart review, review of test results, interpretation of tests, patient visit and documentation         Again, thank you for allowing me to participate in the care of your patient.      Sincerely,    YAJAIRA Guardado CNP

## 2022-08-08 NOTE — NURSING NOTE
"Oncology Rooming Note    August 8, 2022 11:25 AM   Amy Harris is a 27 year old female who presents for:    Chief Complaint   Patient presents with     Blood Draw     Port Draw     Labs drawn by RN via port, vitals taken.     Oncology Clinic Visit     Nodular sclerosis classical hodgkin lymphoma     Initial Vitals: /72   Pulse 92   Temp 98.8  F (37.1  C)   Resp 16   Wt 54.4 kg (120 lb)   LMP 06/09/2022   SpO2 100%   BMI 20.44 kg/m   Estimated body mass index is 20.44 kg/m  as calculated from the following:    Height as of 6/27/22: 1.632 m (5' 4.25\").    Weight as of this encounter: 54.4 kg (120 lb). Body surface area is 1.57 meters squared.  No Pain (0) Comment: Data Unavailable   Patient's last menstrual period was 06/09/2022.  Allergies reviewed: Yes  Medications reviewed: Yes    Medications: Medication refills not needed today.  Pharmacy name entered into EPIC:    [place], [place] - [address] [id number] [place], [place] - [address]    Clinical concerns: none       Karlie Horn            "

## 2022-08-08 NOTE — PROGRESS NOTES
Infusion Nursing Note:  Amy Harris presents today for C2D15 Doxorubicin/Vimblastine/Dacabarzine/Brentuximab/OnBody Neulasta.    Patient seen by provider today: Yes: Aleena Sawyer NP   present during visit today: Not Applicable.    Note: No complaints made.     Intravenous Access:  Implanted Port.    Treatment Conditions:  Lab Results   Component Value Date    HGB 10.8 (L) 08/08/2022    WBC 11.4 (H) 08/08/2022    ANEU 7.9 08/08/2022    ANEUTAUTO 5.2 06/27/2022     08/08/2022      Lab Results   Component Value Date     08/08/2022    POTASSIUM 3.6 08/08/2022    CR 0.62 08/08/2022    NICOLAS 8.7 08/08/2022    BILITOTAL 0.3 08/08/2022    ALBUMIN 3.3 (L) 08/08/2022    ALT 24 08/08/2022    AST 17 08/08/2022     Results reviewed, labs MET treatment parameters, ok to proceed with treatment.  ECHO/MUGA completed 6/14/22  EF 65-70%.    Neulasta Onpro On-Body injector applied to right upper arm at 8/8/22 1430h with light facing elbow.  Writer discussed Neulasta injection would start on 8/9/22 at 1730H, approximately 27 hours after application applied today.  Verbal instruction reviewed with patient.  Pt instructed when the dose delivery starts, it will take about 45 minutes to complete.  Pt aware Neulasta Onpro On-Body should have green flashing light and to call triage or on-call MD if injector flashes red or appears to be leaking. Pt aware to keep Onpro On-Body Neulasta 4 inches away from electrical equipment and to avoid showering 4 hours prior to injection.   Neulasta Onpro Lot number: See MAR      Post Infusion Assessment:  Patient tolerated infusion without incident.  Patient tolerated injection without incident.  Blood return noted pre and post infusion.  Site patent and intact, free from redness, edema or discomfort.  No evidence of extravasations.  Access discontinued per protocol.     Discharge Plan:   Patient declined prescription refills.  Discharge instructions reviewed with:  Patient.  Patient and/or family verbalized understanding of discharge instructions and all questions answered.  AVS to patient via admetricksT.  Patient will return 8/22/22 for next appointment.   Patient discharged in stable condition accompanied by: self.  Departure Mode: Ambulatory.      MATTHEW ADDISON RN

## 2022-08-19 ENCOUNTER — HOSPITAL ENCOUNTER (OUTPATIENT)
Dept: PET IMAGING | Facility: CLINIC | Age: 27
Discharge: HOME | End: 2022-08-19
Attending: PHYSICIAN ASSISTANT
Payer: COMMERCIAL

## 2022-08-19 ENCOUNTER — HOSPITAL ENCOUNTER (OUTPATIENT)
Dept: PET IMAGING | Facility: CLINIC | Age: 27
Setting detail: NUCLEAR MEDICINE
Discharge: HOME | End: 2022-08-19
Attending: PHYSICIAN ASSISTANT | Admitting: PHYSICIAN ASSISTANT
Payer: COMMERCIAL

## 2022-08-19 DIAGNOSIS — C81.10 NODULAR SCLEROSIS CLASSICAL HODGKIN LYMPHOMA (H): ICD-10-CM

## 2022-08-19 PROCEDURE — 250N000011 HC RX IP 250 OP 636: Performed by: PHYSICIAN ASSISTANT

## 2022-08-19 PROCEDURE — 343N000001 HC RX 343: Performed by: PHYSICIAN ASSISTANT

## 2022-08-19 PROCEDURE — 71260 CT THORAX DX C+: CPT | Mod: 26 | Performed by: RADIOLOGY

## 2022-08-19 PROCEDURE — 74177 CT ABD & PELVIS W/CONTRAST: CPT

## 2022-08-19 PROCEDURE — 70491 CT SOFT TISSUE NECK W/DYE: CPT | Mod: 26 | Performed by: RADIOLOGY

## 2022-08-19 PROCEDURE — 999N000130 PET ONCOLOGY WHOLE BODY: Mod: PS

## 2022-08-19 PROCEDURE — 74177 CT ABD & PELVIS W/CONTRAST: CPT | Mod: 26 | Performed by: RADIOLOGY

## 2022-08-19 PROCEDURE — A9552 F18 FDG: HCPCS | Performed by: PHYSICIAN ASSISTANT

## 2022-08-19 PROCEDURE — 70491 CT SOFT TISSUE NECK W/DYE: CPT

## 2022-08-19 PROCEDURE — 78816 PET IMAGE W/CT FULL BODY: CPT | Mod: 26 | Performed by: RADIOLOGY

## 2022-08-19 RX ORDER — HEPARIN SODIUM (PORCINE) LOCK FLUSH IV SOLN 100 UNIT/ML 100 UNIT/ML
500 SOLUTION INTRAVENOUS ONCE
Status: COMPLETED | OUTPATIENT
Start: 2022-08-19 | End: 2022-08-19

## 2022-08-19 RX ORDER — IOPAMIDOL 755 MG/ML
20-135 INJECTION, SOLUTION INTRAVASCULAR ONCE
Status: COMPLETED | OUTPATIENT
Start: 2022-08-19 | End: 2022-08-19

## 2022-08-19 RX ADMIN — FLUDEOXYGLUCOSE F-18 10 MCI.: 500 INJECTION, SOLUTION INTRAVENOUS at 08:09

## 2022-08-19 RX ADMIN — IOPAMIDOL 65 ML: 755 INJECTION, SOLUTION INTRAVENOUS at 09:01

## 2022-08-19 RX ADMIN — Medication 500 UNITS: at 09:11

## 2022-08-22 ENCOUNTER — INFUSION THERAPY VISIT (OUTPATIENT)
Dept: ONCOLOGY | Facility: CLINIC | Age: 27
End: 2022-08-22
Attending: NURSE PRACTITIONER
Payer: COMMERCIAL

## 2022-08-22 ENCOUNTER — APPOINTMENT (OUTPATIENT)
Dept: LAB | Facility: CLINIC | Age: 27
End: 2022-08-22
Attending: NURSE PRACTITIONER
Payer: COMMERCIAL

## 2022-08-22 VITALS
HEART RATE: 104 BPM | RESPIRATION RATE: 16 BRPM | TEMPERATURE: 98.3 F | OXYGEN SATURATION: 100 % | DIASTOLIC BLOOD PRESSURE: 83 MMHG | BODY MASS INDEX: 20.44 KG/M2 | SYSTOLIC BLOOD PRESSURE: 124 MMHG | WEIGHT: 120 LBS

## 2022-08-22 DIAGNOSIS — C81.10 NODULAR SCLEROSIS CLASSICAL HODGKIN LYMPHOMA (H): Primary | ICD-10-CM

## 2022-08-22 LAB
ALBUMIN SERPL-MCNC: 3.5 G/DL (ref 3.4–5)
ALP SERPL-CCNC: 98 U/L (ref 40–150)
ALT SERPL W P-5'-P-CCNC: 23 U/L (ref 0–50)
ANION GAP SERPL CALCULATED.3IONS-SCNC: 8 MMOL/L (ref 3–14)
AST SERPL W P-5'-P-CCNC: 15 U/L (ref 0–45)
BASOPHILS # BLD AUTO: 0.1 10E3/UL (ref 0–0.2)
BASOPHILS NFR BLD AUTO: 1 %
BILIRUB SERPL-MCNC: 0.4 MG/DL (ref 0.2–1.3)
BUN SERPL-MCNC: 10 MG/DL (ref 7–30)
CALCIUM SERPL-MCNC: 8.6 MG/DL (ref 8.5–10.1)
CHLORIDE BLD-SCNC: 103 MMOL/L (ref 94–109)
CO2 SERPL-SCNC: 26 MMOL/L (ref 20–32)
CREAT SERPL-MCNC: 0.5 MG/DL (ref 0.52–1.04)
EOSINOPHIL # BLD AUTO: 0 10E3/UL (ref 0–0.7)
EOSINOPHIL NFR BLD AUTO: 0 %
ERYTHROCYTE [DISTWIDTH] IN BLOOD BY AUTOMATED COUNT: 20.5 % (ref 10–15)
GFR SERPL CREATININE-BSD FRML MDRD: >90 ML/MIN/1.73M2
GLUCOSE BLD-MCNC: 105 MG/DL (ref 70–99)
HCT VFR BLD AUTO: 33.7 % (ref 35–47)
HGB BLD-MCNC: 11 G/DL (ref 11.7–15.7)
IMM GRANULOCYTES # BLD: 0.5 10E3/UL
IMM GRANULOCYTES NFR BLD: 3 %
LYMPHOCYTES # BLD AUTO: 2.1 10E3/UL (ref 0.8–5.3)
LYMPHOCYTES NFR BLD AUTO: 14 %
MCH RBC QN AUTO: 27.6 PG (ref 26.5–33)
MCHC RBC AUTO-ENTMCNC: 32.6 G/DL (ref 31.5–36.5)
MCV RBC AUTO: 85 FL (ref 78–100)
MONOCYTES # BLD AUTO: 0.8 10E3/UL (ref 0–1.3)
MONOCYTES NFR BLD AUTO: 6 %
NEUTROPHILS # BLD AUTO: 11.7 10E3/UL (ref 1.6–8.3)
NEUTROPHILS NFR BLD AUTO: 76 %
NRBC # BLD AUTO: 0 10E3/UL
NRBC BLD AUTO-RTO: 0 /100
PLATELET # BLD AUTO: 305 10E3/UL (ref 150–450)
POTASSIUM BLD-SCNC: 3.7 MMOL/L (ref 3.4–5.3)
PROT SERPL-MCNC: 7.1 G/DL (ref 6.8–8.8)
RBC # BLD AUTO: 3.98 10E6/UL (ref 3.8–5.2)
SODIUM SERPL-SCNC: 137 MMOL/L (ref 133–144)
WBC # BLD AUTO: 15.2 10E3/UL (ref 4–11)

## 2022-08-22 PROCEDURE — 96417 CHEMO IV INFUS EACH ADDL SEQ: CPT

## 2022-08-22 PROCEDURE — 250N000011 HC RX IP 250 OP 636: Performed by: NURSE PRACTITIONER

## 2022-08-22 PROCEDURE — 96377 APPLICATON ON-BODY INJECTOR: CPT | Mod: 59

## 2022-08-22 PROCEDURE — 96375 TX/PRO/DX INJ NEW DRUG ADDON: CPT

## 2022-08-22 PROCEDURE — 250N000011 HC RX IP 250 OP 636: Performed by: STUDENT IN AN ORGANIZED HEALTH CARE EDUCATION/TRAINING PROGRAM

## 2022-08-22 PROCEDURE — 99214 OFFICE O/P EST MOD 30 MIN: CPT | Performed by: STUDENT IN AN ORGANIZED HEALTH CARE EDUCATION/TRAINING PROGRAM

## 2022-08-22 PROCEDURE — 96411 CHEMO IV PUSH ADDL DRUG: CPT

## 2022-08-22 PROCEDURE — 96413 CHEMO IV INFUSION 1 HR: CPT

## 2022-08-22 PROCEDURE — G0463 HOSPITAL OUTPT CLINIC VISIT: HCPCS

## 2022-08-22 PROCEDURE — 96367 TX/PROPH/DG ADDL SEQ IV INF: CPT

## 2022-08-22 PROCEDURE — 96372 THER/PROPH/DIAG INJ SC/IM: CPT | Performed by: STUDENT IN AN ORGANIZED HEALTH CARE EDUCATION/TRAINING PROGRAM

## 2022-08-22 PROCEDURE — 85041 AUTOMATED RBC COUNT: CPT | Performed by: NURSE PRACTITIONER

## 2022-08-22 PROCEDURE — 258N000003 HC RX IP 258 OP 636: Performed by: STUDENT IN AN ORGANIZED HEALTH CARE EDUCATION/TRAINING PROGRAM

## 2022-08-22 PROCEDURE — 80053 COMPREHEN METABOLIC PANEL: CPT | Performed by: NURSE PRACTITIONER

## 2022-08-22 RX ORDER — ALBUTEROL SULFATE 90 UG/1
1-2 AEROSOL, METERED RESPIRATORY (INHALATION)
Status: CANCELLED
Start: 2022-08-22

## 2022-08-22 RX ORDER — NALOXONE HYDROCHLORIDE 0.4 MG/ML
0.2 INJECTION, SOLUTION INTRAMUSCULAR; INTRAVENOUS; SUBCUTANEOUS
Status: CANCELLED | OUTPATIENT
Start: 2022-08-22

## 2022-08-22 RX ORDER — PALONOSETRON 0.05 MG/ML
0.25 INJECTION, SOLUTION INTRAVENOUS ONCE
Status: CANCELLED
Start: 2022-08-22

## 2022-08-22 RX ORDER — PALONOSETRON 0.05 MG/ML
0.25 INJECTION, SOLUTION INTRAVENOUS ONCE
Status: COMPLETED | OUTPATIENT
Start: 2022-08-22 | End: 2022-08-22

## 2022-08-22 RX ORDER — DOXORUBICIN HYDROCHLORIDE 2 MG/ML
25 INJECTION, SOLUTION INTRAVENOUS ONCE
Status: COMPLETED | OUTPATIENT
Start: 2022-08-22 | End: 2022-08-22

## 2022-08-22 RX ORDER — METHYLPREDNISOLONE SODIUM SUCCINATE 125 MG/2ML
125 INJECTION, POWDER, LYOPHILIZED, FOR SOLUTION INTRAMUSCULAR; INTRAVENOUS
Status: CANCELLED
Start: 2022-08-22

## 2022-08-22 RX ORDER — LORAZEPAM 2 MG/ML
0.5 INJECTION INTRAMUSCULAR EVERY 4 HOURS PRN
Status: CANCELLED | OUTPATIENT
Start: 2022-08-22

## 2022-08-22 RX ORDER — DIPHENHYDRAMINE HYDROCHLORIDE 50 MG/ML
50 INJECTION INTRAMUSCULAR; INTRAVENOUS
Status: CANCELLED
Start: 2022-08-22

## 2022-08-22 RX ORDER — MEPERIDINE HYDROCHLORIDE 25 MG/ML
25 INJECTION INTRAMUSCULAR; INTRAVENOUS; SUBCUTANEOUS EVERY 30 MIN PRN
Status: CANCELLED | OUTPATIENT
Start: 2022-08-22

## 2022-08-22 RX ORDER — DIPHENHYDRAMINE HCL 25 MG
50 CAPSULE ORAL
Status: CANCELLED
Start: 2022-08-22

## 2022-08-22 RX ORDER — DOXORUBICIN HYDROCHLORIDE 2 MG/ML
25 INJECTION, SOLUTION INTRAVENOUS ONCE
Status: CANCELLED | OUTPATIENT
Start: 2022-08-22

## 2022-08-22 RX ORDER — HEPARIN SODIUM (PORCINE) LOCK FLUSH IV SOLN 100 UNIT/ML 100 UNIT/ML
5 SOLUTION INTRAVENOUS
Status: CANCELLED | OUTPATIENT
Start: 2022-08-22

## 2022-08-22 RX ORDER — ACETAMINOPHEN 325 MG/1
650 TABLET ORAL
Status: CANCELLED
Start: 2022-08-22

## 2022-08-22 RX ORDER — ALBUTEROL SULFATE 0.83 MG/ML
2.5 SOLUTION RESPIRATORY (INHALATION)
Status: CANCELLED | OUTPATIENT
Start: 2022-08-22

## 2022-08-22 RX ORDER — HEPARIN SODIUM,PORCINE 10 UNIT/ML
5 VIAL (ML) INTRAVENOUS
Status: CANCELLED | OUTPATIENT
Start: 2022-08-22

## 2022-08-22 RX ORDER — HEPARIN SODIUM (PORCINE) LOCK FLUSH IV SOLN 100 UNIT/ML 100 UNIT/ML
5 SOLUTION INTRAVENOUS
Status: DISCONTINUED | OUTPATIENT
Start: 2022-08-22 | End: 2022-08-22 | Stop reason: HOSPADM

## 2022-08-22 RX ORDER — EPINEPHRINE 1 MG/ML
0.3 INJECTION, SOLUTION INTRAMUSCULAR; SUBCUTANEOUS EVERY 5 MIN PRN
Status: CANCELLED | OUTPATIENT
Start: 2022-08-22

## 2022-08-22 RX ORDER — HEPARIN SODIUM (PORCINE) LOCK FLUSH IV SOLN 100 UNIT/ML 100 UNIT/ML
5 SOLUTION INTRAVENOUS ONCE
Status: COMPLETED | OUTPATIENT
Start: 2022-08-22 | End: 2022-08-22

## 2022-08-22 RX ADMIN — PEGFILGRASTIM 6 MG: KIT SUBCUTANEOUS at 15:10

## 2022-08-22 RX ADMIN — SODIUM CHLORIDE 600 MG: 9 INJECTION, SOLUTION INTRAVENOUS at 14:35

## 2022-08-22 RX ADMIN — VINBLASTINE SULFATE 10 MG: 1 INJECTION INTRAVENOUS at 14:26

## 2022-08-22 RX ADMIN — Medication 5 ML: at 12:19

## 2022-08-22 RX ADMIN — PALONOSETRON HYDROCHLORIDE 0.25 MG: 0.25 INJECTION INTRAVENOUS at 13:34

## 2022-08-22 RX ADMIN — Medication 5 ML: at 15:45

## 2022-08-22 RX ADMIN — DOXORUBICIN HYDROCHLORIDE 40 MG: 2 INJECTION, SOLUTION INTRAVENOUS at 14:14

## 2022-08-22 RX ADMIN — SODIUM CHLORIDE 250 ML: 9 INJECTION, SOLUTION INTRAVENOUS at 13:34

## 2022-08-22 RX ADMIN — DEXAMETHASONE SODIUM PHOSPHATE: 10 INJECTION, SOLUTION INTRAMUSCULAR; INTRAVENOUS at 13:44

## 2022-08-22 RX ADMIN — BRENTUXIMAB VEDOTIN 66 MG: 50 INJECTION, POWDER, LYOPHILIZED, FOR SOLUTION INTRAVENOUS at 15:08

## 2022-08-22 ASSESSMENT — PAIN SCALES - GENERAL: PAINLEVEL: NO PAIN (0)

## 2022-08-22 NOTE — LETTER
8/22/2022         RE: Amy Harris  [address] [place] [address]        Dear Colleague,    Thank you for referring your patient, Amy Harris, to the [facility]. Please see a copy of my visit note below.    Subjective   Amy is a 27 year old, presenting for the following health issues:  Port Draw (Labs drawn by RN via port, vitals taken.) and Oncology Clinic Visit (Rtn for nodular sclerosis classical hodgkin lymphoma)      HPI   Oncology History Overview Note   She has a past medical history of acne treated with spironolactone who is otherwise healthy with no notable past surgical history. She first noticed heartburn-like symptoms when drinking alcohol a year ago and has noticed progressive dysphagia, where now dry solids get caught when swallowing although it is not painful. She has had to stop eating mid-meal but denies appetite change or weight loss. She developed night sweats x2 in Fall 2021 where she woke up with a drenched shirt. Denies current night sweats, fever, chills. During that time, she first noticed an enlarged lymph node in her right clavicular region and as it was not bothersome or painful, did not pursue medical attention at that time. She noticed no other lumps or bumps at that time. She has also been increasingly itchy in all parts of her body for the past 2 months. Over the past two weeks, she has noticed a fast heart rate without chest pain or palpitations and shortness of breath on exertion like carrying laundry upstairs or walking and talking at the same time. She denies lower extremity or upper extremity edema. Her energy level has been slowly declining, where she sleeps for longer at night and needing to rest for 4 hours in the afternoon.      She had a routine physical on 5/16/22 where she brought attention to her growing mass on her right shoulder. An excisional biopsy was conducted by ENT on 6/9/22 with pathology showing sona  sclerosis classic Hodgkin lymphoma.       She udnerwent ECHO which showed EF of >50%.  PET scan done on 6/18/2022 showed hypermetabolic mediastinal mass with SUV max of 14.7 and 15.6 x 14.8 x 7.8 cm in size.  Mediastinal mass extends to right level 4 lateral to right thyroid lobe.  There is associated right retroclavicular lymphadenopathy.  Associated bone marrow activity is seen in the axial skeleton consistent with bone marrow infiltration.  Her [place] stage is stage IV.    She consulted with fertility specialist.  We do not have time to pursue pretreatment quality preservation.  She was advised to return in 6 months after completing treatment at that time fertility preservation would be considered.    Due to her ongoing itching, exertional dyspnea, I had prescribed prednisone 60 mg daily and allopurinol to prevent tumor lysis.    She underwent 2 cycles of AVD with rituximab.  Tolerated well without any side effects.  PET scan after 2 cycles shows complete metabolic response.       Nodular sclerosis classical Hodgkin lymphoma (H)   6/16/2022 Initial Diagnosis    Nodular sclerosis classical Hodgkin lymphoma (H)     6/27/2022 -  Chemotherapy    OP ONC Lymphoma and CLL - A + AVD (Brentuximab vedotin / DOXOrubicin / vinBLAStine / Dacarbazine)   Plan Provider: Mallory Mendez MD  Treatment goal: Curative  Line of treatment: [No plan line of treatment]     8/19/2022 No evidence of disease    PET scan after 2 cycles of AVD (map shows no evidence of disease.       Patient comes today for follow up. No fevers, chills, night sweats or weight loss, no lymphadenopathy. No pain.no nausea. Energy level and appetite are good. Tolerating chemotherapy well.  She had mild neuropathy in her feet after third dose but after last dose of treatment she did not have any neuropathy.  She does endorse fatigue in the week that she receives chemotherapy which improves before next chemotherapy.        Review of Systems   8 point review of  systems was negative except pertinent positives listed above.        Objective    /83   Pulse 104   Temp 98.3  F (36.8  C)   Resp 16   Wt 54.4 kg (120 lb)   SpO2 100%   BMI 20.44 kg/m    Body mass index is 20.44 kg/m .  Physical Exam   GENERAL:  Alert oriented well nourished  SKIN:  no rash, hives, other lesions.  EYE: no icterus/pallor  ENT: no throat erythema, enlarged tonsills, no ulcers or mucositis in the mouth  LYMPHATIC: no abnormal lymph nodes palable in cervical, axillary, supraclavicular or inguinal area.  RESP:  No rales or rhonchi, breath sounds bilaterally equal and vesicular  CV:  No tachycardia, S1 S2 normal No murmur.  GI:  Abdomen soft nontender no hepato or splenomegaly  MUSCULOSKELETAL:  No visible joint redness or swelling.  NEURO:  No gross weakness gait normal    Labs reviewed. No cytopenias, renal or liver abnormalities    Imaging: PETCt chest/abdomen/pelvis reviewed, noHypermetabolic activity consistent with lymphoma.  Previous 15 x 14 cm hypermetabolic mass is now markedly reduced in size with thin 8 x 2 strength of necrotic tissue remaining which is not hypermetabolic.    Assessment and plan:  1) classic Hodgkin lymphoma advanced stage on AVD-BV:  -She has excellent response to treatment so far.  -We will continue with total 6 cycles of treatment.  -Repeat scan after 6 cycles of chemotherapy.  -Return to clinic coordinate infusion of AVD-BV.  -Return to clinic to see me 6 to 8 weeks after 6 cycles of chemotherapy to discuss PET scan results.  -She is tolerating chemotherapy well without side effects, for now, we will continue the same dose.  She knows to notify us if she develops persistent neuropathy, in which case we will reduce vinblastine.    Total time spent on date of service in review of medical records, review of labs, history taking, physical exam, discussion of assessment and plan, counseling and patient education is 30 minutes.          Again, thank you for allowing  me to participate in the care of your patient.      Sincerely,    Mallory Mendez MD

## 2022-08-22 NOTE — NURSING NOTE
"Oncology Rooming Note    August 22, 2022 12:21 PM   Amy Harris is a 27 year old female who presents for:    Chief Complaint   Patient presents with     Port Draw     Labs drawn by RN via port, vitals taken.     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    Weight as of this encounter: 54.4 kg (120 lb). Body surface area is 1.57 meters squared.  No Pain (0) Comment: Data Unavailable   No LMP recorded. (Menstrual status: Birth Control).  Allergies reviewed: Yes  Medications reviewed: Yes    Medications: Medication refills not needed today.  Pharmacy name entered into EPIC:    [place], [place] - [address] [facility] [address] [place], [place] - [address] [facility]    Clinical concerns: Patient has no specific questions or clinical concerns outside of the reason for the visit.       Steph Carter, EMT            "

## 2022-08-22 NOTE — PROGRESS NOTES
Infusion Nursing Note:  Amy Harris presents today for Cycle 3 Day 1 Adriamycin, Vinblastine, Dacarbazine, Brentuximab and Neulasta OnPro.    Patient seen by provider today: Yes: Dr. Mendez   present during visit today: Not Applicable.    Note: Patient presents to infusion today doing well. Denies any new questions or concerns following her visit with Dr. Mendez    Intravenous Access:  Implanted Port.    Treatment Conditions:  Lab Results   Component Value Date    HGB 11.0 (L) 08/22/2022    WBC 15.2 (H) 08/22/2022    ANEU 7.9 08/08/2022    ANEUTAUTO 11.7 (H) 08/22/2022     08/22/2022      Lab Results   Component Value Date     08/22/2022    POTASSIUM 3.7 08/22/2022    CR 0.50 (L) 08/22/2022    NICOLAS 8.6 08/22/2022    BILITOTAL 0.4 08/22/2022    ALBUMIN 3.5 08/22/2022    ALT 23 08/22/2022    AST 15 08/22/2022     Results reviewed, labs MET treatment parameters, ok to proceed with treatment.  ECHO/MUGA completed 6/15/2022  EF 65-70%.    Post Infusion Assessment:  Patient tolerated infusion without incident.  Blood return noted pre and post infusion.  Blood return noted during Adriamycin and Vinblastine administration every 2-3 cc.  Site patent and intact, free from redness, edema or discomfort.  No evidence of extravasations.  Access discontinued per protocol.     Neulasta Onpro On-Body injector applied to Left Arm at 1510.  Writer discussed Neulasta injection would start tomorrow 8/23 at 1810, approximately 27 hours after application applied today.  Written and Verbal instruction reviewed with patient.  Pt instructed when the dose delivery starts, it will take about 45 minutes to complete.  Pt aware Neulasta Onpro On-Body should have green flashing light and to call triage or on-call MD if injector flashes red or appears to be leaking. Pt aware to keep Onpro On-Body Neulasta 4 inches away from electrical equipment and to avoid showering 4 hours prior to injection.   Neulasta Onpro Lot  number: See MAR    Discharge Plan:   Prescription refills given for Dexamethasone.  Discharge instructions reviewed with: Patient.  Patient and/or family verbalized understanding of discharge instructions and all questions answered.  AVS to patient via Share[employer].  Patient will return 9/6 for next appointment.   Patient discharged in stable condition accompanied by: self.  Departure Mode: Ambulatory.      Eveline Casillas RN

## 2022-08-22 NOTE — NURSING NOTE
Chief Complaint   Patient presents with     Port Draw     Labs drawn by RN via port, vitals taken.     Port accessed with 20 gauge 3/4 inch power port needle by RN, labs collected, line flushed with saline and heparin.  Vitals taken. Pt checked in for appointment(s).    Ifrah Bolivar RN

## 2022-08-22 NOTE — PATIENT INSTRUCTIONS
Contact Numbers  [facility]: [phone] (for symptom and scheduling needs)    Please call the [facility] Triage line if you experience a temperature greater than or equal to 100.4, shaking chills, have uncontrolled nausea, vomiting and/or diarrhea, dizziness, shortness of breath, chest pain, bleeding, unexplained bruising, or if you have any other new/concerning symptoms, questions or concerns.     If you are having any concerning symptoms or wish to speak to a provider before your next infusion visit, please call your care coordinator or triage to notify them so we can adequately serve you.     If you need a refill on a narcotic prescription or other medication, please call triage before your infusion appointment.           August 2022 Sunday Monday Tuesday Wednesday Thursday Friday Saturday        1     2     3     4     5     6       7     8    LAB PERIPHERAL  10:45 AM   (15 min.)   [facility] LAB DRAW   [facility]    RETURN  11:00 AM   (45 min.)   Aleena Sawyer APRN CNP   [facility]    ONC INFUSION 4 HR (240 MIN)  12:00 PM   (240 min.)    ONC INFUSION NURSE   [facility] 9     10     11     12     13       14     15     16     17     18     19    PET ONCOLOGY WHOLE BODY   7:30 AM   (30 min.)   [id number]   [facility]    CT SOFT TISSUE NECK W   8:05 AM   (20 min.)   [id number]   [facility] 20       21     22    LAB PERIPHERAL  11:45 AM   (15 min.)   [facility] LAB DRAW   [facility]    RETURN  12:00 PM   (30 min.)   Mallory Mendez MD   [facility]    ONC INFUSION 4 HR (240 MIN)   1:00 PM   (240 min.)    ONC INFUSION NURSE   [facility] 23     24     25     26     27       28     29     30     31                                  September 2022 Sunday Monday Tuesday Wednesday Thursday Friday  Saturday                       1     2     3       4     5     6    LAB PERIPHERAL   8:15 AM   (15 min.)    [facility] DRAW   [facility]    RETURN   8:45 AM   (45 min.)   Anita Roque PA-C   [facility]    ONC INFUSION 4 HR (240 MIN)  10:00 AM   (240 min.)    ONC INFUSION NURSE   [facility] 7     8     9     10       11     12     13     14     15     16     17       18     19    LAB PERIPHERAL   9:45 AM   (15 min.)    [facility] DRAW   [facility]    RETURN  10:00 AM   (45 min.)   Aleena Sawyer APRN CNP   [facility]    ONC INFUSION 4 HR (240 MIN)  11:30 AM   (240 min.)    ONC INFUSION NURSE   [facility] 20     21     22     23     24       25     26     27     28     29     30                            Lab Results:  Recent Results (from the past 12 hour(s))   Comprehensive metabolic panel    Collection Time: 08/22/22 12:15 PM   Result Value Ref Range    Sodium 137 133 - 144 mmol/L    Potassium 3.7 3.4 - 5.3 mmol/L    Chloride 103 94 - 109 mmol/L    Carbon Dioxide (CO2) 26 20 - 32 mmol/L    Anion Gap 8 3 - 14 mmol/L    Urea Nitrogen 10 7 - 30 mg/dL    Creatinine 0.50 (L) 0.52 - 1.04 mg/dL    Calcium 8.6 8.5 - 10.1 mg/dL    Glucose 105 (H) 70 - 99 mg/dL    Alkaline Phosphatase 98 40 - 150 U/L    AST 15 0 - 45 U/L    ALT 23 0 - 50 U/L    Protein Total 7.1 6.8 - 8.8 g/dL    Albumin 3.5 3.4 - 5.0 g/dL    Bilirubin Total 0.4 0.2 - 1.3 mg/dL    GFR Estimate >90 >60 mL/min/1.73m2   CBC with platelets and differential    Collection Time: 08/22/22 12:15 PM   Result Value Ref Range    WBC Count 15.2 (H) 4.0 - 11.0 10e3/uL    RBC Count 3.98 3.80 - 5.20 10e6/uL    Hemoglobin 11.0 (L) 11.7 - 15.7 g/dL    Hematocrit 33.7 (L) 35.0 - 47.0 %    MCV 85 78 - 100 fL    MCH 27.6 26.5 - 33.0 pg    MCHC 32.6 31.5 - 36.5 g/dL    RDW 20.5 (H) 10.0 -  15.0 %    Platelet Count 305 150 - 450 10e3/uL    % Neutrophils 76 %    % Lymphocytes 14 %    % Monocytes 6 %    % Eosinophils 0 %    % Basophils 1 %    % Immature Granulocytes 3 %    NRBCs per 100 WBC 0 <1 /100    Absolute Neutrophils 11.7 (H) 1.6 - 8.3 10e3/uL    Absolute Lymphocytes 2.1 0.8 - 5.3 10e3/uL    Absolute Monocytes 0.8 0.0 - 1.3 10e3/uL    Absolute Eosinophils 0.0 0.0 - 0.7 10e3/uL    Absolute Basophils 0.1 0.0 - 0.2 10e3/uL    Absolute Immature Granulocytes 0.5 (H) <=0.4 10e3/uL    Absolute NRBCs 0.0 10e3/uL

## 2022-08-22 NOTE — PROGRESS NOTES
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 She first noticed heartburn-like symptoms when drinking alcohol a year ago and has noticed progressive dysphagia, where now dry solids get caught when swallowing although it is not painful. She has had to stop eating mid-meal but denies appetite change or weight loss. She developed night sweats x2 in Fall 2021 where she woke up with a drenched shirt. Denies current night sweats, fever, chills. During that time, she first noticed an enlarged lymph node in her right clavicular region and as it was not bothersome or painful, did not pursue medical attention at that time. She noticed no other lumps or bumps at that time. She has also been increasingly itchy in all parts of her body for the past 2 months. Over the past two weeks, she has noticed a fast heart rate without chest pain or palpitations and shortness of breath on exertion like carrying laundry upstairs or walking and talking at the same time. She denies lower extremity or upper extremity edema. Her energy level has been slowly declining, where she sleeps for longer at night and needing to rest for 4 hours in the afternoon.      She had a routine physical on 5/16/22 where she brought attention to her growing mass on her right shoulder. 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       She udnerwent ECHO which showed EF of >50%.  PET scan done on 6/18/2022 showed hypermetabolic mediastinal mass with SUV max of 14.7 and 15.6 x 14.8 x 7.8 cm in size.  Mediastinal mass extends to right level 4 lateral to  right thyroid lobe.  There is associated right retroclavicular lymphadenopathy.  Associated bone marrow activity is seen in the axial skeleton consistent with bone marrow infiltration.  Her [place] stage is stage IV.    She consulted with fertility specialist.  We do not have time to pursue pretreatment quality preservation.  She was advised to return in 6 months after completing treatment at that time fertility preservation would be considered.    Due to her ongoing itching, exertional dyspnea, I had prescribed prednisone 60 mg daily and allopurinol to prevent tumor lysis.    She underwent 2 cycles of AVD with rituximab.  Tolerated well without any side effects.  PET scan after 2 cycles shows complete metabolic response.       Nodular sclerosis classical Hodgkin lymphoma (H)   6/16/2022 Initial Diagnosis    Nodular sclerosis classical Hodgkin lymphoma (H)     6/27/2022 -  Chemotherapy    OP ONC Lymphoma and CLL - A + AVD (Brentuximab vedotin / DOXOrubicin / vinBLAStine / Dacarbazine)   Plan Provider: Mallory Mendez MD  Treatment goal: Curative  Line of treatment: [No plan line of treatment]     8/19/2022 No evidence of disease    PET scan after 2 cycles of AVD (map shows no evidence of disease.       Patient comes today for follow up. No fevers, chills, night sweats or weight loss, no lymphadenopathy. No pain.no nausea. Energy level and appetite are good. Tolerating chemotherapy well.  She had mild neuropathy in her feet after third dose but after last dose of treatment she did not have any neuropathy.  She does endorse fatigue in the week that she receives chemotherapy which improves before next chemotherapy.        Review of Systems   8 point review of systems was negative except pertinent positives listed above.        Objective    /83   Pulse 104   Temp 98.3  F (36.8  C)   Resp 16   Wt 54.4 kg (120 lb)   SpO2 100%   BMI 20.44 kg/m    Body mass index is 20.44 kg/m .  Physical Exam   GENERAL:   Alert oriented well nourished  SKIN:  no rash, hives, other lesions.  EYE: no icterus/pallor  ENT: no throat erythema, enlarged tonsills, no ulcers or mucositis in the mouth  LYMPHATIC: no abnormal lymph nodes palable in cervical, axillary, supraclavicular or inguinal area.  RESP:  No rales or rhonchi, breath sounds bilaterally equal and vesicular  CV:  No tachycardia, S1 S2 normal No murmur.  GI:  Abdomen soft nontender no hepato or splenomegaly  MUSCULOSKELETAL:  No visible joint redness or swelling.  NEURO:  No gross weakness gait normal    Labs reviewed. No cytopenias, renal or liver abnormalities    Imaging: PETCt chest/abdomen/pelvis reviewed, noHypermetabolic activity consistent with lymphoma.  Previous 15 x 14 cm hypermetabolic mass is now markedly reduced in size with thin 8 x 2 strength of necrotic tissue remaining which is not hypermetabolic.    Assessment and plan:  1) classic Hodgkin lymphoma advanced stage on AVD-BV:  -She has excellent response to treatment so far.  -We will continue with total 6 cycles of treatment.  -Repeat scan after 6 cycles of chemotherapy.  -Return to clinic coordinate infusion of AVD-BV.  -Return to clinic to see me 6 to 8 weeks after 6 cycles of chemotherapy to discuss PET scan results.  -She is tolerating chemotherapy well without side effects, for now, we will continue the same dose.  She knows to notify us if she develops persistent neuropathy, in which case we will reduce vinblastine.    Total time spent on date of service in review of medical records, review of labs, history taking, physical exam, discussion of assessment and plan, counseling and patient education is 30 minutes.    Mallory Mendez MD  Attending Physician  Pager [phone]                    .  ..

## 2022-08-24 DIAGNOSIS — C81.10 NODULAR SCLEROSING HODGKIN'S LYMPHOMA, UNSPECIFIED BODY REGION (H): Primary | ICD-10-CM

## 2022-09-01 NOTE — PROGRESS NOTES
[facility]  Date of visit: Sep 6, 2022  Oncologist: Dr. Mallory Mendez      Reason for Visit: follow up HL      Oncology HPI:   She has a past medical history of acne treated with spironolactone who is otherwise healthy with no notable past surgical history. She first noticed heartburn-like symptoms when drinking alcohol a year ago and has noticed progressive dysphagia, where now dry solids get caught when swallowing although it is not painful. She has had to stop eating mid-meal but denies appetite change or weight loss. She developed night sweats x2 in Fall 2021 where she woke up with a drenched shirt. Denies current night sweats, fever, chills. During that time, she first noticed an enlarged lymph node in her right clavicular region and as it was not bothersome or painful, did not pursue medical attention at that time. She noticed no other lumps or bumps at that time. She has also been increasingly itchy in all parts of her body for the past 2 months. Over the past two weeks, she has noticed a fast heart rate without chest pain or palpitations and shortness of breath on exertion like carrying laundry upstairs or walking and talking at the same time. She denies lower extremity or upper extremity edema. Her energy level has been slowly declining, where she sleeps for longer at night and needing to rest for 4 hours in the afternoon.      She had a routine physical on 5/16/22 where she brought attention to her growing mass on her right shoulder. 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         She udnerwent ECHO which showed EF of >50%.  PET scan done on 6/18/2022 showed hypermetabolic mediastinal mass with SUV max of 14.7 and 15.6 x 14.8 x 7.8 cm in size.  Mediastinal mass extends to right level 4 lateral to right thyroid lobe.  There is associated right retroclavicular lymphadenopathy.  Associated bone  marrow activity is seen in the axial skeleton consistent with bone marrow infiltration.  Her [place] stage is stage IV.     She consulted with fertility specialist.  We do not have time to pursue pretreatment quality preservation.  She was advised to return in 6 months after completing treatment at that time fertility preservation would be considered.     Due to her ongoing itching, exertional dyspnea, I had prescribed prednisone 60 mg daily and allopurinol to prevent tumor lysis.     She underwent 2 cycles of AVD with rituximab.  Tolerated well without any side effects.  PET scan after 2 cycles shows complete metabolic response.         Nodular sclerosis classical Hodgkin lymphoma (H)    6/16/2022 Initial Diagnosis      Nodular sclerosis classical Hodgkin lymphoma (H)       6/27/2022 -  Chemotherapy      OP ONC Lymphoma and CLL - A + AVD (Brentuximab vedotin / DOXOrubicin / vinBLAStine / Dacarbazine)   Plan Provider: Mallory Mendez MD  Treatment goal: Curative  Line of treatment: [No plan line of treatment]       8/19/2022 No evidence of disease      PET scan after 2 cycles of AVD (map shows no evidence of disease.              Interval history:   Amy presents for oncology follow-up visit today and consideration of C3D15 chemotherapy. She is here with her sister today. Overall doing well and tolerating treatment. She does endorse fatigue in the week that she receives chemotherapy which improves before next chemotherapy. She also has some constipation after chemotherapy. Now moving bowels regularly. She was taking senna, miralax, and herbal tea supplements for this. She is eating/drinking well. No nausea/vomiting. No pain. She did not have neuropathy after this last round of treatment. No fevers or chills. No bleeding or new lumps or bumps. She does have some dyspnea on exertion (I.e., walking up stairs) for a few days after infusion. Otherwise, breathing is overall stable and no CP. ROS is otherwise  negative.        Physical Exam:  /84   Pulse 104   Temp 98.1  F (36.7  C) (Oral)   Resp 16   Wt 55.3 kg (122 lb)   SpO2 100%   BMI 20.78 kg/m    General: No acute distress. Young, pleasant female.   HEENT: EOMI, PERRL. Sclerae are anicteric.   Lymph: Neck is supple with no lymphadenopathy in the cervical or supraclavicular areas.   Heart: Regular rate and rhythm.   Lungs: Clear to auscultation bilaterally.   Abdomen: Bowel sounds present, soft, nontender with no palpable hepatosplenomegaly or masses.   Extremities: No lower extremity edema noted bilaterally.   Neuro: Alert and oriented x3, CN grossly intact, steady gait  Skin: No rashes, petechiae, or bruising noted on exposed skin. Port to right chest wall.       Labs:     I personally reviewed the following labs:   Latest Reference Range & Units 09/06/22 08:24   Sodium 136 - 145 mmol/L 136   Potassium 3.4 - 5.3 mmol/L 3.7   Chloride 98 - 107 mmol/L 102   Carbon Dioxide (CO2) 22 - 29 mmol/L 20 (L)   Urea Nitrogen 6.0 - 20.0 mg/dL 7.8   Creatinine 0.51 - 0.95 mg/dL 0.63   GFR Estimate >60 mL/min/1.73m2 >90   Calcium 8.6 - 10.0 mg/dL 8.9   Anion Gap 7 - 15 mmol/L 14   Albumin 3.5 - 5.2 g/dL 4.0   Protein Total 6.4 - 8.3 g/dL 6.5   Alkaline Phosphatase 35 - 104 U/L 128 (H)   ALT 10 - 35 U/L 16   AST 10 - 35 U/L 21   Bilirubin Total <=1.2 mg/dL 0.4   Glucose 70 - 99 mg/dL 92   WBC 4.0 - 11.0 10e3/uL 13.0 (H)   Hemoglobin 11.7 - 15.7 g/dL 10.0 (L)   Hematocrit 35.0 - 47.0 % 31.6 (L)   Platelet Count 150 - 450 10e3/uL 203   RBC Count 3.80 - 5.20 10e6/uL 3.54 (L)   MCV 78 - 100 fL 89   MCH 26.5 - 33.0 pg 28.2   MCHC 31.5 - 36.5 g/dL 31.6   RDW 10.0 - 15.0 % 22.0 (H)   % Neutrophils % 78   % Lymphocytes % 11   % Monocytes % 6   % Eosinophils % 0   % Basophils % 1   Absolute Basophils 0.0 - 0.2 10e3/uL 0.1   Absolute Eosinophils 0.0 - 0.7 10e3/uL 0.1   Absolute Immature Granulocytes <=0.4 10e3/uL 0.5 (H)   Absolute Lymphocytes 0.8 - 5.3 10e3/uL 1.5   Absolute  Monocytes 0.0 - 1.3 10e3/uL 0.8   % Immature Granulocytes % 4   Absolute Neutrophils 1.6 - 8.3 10e3/uL 10.3 (H)   Absolute NRBCs 10e3/uL 0.1   NRBCs per 100 WBC <1 /100 0       Imaging:     PETCT chest/abdomen/pelvis 8/19/22 showed no Hypermetabolic activity consistent with lymphoma.  Previous 15 x 14 cm hypermetabolic mass is now markedly reduced in size with thin 8 x 2 strength of necrotic tissue remaining which is not hypermetabolic.      Impression/plan:    # classic Hodgkin lymphoma advanced stage on AVD-BV with neulasta support.   - She has excellent response to treatment so far. PET/CT 8/19 after 2 cycles of treatment showed complete metabolic response by [place] criteria.   - We will continue with total 6 cycles of treatment.  - Repeat scan after 6 cycles of chemotherapy.  - Patient is appropriate to proceed with C3D15 of treatment today 9/6. She is tolerating chemotherapy well without major side effects, for now, we will continue the same dose.  She knows to notify us if she develops persistent neuropathy, in which case we will reduce vinblastine. She did not have neuropathy after most recent treatment.  - Return to see BI on D1 and D15.   - Return to clinic to see Dr. Mendez 6 to 8 weeks after 6 cycles of chemotherapy to discuss PET scan results.      Ppx  - Continue acyclovir and Bactrim ppx      # Leukocytosis  - No signs/sxs of infection at this time. Likely related to recent Neulasta.   - As we expect her counts to drop with chemotherapy today, will continue with Neulasta for now. Can consider holding it with next cycle if WBC increasing.       # Constipation  This started prior to her first infusion, however worsened post chemo. Enema x1 in ER (7/3/22) however has not had BM since. +flatus. No vomiting, mild nausea. Mag citrate eventually given and was successful.   - Continue miralax BID and senna 1-2 tab BID. She also has an Rx for milk of magnesium to use PRN that she has not started yet.  - She has  tried an aloe vera concentrate and herbal tea which was effective-- advised to caution with herbal remedies. Reviewed with pharmacy and would prefer to hold off on this for now and have patient try milk of magnesium PRN since she has not tried this yet. Can re-assess at next visit.       # Neulasta induced bony pain-- alternated tylenol and ibuprofen which helped only a bit. Pain lasted 1 and a half days, now resolved.   - She had bone pain again after last cycle. Relieved with claritin.   - Tramadol PRN.       # CINV  - PRN antiemetics for breakthrough nausea.       39 minutes spent on the date of the encounter doing chart review, review of test results, interpretation of tests, patient visit, documentation and discussion with other provider(s)     Anita Roque PA-C  [facility]  [address] [place] [address] [phone]

## 2022-09-06 ENCOUNTER — APPOINTMENT (OUTPATIENT)
Dept: LAB | Facility: CLINIC | Age: 27
End: 2022-09-06
Attending: PHYSICIAN ASSISTANT
Payer: COMMERCIAL

## 2022-09-06 ENCOUNTER — INFUSION THERAPY VISIT (OUTPATIENT)
Dept: ONCOLOGY | Facility: CLINIC | Age: 27
End: 2022-09-06
Attending: PHYSICIAN ASSISTANT
Payer: COMMERCIAL

## 2022-09-06 VITALS
TEMPERATURE: 98.1 F | DIASTOLIC BLOOD PRESSURE: 84 MMHG | BODY MASS INDEX: 20.78 KG/M2 | OXYGEN SATURATION: 100 % | HEART RATE: 104 BPM | WEIGHT: 122 LBS | SYSTOLIC BLOOD PRESSURE: 132 MMHG | RESPIRATION RATE: 16 BRPM

## 2022-09-06 VITALS — HEART RATE: 92 BPM

## 2022-09-06 DIAGNOSIS — C81.10 NODULAR SCLEROSIS CLASSICAL HODGKIN LYMPHOMA (H): Primary | ICD-10-CM

## 2022-09-06 LAB
ALBUMIN SERPL BCG-MCNC: 4 G/DL (ref 3.5–5.2)
ALP SERPL-CCNC: 128 U/L (ref 35–104)
ALT SERPL W P-5'-P-CCNC: 16 U/L (ref 10–35)
ANION GAP SERPL CALCULATED.3IONS-SCNC: 14 MMOL/L (ref 7–15)
AST SERPL W P-5'-P-CCNC: 21 U/L (ref 10–35)
BASOPHILS # BLD AUTO: 0.1 10E3/UL (ref 0–0.2)
BASOPHILS NFR BLD AUTO: 1 %
BILIRUB SERPL-MCNC: 0.4 MG/DL
BUN SERPL-MCNC: 7.8 MG/DL (ref 6–20)
CALCIUM SERPL-MCNC: 8.9 MG/DL (ref 8.6–10)
CHLORIDE SERPL-SCNC: 102 MMOL/L (ref 98–107)
CREAT SERPL-MCNC: 0.63 MG/DL (ref 0.51–0.95)
DEPRECATED HCO3 PLAS-SCNC: 20 MMOL/L (ref 22–29)
EOSINOPHIL # BLD AUTO: 0.1 10E3/UL (ref 0–0.7)
EOSINOPHIL NFR BLD AUTO: 0 %
ERYTHROCYTE [DISTWIDTH] IN BLOOD BY AUTOMATED COUNT: 22 % (ref 10–15)
GFR SERPL CREATININE-BSD FRML MDRD: >90 ML/MIN/1.73M2
GLUCOSE SERPL-MCNC: 92 MG/DL (ref 70–99)
HCT VFR BLD AUTO: 31.6 % (ref 35–47)
HGB BLD-MCNC: 10 G/DL (ref 11.7–15.7)
IMM GRANULOCYTES # BLD: 0.5 10E3/UL
IMM GRANULOCYTES NFR BLD: 4 %
LYMPHOCYTES # BLD AUTO: 1.5 10E3/UL (ref 0.8–5.3)
LYMPHOCYTES NFR BLD AUTO: 11 %
MCH RBC QN AUTO: 28.2 PG (ref 26.5–33)
MCHC RBC AUTO-ENTMCNC: 31.6 G/DL (ref 31.5–36.5)
MCV RBC AUTO: 89 FL (ref 78–100)
MONOCYTES # BLD AUTO: 0.8 10E3/UL (ref 0–1.3)
MONOCYTES NFR BLD AUTO: 6 %
NEUTROPHILS # BLD AUTO: 10.3 10E3/UL (ref 1.6–8.3)
NEUTROPHILS NFR BLD AUTO: 78 %
NRBC # BLD AUTO: 0.1 10E3/UL
NRBC BLD AUTO-RTO: 0 /100
PLATELET # BLD AUTO: 203 10E3/UL (ref 150–450)
POTASSIUM SERPL-SCNC: 3.7 MMOL/L (ref 3.4–5.3)
PROT SERPL-MCNC: 6.5 G/DL (ref 6.4–8.3)
RBC # BLD AUTO: 3.54 10E6/UL (ref 3.8–5.2)
SODIUM SERPL-SCNC: 136 MMOL/L (ref 136–145)
WBC # BLD AUTO: 13 10E3/UL (ref 4–11)

## 2022-09-06 PROCEDURE — 96372 THER/PROPH/DIAG INJ SC/IM: CPT | Performed by: PHYSICIAN ASSISTANT

## 2022-09-06 PROCEDURE — 99214 OFFICE O/P EST MOD 30 MIN: CPT | Performed by: PHYSICIAN ASSISTANT

## 2022-09-06 PROCEDURE — 85025 COMPLETE CBC W/AUTO DIFF WBC: CPT | Performed by: PHYSICIAN ASSISTANT

## 2022-09-06 PROCEDURE — 96377 APPLICATON ON-BODY INJECTOR: CPT | Mod: 59

## 2022-09-06 PROCEDURE — 96411 CHEMO IV PUSH ADDL DRUG: CPT

## 2022-09-06 PROCEDURE — 80053 COMPREHEN METABOLIC PANEL: CPT | Performed by: PHYSICIAN ASSISTANT

## 2022-09-06 PROCEDURE — 96417 CHEMO IV INFUS EACH ADDL SEQ: CPT

## 2022-09-06 PROCEDURE — G0463 HOSPITAL OUTPT CLINIC VISIT: HCPCS

## 2022-09-06 PROCEDURE — 258N000003 HC RX IP 258 OP 636: Performed by: PHYSICIAN ASSISTANT

## 2022-09-06 PROCEDURE — 96367 TX/PROPH/DG ADDL SEQ IV INF: CPT

## 2022-09-06 PROCEDURE — 96413 CHEMO IV INFUSION 1 HR: CPT

## 2022-09-06 PROCEDURE — 250N000011 HC RX IP 250 OP 636: Performed by: PHYSICIAN ASSISTANT

## 2022-09-06 PROCEDURE — 96375 TX/PRO/DX INJ NEW DRUG ADDON: CPT

## 2022-09-06 RX ORDER — DIPHENHYDRAMINE HYDROCHLORIDE 50 MG/ML
50 INJECTION INTRAMUSCULAR; INTRAVENOUS
Status: CANCELLED
Start: 2022-09-06

## 2022-09-06 RX ORDER — ACETAMINOPHEN 325 MG/1
650 TABLET ORAL
Status: CANCELLED
Start: 2022-09-06

## 2022-09-06 RX ORDER — ALBUTEROL SULFATE 0.83 MG/ML
2.5 SOLUTION RESPIRATORY (INHALATION)
Status: CANCELLED | OUTPATIENT
Start: 2022-09-06

## 2022-09-06 RX ORDER — NALOXONE HYDROCHLORIDE 0.4 MG/ML
0.2 INJECTION, SOLUTION INTRAMUSCULAR; INTRAVENOUS; SUBCUTANEOUS
Status: CANCELLED | OUTPATIENT
Start: 2022-09-06

## 2022-09-06 RX ORDER — DOXORUBICIN HYDROCHLORIDE 2 MG/ML
25 INJECTION, SOLUTION INTRAVENOUS ONCE
Status: CANCELLED | OUTPATIENT
Start: 2022-09-06

## 2022-09-06 RX ORDER — LORAZEPAM 2 MG/ML
0.5 INJECTION INTRAMUSCULAR EVERY 4 HOURS PRN
Status: CANCELLED | OUTPATIENT
Start: 2022-09-06

## 2022-09-06 RX ORDER — MEPERIDINE HYDROCHLORIDE 25 MG/ML
25 INJECTION INTRAMUSCULAR; INTRAVENOUS; SUBCUTANEOUS EVERY 30 MIN PRN
Status: CANCELLED | OUTPATIENT
Start: 2022-09-06

## 2022-09-06 RX ORDER — PALONOSETRON 0.05 MG/ML
0.25 INJECTION, SOLUTION INTRAVENOUS ONCE
Status: COMPLETED | OUTPATIENT
Start: 2022-09-06 | End: 2022-09-06

## 2022-09-06 RX ORDER — ALBUTEROL SULFATE 90 UG/1
1-2 AEROSOL, METERED RESPIRATORY (INHALATION)
Status: CANCELLED
Start: 2022-09-06

## 2022-09-06 RX ORDER — DIPHENHYDRAMINE HCL 25 MG
50 CAPSULE ORAL
Status: CANCELLED
Start: 2022-09-06

## 2022-09-06 RX ORDER — HEPARIN SODIUM,PORCINE 10 UNIT/ML
5 VIAL (ML) INTRAVENOUS
Status: CANCELLED | OUTPATIENT
Start: 2022-09-06

## 2022-09-06 RX ORDER — PALONOSETRON 0.05 MG/ML
0.25 INJECTION, SOLUTION INTRAVENOUS ONCE
Status: CANCELLED
Start: 2022-09-06

## 2022-09-06 RX ORDER — DOXORUBICIN HYDROCHLORIDE 2 MG/ML
25 INJECTION, SOLUTION INTRAVENOUS ONCE
Status: COMPLETED | OUTPATIENT
Start: 2022-09-06 | End: 2022-09-06

## 2022-09-06 RX ORDER — HEPARIN SODIUM (PORCINE) LOCK FLUSH IV SOLN 100 UNIT/ML 100 UNIT/ML
5 SOLUTION INTRAVENOUS
Status: DISCONTINUED | OUTPATIENT
Start: 2022-09-06 | End: 2022-09-06 | Stop reason: HOSPADM

## 2022-09-06 RX ORDER — METHYLPREDNISOLONE SODIUM SUCCINATE 125 MG/2ML
125 INJECTION, POWDER, LYOPHILIZED, FOR SOLUTION INTRAMUSCULAR; INTRAVENOUS
Status: CANCELLED
Start: 2022-09-06

## 2022-09-06 RX ORDER — HEPARIN SODIUM (PORCINE) LOCK FLUSH IV SOLN 100 UNIT/ML 100 UNIT/ML
5 SOLUTION INTRAVENOUS
Status: CANCELLED | OUTPATIENT
Start: 2022-09-06

## 2022-09-06 RX ORDER — EPINEPHRINE 1 MG/ML
0.3 INJECTION, SOLUTION INTRAMUSCULAR; SUBCUTANEOUS EVERY 5 MIN PRN
Status: CANCELLED | OUTPATIENT
Start: 2022-09-06

## 2022-09-06 RX ORDER — HEPARIN SODIUM (PORCINE) LOCK FLUSH IV SOLN 100 UNIT/ML 100 UNIT/ML
5 SOLUTION INTRAVENOUS ONCE
Status: COMPLETED | OUTPATIENT
Start: 2022-09-06 | End: 2022-09-06

## 2022-09-06 RX ADMIN — DEXAMETHASONE SODIUM PHOSPHATE: 10 INJECTION, SOLUTION INTRAMUSCULAR; INTRAVENOUS at 09:54

## 2022-09-06 RX ADMIN — VINBLASTINE SULFATE 10 MG: 1 INJECTION INTRAVENOUS at 10:51

## 2022-09-06 RX ADMIN — PALONOSETRON HYDROCHLORIDE 0.25 MG: 0.25 INJECTION INTRAVENOUS at 09:54

## 2022-09-06 RX ADMIN — PEGFILGRASTIM 6 MG: KIT SUBCUTANEOUS at 11:42

## 2022-09-06 RX ADMIN — SODIUM CHLORIDE 250 ML: 9 INJECTION, SOLUTION INTRAVENOUS at 09:49

## 2022-09-06 RX ADMIN — SODIUM CHLORIDE 600 MG: 9 INJECTION, SOLUTION INTRAVENOUS at 10:59

## 2022-09-06 RX ADMIN — BRENTUXIMAB VEDOTIN 66 MG: 50 INJECTION, POWDER, LYOPHILIZED, FOR SOLUTION INTRAVENOUS at 11:40

## 2022-09-06 RX ADMIN — DOXORUBICIN HYDROCHLORIDE 40 MG: 2 INJECTION, SOLUTION INTRAVENOUS at 10:42

## 2022-09-06 RX ADMIN — Medication 5 ML: at 12:15

## 2022-09-06 RX ADMIN — FAMOTIDINE 20 MG: 10 INJECTION INTRAVENOUS at 11:52

## 2022-09-06 RX ADMIN — Medication 5 ML: at 08:25

## 2022-09-06 ASSESSMENT — PAIN SCALES - GENERAL: PAINLEVEL: NO PAIN (0)

## 2022-09-06 NOTE — NURSING NOTE
"Oncology Rooming Note    September 6, 2022 8:44 AM   Amy Harris is a 27 year old female who presents for:    Chief Complaint   Patient presents with     Port Draw     Labs drawn via port by RN. VS taken.     Oncology Clinic Visit     Hodgkins Lymphoma     Initial Vitals: /84   Pulse 104   Temp 98.1  F (36.7  C) (Oral)   Resp 16   Wt 55.3 kg (122 lb)   SpO2 100%   BMI 20.78 kg/m   Estimated body mass index is 20.78 kg/m  as calculated from the following:    Height as of 6/27/22: 1.632 m (5' 4.25\").    Weight as of this encounter: 55.3 kg (122 lb). Body surface area is 1.58 meters squared.  No Pain (0) Comment: Data Unavailable   No LMP recorded. (Menstrual status: Birth Control).  Allergies reviewed: Yes  Medications reviewed: Yes    Medications: Medication refills not needed today.  Pharmacy name entered into EPIC:    [place], [place] - [address] [facility] [address] [place], [place] - [address] [facility]    Clinical concerns: Pt presents today for f/u,       Lina Hope LPN  9/6/2022              "

## 2022-09-06 NOTE — LETTER
9/6/2022         RE: Amy ERIBERTO Kimberly  [address] [place] [address]      [facility] [place]  Date of visit: Sep 6, 2022  Oncologist: Dr. Mallory Mendez      Reason for Visit: follow up       Oncology HPI:   She has a past medical history of acne treated with spironolactone who is otherwise healthy with no notable past surgical history. She first noticed heartburn-like symptoms when drinking alcohol a year ago and has noticed progressive dysphagia, where now dry solids get caught when swallowing although it is not painful. She has had to stop eating mid-meal but denies appetite change or weight loss. She developed night sweats x2 in Fall 2021 where she woke up with a drenched shirt. Denies current night sweats, fever, chills. During that time, she first noticed an enlarged lymph node in her right clavicular region and as it was not bothersome or painful, did not pursue medical attention at that time. She noticed no other lumps or bumps at that time. She has also been increasingly itchy in all parts of her body for the past 2 months. Over the past two weeks, she has noticed a fast heart rate without chest pain or palpitations and shortness of breath on exertion like carrying laundry upstairs or walking and talking at the same time. She denies lower extremity or upper extremity edema. Her energy level has been slowly declining, where she sleeps for longer at night and needing to rest for 4 hours in the afternoon.      She had a routine physical on 5/16/22 where she brought attention to her growing mass on her right shoulder. 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         She udnerwent ECHO which showed EF of >50%.  PET scan done on 6/18/2022 showed hypermetabolic mediastinal mass with SUV max of 14.7 and 15.6 x 14.8 x 7.8 cm in size.  Mediastinal mass extends to right level 4 lateral to right thyroid  lobe.  There is associated right retroclavicular lymphadenopathy.  Associated bone marrow activity is seen in the axial skeleton consistent with bone marrow infiltration.  Her [place] stage is stage IV.     She consulted with fertility specialist.  We do not have time to pursue pretreatment quality preservation.  She was advised to return in 6 months after completing treatment at that time fertility preservation would be considered.     Due to her ongoing itching, exertional dyspnea, I had prescribed prednisone 60 mg daily and allopurinol to prevent tumor lysis.     She underwent 2 cycles of AVD with rituximab.  Tolerated well without any side effects.  PET scan after 2 cycles shows complete metabolic response.         Nodular sclerosis classical Hodgkin lymphoma (H)    6/16/2022 Initial Diagnosis      Nodular sclerosis classical Hodgkin lymphoma (H)       6/27/2022 -  Chemotherapy      OP ONC Lymphoma and CLL - A + AVD (Brentuximab vedotin / DOXOrubicin / vinBLAStine / Dacarbazine)   Plan Provider: Mallory Mendez MD  Treatment goal: Curative  Line of treatment: [No plan line of treatment]       8/19/2022 No evidence of disease      PET scan after 2 cycles of AVD (map shows no evidence of disease.              Interval history:   Amy presents for oncology follow-up visit today and consideration of C3D15 chemotherapy. She is here with her sister today. Overall doing well and tolerating treatment. She does endorse fatigue in the week that she receives chemotherapy which improves before next chemotherapy. She also has some constipation after chemotherapy. Now moving bowels regularly. She was taking senna, miralax, and herbal tea supplements for this. She is eating/drinking well. No nausea/vomiting. No pain. She did not have neuropathy after this last round of treatment. No fevers or chills. No bleeding or new lumps or bumps. She does have some dyspnea on exertion (I.e., walking up stairs) for a few days after  infusion. Otherwise, breathing is overall stable and no CP. ROS is otherwise negative.        Physical Exam:  /84   Pulse 104   Temp 98.1  F (36.7  C) (Oral)   Resp 16   Wt 55.3 kg (122 lb)   SpO2 100%   BMI 20.78 kg/m    General: No acute distress. Young, pleasant female.   HEENT: EOMI, PERRL. Sclerae are anicteric.   Lymph: Neck is supple with no lymphadenopathy in the cervical or supraclavicular areas.   Heart: Regular rate and rhythm.   Lungs: Clear to auscultation bilaterally.   Abdomen: Bowel sounds present, soft, nontender with no palpable hepatosplenomegaly or masses.   Extremities: No lower extremity edema noted bilaterally.   Neuro: Alert and oriented x3, CN grossly intact, steady gait  Skin: No rashes, petechiae, or bruising noted on exposed skin. Port to right chest wall.       Labs:     I personally reviewed the following labs:   Latest Reference Range & Units 09/06/22 08:24   Sodium 136 - 145 mmol/L 136   Potassium 3.4 - 5.3 mmol/L 3.7   Chloride 98 - 107 mmol/L 102   Carbon Dioxide (CO2) 22 - 29 mmol/L 20 (L)   Urea Nitrogen 6.0 - 20.0 mg/dL 7.8   Creatinine 0.51 - 0.95 mg/dL 0.63   GFR Estimate >60 mL/min/1.73m2 >90   Calcium 8.6 - 10.0 mg/dL 8.9   Anion Gap 7 - 15 mmol/L 14   Albumin 3.5 - 5.2 g/dL 4.0   Protein Total 6.4 - 8.3 g/dL 6.5   Alkaline Phosphatase 35 - 104 U/L 128 (H)   ALT 10 - 35 U/L 16   AST 10 - 35 U/L 21   Bilirubin Total <=1.2 mg/dL 0.4   Glucose 70 - 99 mg/dL 92   WBC 4.0 - 11.0 10e3/uL 13.0 (H)   Hemoglobin 11.7 - 15.7 g/dL 10.0 (L)   Hematocrit 35.0 - 47.0 % 31.6 (L)   Platelet Count 150 - 450 10e3/uL 203   RBC Count 3.80 - 5.20 10e6/uL 3.54 (L)   MCV 78 - 100 fL 89   MCH 26.5 - 33.0 pg 28.2   MCHC 31.5 - 36.5 g/dL 31.6   RDW 10.0 - 15.0 % 22.0 (H)   % Neutrophils % 78   % Lymphocytes % 11   % Monocytes % 6   % Eosinophils % 0   % Basophils % 1   Absolute Basophils 0.0 - 0.2 10e3/uL 0.1   Absolute Eosinophils 0.0 - 0.7 10e3/uL 0.1   Absolute Immature Granulocytes  <=0.4 10e3/uL 0.5 (H)   Absolute Lymphocytes 0.8 - 5.3 10e3/uL 1.5   Absolute Monocytes 0.0 - 1.3 10e3/uL 0.8   % Immature Granulocytes % 4   Absolute Neutrophils 1.6 - 8.3 10e3/uL 10.3 (H)   Absolute NRBCs 10e3/uL 0.1   NRBCs per 100 WBC <1 /100 0       Imaging:     PETCT chest/abdomen/pelvis 8/19/22 showed no Hypermetabolic activity consistent with lymphoma.  Previous 15 x 14 cm hypermetabolic mass is now markedly reduced in size with thin 8 x 2 strength of necrotic tissue remaining which is not hypermetabolic.      Impression/plan:    # classic Hodgkin lymphoma advanced stage on AVD-BV with neulasta support.   - She has excellent response to treatment so far. PET/CT 8/19 after 2 cycles of treatment showed complete metabolic response by Lugano criteria.   - We will continue with total 6 cycles of treatment.  - Repeat scan after 6 cycles of chemotherapy.  - Patient is appropriate to proceed with C3D15 of treatment today 9/6. She is tolerating chemotherapy well without major side effects, for now, we will continue the same dose.  She knows to notify us if she develops persistent neuropathy, in which case we will reduce vinblastine. She did not have neuropathy after most recent treatment.  - Return to see BI on D1 and D15.   - Return to clinic to see Dr. Mendez 6 to 8 weeks after 6 cycles of chemotherapy to discuss PET scan results.      Ppx  - Continue acyclovir and Bactrim ppx      # Leukocytosis  - No signs/sxs of infection at this time. Likely related to recent Neulasta.   - As we expect her counts to drop with chemotherapy today, will continue with Neulasta for now. Can consider holding it with next cycle if WBC increasing.       # Constipation  This started prior to her first infusion, however worsened post chemo. Enema x1 in ER (7/3/22) however has not had BM since. +flatus. No vomiting, mild nausea. Mag citrate eventually given and was successful.   - Continue miralax BID and senna 1-2 tab BID. She also has  an Rx for milk of magnesium to use PRN that she has not started yet.  - She has tried an aloe vera concentrate and herbal tea which was effective-- advised to caution with herbal remedies. Reviewed with pharmacy and would prefer to hold off on this for now and have patient try milk of magnesium PRN since she has not tried this yet. Can re-assess at next visit.       # Neulasta induced bony pain-- alternated tylenol and ibuprofen which helped only a bit. Pain lasted 1 and a half days, now resolved.   - She had bone pain again after last cycle. Relieved with claritin.   - Tramadol PRN.       # CINV  - PRN antiemetics for breakthrough nausea.       39 minutes spent on the date of the encounter doing chart review, review of test results, interpretation of tests, patient visit, documentation and discussion with other provider(s)         Anita Roque PA-C

## 2022-09-06 NOTE — PATIENT INSTRUCTIONS
[facility] Triage and after hours / weekends / holidays:  [phone]    Please call the triage or after hours line if you experience a temperature greater than or equal to 100.4, shaking chills, have uncontrolled nausea, vomiting and/or diarrhea, dizziness, shortness of breath, chest pain, bleeding, unexplained bruising, or if you have any other new/concerning symptoms, questions or concerns.      If you are having any concerning symptoms or wish to speak to a provider before your next infusion visit, please call your care coordinator or triage to notify them so we can adequately serve you.     If you need a refill on a narcotic prescription or other medication, please call before your infusion appointment.                September 2022 Sunday Monday Tuesday Wednesday Thursday Friday Saturday                       1     2     3       4     5     6    LAB PERIPHERAL   8:15 AM   (15 min.)   [facility]   [facility]    RETURN   8:45 AM   (45 min.)   Anita Roque PA-C   [facility]    ONC INFUSION 4 HR (240 MIN)  10:00 AM   (240 min.)    ONC INFUSION NURSE   [facility] 7     8     9     10       11     12     13     14     15     16     17       18     19    LAB PERIPHERAL   9:45 AM   (15 min.)   [facility]   [facility]    RETURN  10:00 AM   (45 min.)   Aleena Sawyer APRN CNP   [facility]    ONC INFUSION 4 HR (240 MIN)  11:30 AM   (240 min.)    ONC INFUSION NURSE   [facility] 20     21     22     23     24       25     26     27     28     29     30                        October 2022 Sunday Monday Tuesday Wednesday Thursday Friday Saturday                                 1       2     3    LAB PERIPHERAL   9:45 AM   (15 min.)   [facility]   [facility]    RETURN  10:00  AM   (45 min.)   Aleena Sawyer APRN CNP   [facility]    ONC INFUSION 4 HR (240 MIN)  11:30 AM   (240 min.)   [facility] ONC INFUSION NURSE   [facility] 4     5     6     7     8       9     10     11     12     13     14     15       16     17    LAB PERIPHERAL   8:45 AM   (15 min.)   [facility]   [facility]    RETURN   9:00 AM   (45 min.)   Aleena Sawyer APRN CNP   [facility]    ONC INFUSION 4 HR (240 MIN)  10:00 AM   (240 min.)   [facility] ONC INFUSION NURSE   [facility] 18     19     20     21     22       23     24     25     26     27     28     29       30     31    LAB PERIPHERAL   8:45 AM   (15 min.)    [facility]   [facility]    RETURN   9:00 AM   (45 min.)   Aleena Sawyer APRN CNP   [facility]    ONC INFUSION 4 HR (240 MIN)  10:00 AM   (240 min.)    ONC INFUSION NURSE   [facility]                                            Lab Results:  Recent Results (from the past 12 hour(s))   Comprehensive metabolic panel    Collection Time: 09/06/22  8:24 AM   Result Value Ref Range    Sodium 136 136 - 145 mmol/L    Potassium 3.7 3.4 - 5.3 mmol/L    Creatinine 0.63 0.51 - 0.95 mg/dL    Urea Nitrogen 7.8 6.0 - 20.0 mg/dL    Chloride 102 98 - 107 mmol/L    Carbon Dioxide (CO2) 20 (L) 22 - 29 mmol/L    Anion Gap 14 7 - 15 mmol/L    Glucose 92 70 - 99 mg/dL    Calcium 8.9 8.6 - 10.0 mg/dL    Protein Total 6.5 6.4 - 8.3 g/dL    Albumin 4.0 3.5 - 5.2 g/dL    Bilirubin Total 0.4 <=1.2 mg/dL    Alkaline Phosphatase 128 (H) 35 - 104 U/L    AST 21 10 - 35 U/L    ALT 16 10 - 35 U/L    GFR Estimate >90 >60 mL/min/1.73m2   CBC with platelets and differential    Collection Time: 09/06/22  8:24 AM   Result Value Ref Range    WBC Count 13.0 (H) 4.0 - 11.0 10e3/uL    RBC Count 3.54 (L)  3.80 - 5.20 10e6/uL    Hemoglobin 10.0 (L) 11.7 - 15.7 g/dL    Hematocrit 31.6 (L) 35.0 - 47.0 %    MCV 89 78 - 100 fL    MCH 28.2 26.5 - 33.0 pg    MCHC 31.6 31.5 - 36.5 g/dL    RDW 22.0 (H) 10.0 - 15.0 %    Platelet Count 203 150 - 450 10e3/uL    % Neutrophils 78 %    % Lymphocytes 11 %    % Monocytes 6 %    % Eosinophils 0 %    % Basophils 1 %    % Immature Granulocytes 4 %    NRBCs per 100 WBC 0 <1 /100    Absolute Neutrophils 10.3 (H) 1.6 - 8.3 10e3/uL    Absolute Lymphocytes 1.5 0.8 - 5.3 10e3/uL    Absolute Monocytes 0.8 0.0 - 1.3 10e3/uL    Absolute Eosinophils 0.1 0.0 - 0.7 10e3/uL    Absolute Basophils 0.1 0.0 - 0.2 10e3/uL    Absolute Immature Granulocytes 0.5 (H) <=0.4 10e3/uL    Absolute NRBCs 0.1 10e3/uL

## 2022-09-06 NOTE — PROGRESS NOTES
Infusion Nursing Note:  Amy Harris presents today for Cycle 3 Day 15 Doxorubicin, Vinblastine, Dacarbazine and Brentuximab + Neulasta OnPro.  Patient seen by provider today: Yes: SILVER Jones   present during visit today: Not Applicable.    Note: Pt presents to infusion feeling well. She offers no new concerns following her provider appointment.    Pepcid given x1 for mild nausea. Per SILVER Howard request - pt encouraged to try milk of magnesia to treat constipation, as opposed to herbal supplements.    Intravenous Access:  Implanted Port.    Treatment Conditions:  Lab Results   Component Value Date    HGB 10.0 (L) 09/06/2022    WBC 13.0 (H) 09/06/2022    ANEU 7.9 08/08/2022    ANEUTAUTO 10.3 (H) 09/06/2022     09/06/2022      Lab Results   Component Value Date     09/06/2022    POTASSIUM 3.7 09/06/2022    CR 0.63 09/06/2022    NICOLAS 8.9 09/06/2022    BILITOTAL 0.4 09/06/2022    ALBUMIN 4.0 09/06/2022    ALT 16 09/06/2022    AST 21 09/06/2022     Results reviewed, labs MET treatment parameters, ok to proceed with treatment.  ECHO/MUGA completed 6/15/22  EF 65-70%.    Post Infusion Assessment:  Patient tolerated infusion without incident.  Blood return noted pre and post infusion.  Site patent and intact, free from redness, edema or discomfort.  No evidence of extravasations.  Access discontinued per protocol.     Neulasta Onpro On-Body injector applied to LEFT arm at 1145.  Writer discussed Neulasta injection would start tomorrow 9/7/22 at 1500, approximately 27 hours after application applied today.  Written and Verbal instruction reviewed with patient.  Pt instructed when the dose delivery starts, it will take about 45 minutes to complete.  Pt aware Neulasta Onpro On-Body should have green flashing light and to call triage or on-call MD if injector flashes red or appears to be leaking. Pt aware to keep Onpro On-Body Neulasta 4 inches away from electrical equipment and to avoid  showering 4 hours prior to injection.     Discharge Plan:   Patient declined prescription refills.  Discharge instructions reviewed with: Patient.  Patient and/or family verbalized understanding of discharge instructions and all questions answered.  AVS to patient via [employer]. Patient will return 9/19/22 for next appointment.   Patient discharged in stable condition accompanied by: self.  Departure Mode: Ambulatory.      Shantel Groves RN

## 2022-09-19 ENCOUNTER — INFUSION THERAPY VISIT (OUTPATIENT)
Dept: ONCOLOGY | Facility: CLINIC | Age: 27
End: 2022-09-19
Attending: NURSE PRACTITIONER
Payer: COMMERCIAL

## 2022-09-19 ENCOUNTER — APPOINTMENT (OUTPATIENT)
Dept: LAB | Facility: CLINIC | Age: 27
End: 2022-09-19
Attending: NURSE PRACTITIONER
Payer: COMMERCIAL

## 2022-09-19 VITALS
WEIGHT: 121 LBS | BODY MASS INDEX: 20.61 KG/M2 | HEART RATE: 98 BPM | TEMPERATURE: 98.4 F | RESPIRATION RATE: 18 BRPM | OXYGEN SATURATION: 100 % | SYSTOLIC BLOOD PRESSURE: 112 MMHG | DIASTOLIC BLOOD PRESSURE: 73 MMHG

## 2022-09-19 DIAGNOSIS — C81.10 NODULAR SCLEROSIS CLASSICAL HODGKIN LYMPHOMA (H): Primary | ICD-10-CM

## 2022-09-19 LAB
ALBUMIN SERPL BCG-MCNC: 4.1 G/DL (ref 3.5–5.2)
ALP SERPL-CCNC: 98 U/L (ref 35–104)
ALT SERPL W P-5'-P-CCNC: 18 U/L (ref 10–35)
ANION GAP SERPL CALCULATED.3IONS-SCNC: 13 MMOL/L (ref 7–15)
AST SERPL W P-5'-P-CCNC: 22 U/L (ref 10–35)
BASOPHILS # BLD MANUAL: 0 10E3/UL (ref 0–0.2)
BASOPHILS NFR BLD MANUAL: 0 %
BILIRUB SERPL-MCNC: 0.3 MG/DL
BUN SERPL-MCNC: 6.8 MG/DL (ref 6–20)
CALCIUM SERPL-MCNC: 9.3 MG/DL (ref 8.6–10)
CHLORIDE SERPL-SCNC: 103 MMOL/L (ref 98–107)
CREAT SERPL-MCNC: 0.63 MG/DL (ref 0.51–0.95)
DACRYOCYTES BLD QL SMEAR: SLIGHT
DEPRECATED HCO3 PLAS-SCNC: 21 MMOL/L (ref 22–29)
EOSINOPHIL # BLD MANUAL: 0 10E3/UL (ref 0–0.7)
EOSINOPHIL NFR BLD MANUAL: 0 %
ERYTHROCYTE [DISTWIDTH] IN BLOOD BY AUTOMATED COUNT: 21.1 % (ref 10–15)
GFR SERPL CREATININE-BSD FRML MDRD: >90 ML/MIN/1.73M2
GLUCOSE SERPL-MCNC: 120 MG/DL (ref 70–99)
HCT VFR BLD AUTO: 32.9 % (ref 35–47)
HGB BLD-MCNC: 10.7 G/DL (ref 11.7–15.7)
LYMPHOCYTES # BLD MANUAL: 1.1 10E3/UL (ref 0.8–5.3)
LYMPHOCYTES NFR BLD MANUAL: 8 %
MCH RBC QN AUTO: 29.8 PG (ref 26.5–33)
MCHC RBC AUTO-ENTMCNC: 32.5 G/DL (ref 31.5–36.5)
MCV RBC AUTO: 92 FL (ref 78–100)
MONOCYTES # BLD MANUAL: 0.9 10E3/UL (ref 0–1.3)
MONOCYTES NFR BLD MANUAL: 7 %
MYELOCYTES # BLD MANUAL: 0.4 10E3/UL
MYELOCYTES NFR BLD MANUAL: 3 %
NEUTROPHILS # BLD MANUAL: 11.1 10E3/UL (ref 1.6–8.3)
NEUTROPHILS NFR BLD MANUAL: 82 %
PLAT MORPH BLD: ABNORMAL
PLATELET # BLD AUTO: 255 10E3/UL (ref 150–450)
POTASSIUM SERPL-SCNC: 3.4 MMOL/L (ref 3.4–5.3)
PROT SERPL-MCNC: 6.7 G/DL (ref 6.4–8.3)
RBC # BLD AUTO: 3.59 10E6/UL (ref 3.8–5.2)
RBC MORPH BLD: ABNORMAL
SODIUM SERPL-SCNC: 137 MMOL/L (ref 136–145)
WBC # BLD AUTO: 13.5 10E3/UL (ref 4–11)

## 2022-09-19 PROCEDURE — 85007 BL SMEAR W/DIFF WBC COUNT: CPT | Performed by: NURSE PRACTITIONER

## 2022-09-19 PROCEDURE — 96417 CHEMO IV INFUS EACH ADDL SEQ: CPT

## 2022-09-19 PROCEDURE — G0463 HOSPITAL OUTPT CLINIC VISIT: HCPCS

## 2022-09-19 PROCEDURE — 85018 HEMOGLOBIN: CPT | Performed by: NURSE PRACTITIONER

## 2022-09-19 PROCEDURE — 250N000011 HC RX IP 250 OP 636: Performed by: NURSE PRACTITIONER

## 2022-09-19 PROCEDURE — 96411 CHEMO IV PUSH ADDL DRUG: CPT

## 2022-09-19 PROCEDURE — 96413 CHEMO IV INFUSION 1 HR: CPT

## 2022-09-19 PROCEDURE — 96375 TX/PRO/DX INJ NEW DRUG ADDON: CPT

## 2022-09-19 PROCEDURE — 258N000003 HC RX IP 258 OP 636: Performed by: NURSE PRACTITIONER

## 2022-09-19 PROCEDURE — 96372 THER/PROPH/DIAG INJ SC/IM: CPT | Performed by: NURSE PRACTITIONER

## 2022-09-19 PROCEDURE — 96377 APPLICATON ON-BODY INJECTOR: CPT | Mod: 59

## 2022-09-19 PROCEDURE — 80053 COMPREHEN METABOLIC PANEL: CPT | Performed by: NURSE PRACTITIONER

## 2022-09-19 PROCEDURE — 99215 OFFICE O/P EST HI 40 MIN: CPT | Performed by: NURSE PRACTITIONER

## 2022-09-19 RX ORDER — DEXAMETHASONE 4 MG/1
8 TABLET ORAL
Qty: 12 TABLET | Refills: 5 | Status: SHIPPED | OUTPATIENT
Start: 2022-09-19 | End: 2022-10-17

## 2022-09-19 RX ORDER — DOXORUBICIN HYDROCHLORIDE 2 MG/ML
25 INJECTION, SOLUTION INTRAVENOUS ONCE
Status: COMPLETED | OUTPATIENT
Start: 2022-09-19 | End: 2022-09-19

## 2022-09-19 RX ORDER — NALOXONE HYDROCHLORIDE 0.4 MG/ML
0.2 INJECTION, SOLUTION INTRAMUSCULAR; INTRAVENOUS; SUBCUTANEOUS
Status: CANCELLED | OUTPATIENT
Start: 2022-09-19

## 2022-09-19 RX ORDER — MEPERIDINE HYDROCHLORIDE 25 MG/ML
25 INJECTION INTRAMUSCULAR; INTRAVENOUS; SUBCUTANEOUS EVERY 30 MIN PRN
Status: CANCELLED | OUTPATIENT
Start: 2022-09-19

## 2022-09-19 RX ORDER — PALONOSETRON 0.05 MG/ML
0.25 INJECTION, SOLUTION INTRAVENOUS ONCE
Status: COMPLETED | OUTPATIENT
Start: 2022-09-19 | End: 2022-09-19

## 2022-09-19 RX ORDER — HEPARIN SODIUM (PORCINE) LOCK FLUSH IV SOLN 100 UNIT/ML 100 UNIT/ML
5 SOLUTION INTRAVENOUS ONCE
Status: COMPLETED | OUTPATIENT
Start: 2022-09-19 | End: 2022-09-19

## 2022-09-19 RX ORDER — ALBUTEROL SULFATE 0.83 MG/ML
2.5 SOLUTION RESPIRATORY (INHALATION)
Status: CANCELLED | OUTPATIENT
Start: 2022-09-19

## 2022-09-19 RX ORDER — ACETAMINOPHEN 325 MG/1
650 TABLET ORAL
Status: CANCELLED
Start: 2022-09-19

## 2022-09-19 RX ORDER — DIPHENHYDRAMINE HYDROCHLORIDE 50 MG/ML
50 INJECTION INTRAMUSCULAR; INTRAVENOUS
Status: CANCELLED
Start: 2022-09-19

## 2022-09-19 RX ORDER — HEPARIN SODIUM (PORCINE) LOCK FLUSH IV SOLN 100 UNIT/ML 100 UNIT/ML
5 SOLUTION INTRAVENOUS
Status: CANCELLED | OUTPATIENT
Start: 2022-09-19

## 2022-09-19 RX ORDER — ALBUTEROL SULFATE 90 UG/1
1-2 AEROSOL, METERED RESPIRATORY (INHALATION)
Status: CANCELLED
Start: 2022-09-19

## 2022-09-19 RX ORDER — HEPARIN SODIUM,PORCINE 10 UNIT/ML
5 VIAL (ML) INTRAVENOUS
Status: CANCELLED | OUTPATIENT
Start: 2022-09-19

## 2022-09-19 RX ORDER — HEPARIN SODIUM (PORCINE) LOCK FLUSH IV SOLN 100 UNIT/ML 100 UNIT/ML
5 SOLUTION INTRAVENOUS
Status: DISCONTINUED | OUTPATIENT
Start: 2022-09-19 | End: 2022-09-19 | Stop reason: HOSPADM

## 2022-09-19 RX ORDER — LORAZEPAM 2 MG/ML
0.5 INJECTION INTRAMUSCULAR EVERY 4 HOURS PRN
Status: CANCELLED | OUTPATIENT
Start: 2022-09-19

## 2022-09-19 RX ORDER — DOXORUBICIN HYDROCHLORIDE 2 MG/ML
25 INJECTION, SOLUTION INTRAVENOUS ONCE
Status: CANCELLED | OUTPATIENT
Start: 2022-09-19

## 2022-09-19 RX ORDER — DIPHENHYDRAMINE HCL 25 MG
50 CAPSULE ORAL
Status: CANCELLED
Start: 2022-09-19

## 2022-09-19 RX ORDER — EPINEPHRINE 1 MG/ML
0.3 INJECTION, SOLUTION INTRAMUSCULAR; SUBCUTANEOUS EVERY 5 MIN PRN
Status: CANCELLED | OUTPATIENT
Start: 2022-09-19

## 2022-09-19 RX ORDER — METHYLPREDNISOLONE SODIUM SUCCINATE 125 MG/2ML
125 INJECTION, POWDER, LYOPHILIZED, FOR SOLUTION INTRAMUSCULAR; INTRAVENOUS
Status: CANCELLED
Start: 2022-09-19

## 2022-09-19 RX ORDER — PALONOSETRON 0.05 MG/ML
0.25 INJECTION, SOLUTION INTRAVENOUS ONCE
Status: CANCELLED
Start: 2022-09-19

## 2022-09-19 RX ADMIN — PEGFILGRASTIM 6 MG: KIT SUBCUTANEOUS at 13:27

## 2022-09-19 RX ADMIN — DOXORUBICIN HYDROCHLORIDE 40 MG: 2 INJECTION, SOLUTION INTRAVENOUS at 12:28

## 2022-09-19 RX ADMIN — FOSAPREPITANT: 150 INJECTION, POWDER, LYOPHILIZED, FOR SOLUTION INTRAVENOUS at 12:03

## 2022-09-19 RX ADMIN — SODIUM CHLORIDE 250 ML: 9 INJECTION, SOLUTION INTRAVENOUS at 11:42

## 2022-09-19 RX ADMIN — DACARBAZINE 600 MG: 10 INJECTION, POWDER, FOR SOLUTION INTRAVENOUS at 12:44

## 2022-09-19 RX ADMIN — Medication 5 ML: at 10:11

## 2022-09-19 RX ADMIN — PALONOSETRON HYDROCHLORIDE 0.25 MG: 0.25 INJECTION INTRAVENOUS at 11:43

## 2022-09-19 RX ADMIN — Medication 5 ML: at 14:05

## 2022-09-19 RX ADMIN — VINBLASTINE SULFATE 10 MG: 1 INJECTION INTRAVENOUS at 12:35

## 2022-09-19 RX ADMIN — BRENTUXIMAB VEDOTIN 66 MG: 50 INJECTION, POWDER, LYOPHILIZED, FOR SOLUTION INTRAVENOUS at 13:25

## 2022-09-19 ASSESSMENT — PAIN SCALES - GENERAL: PAINLEVEL: NO PAIN (0)

## 2022-09-19 NOTE — LETTER
9/19/2022         RE: Amy Harris  [address] [place] [address]        Dear Colleague,    Thank you for referring your patient, Amy Harris, to the [facility]. Please see a copy of my visit note below.    [facility] [place]  Date of visit: Sep 19, 2022  Oncologist: Dr. Mallory Mendez      Reason for Visit: follow up HL      Oncology HPI:   She has a past medical history of acne treated with spironolactone who is otherwise healthy with no notable past surgical history. She first noticed heartburn-like symptoms when drinking alcohol a year ago and has noticed progressive dysphagia, where now dry solids get caught when swallowing although it is not painful. She has had to stop eating mid-meal but denies appetite change or weight loss. She developed night sweats x2 in Fall 2021 where she woke up with a drenched shirt. Denies current night sweats, fever, chills. During that time, she first noticed an enlarged lymph node in her right clavicular region and as it was not bothersome or painful, did not pursue medical attention at that time. She noticed no other lumps or bumps at that time. She has also been increasingly itchy in all parts of her body for the past 2 months. Over the past two weeks, she has noticed a fast heart rate without chest pain or palpitations and shortness of breath on exertion like carrying laundry upstairs or walking and talking at the same time. She denies lower extremity or upper extremity edema. Her energy level has been slowly declining, where she sleeps for longer at night and needing to rest for 4 hours in the afternoon.      She had a routine physical on 5/16/22 where she brought attention to her growing mass on her right shoulder. 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         She udnerwent ECHO which showed EF of >50%.  PET scan done on  6/18/2022 showed hypermetabolic mediastinal mass with SUV max of 14.7 and 15.6 x 14.8 x 7.8 cm in size.  Mediastinal mass extends to right level 4 lateral to right thyroid lobe.  There is associated right retroclavicular lymphadenopathy.  Associated bone marrow activity is seen in the axial skeleton consistent with bone marrow infiltration.  Her [place] stage is stage IV.     She consulted with fertility specialist.  We do not have time to pursue pretreatment quality preservation.  She was advised to return in 6 months after completing treatment at that time fertility preservation would be considered.     Due to her ongoing itching, exertional dyspnea, I had prescribed prednisone 60 mg daily and allopurinol to prevent tumor lysis.     She underwent 2 cycles of AVD with rituximab.  Tolerated well without any side effects.  PET scan after 2 cycles shows complete metabolic response.         Nodular sclerosis classical Hodgkin lymphoma (H)    6/16/2022 Initial Diagnosis      Nodular sclerosis classical Hodgkin lymphoma (H)       6/27/2022 -  Chemotherapy      OP ONC Lymphoma and CLL - A + AVD (Brentuximab vedotin / DOXOrubicin / vinBLAStine / Dacarbazine)   Plan Provider: Mallory Mendez MD  Treatment goal: Curative  Line of treatment: [No plan line of treatment]       8/19/2022 No evidence of disease      PET scan after 2 cycles of AVD (map shows no evidence of disease.              Interval history:   Amy presents for oncology follow-up visit today   She is here today prior to C4 and doing well overall. New symptom this past cycle has been some chest discomfort just left of her sternum. This is not sharp in nature, not severe, no associated shortness of breath. Her heart rate has overall decreased a bit however still elevated.   She is eating ok, weight has been stable. She gets nausea if she waits too long to eat. She did not have constipation this last round. Her neuropathy seems to have resolved. She does  have stable dyspnea on exertion for a few days after infusion, she continues to walk some in short distances. She does feel quite stiff, especially the week after chemo. She is interested in massage therapy. ROS is otherwise negative.       Physical Exam:  /73 (BP Location: Right arm, Patient Position: Sitting, Cuff Size: Adult Regular)   Pulse 98   Temp 98.4  F (36.9  C) (Oral)   Resp 18   Wt 54.9 kg (121 lb)   SpO2 100%   BMI 20.61 kg/m    General: No acute distress. Young, pleasant female.   HEENT: EOMI, PERRL. Sclerae are anicteric.   Lymph: Neck is supple with no lymphadenopathy in the cervical or supraclavicular areas.   Heart: Regular rate and rhythm.   Lungs: Clear to auscultation bilaterally.   Abdomen: Bowel sounds present, soft, nontender with no palpable hepatosplenomegaly or masses.   Extremities: No lower extremity edema noted bilaterally.   Neuro: Alert and oriented x3, CN grossly intact, steady gait  Skin: No rashes, petechiae, or bruising noted on exposed skin. Port to right chest wall.       Labs:     I personally reviewed the following labs:    Most Recent 3 CBC's:  Recent Labs   Lab Test 09/19/22  1015 09/06/22  0824 08/22/22  1215   WBC 13.5* 13.0* 15.2*   HGB 10.7* 10.0* 11.0*   MCV 92 89 85    203 305     Most Recent 3 BMP's:  Recent Labs   Lab Test 09/06/22  0824 08/22/22  1215 08/08/22  1105    137 139   POTASSIUM 3.7 3.7 3.6   CHLORIDE 102 103 106   CO2 20* 26 23   BUN 7.8 10 10   CR 0.63 0.50* 0.62   ANIONGAP 14 8 10   NICOLAS 8.9 8.6 8.7   GLC 92 105* 91     Most Recent 2 LFT's:  Recent Labs   Lab Test 09/06/22  0824 08/22/22  1215   AST 21 15   ALT 16 23   ALKPHOS 128* 98   BILITOTAL 0.4 0.4         Imaging: n/a      Impression/plan:    # Classic Hodgkin lymphoma advanced stage on AVD-BV with neulasta support.   - She has excellent response to treatment so far. PET/CT 8/19 after 2 cycles of treatment showed complete metabolic response by Lugano criteria.   - We will  continue with total 6 cycles of treatment.  - Repeat scan after 6 cycles of chemotherapy.  -Today is C4D1 (9/19) AVD-BV-- She is tolerating chemotherapy well without major side effects, for now, we will continue the same dose.  She knows to notify us if she develops persistent neuropathy, in which case we will reduce vinblastine or BV. She did not have neuropathy after most recent treatment.  - Return to see BI on D1 and D15.   - Return to clinic to see Dr. Mendez 6 to 8 weeks after 6 cycles of chemotherapy to discuss PET scan results.      Ppx  - Continue acyclovir and Bactrim ppx      # Leukocytosis -- stable  - No signs/sxs of infection at this time. Likely related to recent Neulasta.   - As we expect her counts to drop with chemotherapy today, will continue with Neulasta for now. Can consider holding it with next cycle if WBC increasing.       # Constipation  This started prior to her first infusion, however worsened post chemo. Enema x1 in ER (7/3/22) however has not had BM since. +flatus. No vomiting, mild nausea. Mag citrate eventually given and was successful.   - Continue miralax BID and senna 1-2 tab BID. She also has an Rx for milk of magnesium to use PRN that she has not started yet.  - She has tried an aloe vera concentrate and herbal tea which was effective-- advised to caution with herbal remedies. Reviewed with pharmacy and would prefer to hold off on this for now and have patient try milk of magnesium PRN since she has not tried this yet. Can re-assess at next visit.       # Neulasta induced bony pain-- alternated tylenol and ibuprofen which helped only a bit. Pain lasted 1 and a half days, now resolved.   - She had bone pain again after last cycle. Relieved with claritin.   - Tramadol PRN.       # CINV  - PRN antiemetics for breakthrough nausea.       40 minutes spent on the date of the encounter doing chart review, review of test results, interpretation of tests, patient visit, documentation and  discussion with other provider(s)           Again, thank you for allowing me to participate in the care of your patient.      Sincerely,    YAJAIRA Guardado CNP

## 2022-09-19 NOTE — PROGRESS NOTES
[facility]  Date of visit: Sep 19, 2022  Oncologist: Dr. Mallory Mendez      Reason for Visit: follow up HL      Oncology HPI:   She has a past medical history of acne treated with spironolactone who is otherwise healthy with no notable past surgical history. She first noticed heartburn-like symptoms when drinking alcohol a year ago and has noticed progressive dysphagia, where now dry solids get caught when swallowing although it is not painful. She has had to stop eating mid-meal but denies appetite change or weight loss. She developed night sweats x2 in Fall 2021 where she woke up with a drenched shirt. Denies current night sweats, fever, chills. During that time, she first noticed an enlarged lymph node in her right clavicular region and as it was not bothersome or painful, did not pursue medical attention at that time. She noticed no other lumps or bumps at that time. She has also been increasingly itchy in all parts of her body for the past 2 months. Over the past two weeks, she has noticed a fast heart rate without chest pain or palpitations and shortness of breath on exertion like carrying laundry upstairs or walking and talking at the same time. She denies lower extremity or upper extremity edema. Her energy level has been slowly declining, where she sleeps for longer at night and needing to rest for 4 hours in the afternoon.      She had a routine physical on 5/16/22 where she brought attention to her growing mass on her right shoulder. 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         She udnerwent ECHO which showed EF of >50%.  PET scan done on 6/18/2022 showed hypermetabolic mediastinal mass with SUV max of 14.7 and 15.6 x 14.8 x 7.8 cm in size.  Mediastinal mass extends to right level 4 lateral to right thyroid lobe.  There is associated right retroclavicular lymphadenopathy.  Associated bone  marrow activity is seen in the axial skeleton consistent with bone marrow infiltration.  Her [place] stage is stage IV.     She consulted with fertility specialist.  We do not have time to pursue pretreatment quality preservation.  She was advised to return in 6 months after completing treatment at that time fertility preservation would be considered.     Due to her ongoing itching, exertional dyspnea, I had prescribed prednisone 60 mg daily and allopurinol to prevent tumor lysis.     She underwent 2 cycles of AVD with rituximab.  Tolerated well without any side effects.  PET scan after 2 cycles shows complete metabolic response.         Nodular sclerosis classical Hodgkin lymphoma (H)    6/16/2022 Initial Diagnosis      Nodular sclerosis classical Hodgkin lymphoma (H)       6/27/2022 -  Chemotherapy      OP ONC Lymphoma and CLL - A + AVD (Brentuximab vedotin / DOXOrubicin / vinBLAStine / Dacarbazine)   Plan Provider: Mallory Mendez MD  Treatment goal: Curative  Line of treatment: [No plan line of treatment]       8/19/2022 No evidence of disease      PET scan after 2 cycles of AVD (map shows no evidence of disease.              Interval history:   Amy presents for oncology follow-up visit today   She is here today prior to C4 and doing well overall. New symptom this past cycle has been some chest discomfort just left of her sternum. This is not sharp in nature, not severe, no associated shortness of breath. Her heart rate has overall decreased a bit however still elevated.   She is eating ok, weight has been stable. She gets nausea if she waits too long to eat. She did not have constipation this last round. Her neuropathy seems to have resolved. She does have stable dyspnea on exertion for a few days after infusion, she continues to walk some in short distances. She does feel quite stiff, especially the week after chemo. She is interested in massage therapy. ROS is otherwise negative.       Physical  Exam:  /73 (BP Location: Right arm, Patient Position: Sitting, Cuff Size: Adult Regular)   Pulse 98   Temp 98.4  F (36.9  C) (Oral)   Resp 18   Wt 54.9 kg (121 lb)   SpO2 100%   BMI 20.61 kg/m    General: No acute distress. Young, pleasant female.   HEENT: EOMI, PERRL. Sclerae are anicteric.   Lymph: Neck is supple with no lymphadenopathy in the cervical or supraclavicular areas.   Heart: Regular rate and rhythm.   Lungs: Clear to auscultation bilaterally.   Abdomen: Bowel sounds present, soft, nontender with no palpable hepatosplenomegaly or masses.   Extremities: No lower extremity edema noted bilaterally.   Neuro: Alert and oriented x3, CN grossly intact, steady gait  Skin: No rashes, petechiae, or bruising noted on exposed skin. Port to right chest wall.       Labs:     I personally reviewed the following labs:    Most Recent 3 CBC's:  Recent Labs   Lab Test 09/19/22  1015 09/06/22  0824 08/22/22  1215   WBC 13.5* 13.0* 15.2*   HGB 10.7* 10.0* 11.0*   MCV 92 89 85    203 305     Most Recent 3 BMP's:  Recent Labs   Lab Test 09/06/22  0824 08/22/22  1215 08/08/22  1105    137 139   POTASSIUM 3.7 3.7 3.6   CHLORIDE 102 103 106   CO2 20* 26 23   BUN 7.8 10 10   CR 0.63 0.50* 0.62   ANIONGAP 14 8 10   NICOLAS 8.9 8.6 8.7   GLC 92 105* 91     Most Recent 2 LFT's:  Recent Labs   Lab Test 09/06/22  0824 08/22/22  1215   AST 21 15   ALT 16 23   ALKPHOS 128* 98   BILITOTAL 0.4 0.4         Imaging: n/a      Impression/plan:    # Classic Hodgkin lymphoma advanced stage on AVD-BV with neulasta support.   - She has excellent response to treatment so far. PET/CT 8/19 after 2 cycles of treatment showed complete metabolic response by Lugano criteria.   - We will continue with total 6 cycles of treatment.  - Repeat scan after 6 cycles of chemotherapy.  -Today is C4D1 (9/19) AVD-BV-- She is tolerating chemotherapy well without major side effects, for now, we will continue the same dose.  She knows to notify us  if she develops persistent neuropathy, in which case we will reduce vinblastine or BV. She did not have neuropathy after most recent treatment.  - Return to see BI on D1 and D15.   - Return to clinic to see Dr. Mendez 6 to 8 weeks after 6 cycles of chemotherapy to discuss PET scan results.      Ppx  - Continue acyclovir and Bactrim ppx      # Leukocytosis -- stable  - No signs/sxs of infection at this time. Likely related to recent Neulasta.   - As we expect her counts to drop with chemotherapy today, will continue with Neulasta for now. Can consider holding it with next cycle if WBC increasing.       # Constipation  This started prior to her first infusion, however worsened post chemo. Enema x1 in ER (7/3/22) however has not had BM since. +flatus. No vomiting, mild nausea. Mag citrate eventually given and was successful.   - Continue miralax BID and senna 1-2 tab BID. She also has an Rx for milk of magnesium to use PRN that she has not started yet.  - She has tried an aloe vera concentrate and herbal tea which was effective-- advised to caution with herbal remedies. Reviewed with pharmacy and would prefer to hold off on this for now and have patient try milk of magnesium PRN since she has not tried this yet. Can re-assess at next visit.       # Neulasta induced bony pain-- alternated tylenol and ibuprofen which helped only a bit. Pain lasted 1 and a half days, now resolved.   - She had bone pain again after last cycle. Relieved with claritin.   - Tramadol PRN.       # CINV  - PRN antiemetics for breakthrough nausea.       40 minutes spent on the date of the encounter doing chart review, review of test results, interpretation of tests, patient visit, documentation and discussion with other provider(s)     Aleena Sawyer ACNP-BC

## 2022-09-19 NOTE — NURSING NOTE
"Oncology Rooming Note    September 19, 2022 10:27 AM   Amy Harris is a 27 year old female who presents for:    Chief Complaint   Patient presents with     Port Draw     Labs drawn via port by RN in lab. VS taken.      Oncology Clinic Visit     Nodular sclerosis classical hodgkin lymphoma      Initial Vitals: /73 (BP Location: Right arm, Patient Position: Sitting, Cuff Size: Adult Regular)   Pulse 98   Temp 98.4  F (36.9  C) (Oral)   Resp 18   Wt 54.9 kg (121 lb)   SpO2 100%   BMI 20.61 kg/m   Estimated body mass index is 20.61 kg/m  as calculated from the following:    Height as of 6/27/22: 1.632 m (5' 4.25\").    Weight as of this encounter: 54.9 kg (121 lb). Body surface area is 1.58 meters squared.  No Pain (0) Comment: Data Unavailable   No LMP recorded. (Menstrual status: Birth Control).  Allergies reviewed: Yes  Medications reviewed: Yes    Medications: MEDICATION REFILLS NEEDED TODAY. Provider was notified.  Pharmacy name entered into EPIC:    [place], [place] - [address] [id number] [place], [place] - [address]    Clinical concerns: Refill needed on Dexamethasone, send to infusion.        Susie Cardoza CMA            "

## 2022-09-19 NOTE — PROGRESS NOTES
Infusion Nursing Note:  Amy Harris presents today for Cycle 4, Day 1 Doxorubicin, Vinblastine, Dacarbazine, Brentuximab, Onbody Neulasta.    Patient seen by provider today: Yes: Aleena Sawyer, BLAINE   present during visit today: Not Applicable.    Note: N/A.    Intravenous Access:  Implanted Port.    Treatment Conditions:  Lab Results   Component Value Date    HGB 10.7 (L) 09/19/2022    WBC 13.5 (H) 09/19/2022    ANEU 11.1 (H) 09/19/2022    ANEUTAUTO 10.3 (H) 09/06/2022     09/19/2022      Lab Results   Component Value Date     09/19/2022    POTASSIUM 3.4 09/19/2022    CR 0.63 09/19/2022    NICOLAS 9.3 09/19/2022    BILITOTAL 0.3 09/19/2022    ALBUMIN 4.1 09/19/2022    ALT 18 09/19/2022    AST 22 09/19/2022     Results reviewed, labs MET treatment parameters, ok to proceed with treatment.  ECHO/MUGA completed 6/15/22  EF 65-70%.    Post Infusion Assessment:  Patient tolerated infusion without incident.  Blood return noted pre and post infusion.  Blood return noted during Adriamycin push and Vinblastine gravity administration every 2-3 cc.  No evidence of extravasations.  Access discontinued per protocol.     Neulasta Onpro On-Body injector applied to back of left arm at 1330.  Writer discussed Neulasta injection would start tomorrow 9/20 at 1630, approximately 27 hours after application applied today.  Written and Verbal instruction reviewed with patient.  Pt instructed when the dose delivery starts, it will take about 45 minutes to complete.  Pt aware Neulasta Onpro On-Body should have green flashing light and to call triage or on-call MD if injector flashes red or appears to be leaking. Pt aware to keep Onpro On-Body Neulasta 4 inches away from electrical equipment and to avoid showering 4 hours prior to injection.   Neulasta Onpro Lot number: [id number]    Discharge Plan:   Prescription refills given for Dexamethasone.  AVS to patient via [employer].  Patient will return 10/3/22 for next  appointment.   Patient discharged in stable condition accompanied by: self.  Departure Mode: Ambulatory.      Jade Brown RN

## 2022-09-26 ENCOUNTER — TELEPHONE (OUTPATIENT)
Dept: ONCOLOGY | Facility: CLINIC | Age: 27
End: 2022-09-26

## 2022-09-26 NOTE — TELEPHONE ENCOUNTER
Return to school paperwork received via [employer] from patient. Will be placed in provider folder for signature upon completion.     Fax: None.      Will attach copy to [employer] for patient. Needs student signature.       Molly Ying CMA

## 2022-09-29 NOTE — TELEPHONE ENCOUNTER
Signed form received and sent to patient via [employer].        Molly Ying CMA on 9/29/2022 at 5:37 PM

## 2022-10-03 ENCOUNTER — APPOINTMENT (OUTPATIENT)
Dept: LAB | Facility: CLINIC | Age: 27
End: 2022-10-03
Attending: NURSE PRACTITIONER
Payer: COMMERCIAL

## 2022-10-03 ENCOUNTER — INFUSION THERAPY VISIT (OUTPATIENT)
Dept: ONCOLOGY | Facility: CLINIC | Age: 27
End: 2022-10-03
Attending: NURSE PRACTITIONER
Payer: COMMERCIAL

## 2022-10-03 VITALS
DIASTOLIC BLOOD PRESSURE: 78 MMHG | HEART RATE: 112 BPM | SYSTOLIC BLOOD PRESSURE: 111 MMHG | WEIGHT: 121 LBS | OXYGEN SATURATION: 99 % | BODY MASS INDEX: 20.61 KG/M2 | TEMPERATURE: 98.5 F | RESPIRATION RATE: 16 BRPM

## 2022-10-03 DIAGNOSIS — C81.11 NODULAR SCLEROSIS HODGKIN LYMPHOMA OF LYMPH NODES OF NECK (H): Primary | ICD-10-CM

## 2022-10-03 DIAGNOSIS — C81.11 NODULAR SCLEROSIS HODGKIN LYMPHOMA OF LYMPH NODES OF NECK (H): ICD-10-CM

## 2022-10-03 DIAGNOSIS — C81.10 NODULAR SCLEROSIS CLASSICAL HODGKIN LYMPHOMA (H): Primary | ICD-10-CM

## 2022-10-03 LAB
ALBUMIN SERPL BCG-MCNC: 4.4 G/DL (ref 3.5–5.2)
ALP SERPL-CCNC: 113 U/L (ref 35–104)
ALT SERPL W P-5'-P-CCNC: 18 U/L (ref 10–35)
ANION GAP SERPL CALCULATED.3IONS-SCNC: 15 MMOL/L (ref 7–15)
AST SERPL W P-5'-P-CCNC: 19 U/L (ref 10–35)
BASOPHILS # BLD MANUAL: 0 10E3/UL (ref 0–0.2)
BASOPHILS NFR BLD MANUAL: 0 %
BILIRUB SERPL-MCNC: 0.3 MG/DL
BUN SERPL-MCNC: 7.2 MG/DL (ref 6–20)
CALCIUM SERPL-MCNC: 9.5 MG/DL (ref 8.6–10)
CHLORIDE SERPL-SCNC: 102 MMOL/L (ref 98–107)
CREAT SERPL-MCNC: 0.63 MG/DL (ref 0.51–0.95)
DEPRECATED HCO3 PLAS-SCNC: 22 MMOL/L (ref 22–29)
EOSINOPHIL # BLD MANUAL: 0 10E3/UL (ref 0–0.7)
EOSINOPHIL NFR BLD MANUAL: 0 %
ERYTHROCYTE [DISTWIDTH] IN BLOOD BY AUTOMATED COUNT: 18.2 % (ref 10–15)
GFR SERPL CREATININE-BSD FRML MDRD: >90 ML/MIN/1.73M2
GLUCOSE SERPL-MCNC: 126 MG/DL (ref 70–99)
HCT VFR BLD AUTO: 34.4 % (ref 35–47)
HGB BLD-MCNC: 11.1 G/DL (ref 11.7–15.7)
LYMPHOCYTES # BLD MANUAL: 2.4 10E3/UL (ref 0.8–5.3)
LYMPHOCYTES NFR BLD MANUAL: 15 %
MCH RBC QN AUTO: 29.8 PG (ref 26.5–33)
MCHC RBC AUTO-ENTMCNC: 32.3 G/DL (ref 31.5–36.5)
MCV RBC AUTO: 93 FL (ref 78–100)
METAMYELOCYTES # BLD MANUAL: 0.2 10E3/UL
METAMYELOCYTES NFR BLD MANUAL: 1 %
MONOCYTES # BLD MANUAL: 0.9 10E3/UL (ref 0–1.3)
MONOCYTES NFR BLD MANUAL: 6 %
NEUTROPHILS # BLD MANUAL: 12.2 10E3/UL (ref 1.6–8.3)
NEUTROPHILS NFR BLD MANUAL: 78 %
NRBC # BLD AUTO: 0.2 10E3/UL
NRBC BLD MANUAL-RTO: 1 %
PLAT MORPH BLD: ABNORMAL
PLATELET # BLD AUTO: 265 10E3/UL (ref 150–450)
POTASSIUM SERPL-SCNC: 3.6 MMOL/L (ref 3.4–5.3)
PROT SERPL-MCNC: 7.1 G/DL (ref 6.4–8.3)
RBC # BLD AUTO: 3.72 10E6/UL (ref 3.8–5.2)
RBC MORPH BLD: ABNORMAL
SODIUM SERPL-SCNC: 139 MMOL/L (ref 136–145)
WBC # BLD AUTO: 15.7 10E3/UL (ref 4–11)

## 2022-10-03 PROCEDURE — 82040 ASSAY OF SERUM ALBUMIN: CPT

## 2022-10-03 PROCEDURE — G0463 HOSPITAL OUTPT CLINIC VISIT: HCPCS

## 2022-10-03 PROCEDURE — 96372 THER/PROPH/DIAG INJ SC/IM: CPT | Performed by: NURSE PRACTITIONER

## 2022-10-03 PROCEDURE — 250N000011 HC RX IP 250 OP 636: Performed by: NURSE PRACTITIONER

## 2022-10-03 PROCEDURE — 85027 COMPLETE CBC AUTOMATED: CPT

## 2022-10-03 PROCEDURE — 96413 CHEMO IV INFUSION 1 HR: CPT

## 2022-10-03 PROCEDURE — 85007 BL SMEAR W/DIFF WBC COUNT: CPT

## 2022-10-03 PROCEDURE — 80053 COMPREHEN METABOLIC PANEL: CPT

## 2022-10-03 PROCEDURE — 99215 OFFICE O/P EST HI 40 MIN: CPT | Performed by: NURSE PRACTITIONER

## 2022-10-03 PROCEDURE — 96367 TX/PROPH/DG ADDL SEQ IV INF: CPT

## 2022-10-03 PROCEDURE — 96417 CHEMO IV INFUS EACH ADDL SEQ: CPT

## 2022-10-03 PROCEDURE — 36591 DRAW BLOOD OFF VENOUS DEVICE: CPT

## 2022-10-03 PROCEDURE — 258N000003 HC RX IP 258 OP 636: Performed by: NURSE PRACTITIONER

## 2022-10-03 PROCEDURE — 96411 CHEMO IV PUSH ADDL DRUG: CPT

## 2022-10-03 PROCEDURE — 96377 APPLICATON ON-BODY INJECTOR: CPT | Mod: 59

## 2022-10-03 PROCEDURE — 96375 TX/PRO/DX INJ NEW DRUG ADDON: CPT

## 2022-10-03 RX ORDER — HEPARIN SODIUM (PORCINE) LOCK FLUSH IV SOLN 100 UNIT/ML 100 UNIT/ML
5 SOLUTION INTRAVENOUS
Status: CANCELLED | OUTPATIENT
Start: 2022-10-03

## 2022-10-03 RX ORDER — PALONOSETRON 0.05 MG/ML
0.25 INJECTION, SOLUTION INTRAVENOUS ONCE
Status: COMPLETED | OUTPATIENT
Start: 2022-10-03 | End: 2022-10-03

## 2022-10-03 RX ORDER — MEPERIDINE HYDROCHLORIDE 25 MG/ML
25 INJECTION INTRAMUSCULAR; INTRAVENOUS; SUBCUTANEOUS EVERY 30 MIN PRN
Status: CANCELLED | OUTPATIENT
Start: 2022-10-03

## 2022-10-03 RX ORDER — LORAZEPAM 2 MG/ML
0.5 INJECTION INTRAMUSCULAR EVERY 4 HOURS PRN
Status: CANCELLED | OUTPATIENT
Start: 2022-10-03

## 2022-10-03 RX ORDER — METHYLPREDNISOLONE SODIUM SUCCINATE 125 MG/2ML
125 INJECTION, POWDER, LYOPHILIZED, FOR SOLUTION INTRAMUSCULAR; INTRAVENOUS
Status: CANCELLED
Start: 2022-10-03

## 2022-10-03 RX ORDER — ALBUTEROL SULFATE 0.83 MG/ML
2.5 SOLUTION RESPIRATORY (INHALATION)
Status: CANCELLED | OUTPATIENT
Start: 2022-10-03

## 2022-10-03 RX ORDER — PALONOSETRON 0.05 MG/ML
0.25 INJECTION, SOLUTION INTRAVENOUS ONCE
Status: CANCELLED
Start: 2022-10-03

## 2022-10-03 RX ORDER — DIPHENHYDRAMINE HYDROCHLORIDE 50 MG/ML
50 INJECTION INTRAMUSCULAR; INTRAVENOUS
Status: CANCELLED
Start: 2022-10-03

## 2022-10-03 RX ORDER — HEPARIN SODIUM (PORCINE) LOCK FLUSH IV SOLN 100 UNIT/ML 100 UNIT/ML
5 SOLUTION INTRAVENOUS
Status: DISCONTINUED | OUTPATIENT
Start: 2022-10-03 | End: 2022-10-03 | Stop reason: HOSPADM

## 2022-10-03 RX ORDER — ACETAMINOPHEN 325 MG/1
650 TABLET ORAL
Status: CANCELLED
Start: 2022-10-03

## 2022-10-03 RX ORDER — DOXORUBICIN HYDROCHLORIDE 2 MG/ML
25 INJECTION, SOLUTION INTRAVENOUS ONCE
Status: COMPLETED | OUTPATIENT
Start: 2022-10-03 | End: 2022-10-03

## 2022-10-03 RX ORDER — HEPARIN SODIUM (PORCINE) LOCK FLUSH IV SOLN 100 UNIT/ML 100 UNIT/ML
500 SOLUTION INTRAVENOUS ONCE
Status: COMPLETED | OUTPATIENT
Start: 2022-10-03 | End: 2022-10-03

## 2022-10-03 RX ORDER — EPINEPHRINE 1 MG/ML
0.3 INJECTION, SOLUTION INTRAMUSCULAR; SUBCUTANEOUS EVERY 5 MIN PRN
Status: CANCELLED | OUTPATIENT
Start: 2022-10-03

## 2022-10-03 RX ORDER — HEPARIN SODIUM,PORCINE 10 UNIT/ML
5 VIAL (ML) INTRAVENOUS
Status: CANCELLED | OUTPATIENT
Start: 2022-10-03

## 2022-10-03 RX ORDER — DOXORUBICIN HYDROCHLORIDE 2 MG/ML
25 INJECTION, SOLUTION INTRAVENOUS ONCE
Status: CANCELLED | OUTPATIENT
Start: 2022-10-03

## 2022-10-03 RX ORDER — NALOXONE HYDROCHLORIDE 0.4 MG/ML
0.2 INJECTION, SOLUTION INTRAMUSCULAR; INTRAVENOUS; SUBCUTANEOUS
Status: CANCELLED | OUTPATIENT
Start: 2022-10-03

## 2022-10-03 RX ORDER — ALBUTEROL SULFATE 90 UG/1
1-2 AEROSOL, METERED RESPIRATORY (INHALATION)
Status: CANCELLED
Start: 2022-10-03

## 2022-10-03 RX ORDER — DIPHENHYDRAMINE HCL 25 MG
50 CAPSULE ORAL
Status: CANCELLED
Start: 2022-10-03

## 2022-10-03 RX ADMIN — PALONOSETRON HYDROCHLORIDE 0.25 MG: 0.25 INJECTION INTRAVENOUS at 12:34

## 2022-10-03 RX ADMIN — DOXORUBICIN HYDROCHLORIDE 40 MG: 2 INJECTION, SOLUTION INTRAVENOUS at 13:05

## 2022-10-03 RX ADMIN — SODIUM CHLORIDE 250 ML: 9 INJECTION, SOLUTION INTRAVENOUS at 12:33

## 2022-10-03 RX ADMIN — FAMOTIDINE 20 MG: 10 INJECTION INTRAVENOUS at 12:37

## 2022-10-03 RX ADMIN — DACARBAZINE 600 MG: 10 INJECTION, POWDER, FOR SOLUTION INTRAVENOUS at 13:20

## 2022-10-03 RX ADMIN — Medication 5 ML: at 14:33

## 2022-10-03 RX ADMIN — DEXAMETHASONE SODIUM PHOSPHATE: 10 INJECTION, SOLUTION INTRAMUSCULAR; INTRAVENOUS at 12:43

## 2022-10-03 RX ADMIN — Medication 500 UNITS: at 10:12

## 2022-10-03 RX ADMIN — VINBLASTINE SULFATE 4.7 MG: 1 INJECTION INTRAVENOUS at 13:13

## 2022-10-03 RX ADMIN — PEGFILGRASTIM 6 MG: KIT SUBCUTANEOUS at 14:09

## 2022-10-03 RX ADMIN — BRENTUXIMAB VEDOTIN 66 MG: 50 INJECTION, POWDER, LYOPHILIZED, FOR SOLUTION INTRAVENOUS at 13:55

## 2022-10-03 ASSESSMENT — PAIN SCALES - GENERAL: PAINLEVEL: NO PAIN (0)

## 2022-10-03 NOTE — PROGRESS NOTES
[facility]  Date of visit: Oct 3, 2022  Oncologist: Dr. Mallory Mendez      Reason for Visit: follow up HL      Oncology HPI:   She has a past medical history of acne treated with spironolactone who is otherwise healthy with no notable past surgical history. She first noticed heartburn-like symptoms when drinking alcohol a year ago and has noticed progressive dysphagia, where now dry solids get caught when swallowing although it is not painful. She has had to stop eating mid-meal but denies appetite change or weight loss. She developed night sweats x2 in Fall 2021 where she woke up with a drenched shirt. Denies current night sweats, fever, chills. During that time, she first noticed an enlarged lymph node in her right clavicular region and as it was not bothersome or painful, did not pursue medical attention at that time. She noticed no other lumps or bumps at that time. She has also been increasingly itchy in all parts of her body for the past 2 months. Over the past two weeks, she has noticed a fast heart rate without chest pain or palpitations and shortness of breath on exertion like carrying laundry upstairs or walking and talking at the same time. She denies lower extremity or upper extremity edema. Her energy level has been slowly declining, where she sleeps for longer at night and needing to rest for 4 hours in the afternoon.      She had a routine physical on 5/16/22 where she brought attention to her growing mass on her right shoulder. 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         She udnerwent ECHO which showed EF of >50%.  PET scan done on 6/18/2022 showed hypermetabolic mediastinal mass with SUV max of 14.7 and 15.6 x 14.8 x 7.8 cm in size.  Mediastinal mass extends to right level 4 lateral to right thyroid lobe.  There is associated right retroclavicular lymphadenopathy.  Associated bone  marrow activity is seen in the axial skeleton consistent with bone marrow infiltration.  Her [place] stage is stage IV.     She consulted with fertility specialist.  We do not have time to pursue pretreatment quality preservation.  She was advised to return in 6 months after completing treatment at that time fertility preservation would be considered.     Due to her ongoing itching, exertional dyspnea, I had prescribed prednisone 60 mg daily and allopurinol to prevent tumor lysis.     She underwent 2 cycles of AVD with rituximab.  Tolerated well without any side effects.  PET scan after 2 cycles shows complete metabolic response.         Nodular sclerosis classical Hodgkin lymphoma (H)    6/16/2022 Initial Diagnosis      Nodular sclerosis classical Hodgkin lymphoma (H)       6/27/2022 -  Chemotherapy      OP ONC Lymphoma and CLL - A + AVD (Brentuximab vedotin / DOXOrubicin / vinBLAStine / Dacarbazine)   Plan Provider: Mallory Mendez MD  Treatment goal: Curative  Line of treatment: [No plan line of treatment]       8/19/2022 No evidence of disease      PET scan after 2 cycles of AVD (map shows no evidence of disease.              Interval history:   Amy presents for oncology follow-up visit today   She overall is doing well, her nausea and appetite are pretty stable with prior infusions. She did have some constipation this cycle, however resolved with some resultant diarrhea, this has resolved since Friday. Reflux is worse when she is constipated.   She says the numbness in her fingertips has returned and now persisted for the past week or so. This is just in her fingers, not toes. No pain. She is noticing some difficulty with texting.    She has not had fevers or chills. She has dyspnea with exertion, however this is stable. No cough or congestion. No skin concerns. No diarrhea that has persisted. No abdominal pain or discomfort. She had some itchy spots on her ankles (mostly at night) however this itching  has resolved today and no visible rash.   ROS is otherwise negative.       Physical Exam:  /78   Pulse 112   Temp 98.5  F (36.9  C) (Oral)   Resp 16   Wt 54.9 kg (121 lb)   SpO2 99%   BMI 20.61 kg/m    General: No acute distress. Young, pleasant female.   HEENT: EOMI, PERRL. Sclerae are anicteric.   Lymph: Neck is supple with no lymphadenopathy in the cervical or supraclavicular areas.   Heart: Regular rate and rhythm.   Lungs: Clear to auscultation bilaterally.   Abdomen: Bowel sounds present, soft, nontender with no palpable hepatosplenomegaly or masses.   Extremities: No lower extremity edema noted bilaterally.   Neuro: Alert and oriented x3, CN grossly intact, steady gait  Skin: No rashes, petechiae, or bruising noted on exposed skin. Port to right chest wall.       Labs:     I personally reviewed the following labs:    Most Recent 3 CBC's:  Recent Labs   Lab Test 10/03/22  1011 09/19/22  1015 09/06/22  0824   WBC 15.7* 13.5* 13.0*   HGB 11.1* 10.7* 10.0*   MCV 93 92 89    255 203     Most Recent 3 BMP's:  Recent Labs   Lab Test 10/03/22  1011 09/19/22  1015 09/06/22  0824    137 136   POTASSIUM 3.6 3.4 3.7   CHLORIDE 102 103 102   CO2 22 21* 20*   BUN 7.2 6.8 7.8   CR 0.63 0.63 0.63   ANIONGAP 15 13 14   NICOLAS 9.5 9.3 8.9   * 120* 92     Most Recent 2 LFT's:  Recent Labs   Lab Test 10/03/22  1011 09/19/22  1015   AST 19 22   ALT 18 18   ALKPHOS 113* 98   BILITOTAL 0.3 0.3         Imaging: n/a      Impression/plan:    # Classic Hodgkin lymphoma advanced stage on AVD-BV with neulasta support.   - She has excellent response to treatment so far. PET/CT 8/19 after 2 cycles of treatment showed complete metabolic response by Lugano criteria.   - We will continue with total 6 cycles of treatment.  - Repeat scan after 6 cycles of chemotherapy.  -Today is C4D15 (10/3) AVD-BV-- will dose reduce Vinblastine by 50% given neuropathy, below  - Return to see BI on D1 and D15.   - Return to clinic  to see Dr. Mendez 6 to 8 weeks after 6 cycles of chemotherapy to discuss PET scan results.      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 This is affecting fine motor skills (like texting). She had neuropathy with earlier cycles that had resolved. Now this is persistent.   - Will dose reduce vinblastine, consider dose reduction of BV if needed      # Leukocytosis   - No signs/sxs of infection at this time. Likely related to recent Neulasta.   - Thorough ROS today given ANC 12/ WBC 15-- denies any concerning symptoms to suggest infection. Will proceed today      # Constipation  This started prior to her first infusion, however worsened post chemo. Enema x1 in ER (7/3/22) however has not had BM since. +flatus. No vomiting, mild nausea. Mag citrate eventually given and was successful.   - Continue miralax BID and senna 1-2 tab BID. She also has an Rx for milk of magnesium to use PRN that she has not started yet.  - She has tried an aloe vera concentrate and herbal tea which was effective-- advised to caution with herbal remedies. Reviewed with pharmacy and would prefer to hold off on this for now and have patient try milk of magnesium PRN since she has not tried this yet.      # Neulasta induced bony pain-- alternated tylenol and ibuprofen which helped only a bit.  - Claritin   - Tramadol PRN      # CINV  - PRN antiemetics for breakthrough nausea.       40 minutes spent on the date of the encounter doing chart review, review of test results, interpretation of tests, patient visit, documentation and discussion with other provider(s)     Aleena CHOWDARYP-BC

## 2022-10-03 NOTE — PROGRESS NOTES
Infusion Nursing Note:  Amy Harris presents today for Cycle 4 Day 15 doxorubicin, vinblastine, dacarbazine, brentuximab; neulasta onpro application.    Patient seen by provider today: Yes: Aleena Sawyer CNP   present during visit today: Not Applicable.    Note: Amy presents today feeling well. Denies pain or nausea/vomiting. Offers no concerns since visit with Aleena Sawyer prior to infusion.     Amy forgot to take her prilosec this morning, IV pepcid given in infusion to prevent reflux symptoms.    Per written communication with Aleena Sawyer CNP/Amie Deluca RN   Decreasing vinblastine dose due to persistent neuropathy     Neulasta Onpro On-Body injector applied to L arm at 1410.  Writer discussed Neulasta injection would start tomorrow 10/04 at 1710, approximately 27 hours after application today.  Written and Verbal instruction reviewed with patient.  Pt instructed when the dose delivery starts, it will take about 45 minutes to complete.  Pt aware Neulasta Onpro On-Body should have green flashing light and to call triage or on-call MD if injector flashes red or appears to be leaking. Pt aware to keep Onpro On-Body Neulasta 4 inches away from electrical equipment and to avoid showering 4 hours prior to injection.   Neulasta Onpro Lot number: [id number]    Intravenous Access:  Implanted Port.    Treatment Conditions:     Latest Reference Range & Units 10/03/22 10:11   Sodium 136 - 145 mmol/L 139   Potassium 3.4 - 5.3 mmol/L 3.6   Chloride 98 - 107 mmol/L 102   Carbon Dioxide (CO2) 22 - 29 mmol/L 22   Urea Nitrogen 6.0 - 20.0 mg/dL 7.2   Creatinine 0.51 - 0.95 mg/dL 0.63   GFR Estimate >60 mL/min/1.73m2 >90   Calcium 8.6 - 10.0 mg/dL 9.5   Anion Gap 7 - 15 mmol/L 15   Albumin 3.5 - 5.2 g/dL 4.4   Protein Total 6.4 - 8.3 g/dL 7.1   Alkaline Phosphatase 35 - 104 U/L 113 (H)   ALT 10 - 35 U/L 18   AST 10 - 35 U/L 19   Bilirubin Total <=1.2 mg/dL 0.3   Glucose 70 - 99 mg/dL 126 (H)   WBC  4.0 - 11.0 10e3/uL 15.7 (H)   Hemoglobin 11.7 - 15.7 g/dL 11.1 (L)   Hematocrit 35.0 - 47.0 % 34.4 (L)   Platelet Count 150 - 450 10e3/uL 265   RBC Count 3.80 - 5.20 10e6/uL 3.72 (L)   MCV 78 - 100 fL 93   MCH 26.5 - 33.0 pg 29.8   MCHC 31.5 - 36.5 g/dL 32.3   RDW 10.0 - 15.0 % 18.2 (H)   % Neutrophils % 78   % Lymphocytes % 15   % Monocytes % 6   % Eosinophils % 0   % Basophils % 0   % Metamyelocytes % 1   Absolute Basophils 0.0 - 0.2 10e3/uL 0.0   NRBC/W <=0 % 1 (H)   Absolute Neutrophil 1.6 - 8.3 10e3/uL 12.2 (H)   Absolute Lymphocytes 0.8 - 5.3 10e3/uL 2.4   Absolute Monocytes 0.0 - 1.3 10e3/uL 0.9   Absolute Eosinophils 0.0 - 0.7 10e3/uL 0.0   Absolute Metamyelocytes <=0.0 10e3/uL 0.2 (H)   Absolute NRBCs <=0.0 10e3/uL 0.2 (H)   RBC Morphology  Confirmed RBC Indices   Platelet Morphology Automated Count Confirmed. Platelet morphology is normal.  Automated Count Confirmed. Platelet morphology is normal.     Results reviewed, labs MET treatment parameters, ok to proceed with treatment.  ECHO/MUGA completed 06/15/22, EF 65-70%.    Post Infusion Assessment:  Patient tolerated infusion without incident.  Blood return noted pre and post infusion.  Blood return noted during doxorubicin administration every 2 cc.  Site patent and intact, free from redness, edema or discomfort.  No evidence of extravasations.  Access discontinued per protocol.     Discharge Plan:   Patient declined prescription refills.  Discharge instructions reviewed with: Patient.  Patient and/or family verbalized understanding of discharge instructions and all questions answered.  AVS to patient via [employer].  Patient will return 10/17 for next infusion appointment.   Patient discharged in stable condition accompanied by: mother.  Departure Mode: Ambulatory.      Amie Deluca RN

## 2022-10-03 NOTE — LETTER
10/3/2022         RE: Amy Harris  [address] [place] [address]        Dear Colleague,    Thank you for referring your patient, Amy Harris, to the [facility]. Please see a copy of my visit note below.    [facility] [place]  Date of visit: Oct 3, 2022  Oncologist: Dr. Mallory Mendez      Reason for Visit: follow up       Oncology HPI:   She has a past medical history of acne treated with spironolactone who is otherwise healthy with no notable past surgical history. She first noticed heartburn-like symptoms when drinking alcohol a year ago and has noticed progressive dysphagia, where now dry solids get caught when swallowing although it is not painful. She has had to stop eating mid-meal but denies appetite change or weight loss. She developed night sweats x2 in Fall 2021 where she woke up with a drenched shirt. Denies current night sweats, fever, chills. During that time, she first noticed an enlarged lymph node in her right clavicular region and as it was not bothersome or painful, did not pursue medical attention at that time. She noticed no other lumps or bumps at that time. She has also been increasingly itchy in all parts of her body for the past 2 months. Over the past two weeks, she has noticed a fast heart rate without chest pain or palpitations and shortness of breath on exertion like carrying laundry upstairs or walking and talking at the same time. She denies lower extremity or upper extremity edema. Her energy level has been slowly declining, where she sleeps for longer at night and needing to rest for 4 hours in the afternoon.      She had a routine physical on 5/16/22 where she brought attention to her growing mass on her right shoulder. 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         She udnerwent ECHO which showed EF of >50%.  PET scan done on  6/18/2022 showed hypermetabolic mediastinal mass with SUV max of 14.7 and 15.6 x 14.8 x 7.8 cm in size.  Mediastinal mass extends to right level 4 lateral to right thyroid lobe.  There is associated right retroclavicular lymphadenopathy.  Associated bone marrow activity is seen in the axial skeleton consistent with bone marrow infiltration.  Her [place] stage is stage IV.     She consulted with fertility specialist.  We do not have time to pursue pretreatment quality preservation.  She was advised to return in 6 months after completing treatment at that time fertility preservation would be considered.     Due to her ongoing itching, exertional dyspnea, I had prescribed prednisone 60 mg daily and allopurinol to prevent tumor lysis.     She underwent 2 cycles of AVD with rituximab.  Tolerated well without any side effects.  PET scan after 2 cycles shows complete metabolic response.         Nodular sclerosis classical Hodgkin lymphoma (H)    6/16/2022 Initial Diagnosis      Nodular sclerosis classical Hodgkin lymphoma (H)       6/27/2022 -  Chemotherapy      OP ONC Lymphoma and CLL - A + AVD (Brentuximab vedotin / DOXOrubicin / vinBLAStine / Dacarbazine)   Plan Provider: Mallory Mendez MD  Treatment goal: Curative  Line of treatment: [No plan line of treatment]       8/19/2022 No evidence of disease      PET scan after 2 cycles of AVD (map shows no evidence of disease.              Interval history:   Amy presents for oncology follow-up visit today   She overall is doing well, her nausea and appetite are pretty stable with prior infusions. She did have some constipation this cycle, however resolved with some resultant diarrhea, this has resolved since Friday. Reflux is worse when she is constipated.   She says the numbness in her fingertips has returned and now persisted for the past week or so. This is just in her fingers, not toes. No pain. She is noticing some difficulty with texting.    She has not had  fevers or chills. She has dyspnea with exertion, however this is stable. No cough or congestion. No skin concerns. No diarrhea that has persisted. No abdominal pain or discomfort. She had some itchy spots on her ankles (mostly at night) however this itching has resolved today and no visible rash.   ROS is otherwise negative.       Physical Exam:  /78   Pulse 112   Temp 98.5  F (36.9  C) (Oral)   Resp 16   Wt 54.9 kg (121 lb)   SpO2 99%   BMI 20.61 kg/m    General: No acute distress. Young, pleasant female.   HEENT: EOMI, PERRL. Sclerae are anicteric.   Lymph: Neck is supple with no lymphadenopathy in the cervical or supraclavicular areas.   Heart: Regular rate and rhythm.   Lungs: Clear to auscultation bilaterally.   Abdomen: Bowel sounds present, soft, nontender with no palpable hepatosplenomegaly or masses.   Extremities: No lower extremity edema noted bilaterally.   Neuro: Alert and oriented x3, CN grossly intact, steady gait  Skin: No rashes, petechiae, or bruising noted on exposed skin. Port to right chest wall.       Labs:     I personally reviewed the following labs:    Most Recent 3 CBC's:  Recent Labs   Lab Test 10/03/22  1011 09/19/22  1015 09/06/22  0824   WBC 15.7* 13.5* 13.0*   HGB 11.1* 10.7* 10.0*   MCV 93 92 89    255 203     Most Recent 3 BMP's:  Recent Labs   Lab Test 10/03/22  1011 09/19/22  1015 09/06/22  0824    137 136   POTASSIUM 3.6 3.4 3.7   CHLORIDE 102 103 102   CO2 22 21* 20*   BUN 7.2 6.8 7.8   CR 0.63 0.63 0.63   ANIONGAP 15 13 14   NICOLAS 9.5 9.3 8.9   * 120* 92     Most Recent 2 LFT's:  Recent Labs   Lab Test 10/03/22  1011 09/19/22  1015   AST 19 22   ALT 18 18   ALKPHOS 113* 98   BILITOTAL 0.3 0.3         Imaging: n/a      Impression/plan:    # Classic Hodgkin lymphoma advanced stage on AVD-BV with neulasta support.   - She has excellent response to treatment so far. PET/CT 8/19 after 2 cycles of treatment showed complete metabolic response by [place]  criteria.   - We will continue with total 6 cycles of treatment.  - Repeat scan after 6 cycles of chemotherapy.  -Today is C4D15 (10/3) AVD-BV-- will dose reduce Vinblastine by 50% given neuropathy, below  - Return to see BI on D1 and D15.   - Return to clinic to see Dr. Mendez 6 to 8 weeks after 6 cycles of chemotherapy to discuss PET scan results.      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 This is affecting fine motor skills (like texting). She had neuropathy with earlier cycles that had resolved. Now this is persistent.   - Will dose reduce vinblastine, consider dose reduction of BV if needed      # Leukocytosis   - No signs/sxs of infection at this time. Likely related to recent Neulasta.   - Thorough ROS today given ANC 12/ WBC 15-- denies any concerning symptoms to suggest infection. Will proceed today      # Constipation  This started prior to her first infusion, however worsened post chemo. Enema x1 in ER (7/3/22) however has not had BM since. +flatus. No vomiting, mild nausea. Mag citrate eventually given and was successful.   - Continue miralax BID and senna 1-2 tab BID. She also has an Rx for milk of magnesium to use PRN that she has not started yet.  - She has tried an aloe vera concentrate and herbal tea which was effective-- advised to caution with herbal remedies. Reviewed with pharmacy and would prefer to hold off on this for now and have patient try milk of magnesium PRN since she has not tried this yet.      # Neulasta induced bony pain-- alternated tylenol and ibuprofen which helped only a bit.  - Claritin   - Tramadol PRN    # CINV  - PRN antiemetics for breakthrough nausea.     40 minutes spent on the date of the encounter doing chart review, review of test results, interpretation of tests, patient visit, documentation and discussion with other provider(s)         Again, thank you for allowing me to participate in the  care of your patient.      Sincerely,    YAJAIRA Guardado CNP

## 2022-10-03 NOTE — NURSING NOTE
"Chief Complaint   Patient presents with     Port Draw     Labs drawn via port by RN in lab.  VS taken        Port accessed with 20 gauge 3/4\" gripper needle by RN, labs collected, line flushed with saline and heparin.  Vitals taken. Pt checked in for appointment(s).    Jessie Beasley RN    "

## 2022-10-03 NOTE — PATIENT INSTRUCTIONS
[facility] Triage and after hours / weekends / holidays:  [phone]    Please call the triage or after hours line if you experience a temperature greater than or equal to 100.4, shaking chills, have uncontrolled nausea, vomiting and/or diarrhea, dizziness, shortness of breath, chest pain, bleeding, unexplained bruising, or if you have any other new/concerning symptoms, questions or concerns.      If you are having any concerning symptoms or wish to speak to a provider before your next infusion visit, please call your care coordinator or triage to notify them so we can adequately serve you.     If you need a refill on a narcotic prescription or other medication, please call before your infusion appointment.

## 2022-10-03 NOTE — NURSING NOTE
"Oncology Rooming Note    October 3, 2022 10:19 AM   Amy Harris is a 27 year old female who presents for:    Chief Complaint   Patient presents with     Port Draw     Labs drawn via port by RN in lab.  VS taken      Oncology Clinic Visit     Nodular sclerosis classical Hodgkin lymphoma (H)      Initial Vitals: /78   Pulse 112   Temp 98.5  F (36.9  C) (Oral)   Resp 16   Wt 54.9 kg (121 lb)   SpO2 99%   BMI 20.61 kg/m   Estimated body mass index is 20.61 kg/m  as calculated from the following:    Height as of 6/27/22: 1.632 m (5' 4.25\").    Weight as of this encounter: 54.9 kg (121 lb). Body surface area is 1.58 meters squared.  No Pain (0) Comment: Data Unavailable   No LMP recorded. (Menstrual status: Birth Control).  Allergies reviewed: Yes  Medications reviewed: Yes    Medications: Medication refills not needed today.  Pharmacy name entered into EPIC:    [place], [place] - [address] [id number] [place], [place] - [address]    Clinical concerns: Pt having neuropathy on her fingers.       Axel Frye            "

## 2022-10-16 NOTE — PROGRESS NOTES
[facility]  Date of visit: Oct 17, 2022  Oncologist: Dr. Mallory Mendez      Reason for Visit: follow up HL      Oncology HPI:   She has a past medical history of acne treated with spironolactone who is otherwise healthy with no notable past surgical history. She first noticed heartburn-like symptoms when drinking alcohol a year ago and has noticed progressive dysphagia, where now dry solids get caught when swallowing although it is not painful. She has had to stop eating mid-meal but denies appetite change or weight loss. She developed night sweats x2 in Fall 2021 where she woke up with a drenched shirt. Denies current night sweats, fever, chills. During that time, she first noticed an enlarged lymph node in her right clavicular region and as it was not bothersome or painful, did not pursue medical attention at that time. She noticed no other lumps or bumps at that time. She has also been increasingly itchy in all parts of her body for the past 2 months. Over the past two weeks, she has noticed a fast heart rate without chest pain or palpitations and shortness of breath on exertion like carrying laundry upstairs or walking and talking at the same time. She denies lower extremity or upper extremity edema. Her energy level has been slowly declining, where she sleeps for longer at night and needing to rest for 4 hours in the afternoon.      She had a routine physical on 5/16/22 where she brought attention to her growing mass on her right shoulder. 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         She udnerwent ECHO which showed EF of >50%.  PET scan done on 6/18/2022 showed hypermetabolic mediastinal mass with SUV max of 14.7 and 15.6 x 14.8 x 7.8 cm in size.  Mediastinal mass extends to right level 4 lateral to right thyroid lobe.  There is associated right retroclavicular lymphadenopathy.  Associated bone  marrow activity is seen in the axial skeleton consistent with bone marrow infiltration.  Her [place] stage is stage IV.     She consulted with fertility specialist.  We do not have time to pursue pretreatment quality preservation.  She was advised to return in 6 months after completing treatment at that time fertility preservation would be considered.     Due to her ongoing itching, exertional dyspnea, I had prescribed prednisone 60 mg daily and allopurinol to prevent tumor lysis.     She underwent 2 cycles of AVD with rituximab.  Tolerated well without any side effects.  PET scan after 2 cycles shows complete metabolic response.         Nodular sclerosis classical Hodgkin lymphoma (H)    6/16/2022 Initial Diagnosis      Nodular sclerosis classical Hodgkin lymphoma (H)       6/27/2022 -  Chemotherapy      OP ONC Lymphoma and CLL - A + AVD (Brentuximab vedotin / DOXOrubicin / vinBLAStine / Dacarbazine)   Plan Provider: Mallory Mendez MD  Treatment goal: Curative  Line of treatment: [No plan line of treatment]       8/19/2022 No evidence of disease      PET scan after 2 cycles of AVD (map shows no evidence of disease.              Interval history:   Amy presents for oncology follow-up visit today   ***  Constipation was much better this past round-- able to have daily BMs.       She overall is doing well, her nausea and appetite are pretty stable with prior infusions. She did have some constipation this cycle, however resolved with some resultant diarrhea, this has resolved since Friday. Reflux is worse when she is constipated.   She says the numbness in her fingertips has returned and now persisted for the past week or so. This is just in her fingers, not toes. No pain. She is noticing some difficulty with texting.    She has not had fevers or chills. She has dyspnea with exertion, however this is stable. No cough or congestion. No skin concerns. No diarrhea that has persisted. No abdominal pain or  discomfort. She had some itchy spots on her ankles (mostly at night) however this itching has resolved today and no visible rash.   ROS is otherwise negative.       Physical Exam:  /77   Pulse 113   Temp 98.1  F (36.7  C) (Oral)   Resp 16   Wt 54.6 kg (120 lb 6.4 oz)   SpO2 98%   BMI 20.50 kg/m    General: No acute distress. Young, pleasant female.   HEENT: EOMI, PERRL. Sclerae are anicteric.   Lymph: Neck is supple with no lymphadenopathy in the cervical or supraclavicular areas.   Heart: Regular rate and rhythm.   Lungs: Clear to auscultation bilaterally.   Abdomen: Bowel sounds present, soft, nontender with no palpable hepatosplenomegaly or masses.   Extremities: No lower extremity edema noted bilaterally.   Neuro: Alert and oriented x3, CN grossly intact, steady gait  Skin: No rashes, petechiae, or bruising noted on exposed skin. Port to right chest wall.       Labs:     I personally reviewed the following labs:    Most Recent 3 CBC's:  Recent Labs   Lab Test 10/17/22  0900 10/03/22  1011 09/19/22  1015   WBC 7.0 15.7* 13.5*   HGB 11.9 11.1* 10.7*   MCV 94 93 92    265 255     Most Recent 3 BMP's:  Recent Labs   Lab Test 10/03/22  1011 09/19/22  1015 09/06/22  0824    137 136   POTASSIUM 3.6 3.4 3.7   CHLORIDE 102 103 102   CO2 22 21* 20*   BUN 7.2 6.8 7.8   CR 0.63 0.63 0.63   ANIONGAP 15 13 14   NICOLAS 9.5 9.3 8.9   * 120* 92     Most Recent 2 LFT's:  Recent Labs   Lab Test 10/03/22  1011 09/19/22  1015   AST 19 22   ALT 18 18   ALKPHOS 113* 98   BILITOTAL 0.3 0.3         Imaging: n/a      Impression/plan:    # Classic Hodgkin lymphoma advanced stage on AVD-BV with neulasta support.   - She has excellent response to treatment so far. PET/CT 8/19 after 2 cycles of treatment showed complete metabolic response by [place] criteria.   - We will continue with total 6 cycles of treatment.  - Repeat scan after 6 cycles of chemotherapy.  -Today is C5D1 (10/17) AVD-BV-- continue with dose  reduced Vinblastine by 50% given neuropathy  - Return to see BI on D1 and D15.   - Return to clinic to see Dr. Mendez 6 to 8 weeks after 6 cycles of chemotherapy to discuss PET scan results.      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 This is affecting fine motor skills (like texting). She had neuropathy with earlier cycles that had resolved. Now this is persistent.   - Will dose reduce vinblastine, consider dose reduction of BV if needed ***      # Leukocytosis   - No signs/sxs of infection at this time. Likely related to recent Neulasta.   - Thorough ROS today given ANC 12/ WBC 15-- denies any concerning symptoms to suggest infection. Will proceed today      # Constipation  This started prior to her first infusion, however worsened post chemo. Enema x1 in ER (7/3/22) however has not had BM since. +flatus. No vomiting, mild nausea. Mag citrate eventually given and was successful.   - Continue miralax BID and senna 1-2 tab BID. She also has an Rx for milk of magnesium to use PRN that she has not started yet.  - She has tried an aloe vera concentrate and herbal tea which was effective-- advised to caution with herbal remedies. Reviewed with pharmacy and would prefer to hold off on this for now and have patient try milk of magnesium PRN since she has not tried this yet.      # Neulasta induced bony pain-- alternated tylenol and ibuprofen which helped only a bit.  - Claritin   - Tramadol PRN      # CINV  - PRN antiemetics for breakthrough nausea.       *** minutes spent on the date of the encounter doing chart review, review of test results, interpretation of tests, patient visit, documentation and discussion with other provider(s)     Aleena Sawyer ACNP-BC

## 2022-10-17 ENCOUNTER — APPOINTMENT (OUTPATIENT)
Dept: LAB | Facility: CLINIC | Age: 27
End: 2022-10-17
Attending: NURSE PRACTITIONER
Payer: COMMERCIAL

## 2022-10-17 ENCOUNTER — ONCOLOGY VISIT (OUTPATIENT)
Dept: ONCOLOGY | Facility: CLINIC | Age: 27
End: 2022-10-17
Attending: NURSE PRACTITIONER
Payer: COMMERCIAL

## 2022-10-17 VITALS
HEART RATE: 113 BPM | WEIGHT: 120.4 LBS | DIASTOLIC BLOOD PRESSURE: 77 MMHG | SYSTOLIC BLOOD PRESSURE: 113 MMHG | TEMPERATURE: 98.1 F | RESPIRATION RATE: 16 BRPM | BODY MASS INDEX: 20.5 KG/M2 | OXYGEN SATURATION: 98 %

## 2022-10-17 DIAGNOSIS — C81.12 NODULAR SCLEROSIS HODGKIN LYMPHOMA OF INTRATHORACIC LYMPH NODES (H): ICD-10-CM

## 2022-10-17 DIAGNOSIS — C81.10 NODULAR SCLEROSIS CLASSICAL HODGKIN LYMPHOMA (H): Primary | ICD-10-CM

## 2022-10-17 DIAGNOSIS — C81.11 NODULAR SCLEROSIS HODGKIN LYMPHOMA OF LYMPH NODES OF NECK (H): Primary | ICD-10-CM

## 2022-10-17 DIAGNOSIS — C81.11 NODULAR SCLEROSIS HODGKIN LYMPHOMA OF LYMPH NODES OF NECK (H): ICD-10-CM

## 2022-10-17 LAB
ALBUMIN SERPL BCG-MCNC: 4.5 G/DL (ref 3.5–5.2)
ALP SERPL-CCNC: 95 U/L (ref 35–104)
ALT SERPL W P-5'-P-CCNC: 12 U/L (ref 10–35)
ANION GAP SERPL CALCULATED.3IONS-SCNC: 15 MMOL/L (ref 7–15)
AST SERPL W P-5'-P-CCNC: 18 U/L (ref 10–35)
BASOPHILS # BLD AUTO: 0.1 10E3/UL (ref 0–0.2)
BASOPHILS NFR BLD AUTO: 1 %
BILIRUB SERPL-MCNC: 0.2 MG/DL
BUN SERPL-MCNC: 8.3 MG/DL (ref 6–20)
CALCIUM SERPL-MCNC: 9.5 MG/DL (ref 8.6–10)
CHLORIDE SERPL-SCNC: 102 MMOL/L (ref 98–107)
CREAT SERPL-MCNC: 0.57 MG/DL (ref 0.51–0.95)
DEPRECATED HCO3 PLAS-SCNC: 22 MMOL/L (ref 22–29)
EOSINOPHIL # BLD AUTO: 0.1 10E3/UL (ref 0–0.7)
EOSINOPHIL NFR BLD AUTO: 1 %
ERYTHROCYTE [DISTWIDTH] IN BLOOD BY AUTOMATED COUNT: 16.1 % (ref 10–15)
GFR SERPL CREATININE-BSD FRML MDRD: >90 ML/MIN/1.73M2
GLUCOSE SERPL-MCNC: 120 MG/DL (ref 70–99)
HCT VFR BLD AUTO: 37 % (ref 35–47)
HGB BLD-MCNC: 11.9 G/DL (ref 11.7–15.7)
IMM GRANULOCYTES # BLD: 0.1 10E3/UL
IMM GRANULOCYTES NFR BLD: 2 %
LYMPHOCYTES # BLD AUTO: 1.3 10E3/UL (ref 0.8–5.3)
LYMPHOCYTES NFR BLD AUTO: 18 %
MCH RBC QN AUTO: 30.4 PG (ref 26.5–33)
MCHC RBC AUTO-ENTMCNC: 32.2 G/DL (ref 31.5–36.5)
MCV RBC AUTO: 94 FL (ref 78–100)
MONOCYTES # BLD AUTO: 0.6 10E3/UL (ref 0–1.3)
MONOCYTES NFR BLD AUTO: 9 %
NEUTROPHILS # BLD AUTO: 4.9 10E3/UL (ref 1.6–8.3)
NEUTROPHILS NFR BLD AUTO: 69 %
NRBC # BLD AUTO: 0 10E3/UL
NRBC BLD AUTO-RTO: 0 /100
PLATELET # BLD AUTO: 324 10E3/UL (ref 150–450)
POTASSIUM SERPL-SCNC: 3.6 MMOL/L (ref 3.4–5.3)
PROT SERPL-MCNC: 7 G/DL (ref 6.4–8.3)
RBC # BLD AUTO: 3.92 10E6/UL (ref 3.8–5.2)
SODIUM SERPL-SCNC: 139 MMOL/L (ref 136–145)
WBC # BLD AUTO: 7 10E3/UL (ref 4–11)

## 2022-10-17 PROCEDURE — G0463 HOSPITAL OUTPT CLINIC VISIT: HCPCS

## 2022-10-17 PROCEDURE — 82040 ASSAY OF SERUM ALBUMIN: CPT | Performed by: NURSE PRACTITIONER

## 2022-10-17 PROCEDURE — 258N000003 HC RX IP 258 OP 636: Performed by: NURSE PRACTITIONER

## 2022-10-17 PROCEDURE — 96377 APPLICATON ON-BODY INJECTOR: CPT | Mod: 59

## 2022-10-17 PROCEDURE — 80053 COMPREHEN METABOLIC PANEL: CPT | Performed by: NURSE PRACTITIONER

## 2022-10-17 PROCEDURE — 85025 COMPLETE CBC W/AUTO DIFF WBC: CPT | Performed by: NURSE PRACTITIONER

## 2022-10-17 PROCEDURE — 99215 OFFICE O/P EST HI 40 MIN: CPT | Performed by: NURSE PRACTITIONER

## 2022-10-17 PROCEDURE — 96375 TX/PRO/DX INJ NEW DRUG ADDON: CPT

## 2022-10-17 PROCEDURE — 96411 CHEMO IV PUSH ADDL DRUG: CPT

## 2022-10-17 PROCEDURE — 96367 TX/PROPH/DG ADDL SEQ IV INF: CPT

## 2022-10-17 PROCEDURE — 96413 CHEMO IV INFUSION 1 HR: CPT

## 2022-10-17 PROCEDURE — 96372 THER/PROPH/DIAG INJ SC/IM: CPT | Performed by: NURSE PRACTITIONER

## 2022-10-17 PROCEDURE — 250N000011 HC RX IP 250 OP 636: Performed by: NURSE PRACTITIONER

## 2022-10-17 PROCEDURE — 96417 CHEMO IV INFUS EACH ADDL SEQ: CPT

## 2022-10-17 RX ORDER — DOXORUBICIN HYDROCHLORIDE 2 MG/ML
25 INJECTION, SOLUTION INTRAVENOUS ONCE
Status: CANCELLED | OUTPATIENT
Start: 2022-10-17

## 2022-10-17 RX ORDER — MEPERIDINE HYDROCHLORIDE 25 MG/ML
25 INJECTION INTRAMUSCULAR; INTRAVENOUS; SUBCUTANEOUS EVERY 30 MIN PRN
Status: CANCELLED | OUTPATIENT
Start: 2022-10-17

## 2022-10-17 RX ORDER — EPINEPHRINE 1 MG/ML
0.3 INJECTION, SOLUTION INTRAMUSCULAR; SUBCUTANEOUS EVERY 5 MIN PRN
Status: CANCELLED | OUTPATIENT
Start: 2022-10-17

## 2022-10-17 RX ORDER — DIPHENHYDRAMINE HYDROCHLORIDE 50 MG/ML
50 INJECTION INTRAMUSCULAR; INTRAVENOUS
Status: CANCELLED
Start: 2022-10-17

## 2022-10-17 RX ORDER — PALONOSETRON 0.05 MG/ML
0.25 INJECTION, SOLUTION INTRAVENOUS ONCE
Status: CANCELLED
Start: 2022-10-17

## 2022-10-17 RX ORDER — HEPARIN SODIUM (PORCINE) LOCK FLUSH IV SOLN 100 UNIT/ML 100 UNIT/ML
5 SOLUTION INTRAVENOUS ONCE
Status: COMPLETED | OUTPATIENT
Start: 2022-10-17 | End: 2022-10-17

## 2022-10-17 RX ORDER — HEPARIN SODIUM (PORCINE) LOCK FLUSH IV SOLN 100 UNIT/ML 100 UNIT/ML
5 SOLUTION INTRAVENOUS
Status: CANCELLED | OUTPATIENT
Start: 2022-10-17

## 2022-10-17 RX ORDER — ALBUTEROL SULFATE 90 UG/1
1-2 AEROSOL, METERED RESPIRATORY (INHALATION)
Status: CANCELLED
Start: 2022-10-31

## 2022-10-17 RX ORDER — DOXORUBICIN HYDROCHLORIDE 2 MG/ML
25 INJECTION, SOLUTION INTRAVENOUS ONCE
Status: COMPLETED | OUTPATIENT
Start: 2022-10-17 | End: 2022-10-17

## 2022-10-17 RX ORDER — ACYCLOVIR 400 MG/1
400 TABLET ORAL EVERY 12 HOURS
Qty: 60 TABLET | Refills: 4 | Status: SHIPPED | OUTPATIENT
Start: 2022-10-17 | End: 2023-07-06

## 2022-10-17 RX ORDER — DOXORUBICIN HYDROCHLORIDE 2 MG/ML
25 INJECTION, SOLUTION INTRAVENOUS ONCE
Status: CANCELLED | OUTPATIENT
Start: 2022-10-31

## 2022-10-17 RX ORDER — NALOXONE HYDROCHLORIDE 0.4 MG/ML
0.2 INJECTION, SOLUTION INTRAMUSCULAR; INTRAVENOUS; SUBCUTANEOUS
Status: CANCELLED | OUTPATIENT
Start: 2022-10-17

## 2022-10-17 RX ORDER — ALBUTEROL SULFATE 0.83 MG/ML
2.5 SOLUTION RESPIRATORY (INHALATION)
Status: CANCELLED | OUTPATIENT
Start: 2022-10-31

## 2022-10-17 RX ORDER — METHYLPREDNISOLONE SODIUM SUCCINATE 125 MG/2ML
125 INJECTION, POWDER, LYOPHILIZED, FOR SOLUTION INTRAMUSCULAR; INTRAVENOUS
Status: CANCELLED
Start: 2022-10-17

## 2022-10-17 RX ORDER — METHYLPREDNISOLONE SODIUM SUCCINATE 125 MG/2ML
125 INJECTION, POWDER, LYOPHILIZED, FOR SOLUTION INTRAMUSCULAR; INTRAVENOUS
Status: CANCELLED
Start: 2022-10-31

## 2022-10-17 RX ORDER — HEPARIN SODIUM,PORCINE 10 UNIT/ML
5 VIAL (ML) INTRAVENOUS
Status: CANCELLED | OUTPATIENT
Start: 2022-10-31

## 2022-10-17 RX ORDER — NALOXONE HYDROCHLORIDE 0.4 MG/ML
0.2 INJECTION, SOLUTION INTRAMUSCULAR; INTRAVENOUS; SUBCUTANEOUS
Status: CANCELLED | OUTPATIENT
Start: 2022-10-31

## 2022-10-17 RX ORDER — PALONOSETRON 0.05 MG/ML
0.25 INJECTION, SOLUTION INTRAVENOUS ONCE
Status: COMPLETED | OUTPATIENT
Start: 2022-10-17 | End: 2022-10-17

## 2022-10-17 RX ORDER — HEPARIN SODIUM (PORCINE) LOCK FLUSH IV SOLN 100 UNIT/ML 100 UNIT/ML
5 SOLUTION INTRAVENOUS
Status: DISCONTINUED | OUTPATIENT
Start: 2022-10-17 | End: 2022-10-17 | Stop reason: HOSPADM

## 2022-10-17 RX ORDER — ACETAMINOPHEN 325 MG/1
650 TABLET ORAL
Status: CANCELLED
Start: 2022-10-31

## 2022-10-17 RX ORDER — DIPHENHYDRAMINE HCL 25 MG
50 CAPSULE ORAL
Status: CANCELLED
Start: 2022-10-31

## 2022-10-17 RX ORDER — EPINEPHRINE 1 MG/ML
0.3 INJECTION, SOLUTION INTRAMUSCULAR; SUBCUTANEOUS EVERY 5 MIN PRN
Status: CANCELLED | OUTPATIENT
Start: 2022-10-31

## 2022-10-17 RX ORDER — HEPARIN SODIUM,PORCINE 10 UNIT/ML
5 VIAL (ML) INTRAVENOUS
Status: CANCELLED | OUTPATIENT
Start: 2022-10-17

## 2022-10-17 RX ORDER — MEPERIDINE HYDROCHLORIDE 25 MG/ML
25 INJECTION INTRAMUSCULAR; INTRAVENOUS; SUBCUTANEOUS EVERY 30 MIN PRN
Status: CANCELLED | OUTPATIENT
Start: 2022-10-31

## 2022-10-17 RX ORDER — HEPARIN SODIUM (PORCINE) LOCK FLUSH IV SOLN 100 UNIT/ML 100 UNIT/ML
5 SOLUTION INTRAVENOUS
Status: CANCELLED | OUTPATIENT
Start: 2022-10-31

## 2022-10-17 RX ORDER — ALBUTEROL SULFATE 90 UG/1
1-2 AEROSOL, METERED RESPIRATORY (INHALATION)
Status: CANCELLED
Start: 2022-10-17

## 2022-10-17 RX ORDER — LORAZEPAM 2 MG/ML
0.5 INJECTION INTRAMUSCULAR EVERY 4 HOURS PRN
Status: CANCELLED | OUTPATIENT
Start: 2022-10-17

## 2022-10-17 RX ORDER — DIPHENHYDRAMINE HCL 25 MG
50 CAPSULE ORAL
Status: CANCELLED
Start: 2022-10-17

## 2022-10-17 RX ORDER — DEXAMETHASONE 4 MG/1
8 TABLET ORAL
Qty: 12 TABLET | Refills: 5 | Status: SHIPPED | OUTPATIENT
Start: 2022-10-17 | End: 2022-11-14

## 2022-10-17 RX ORDER — ACETAMINOPHEN 325 MG/1
650 TABLET ORAL
Status: CANCELLED
Start: 2022-10-17

## 2022-10-17 RX ORDER — ALBUTEROL SULFATE 0.83 MG/ML
2.5 SOLUTION RESPIRATORY (INHALATION)
Status: CANCELLED | OUTPATIENT
Start: 2022-10-17

## 2022-10-17 RX ORDER — HEPARIN SODIUM,PORCINE 10 UNIT/ML
5 VIAL (ML) INTRAVENOUS
Status: DISCONTINUED | OUTPATIENT
Start: 2022-10-17 | End: 2022-10-17 | Stop reason: HOSPADM

## 2022-10-17 RX ORDER — LORAZEPAM 2 MG/ML
0.5 INJECTION INTRAMUSCULAR EVERY 4 HOURS PRN
Status: CANCELLED | OUTPATIENT
Start: 2022-10-31

## 2022-10-17 RX ORDER — DIPHENHYDRAMINE HYDROCHLORIDE 50 MG/ML
50 INJECTION INTRAMUSCULAR; INTRAVENOUS
Status: CANCELLED
Start: 2022-10-31

## 2022-10-17 RX ORDER — PALONOSETRON 0.05 MG/ML
0.25 INJECTION, SOLUTION INTRAVENOUS ONCE
Status: CANCELLED
Start: 2022-10-31

## 2022-10-17 RX ADMIN — SODIUM CHLORIDE 250 ML: 9 INJECTION, SOLUTION INTRAVENOUS at 10:09

## 2022-10-17 RX ADMIN — Medication 5 ML: at 08:56

## 2022-10-17 RX ADMIN — PALONOSETRON HYDROCHLORIDE 0.25 MG: 0.25 INJECTION INTRAVENOUS at 10:12

## 2022-10-17 RX ADMIN — PEGFILGRASTIM 6 MG: KIT SUBCUTANEOUS at 12:07

## 2022-10-17 RX ADMIN — BRENTUXIMAB VEDOTIN 66 MG: 50 INJECTION, POWDER, LYOPHILIZED, FOR SOLUTION INTRAVENOUS at 12:03

## 2022-10-17 RX ADMIN — VINBLASTINE SULFATE 4.7 MG: 1 INJECTION INTRAVENOUS at 11:19

## 2022-10-17 RX ADMIN — Medication 5 ML: at 12:42

## 2022-10-17 RX ADMIN — DEXAMETHASONE SODIUM PHOSPHATE: 10 INJECTION, SOLUTION INTRAMUSCULAR; INTRAVENOUS at 10:35

## 2022-10-17 RX ADMIN — DOXORUBICIN HYDROCHLORIDE 40 MG: 2 INJECTION, SOLUTION INTRAVENOUS at 11:12

## 2022-10-17 RX ADMIN — DACARBAZINE 600 MG: 10 INJECTION, POWDER, FOR SOLUTION INTRAVENOUS at 11:27

## 2022-10-17 ASSESSMENT — PAIN SCALES - GENERAL: PAINLEVEL: NO PAIN (0)

## 2022-10-17 NOTE — PROGRESS NOTES
[facility]  Date of visit: Oct 17, 2022  Oncologist: Dr. Mallory Mendez      Reason for Visit: follow up HL      Oncology HPI:   She has a past medical history of acne treated with spironolactone who is otherwise healthy with no notable past surgical history. She first noticed heartburn-like symptoms when drinking alcohol a year ago and has noticed progressive dysphagia, where now dry solids get caught when swallowing although it is not painful. She has had to stop eating mid-meal but denies appetite change or weight loss. She developed night sweats x2 in Fall 2021 where she woke up with a drenched shirt. Denies current night sweats, fever, chills. During that time, she first noticed an enlarged lymph node in her right clavicular region and as it was not bothersome or painful, did not pursue medical attention at that time. She noticed no other lumps or bumps at that time. She has also been increasingly itchy in all parts of her body for the past 2 months. Over the past two weeks, she has noticed a fast heart rate without chest pain or palpitations and shortness of breath on exertion like carrying laundry upstairs or walking and talking at the same time. She denies lower extremity or upper extremity edema. Her energy level has been slowly declining, where she sleeps for longer at night and needing to rest for 4 hours in the afternoon.      She had a routine physical on 5/16/22 where she brought attention to her growing mass on her right shoulder. 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         She udnerwent ECHO which showed EF of >50%.  PET scan done on 6/18/2022 showed hypermetabolic mediastinal mass with SUV max of 14.7 and 15.6 x 14.8 x 7.8 cm in size.  Mediastinal mass extends to right level 4 lateral to right thyroid lobe.  There is associated right retroclavicular lymphadenopathy.  Associated bone  marrow activity is seen in the axial skeleton consistent with bone marrow infiltration.  Her [place] stage is stage IV.     She consulted with fertility specialist.  We do not have time to pursue pretreatment quality preservation.  She was advised to return in 6 months after completing treatment at that time fertility preservation would be considered.     Due to her ongoing itching, exertional dyspnea, I had prescribed prednisone 60 mg daily and allopurinol to prevent tumor lysis.     She underwent 2 cycles of AVD with rituximab.  Tolerated well without any side effects.  PET scan after 2 cycles shows complete metabolic response.         Nodular sclerosis classical Hodgkin lymphoma (H)    6/16/2022 Initial Diagnosis      Nodular sclerosis classical Hodgkin lymphoma (H)       6/27/2022 -  Chemotherapy      OP ONC Lymphoma and CLL - A + AVD (Brentuximab vedotin / DOXOrubicin / vinBLAStine / Dacarbazine)   Plan Provider: Mallory Mendez MD  Treatment goal: Curative  Line of treatment: [No plan line of treatment]       8/19/2022 No evidence of disease      PET scan after 2 cycles of AVD (map shows no evidence of disease.              Interval history:   Amy presents for oncology follow-up visit today   She is doing well today. No new complaints today. Her dad had a knee replacement and she was able to help with caring for him.   Constipation was much better this past round-- able to have daily BMs. Nausea day of infusion still tends to be the worst, otherwise manageable.     Numbness in her fingers is stable. She feels this has not progressed.  She has not had fevers or chills. She has dyspnea with exertion, however this is stable. No cough or congestion. No skin concerns. No diarrhea that has persisted. No abdominal pain or discomfort. She had some itchy spots on her ankles (mostly at night) however this itching is better today and no visible rash.   ROS is otherwise negative.       Physical Exam:  /77    Pulse 113   Temp 98.1  F (36.7  C) (Oral)   Resp 16   Wt 54.6 kg (120 lb 6.4 oz)   SpO2 98%   BMI 20.50 kg/m    General: No acute distress. Young, pleasant female.   HEENT: EOMI, PERRL. Sclerae are anicteric.   Lymph: Neck is supple with no lymphadenopathy in the cervical or supraclavicular areas.   Heart: Regular rate and rhythm.   Lungs: Clear to auscultation bilaterally.   Abdomen: Bowel sounds present, soft, nontender with no palpable hepatosplenomegaly or masses.   Extremities: No lower extremity edema noted bilaterally.   Neuro: Alert and oriented x3, CN grossly intact, steady gait  Skin: No rashes, petechiae, or bruising noted on exposed skin. Port to right chest wall.       Labs:     I personally reviewed the following labs:    Most Recent 3 CBC's:  Recent Labs   Lab Test 10/17/22  0900 10/03/22  1011 09/19/22  1015   WBC 7.0 15.7* 13.5*   HGB 11.9 11.1* 10.7*   MCV 94 93 92    265 255     Most Recent 3 BMP's:  Recent Labs   Lab Test 10/03/22  1011 09/19/22  1015 09/06/22  0824    137 136   POTASSIUM 3.6 3.4 3.7   CHLORIDE 102 103 102   CO2 22 21* 20*   BUN 7.2 6.8 7.8   CR 0.63 0.63 0.63   ANIONGAP 15 13 14   NICOLAS 9.5 9.3 8.9   * 120* 92     Most Recent 2 LFT's:  Recent Labs   Lab Test 10/03/22  1011 09/19/22  1015   AST 19 22   ALT 18 18   ALKPHOS 113* 98   BILITOTAL 0.3 0.3         Imaging: n/a      Impression/plan:    # Classic Hodgkin lymphoma advanced stage on AVD-BV with neulasta support.   - She has excellent response to treatment so far. PET/CT 8/19 after 2 cycles of treatment showed complete metabolic response by Lugano criteria.   - We will continue with total 6 cycles of treatment.  - Repeat scan after 6 cycles of chemotherapy.  -Today is C5D1 (10/17) AVD-BV-- continue with dose reduced Vinblastine by 50% given neuropathy  - Return to see BI on D1 and D15.   - Return to clinic to see Dr. Mendez 6 to 8 weeks after 6 cycles of chemotherapy to discuss PET scan  results.      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 This is affecting fine motor skills (like texting). She had neuropathy with earlier cycles that had resolved. Now this is persistent.   - Will dose reduce vinblastine, consider dose reduction of BV if needed       # Leukocytosis   - No signs/sxs of infection at this time. Likely related to recent Neulasta.   - Thorough ROS today given ANC 12/ WBC 15-- denies any concerning symptoms to suggest infection. Will proceed today      # Constipation  This started prior to her first infusion, however worsened post chemo. Enema x1 in ER (7/3/22) however has not had BM since. +flatus. No vomiting, mild nausea. Mag citrate eventually given and was successful.   - Continue miralax BID and senna 1-2 tab BID. She also has an Rx for milk of magnesium to use PRN that she has not started yet.  - She has tried an aloe vera concentrate and herbal tea which was effective-- advised to caution with herbal remedies. Reviewed with pharmacy and would prefer to hold off on this for now and have patient try milk of magnesium PRN since she has not tried this yet.      # Neulasta induced bony pain-- alternated tylenol and ibuprofen which helped only a bit.  - Claritin   - Tramadol PRN      # CINV  - PRN antiemetics for breakthrough nausea.       40 minutes spent on the date of the encounter doing chart review, review of test results, interpretation of tests, patient visit, documentation and discussion with other provider(s)     Aleena Sawyer BannerP-BC

## 2022-10-17 NOTE — PATIENT INSTRUCTIONS
[facility] Triage and after hours / weekends / holidays:  [phone]    Please call the triage or after hours line if you experience a temperature greater than or equal to 100.4, shaking chills, have uncontrolled nausea, vomiting and/or diarrhea, dizziness, shortness of breath, chest pain, bleeding, unexplained bruising, or if you have any other new/concerning symptoms, questions or concerns.      If you are having any concerning symptoms or wish to speak to a provider before your next infusion visit, please call your care coordinator or triage to notify them so we can adequately serve you.     If you need a refill on a narcotic prescription or other medication, please call before your infusion appointment.                October 2022 Sunday Monday Tuesday Wednesday Thursday Friday Saturday                                 1       2     3    LAB PERIPHERAL   9:45 AM   (15 min.)   [facility] LAB DRAW   [facility]    RETURN  10:00 AM   (45 min.)   Aleena Sawyer APRN CNP   [facility]    ONC INFUSION 4 HR (240 MIN)  11:30 AM   (240 min.)    ONC INFUSION NURSE   [facility] 4     5     6     7     8       9     10     11     12     13     14     15       16     17    LAB PERIPHERAL   8:45 AM   (15 min.)   [facility] LAB DRAW   [facility]    RETURN   9:00 AM   (45 min.)   Aleena Sawyer APRN CNP   [facility]    ONC INFUSION 4 HR (240 MIN)  10:00 AM   (240 min.)    ONC INFUSION NURSE   [facility] 18     19     20     21     22       23     24     25     26     27     28     29       30     31    LAB PERIPHERAL   8:45 AM   (15 min.)   [facility] LAB DRAW   [facility]    RETURN   9:00 AM   (45 min.)   Aleena Sawyer APRN CNP   [facility]    ONC INFUSION 4 HR (240  MIN)  10:00 AM   (240 min.)   [facility] ONC INFUSION NURSE   [facility]                                        November 2022 Sunday Monday Tuesday Wednesday Thursday Friday Saturday             1     2     3     4     5       6     7     8     9     10     11     12       13     14    LAB PERIPHERAL   8:45 AM   (15 min.)    [facility] DRAW   [facility]    RETURN   9:00 AM   (45 min.)   Aleena Sawyer APRN CNP   [facility]    ONC INFUSION 4 HR (240 MIN)  10:00 AM   (240 min.)   [facility] ONC INFUSION NURSE   [facility] 15     16     17     18     19       20     21     22     23     24     25     26       27     28    LAB PERIPHERAL   9:45 AM   (15 min.)   [facility] DRAW   [facility]    RETURN  10:00 AM   (45 min.)   Aleena Sawyer APRN CNP   [facility]    ONC INFUSION 4 HR (240 MIN)  11:30 AM   (240 min.)    ONC INFUSION NURSE   [facility] 29     30                                      Lab Results:  Recent Results (from the past 12 hour(s))   Comprehensive metabolic panel    Collection Time: 10/17/22  9:00 AM   Result Value Ref Range    Sodium 139 136 - 145 mmol/L    Potassium 3.6 3.4 - 5.3 mmol/L    Chloride 102 98 - 107 mmol/L    Carbon Dioxide (CO2) 22 22 - 29 mmol/L    Anion Gap 15 7 - 15 mmol/L    Urea Nitrogen 8.3 6.0 - 20.0 mg/dL    Creatinine 0.57 0.51 - 0.95 mg/dL    Calcium 9.5 8.6 - 10.0 mg/dL    Glucose 120 (H) 70 - 99 mg/dL    Alkaline Phosphatase 95 35 - 104 U/L    AST 18 10 - 35 U/L    ALT 12 10 - 35 U/L    Protein Total 7.0 6.4 - 8.3 g/dL    Albumin 4.5 3.5 - 5.2 g/dL    Bilirubin Total 0.2 <=1.2 mg/dL    GFR Estimate >90 >60 mL/min/1.73m2   CBC with platelets and differential    Collection Time: 10/17/22  9:00 AM   Result Value Ref Range    WBC Count 7.0 4.0 - 11.0 10e3/uL    RBC Count  3.92 3.80 - 5.20 10e6/uL    Hemoglobin 11.9 11.7 - 15.7 g/dL    Hematocrit 37.0 35.0 - 47.0 %    MCV 94 78 - 100 fL    MCH 30.4 26.5 - 33.0 pg    MCHC 32.2 31.5 - 36.5 g/dL    RDW 16.1 (H) 10.0 - 15.0 %    Platelet Count 324 150 - 450 10e3/uL    % Neutrophils 69 %    % Lymphocytes 18 %    % Monocytes 9 %    % Eosinophils 1 %    % Basophils 1 %    % Immature Granulocytes 2 %    NRBCs per 100 WBC 0 <1 /100    Absolute Neutrophils 4.9 1.6 - 8.3 10e3/uL    Absolute Lymphocytes 1.3 0.8 - 5.3 10e3/uL    Absolute Monocytes 0.6 0.0 - 1.3 10e3/uL    Absolute Eosinophils 0.1 0.0 - 0.7 10e3/uL    Absolute Basophils 0.1 0.0 - 0.2 10e3/uL    Absolute Immature Granulocytes 0.1 <=0.4 10e3/uL    Absolute NRBCs 0.0 10e3/uL

## 2022-10-17 NOTE — PROGRESS NOTES
Infusion Nursing Note:  Amy Harris presents today for Cycle 5 day 1 Adriamycin, Vinblastine, Dacarbazine, Brentuximab, Neulasta On Pro  Patient seen by provider today: Yes: Aleena Sawyer NP   present during visit today: Not Applicable.    Note: Ok to proceed.    Neulasta On Pro- On Body injector applied to patient today on left arm at 1210 with light facing down. Writer discussed Neulasta injection would start tomorrow at 3: 10 pm approximately 27 hours after application applied today.  Written and Verbal instruction reviewed with patient.  Pt instructed when the dose delivery starts, it will take about 45 minutes to complete. Pt aware Neulasta Onpro On-Body should have green flashing light and to call triage or on-call MD if injector flashes red or appears to be leaking. Pt aware to keep Onpro On-Body Neualsta 4 inches away from electrical equipment and to avoid showering 4 hours prior to injection.       Intravenous Access:  Implanted Port.    Treatment Conditions:  Lab Results   Component Value Date    HGB 11.9 10/17/2022    WBC 7.0 10/17/2022    ANEU 12.2 (H) 10/03/2022    ANEUTAUTO 4.9 10/17/2022     10/17/2022      Lab Results   Component Value Date     10/17/2022    POTASSIUM 3.6 10/17/2022    CR 0.57 10/17/2022    NICOLAS 9.5 10/17/2022    BILITOTAL 0.2 10/17/2022    ALBUMIN 4.5 10/17/2022    ALT 12 10/17/2022    AST 18 10/17/2022     Results reviewed, labs MET treatment parameters, ok to proceed with treatment.  ECHO/MUGA completed 6/14/22  EF 65-70%.    Post Infusion Assessment:  Patient tolerated infusion without incident.  Blood return noted pre and post infusion.  Blood return noted during adriamycin administration every 2 ml, and before, after, and post Vinblastine.  Site patent and intact, free from redness, edema or discomfort.  No evidence of extravasations.  Access discontinued per protocol.     Discharge Plan:   Prescription refills given for dexamethasone,  acyclovir.  Discharge instructions reviewed with: Patient.  Patient and/or family verbalized understanding of discharge instructions and all questions answered.  AVS to patient via [employer].  Patient will return 10/31/22 for next appointment.   Patient discharged in stable condition accompanied by: father.  Departure Mode: Ambulatory.      Kylie Rao RN

## 2022-10-17 NOTE — NURSING NOTE
Chief Complaint   Patient presents with     Port Draw     Port accessed with 20g flat needle by RN, labs collected, line flushed with saline and heparin.  Vitals taken. Pt checked in for appointment(s).      Jesenia CHINCHILLA RN PHN BSN  BMT/Oncology Lab

## 2022-10-17 NOTE — LETTER
10/17/2022         RE: Amy Harris  [address] [place] [address]        Dear Colleague,    Thank you for referring your patient, Amy Harris, to the [facility]. Please see a copy of my visit note below.    [facility]  Date of visit: Oct 17, 2022  Oncologist: Dr. Mallory Mendez      Reason for Visit: follow up       Oncology HPI:   She has a past medical history of acne treated with spironolactone who is otherwise healthy with no notable past surgical history. She first noticed heartburn-like symptoms when drinking alcohol a year ago and has noticed progressive dysphagia, where now dry solids get caught when swallowing although it is not painful. She has had to stop eating mid-meal but denies appetite change or weight loss. She developed night sweats x2 in Fall 2021 where she woke up with a drenched shirt. Denies current night sweats, fever, chills. During that time, she first noticed an enlarged lymph node in her right clavicular region and as it was not bothersome or painful, did not pursue medical attention at that time. She noticed no other lumps or bumps at that time. She has also been increasingly itchy in all parts of her body for the past 2 months. Over the past two weeks, she has noticed a fast heart rate without chest pain or palpitations and shortness of breath on exertion like carrying laundry upstairs or walking and talking at the same time. She denies lower extremity or upper extremity edema. Her energy level has been slowly declining, where she sleeps for longer at night and needing to rest for 4 hours in the afternoon.      She had a routine physical on 5/16/22 where she brought attention to her growing mass on her right shoulder. 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         She udnerwent ECHO which showed EF of >50%.  PET scan done on  6/18/2022 showed hypermetabolic mediastinal mass with SUV max of 14.7 and 15.6 x 14.8 x 7.8 cm in size.  Mediastinal mass extends to right level 4 lateral to right thyroid lobe.  There is associated right retroclavicular lymphadenopathy.  Associated bone marrow activity is seen in the axial skeleton consistent with bone marrow infiltration.  Her [place] stage is stage IV.     She consulted with fertility specialist.  We do not have time to pursue pretreatment quality preservation.  She was advised to return in 6 months after completing treatment at that time fertility preservation would be considered.     Due to her ongoing itching, exertional dyspnea, I had prescribed prednisone 60 mg daily and allopurinol to prevent tumor lysis.     She underwent 2 cycles of AVD with rituximab.  Tolerated well without any side effects.  PET scan after 2 cycles shows complete metabolic response.         Nodular sclerosis classical Hodgkin lymphoma (H)    6/16/2022 Initial Diagnosis      Nodular sclerosis classical Hodgkin lymphoma (H)       6/27/2022 -  Chemotherapy      OP ONC Lymphoma and CLL - A + AVD (Brentuximab vedotin / DOXOrubicin / vinBLAStine / Dacarbazine)   Plan Provider: Mallory Mendez MD  Treatment goal: Curative  Line of treatment: [No plan line of treatment]       8/19/2022 No evidence of disease      PET scan after 2 cycles of AVD (map shows no evidence of disease.              Interval history:   Amy presents for oncology follow-up visit today   She is doing well today. No new complaints today. Her dad had a knee replacement and she was able to help with caring for him.   Constipation was much better this past round-- able to have daily BMs. Nausea day of infusion still tends to be the worst, otherwise manageable.     Numbness in her fingers is stable. She feels this has not progressed.  She has not had fevers or chills. She has dyspnea with exertion, however this is stable. No cough or congestion. No skin  concerns. No diarrhea that has persisted. No abdominal pain or discomfort. She had some itchy spots on her ankles (mostly at night) however this itching is better today and no visible rash.   ROS is otherwise negative.       Physical Exam:  /77   Pulse 113   Temp 98.1  F (36.7  C) (Oral)   Resp 16   Wt 54.6 kg (120 lb 6.4 oz)   SpO2 98%   BMI 20.50 kg/m    General: No acute distress. Young, pleasant female.   HEENT: EOMI, PERRL. Sclerae are anicteric.   Lymph: Neck is supple with no lymphadenopathy in the cervical or supraclavicular areas.   Heart: Regular rate and rhythm.   Lungs: Clear to auscultation bilaterally.   Abdomen: Bowel sounds present, soft, nontender with no palpable hepatosplenomegaly or masses.   Extremities: No lower extremity edema noted bilaterally.   Neuro: Alert and oriented x3, CN grossly intact, steady gait  Skin: No rashes, petechiae, or bruising noted on exposed skin. Port to right chest wall.       Labs:     I personally reviewed the following labs:    Most Recent 3 CBC's:  Recent Labs   Lab Test 10/17/22  0900 10/03/22  1011 09/19/22  1015   WBC 7.0 15.7* 13.5*   HGB 11.9 11.1* 10.7*   MCV 94 93 92    265 255     Most Recent 3 BMP's:  Recent Labs   Lab Test 10/03/22  1011 09/19/22  1015 09/06/22  0824    137 136   POTASSIUM 3.6 3.4 3.7   CHLORIDE 102 103 102   CO2 22 21* 20*   BUN 7.2 6.8 7.8   CR 0.63 0.63 0.63   ANIONGAP 15 13 14   NICOLAS 9.5 9.3 8.9   * 120* 92     Most Recent 2 LFT's:  Recent Labs   Lab Test 10/03/22  1011 09/19/22  1015   AST 19 22   ALT 18 18   ALKPHOS 113* 98   BILITOTAL 0.3 0.3         Imaging: n/a      Impression/plan:    # Classic Hodgkin lymphoma advanced stage on AVD-BV with neulasta support.   - She has excellent response to treatment so far. PET/CT 8/19 after 2 cycles of treatment showed complete metabolic response by Lugano criteria.   - We will continue with total 6 cycles of treatment.  - Repeat scan after 6 cycles of  chemotherapy.  -Today is C5D1 (10/17) AVD-BV-- continue with dose reduced Vinblastine by 50% given neuropathy  - Return to see BI on D1 and D15.   - Return to clinic to see Dr. Mendez 6 to 8 weeks after 6 cycles of chemotherapy to discuss PET scan results.      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 This is affecting fine motor skills (like texting). She had neuropathy with earlier cycles that had resolved. Now this is persistent.   - Will dose reduce vinblastine, consider dose reduction of BV if needed       # Leukocytosis   - No signs/sxs of infection at this time. Likely related to recent Neulasta.   - Thorough ROS today given ANC 12/ WBC 15-- denies any concerning symptoms to suggest infection. Will proceed today      # Constipation  This started prior to her first infusion, however worsened post chemo. Enema x1 in ER (7/3/22) however has not had BM since. +flatus. No vomiting, mild nausea. Mag citrate eventually given and was successful.   - Continue miralax BID and senna 1-2 tab BID. She also has an Rx for milk of magnesium to use PRN that she has not started yet.  - She has tried an aloe vera concentrate and herbal tea which was effective-- advised to caution with herbal remedies. Reviewed with pharmacy and would prefer to hold off on this for now and have patient try milk of magnesium PRN since she has not tried this yet.      # Neulasta induced bony pain-- alternated tylenol and ibuprofen which helped only a bit.  - Claritin   - Tramadol PRN      # CINV  - PRN antiemetics for breakthrough nausea.       40 minutes spent on the date of the encounter doing chart review, review of test results, interpretation of tests, patient visit, documentation and discussion with other provider(s)             Again, thank you for allowing me to participate in the care of your patient.      Sincerely,    Aleena Sawyer, YAJAIRA CNP

## 2022-10-17 NOTE — NURSING NOTE
"Oncology Rooming Note    October 17, 2022 9:30 AM   Amy Harris is a 27 year old female who presents for:    Chief Complaint   Patient presents with     Port Draw     Port accessed with 20g flat needle by RN, labs collected, line flushed with saline and heparin.  Vitals taken. Pt checked in for appointment(s).      Oncology Clinic Visit     Nodular sclerosis classical Hodgkin lymphoma     Initial Vitals: /77   Pulse 113   Temp 98.1  F (36.7  C) (Oral)   Resp 16   Wt 54.6 kg (120 lb 6.4 oz)   SpO2 98%   BMI 20.50 kg/m   Estimated body mass index is 20.5 kg/m  as calculated from the following:    Height as of 6/27/22: 1.632 m (5' 4.25\").    Weight as of this encounter: 54.6 kg (120 lb 6.4 oz). Body surface area is 1.57 meters squared.  No Pain (0) Comment: Data Unavailable   No LMP recorded. (Menstrual status: Birth Control).  Allergies reviewed: Yes  Medications reviewed: Yes    Medications: Medication refills not needed today.  [facility] entered into EPIC:    [facility] [place] [place] [facility] [address] [facility] [address] [place], [place] - [address] [facility]    Clinical concerns: none       Karlie Horn"

## 2022-10-27 DIAGNOSIS — C81.11 NODULAR SCLEROSIS HODGKIN LYMPHOMA OF LYMPH NODES OF NECK (H): Primary | ICD-10-CM

## 2022-10-27 DIAGNOSIS — L65.8 ALOPECIA DUE TO CYTOTOXIC DRUG: ICD-10-CM

## 2022-10-27 DIAGNOSIS — T45.1X5A ALOPECIA DUE TO CYTOTOXIC DRUG: ICD-10-CM

## 2022-10-28 NOTE — PROGRESS NOTES
[facility]  Date of visit: Oct 31, 2022  Oncologist: Dr. Mallory Mendez      Reason for Visit: follow up HL      Oncology HPI:   She has a past medical history of acne treated with spironolactone who is otherwise healthy with no notable past surgical history. She first noticed heartburn-like symptoms when drinking alcohol a year ago and has noticed progressive dysphagia, where now dry solids get caught when swallowing although it is not painful. She has had to stop eating mid-meal but denies appetite change or weight loss. She developed night sweats x2 in Fall 2021 where she woke up with a drenched shirt. Denies current night sweats, fever, chills. During that time, she first noticed an enlarged lymph node in her right clavicular region and as it was not bothersome or painful, did not pursue medical attention at that time. She noticed no other lumps or bumps at that time. She has also been increasingly itchy in all parts of her body for the past 2 months. Over the past two weeks, she has noticed a fast heart rate without chest pain or palpitations and shortness of breath on exertion like carrying laundry upstairs or walking and talking at the same time. She denies lower extremity or upper extremity edema. Her energy level has been slowly declining, where she sleeps for longer at night and needing to rest for 4 hours in the afternoon.      She had a routine physical on 5/16/22 where she brought attention to her growing mass on her right shoulder. 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         She udnerwent ECHO which showed EF of >50%.  PET scan done on 6/18/2022 showed hypermetabolic mediastinal mass with SUV max of 14.7 and 15.6 x 14.8 x 7.8 cm in size.  Mediastinal mass extends to right level 4 lateral to right thyroid lobe.  There is associated right retroclavicular lymphadenopathy.  Associated bone  marrow activity is seen in the axial skeleton consistent with bone marrow infiltration.  Her [place] stage is stage IV.     She consulted with fertility specialist.  We do not have time to pursue pretreatment quality preservation.  She was advised to return in 6 months after completing treatment at that time fertility preservation would be considered.     Due to her ongoing itching, exertional dyspnea, I had prescribed prednisone 60 mg daily and allopurinol to prevent tumor lysis.     She underwent 2 cycles of AVD with rituximab.  Tolerated well without any side effects.  PET scan after 2 cycles shows complete metabolic response.         Nodular sclerosis classical Hodgkin lymphoma (H)    6/16/2022 Initial Diagnosis      Nodular sclerosis classical Hodgkin lymphoma (H)       6/27/2022 -  Chemotherapy      OP ONC Lymphoma and CLL - A + AVD (Brentuximab vedotin / DOXOrubicin / vinBLAStine / Dacarbazine)   Plan Provider: Mallory Mendez MD  Treatment goal: Curative  Line of treatment: [No plan line of treatment]       8/19/2022 No evidence of disease      PET scan after 2 cycles of AVD (map shows no evidence of disease.              Interval history:   Amy presents for oncology follow-up visit today   No major changes this past cycle. No new symptoms to report.   Neuropathy is improving-- persists in R thumb sometimes as well as left finger tips. Nothing in feet-- tingling not so much numbness  She has not had fevers or chills. No cough or congestion. No skin concerns. No diarrhea that has persisted. No abdominal pain or discomfort.   ROS is otherwise negative.       Physical Exam:  /81 (BP Location: Right arm)   Pulse 112   Temp 98.1  F (36.7  C) (Oral)   Resp 16   Wt 55 kg (121 lb 3.2 oz)   SpO2 98%   BMI 20.64 kg/m    General: No acute distress. Young, pleasant female.   HEENT: EOMI, PERRL. Sclerae are anicteric.   Lymph: Neck is supple with no lymphadenopathy in the cervical or supraclavicular  areas.   Heart: Regular rate and rhythm. Tachycardic  Lungs: Clear to auscultation bilaterally.   Abdomen: Bowel sounds present, soft, nontender with no palpable hepatosplenomegaly or masses.   Extremities: No lower extremity edema noted bilaterally.   Neuro: Alert and oriented x3, CN grossly intact, steady gait  Skin: No rashes, petechiae, or bruising noted on exposed skin. Port to right chest wall.       Labs:     I personally reviewed the following labs:    Most Recent 3 CBC's:  Recent Labs   Lab Test 10/31/22  0901 10/17/22  0900 10/03/22  1011   WBC 6.9 7.0 15.7*   HGB 12.3 11.9 11.1*   MCV 94 94 93    324 265     Most Recent 3 BMP's:  Recent Labs   Lab Test 10/17/22  0900 10/03/22  1011 09/19/22  1015    139 137   POTASSIUM 3.6 3.6 3.4   CHLORIDE 102 102 103   CO2 22 22 21*   BUN 8.3 7.2 6.8   CR 0.57 0.63 0.63   ANIONGAP 15 15 13   NICOLAS 9.5 9.5 9.3   * 126* 120*     Most Recent 2 LFT's:  Recent Labs   Lab Test 10/17/22  0900 10/03/22  1011   AST 18 19   ALT 12 18   ALKPHOS 95 113*   BILITOTAL 0.2 0.3         Imaging: n/a      Impression/plan:    # Classic Hodgkin lymphoma advanced stage on AVD-BV with neulasta support.   - She has excellent response to treatment so far. PET/CT 8/19 after 2 cycles of treatment showed complete metabolic response by Lugano criteria.   - We will continue with total 6 cycles of treatment.  - Repeat scan after 6 cycles of chemotherapy.  -Today is C5D15 (10/31) AVD-BV-- continue with dose reduced Vinblastine by 50% given neuropathy  - Return to see BI on D1 and D15.   - Return to clinic to see Dr. Mendez 6 to 8 weeks after 6 cycles of chemotherapy to discuss PET scan results.      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 This is affecting fine motor skills (like texting). She had neuropathy with earlier cycles that had resolved. Now this is persistent.   - Will dose reduce  vinblastine, consider dose reduction of BV if needed       # Leukocytosis-- resolved  - No signs/sxs of infection at this time. Likely related to recent Neulasta.   - Thorough ROS today given ANC 12/ WBC 15-- denies any concerning symptoms to suggest infection. Will proceed today      # Constipation  This started prior to her first infusion, however worsened post chemo. Enema x1 in ER (7/3/22) however has not had BM since. +flatus. No vomiting, mild nausea. Mag citrate eventually given and was successful.   - Continue miralax BID and senna 1-2 tab BID. She also has an Rx for milk of magnesium to use PRN that she has not started yet.  - She has tried an aloe vera concentrate and herbal tea which was effective-- advised to caution with herbal remedies. Reviewed with pharmacy and would prefer to hold off on this for now and have patient try milk of magnesium PRN since she has not tried this yet.      # Neulasta induced bony pain-- alternated tylenol and ibuprofen which helped only a bit.  - Claritin   - Tramadol PRN      # CINV  - PRN antiemetics for breakthrough nausea.       35 minutes spent on the date of the encounter doing chart review, review of test results, interpretation of tests, patient visit, documentation and discussion with other provider(s)     Aleena Sawyer ACNP-BC

## 2022-10-31 ENCOUNTER — ONCOLOGY VISIT (OUTPATIENT)
Dept: ONCOLOGY | Facility: CLINIC | Age: 27
End: 2022-10-31
Attending: NURSE PRACTITIONER
Payer: COMMERCIAL

## 2022-10-31 ENCOUNTER — APPOINTMENT (OUTPATIENT)
Dept: LAB | Facility: CLINIC | Age: 27
End: 2022-10-31
Attending: NURSE PRACTITIONER
Payer: COMMERCIAL

## 2022-10-31 VITALS
BODY MASS INDEX: 20.64 KG/M2 | RESPIRATION RATE: 16 BRPM | WEIGHT: 121.2 LBS | HEART RATE: 112 BPM | SYSTOLIC BLOOD PRESSURE: 122 MMHG | OXYGEN SATURATION: 98 % | TEMPERATURE: 98.1 F | DIASTOLIC BLOOD PRESSURE: 81 MMHG

## 2022-10-31 DIAGNOSIS — C81.11 NODULAR SCLEROSIS HODGKIN LYMPHOMA OF LYMPH NODES OF NECK (H): Primary | ICD-10-CM

## 2022-10-31 LAB
ALBUMIN SERPL BCG-MCNC: 4.4 G/DL (ref 3.5–5.2)
ALP SERPL-CCNC: 98 U/L (ref 35–104)
ALT SERPL W P-5'-P-CCNC: 14 U/L (ref 10–35)
ANION GAP SERPL CALCULATED.3IONS-SCNC: 12 MMOL/L (ref 7–15)
AST SERPL W P-5'-P-CCNC: 21 U/L (ref 10–35)
BASOPHILS # BLD AUTO: 0.1 10E3/UL (ref 0–0.2)
BASOPHILS NFR BLD AUTO: 1 %
BILIRUB SERPL-MCNC: 0.3 MG/DL
BUN SERPL-MCNC: 13.2 MG/DL (ref 6–20)
CALCIUM SERPL-MCNC: 9.5 MG/DL (ref 8.6–10)
CHLORIDE SERPL-SCNC: 100 MMOL/L (ref 98–107)
CREAT SERPL-MCNC: 0.68 MG/DL (ref 0.51–0.95)
DEPRECATED HCO3 PLAS-SCNC: 23 MMOL/L (ref 22–29)
EOSINOPHIL # BLD AUTO: 0.1 10E3/UL (ref 0–0.7)
EOSINOPHIL NFR BLD AUTO: 2 %
ERYTHROCYTE [DISTWIDTH] IN BLOOD BY AUTOMATED COUNT: 14.7 % (ref 10–15)
GFR SERPL CREATININE-BSD FRML MDRD: >90 ML/MIN/1.73M2
GLUCOSE SERPL-MCNC: 119 MG/DL (ref 70–99)
HCT VFR BLD AUTO: 37.6 % (ref 35–47)
HGB BLD-MCNC: 12.3 G/DL (ref 11.7–15.7)
IMM GRANULOCYTES # BLD: 0.1 10E3/UL
IMM GRANULOCYTES NFR BLD: 2 %
LYMPHOCYTES # BLD AUTO: 0.9 10E3/UL (ref 0.8–5.3)
LYMPHOCYTES NFR BLD AUTO: 13 %
MCH RBC QN AUTO: 30.7 PG (ref 26.5–33)
MCHC RBC AUTO-ENTMCNC: 32.7 G/DL (ref 31.5–36.5)
MCV RBC AUTO: 94 FL (ref 78–100)
MONOCYTES # BLD AUTO: 0.8 10E3/UL (ref 0–1.3)
MONOCYTES NFR BLD AUTO: 11 %
NEUTROPHILS # BLD AUTO: 4.9 10E3/UL (ref 1.6–8.3)
NEUTROPHILS NFR BLD AUTO: 71 %
NRBC # BLD AUTO: 0 10E3/UL
NRBC BLD AUTO-RTO: 0 /100
PLATELET # BLD AUTO: 302 10E3/UL (ref 150–450)
POTASSIUM SERPL-SCNC: 3.8 MMOL/L (ref 3.4–5.3)
PROT SERPL-MCNC: 7 G/DL (ref 6.4–8.3)
RBC # BLD AUTO: 4.01 10E6/UL (ref 3.8–5.2)
SODIUM SERPL-SCNC: 135 MMOL/L (ref 136–145)
WBC # BLD AUTO: 6.9 10E3/UL (ref 4–11)

## 2022-10-31 PROCEDURE — 96417 CHEMO IV INFUS EACH ADDL SEQ: CPT

## 2022-10-31 PROCEDURE — 85004 AUTOMATED DIFF WBC COUNT: CPT | Performed by: NURSE PRACTITIONER

## 2022-10-31 PROCEDURE — 96411 CHEMO IV PUSH ADDL DRUG: CPT

## 2022-10-31 PROCEDURE — 96377 APPLICATON ON-BODY INJECTOR: CPT | Mod: 59

## 2022-10-31 PROCEDURE — 99214 OFFICE O/P EST MOD 30 MIN: CPT | Performed by: NURSE PRACTITIONER

## 2022-10-31 PROCEDURE — 258N000003 HC RX IP 258 OP 636: Performed by: NURSE PRACTITIONER

## 2022-10-31 PROCEDURE — 250N000011 HC RX IP 250 OP 636: Performed by: NURSE PRACTITIONER

## 2022-10-31 PROCEDURE — 96367 TX/PROPH/DG ADDL SEQ IV INF: CPT

## 2022-10-31 PROCEDURE — 96413 CHEMO IV INFUSION 1 HR: CPT

## 2022-10-31 PROCEDURE — 96375 TX/PRO/DX INJ NEW DRUG ADDON: CPT

## 2022-10-31 PROCEDURE — 80053 COMPREHEN METABOLIC PANEL: CPT | Performed by: NURSE PRACTITIONER

## 2022-10-31 PROCEDURE — 96372 THER/PROPH/DIAG INJ SC/IM: CPT | Performed by: NURSE PRACTITIONER

## 2022-10-31 PROCEDURE — G0463 HOSPITAL OUTPT CLINIC VISIT: HCPCS

## 2022-10-31 PROCEDURE — 82040 ASSAY OF SERUM ALBUMIN: CPT | Performed by: NURSE PRACTITIONER

## 2022-10-31 RX ORDER — PALONOSETRON 0.05 MG/ML
0.25 INJECTION, SOLUTION INTRAVENOUS ONCE
Status: COMPLETED | OUTPATIENT
Start: 2022-10-31 | End: 2022-10-31

## 2022-10-31 RX ORDER — DOXORUBICIN HYDROCHLORIDE 2 MG/ML
25 INJECTION, SOLUTION INTRAVENOUS ONCE
Status: COMPLETED | OUTPATIENT
Start: 2022-10-31 | End: 2022-10-31

## 2022-10-31 RX ORDER — HEPARIN SODIUM (PORCINE) LOCK FLUSH IV SOLN 100 UNIT/ML 100 UNIT/ML
5 SOLUTION INTRAVENOUS
Status: DISCONTINUED | OUTPATIENT
Start: 2022-10-31 | End: 2022-10-31 | Stop reason: HOSPADM

## 2022-10-31 RX ORDER — HEPARIN SODIUM (PORCINE) LOCK FLUSH IV SOLN 100 UNIT/ML 100 UNIT/ML
500 SOLUTION INTRAVENOUS ONCE
Status: DISCONTINUED | OUTPATIENT
Start: 2022-10-31 | End: 2022-10-31 | Stop reason: HOSPADM

## 2022-10-31 RX ADMIN — DOXORUBICIN HYDROCHLORIDE 40 MG: 2 INJECTION, SOLUTION INTRAVENOUS at 10:32

## 2022-10-31 RX ADMIN — PEGFILGRASTIM 6 MG: KIT SUBCUTANEOUS at 11:23

## 2022-10-31 RX ADMIN — DEXAMETHASONE SODIUM PHOSPHATE: 10 INJECTION, SOLUTION INTRAMUSCULAR; INTRAVENOUS at 10:04

## 2022-10-31 RX ADMIN — SODIUM CHLORIDE, PRESERVATIVE FREE 5 ML: 5 INJECTION INTRAVENOUS at 13:45

## 2022-10-31 RX ADMIN — PALONOSETRON HYDROCHLORIDE 0.25 MG: 0.25 INJECTION INTRAVENOUS at 09:53

## 2022-10-31 RX ADMIN — VINBLASTINE SULFATE 4.7 MG: 1 INJECTION INTRAVENOUS at 10:41

## 2022-10-31 RX ADMIN — DACARBAZINE 600 MG: 10 INJECTION, POWDER, FOR SOLUTION INTRAVENOUS at 10:47

## 2022-10-31 RX ADMIN — SODIUM CHLORIDE 250 ML: 9 INJECTION, SOLUTION INTRAVENOUS at 09:53

## 2022-10-31 RX ADMIN — BRENTUXIMAB VEDOTIN 66 MG: 50 INJECTION, POWDER, LYOPHILIZED, FOR SOLUTION INTRAVENOUS at 11:20

## 2022-10-31 ASSESSMENT — PAIN SCALES - GENERAL: PAINLEVEL: NO PAIN (0)

## 2022-10-31 NOTE — NURSING NOTE
"Chief Complaint   Patient presents with     Oncology Clinic Visit     Nodular sclerosis Hodgkin lymphoma of lymph nodes of neck     Port Draw     Labs drawn via port by RN. Vitals taken.     Port accessed with 20G 3/4\" flat needle by RN, labs collected, line flushed with saline and heparin.  Vitals taken. Pt checked in for appointment(s).    Shayy Gonsales RN    "

## 2022-10-31 NOTE — PROGRESS NOTES
Infusion Nursing Note:  Amy Harris presents today for C5D15 Adriamycin, Vinblastine, Dacarbazine, Brentuximab, Neulasta Onpro.    Patient seen by provider today: Yes: Aleena Sawyer, FERN   present during visit today: Not Applicable.    Note: Amy presents to infusion today feeling well following her clinic appointment. Denies any questions or concerns.    Intravenous Access:  Implanted Port.    Treatment Conditions:  Lab Results   Component Value Date    HGB 12.3 10/31/2022    WBC 6.9 10/31/2022    ANEU 12.2 (H) 10/03/2022    ANEUTAUTO 4.9 10/31/2022     10/31/2022      Lab Results   Component Value Date     (L) 10/31/2022    POTASSIUM 3.8 10/31/2022    CR 0.68 10/31/2022    NICOLAS 9.5 10/31/2022    BILITOTAL 0.3 10/31/2022    ALBUMIN 4.4 10/31/2022    ALT 14 10/31/2022    AST 21 10/31/2022     Results reviewed, labs MET treatment parameters, ok to proceed with treatment.  ECHO/MUGA completed 6/15/22  EF 65-70%.    Post Infusion Assessment:  Patient tolerated infusion without incident.  Blood return noted pre and post infusion.  Site patent and intact, free from redness, edema or discomfort.  No evidence of extravasations.  Access discontinued per protocol.     Neulasta Onpro On-Body injector applied to L arm at 1127.  Writer discussed Neulasta injection would start tomorrow 11/1 at 1427, approximately 27 hours after application applied today.  Written and Verbal instruction reviewed with patient.  Pt instructed when the dose delivery starts, it will take about 45 minutes to complete.  Pt aware Neulasta Onpro On-Body should have green flashing light and to call triage or on-call MD if injector flashes red or appears to be leaking. Pt aware to keep Onpro On-Body Neulasta 4 inches away from electrical equipment and to avoid showering 4 hours prior to injection.   Neulasta Onpro Lot number: [id number]    Discharge Plan:   Patient declined prescription refills.  Discharge instructions reviewed  with: Patient and Family.  Patient and/or family verbalized understanding of discharge instructions and all questions answered.  AVS to patient via [employer].  Patient will return 11/14 for next appointment.   Patient discharged in stable condition accompanied by: mother.  Departure Mode: Ambulatory.      Yasmeen Smith RN

## 2022-10-31 NOTE — NURSING NOTE
"Oncology Rooming Note    October 31, 2022 9:09 AM   Amy Harris is a 27 year old female who presents for:    Chief Complaint   Patient presents with     Oncology Clinic Visit     Nodular sclerosis Hodgkin lymphoma of lymph nodes of neck     Port Draw     Labs drawn via port by RN. Vitals taken.     Initial Vitals: /81 (BP Location: Right arm)   Pulse 112   Temp 98.1  F (36.7  C) (Oral)   Resp 16   Wt 55 kg (121 lb 3.2 oz)   SpO2 98%   BMI 20.64 kg/m   Estimated body mass index is 20.64 kg/m  as calculated from the following:    Height as of 6/27/22: 1.632 m (5' 4.25\").    Weight as of this encounter: 55 kg (121 lb 3.2 oz). Body surface area is 1.58 meters squared.  No Pain (0) Comment: Data Unavailable   No LMP recorded. (Menstrual status: Birth Control).  Allergies reviewed: Yes  Medications reviewed: Yes    Medications: Medication refills not needed today.  Pharmacy name entered into EPIC:    [place], [place] - [address] [address] [place], [place] - [address] [facility]    Susie Cardoza CMA            "

## 2022-10-31 NOTE — LETTER
10/31/2022         RE: Amy Harris  [address] [place] [address]        Dear Colleague,    Thank you for referring your patient, Amy Harris, to the [facility]. Please see a copy of my visit note below.    [facility] [place]  Date of visit: Oct 31, 2022  Oncologist: Dr. Mallory Mendez      Reason for Visit: follow up HL      Oncology HPI:   She has a past medical history of acne treated with spironolactone who is otherwise healthy with no notable past surgical history. She first noticed heartburn-like symptoms when drinking alcohol a year ago and has noticed progressive dysphagia, where now dry solids get caught when swallowing although it is not painful. She has had to stop eating mid-meal but denies appetite change or weight loss. She developed night sweats x2 in Fall 2021 where she woke up with a drenched shirt. Denies current night sweats, fever, chills. During that time, she first noticed an enlarged lymph node in her right clavicular region and as it was not bothersome or painful, did not pursue medical attention at that time. She noticed no other lumps or bumps at that time. She has also been increasingly itchy in all parts of her body for the past 2 months. Over the past two weeks, she has noticed a fast heart rate without chest pain or palpitations and shortness of breath on exertion like carrying laundry upstairs or walking and talking at the same time. She denies lower extremity or upper extremity edema. Her energy level has been slowly declining, where she sleeps for longer at night and needing to rest for 4 hours in the afternoon.      She had a routine physical on 5/16/22 where she brought attention to her growing mass on her right shoulder. 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         She udnerwent ECHO which showed EF of >50%.  PET scan done on  6/18/2022 showed hypermetabolic mediastinal mass with SUV max of 14.7 and 15.6 x 14.8 x 7.8 cm in size.  Mediastinal mass extends to right level 4 lateral to right thyroid lobe.  There is associated right retroclavicular lymphadenopathy.  Associated bone marrow activity is seen in the axial skeleton consistent with bone marrow infiltration.  Her [place] stage is stage IV.     She consulted with fertility specialist.  We do not have time to pursue pretreatment quality preservation.  She was advised to return in 6 months after completing treatment at that time fertility preservation would be considered.     Due to her ongoing itching, exertional dyspnea, I had prescribed prednisone 60 mg daily and allopurinol to prevent tumor lysis.     She underwent 2 cycles of AVD with rituximab.  Tolerated well without any side effects.  PET scan after 2 cycles shows complete metabolic response.         Nodular sclerosis classical Hodgkin lymphoma (H)    6/16/2022 Initial Diagnosis      Nodular sclerosis classical Hodgkin lymphoma (H)       6/27/2022 -  Chemotherapy      OP ONC Lymphoma and CLL - A + AVD (Brentuximab vedotin / DOXOrubicin / vinBLAStine / Dacarbazine)   Plan Provider: Mallory Mendez MD  Treatment goal: Curative  Line of treatment: [No plan line of treatment]       8/19/2022 No evidence of disease      PET scan after 2 cycles of AVD (map shows no evidence of disease.              Interval history:   Amy presents for oncology follow-up visit today   No major changes this past cycle. No new symptoms to report.   Neuropathy is improving-- persists in R thumb sometimes as well as left finger tips. Nothing in feet-- tingling not so much numbness  She has not had fevers or chills. No cough or congestion. No skin concerns. No diarrhea that has persisted. No abdominal pain or discomfort.   ROS is otherwise negative.       Physical Exam:  /81 (BP Location: Right arm)   Pulse 112   Temp 98.1  F (36.7  C) (Oral)    Resp 16   Wt 55 kg (121 lb 3.2 oz)   SpO2 98%   BMI 20.64 kg/m    General: No acute distress. Young, pleasant female.   HEENT: EOMI, PERRL. Sclerae are anicteric.   Lymph: Neck is supple with no lymphadenopathy in the cervical or supraclavicular areas.   Heart: Regular rate and rhythm. Tachycardic  Lungs: Clear to auscultation bilaterally.   Abdomen: Bowel sounds present, soft, nontender with no palpable hepatosplenomegaly or masses.   Extremities: No lower extremity edema noted bilaterally.   Neuro: Alert and oriented x3, CN grossly intact, steady gait  Skin: No rashes, petechiae, or bruising noted on exposed skin. Port to right chest wall.       Labs:     I personally reviewed the following labs:    Most Recent 3 CBC's:  Recent Labs   Lab Test 10/31/22  0901 10/17/22  0900 10/03/22  1011   WBC 6.9 7.0 15.7*   HGB 12.3 11.9 11.1*   MCV 94 94 93    324 265     Most Recent 3 BMP's:  Recent Labs   Lab Test 10/17/22  0900 10/03/22  1011 09/19/22  1015    139 137   POTASSIUM 3.6 3.6 3.4   CHLORIDE 102 102 103   CO2 22 22 21*   BUN 8.3 7.2 6.8   CR 0.57 0.63 0.63   ANIONGAP 15 15 13   NICOLAS 9.5 9.5 9.3   * 126* 120*     Most Recent 2 LFT's:  Recent Labs   Lab Test 10/17/22  0900 10/03/22  1011   AST 18 19   ALT 12 18   ALKPHOS 95 113*   BILITOTAL 0.2 0.3         Imaging: n/a      Impression/plan:    # Classic Hodgkin lymphoma advanced stage on AVD-BV with neulasta support.   - She has excellent response to treatment so far. PET/CT 8/19 after 2 cycles of treatment showed complete metabolic response by Lugano criteria.   - We will continue with total 6 cycles of treatment.  - Repeat scan after 6 cycles of chemotherapy.  -Today is C5D15 (10/31) AVD-BV-- continue with dose reduced Vinblastine by 50% given neuropathy  - Return to see BI on D1 and D15.   - Return to clinic to see Dr. Mendez 6 to 8 weeks after 6 cycles of chemotherapy to discuss PET scan results.      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 This is affecting fine motor skills (like texting). She had neuropathy with earlier cycles that had resolved. Now this is persistent.   - Will dose reduce vinblastine, consider dose reduction of BV if needed       # Leukocytosis-- resolved  - No signs/sxs of infection at this time. Likely related to recent Neulasta.   - Thorough ROS today given ANC 12/ WBC 15-- denies any concerning symptoms to suggest infection. Will proceed today      # Constipation  This started prior to her first infusion, however worsened post chemo. Enema x1 in ER (7/3/22) however has not had BM since. +flatus. No vomiting, mild nausea. Mag citrate eventually given and was successful.   - Continue miralax BID and senna 1-2 tab BID. She also has an Rx for milk of magnesium to use PRN that she has not started yet.  - She has tried an aloe vera concentrate and herbal tea which was effective-- advised to caution with herbal remedies. Reviewed with pharmacy and would prefer to hold off on this for now and have patient try milk of magnesium PRN since she has not tried this yet.      # Neulasta induced bony pain-- alternated tylenol and ibuprofen which helped only a bit.  - Claritin   - Tramadol PRN      # CINV  - PRN antiemetics for breakthrough nausea.       35 minutes spent on the date of the encounter doing chart review, review of test results, interpretation of tests, patient visit, documentation and discussion with other provider(s)       Again, thank you for allowing me to participate in the care of your patient.      Sincerely,    Aleena Sawyer, YAJAIRA CNP

## 2022-11-10 ENCOUNTER — MYC MEDICAL ADVICE (OUTPATIENT)
Dept: ONCOLOGY | Facility: CLINIC | Age: 27
End: 2022-11-10

## 2022-11-13 NOTE — PROGRESS NOTES
[facility]  Date of visit: Nov 14, 2022  Oncologist: Dr. Mallory Mendez      Reason for Visit: follow up HL      Oncology HPI:   She has a past medical history of acne treated with spironolactone who is otherwise healthy with no notable past surgical history. She first noticed heartburn-like symptoms when drinking alcohol a year ago and has noticed progressive dysphagia, where now dry solids get caught when swallowing although it is not painful. She has had to stop eating mid-meal but denies appetite change or weight loss. She developed night sweats x2 in Fall 2021 where she woke up with a drenched shirt. Denies current night sweats, fever, chills. During that time, she first noticed an enlarged lymph node in her right clavicular region and as it was not bothersome or painful, did not pursue medical attention at that time. She noticed no other lumps or bumps at that time. She has also been increasingly itchy in all parts of her body for the past 2 months. Over the past two weeks, she has noticed a fast heart rate without chest pain or palpitations and shortness of breath on exertion like carrying laundry upstairs or walking and talking at the same time. She denies lower extremity or upper extremity edema. Her energy level has been slowly declining, where she sleeps for longer at night and needing to rest for 4 hours in the afternoon.      She had a routine physical on 5/16/22 where she brought attention to her growing mass on her right shoulder. 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         She udnerwent ECHO which showed EF of >50%.  PET scan done on 6/18/2022 showed hypermetabolic mediastinal mass with SUV max of 14.7 and 15.6 x 14.8 x 7.8 cm in size.  Mediastinal mass extends to right level 4 lateral to right thyroid lobe.  There is associated right retroclavicular lymphadenopathy.  Associated bone  marrow activity is seen in the axial skeleton consistent with bone marrow infiltration.  Her [place] stage is stage IV.     She consulted with fertility specialist.  We do not have time to pursue pretreatment quality preservation.  She was advised to return in 6 months after completing treatment at that time fertility preservation would be considered.     Due to her ongoing itching, exertional dyspnea, I had prescribed prednisone 60 mg daily and allopurinol to prevent tumor lysis.     She underwent 2 cycles of AVD with rituximab.  Tolerated well without any side effects.  PET scan after 2 cycles shows complete metabolic response.         Nodular sclerosis classical Hodgkin lymphoma (H)    6/16/2022 Initial Diagnosis      Nodular sclerosis classical Hodgkin lymphoma (H)       6/27/2022 -  Chemotherapy      OP ONC Lymphoma and CLL - A + AVD (Brentuximab vedotin / DOXOrubicin / vinBLAStine / Dacarbazine)   Plan Provider: Mallory Mendez MD  Treatment goal: Curative  Line of treatment: [No plan line of treatment]       8/19/2022 No evidence of disease      PET scan after 2 cycles of AVD (map shows no evidence of disease.              Interval history:   Amy presents for oncology follow-up visit today   No major changes this past cycle. No new symptoms to report.   Neuropathy is stable-- persists in R thumb first joint as well as left finger tips. Nothing in feet-- tingling not so much numbness. No tripping/ balance concerns related to neuropathy.   She has not had fevers or chills. No cough or congestion. No skin concerns-- occ itch on her ankles but no open skin or obvious rash. Bowel pattern is stable- constipation first week and diarrhea second week. ROS is otherwise negative.       Physical Exam:  /85 (BP Location: Right arm, Patient Position: Sitting, Cuff Size: Adult Regular)   Pulse 105   Temp 98.6  F (37  C) (Oral)   Resp 16   Wt 54 kg (119 lb)   SpO2 99%   BMI 20.27 kg/m    General: No acute  distress. Young, pleasant female.   HEENT: EOMI, PERRL. Sclerae are anicteric.   Lymph: Neck is supple with no lymphadenopathy in the cervical or supraclavicular areas.   Heart: Regular rate and rhythm. Tachycardic  Lungs: Clear to auscultation bilaterally.   Abdomen: Bowel sounds present, soft, nontender with no palpable hepatosplenomegaly or masses.   Extremities: No lower extremity edema noted bilaterally.   Neuro: Alert and oriented x3, CN grossly intact, steady gait  Skin: No rashes, petechiae, or bruising noted on exposed skin. Port to right chest wall.       Labs:     I personally reviewed the following labs:    Most Recent 3 CBC's:  Recent Labs   Lab Test 11/14/22  0922 10/31/22  0901 10/17/22  0900   WBC 8.2 6.9 7.0   HGB 12.8 12.3 11.9   MCV 93 94 94    302 324     Most Recent 3 BMP's:  Recent Labs   Lab Test 11/14/22  0922 10/31/22  0901 10/17/22  0900    135* 139   POTASSIUM 4.1 3.8 3.6   CHLORIDE 101 100 102   CO2 22 23 22   BUN 9.9 13.2 8.3   CR 0.64 0.68 0.57   ANIONGAP 13 12 15   NICOLAS 9.5 9.5 9.5   * 119* 120*     Most Recent 2 LFT's:  Recent Labs   Lab Test 11/14/22 0922 10/31/22  0901   AST 19 21   ALT 11 14   ALKPHOS 99 98   BILITOTAL 0.2 0.3         Imaging: n/a      Impression/plan:    # Classic Hodgkin lymphoma advanced stage on AVD-BV with neulasta support.   - She has excellent response to treatment so far. PET/CT 8/19 after 2 cycles of treatment showed complete metabolic response by Lugano criteria.   - We will continue with total 6 cycles of treatment.  - Repeat scan after 6 cycles of chemotherapy.  -Today is C6D1 (11/14) AVD-BV-- continue with dose reduced Vinblastine by 50% given neuropathy  - Return to see BI on D1 and D15.   - Return to clinic to see Dr. Mendez 6 to 8 weeks after 6 cycles of chemotherapy to discuss PET scan results.  - Will arrange port removal in December    Ppx  - Continue acyclovir and Bactrim ppx      # Neuropathy-- Gr 1-- improved from Gr  2  Noted to fingertips only, numbness no pain. This is affecting fine motor skills (like texting). She had neuropathy with earlier cycles that had resolved. Now this is persistent.   - Will dose reduce vinblastine, consider dose reduction of BV if needed       # Leukocytosis-- resolved  - No signs/sxs of infection at this time. Likely related to recent Neulasta.       # Constipation  This started prior to her first infusion, however worsened post chemo. Enema x1 in ER (7/3/22) however has not had BM since. +flatus. No vomiting, mild nausea. Mag citrate eventually given and was successful.   - Continue miralax BID and senna 1-2 tab BID. She also has an Rx for milk of magnesium to use PRN that she has not started yet.  - She has tried an aloe vera concentrate and herbal tea which was effective-- advised to caution with herbal remedies. Reviewed with pharmacy and would prefer to hold off on this for now and have patient try milk of magnesium PRN since she has not tried this yet.      # Neulasta induced bony pain  - Claritin   - Tramadol PRN      # CINV  - PRN antiemetics for breakthrough nausea.       35 minutes spent on the date of the encounter doing chart review, review of test results, interpretation of tests, patient visit, documentation and discussion with other provider(s)     Aleena CHOWDARYP-BC

## 2022-11-14 ENCOUNTER — INFUSION THERAPY VISIT (OUTPATIENT)
Dept: ONCOLOGY | Facility: CLINIC | Age: 27
End: 2022-11-14
Attending: NURSE PRACTITIONER
Payer: COMMERCIAL

## 2022-11-14 ENCOUNTER — APPOINTMENT (OUTPATIENT)
Dept: LAB | Facility: CLINIC | Age: 27
End: 2022-11-14
Attending: NURSE PRACTITIONER
Payer: COMMERCIAL

## 2022-11-14 VITALS
DIASTOLIC BLOOD PRESSURE: 85 MMHG | TEMPERATURE: 98.6 F | OXYGEN SATURATION: 99 % | HEART RATE: 105 BPM | WEIGHT: 119 LBS | BODY MASS INDEX: 20.27 KG/M2 | RESPIRATION RATE: 16 BRPM | SYSTOLIC BLOOD PRESSURE: 130 MMHG

## 2022-11-14 DIAGNOSIS — C81.11 NODULAR SCLEROSIS HODGKIN LYMPHOMA OF LYMPH NODES OF NECK (H): Primary | ICD-10-CM

## 2022-11-14 DIAGNOSIS — C81.10 NODULAR SCLEROSIS CLASSICAL HODGKIN LYMPHOMA (H): ICD-10-CM

## 2022-11-14 DIAGNOSIS — C81.12 NODULAR SCLEROSIS HODGKIN LYMPHOMA OF INTRATHORACIC LYMPH NODES (H): ICD-10-CM

## 2022-11-14 LAB
ALBUMIN SERPL BCG-MCNC: 4.4 G/DL (ref 3.5–5.2)
ALP SERPL-CCNC: 99 U/L (ref 35–104)
ALT SERPL W P-5'-P-CCNC: 11 U/L (ref 10–35)
ANION GAP SERPL CALCULATED.3IONS-SCNC: 13 MMOL/L (ref 7–15)
AST SERPL W P-5'-P-CCNC: 19 U/L (ref 10–35)
BASOPHILS # BLD AUTO: 0.1 10E3/UL (ref 0–0.2)
BASOPHILS NFR BLD AUTO: 1 %
BILIRUB SERPL-MCNC: 0.2 MG/DL
BUN SERPL-MCNC: 9.9 MG/DL (ref 6–20)
CALCIUM SERPL-MCNC: 9.5 MG/DL (ref 8.6–10)
CHLORIDE SERPL-SCNC: 101 MMOL/L (ref 98–107)
CREAT SERPL-MCNC: 0.64 MG/DL (ref 0.51–0.95)
DEPRECATED HCO3 PLAS-SCNC: 22 MMOL/L (ref 22–29)
EOSINOPHIL # BLD AUTO: 0.1 10E3/UL (ref 0–0.7)
EOSINOPHIL NFR BLD AUTO: 1 %
ERYTHROCYTE [DISTWIDTH] IN BLOOD BY AUTOMATED COUNT: 14.6 % (ref 10–15)
GFR SERPL CREATININE-BSD FRML MDRD: >90 ML/MIN/1.73M2
GLUCOSE SERPL-MCNC: 101 MG/DL (ref 70–99)
HCT VFR BLD AUTO: 39.5 % (ref 35–47)
HGB BLD-MCNC: 12.8 G/DL (ref 11.7–15.7)
IMM GRANULOCYTES # BLD: 0.1 10E3/UL
IMM GRANULOCYTES NFR BLD: 1 %
LYMPHOCYTES # BLD AUTO: 1.2 10E3/UL (ref 0.8–5.3)
LYMPHOCYTES NFR BLD AUTO: 14 %
MCH RBC QN AUTO: 30 PG (ref 26.5–33)
MCHC RBC AUTO-ENTMCNC: 32.4 G/DL (ref 31.5–36.5)
MCV RBC AUTO: 93 FL (ref 78–100)
MONOCYTES # BLD AUTO: 0.8 10E3/UL (ref 0–1.3)
MONOCYTES NFR BLD AUTO: 10 %
NEUTROPHILS # BLD AUTO: 6 10E3/UL (ref 1.6–8.3)
NEUTROPHILS NFR BLD AUTO: 73 %
NRBC # BLD AUTO: 0 10E3/UL
NRBC BLD AUTO-RTO: 0 /100
PLATELET # BLD AUTO: 287 10E3/UL (ref 150–450)
POTASSIUM SERPL-SCNC: 4.1 MMOL/L (ref 3.4–5.3)
PROT SERPL-MCNC: 7.1 G/DL (ref 6.4–8.3)
RBC # BLD AUTO: 4.27 10E6/UL (ref 3.8–5.2)
SODIUM SERPL-SCNC: 136 MMOL/L (ref 136–145)
WBC # BLD AUTO: 8.2 10E3/UL (ref 4–11)

## 2022-11-14 PROCEDURE — 250N000011 HC RX IP 250 OP 636: Performed by: NURSE PRACTITIONER

## 2022-11-14 PROCEDURE — 96411 CHEMO IV PUSH ADDL DRUG: CPT

## 2022-11-14 PROCEDURE — 258N000003 HC RX IP 258 OP 636: Performed by: NURSE PRACTITIONER

## 2022-11-14 PROCEDURE — 96377 APPLICATON ON-BODY INJECTOR: CPT | Mod: 59

## 2022-11-14 PROCEDURE — 96375 TX/PRO/DX INJ NEW DRUG ADDON: CPT

## 2022-11-14 PROCEDURE — 96417 CHEMO IV INFUS EACH ADDL SEQ: CPT

## 2022-11-14 PROCEDURE — 80053 COMPREHEN METABOLIC PANEL: CPT | Performed by: NURSE PRACTITIONER

## 2022-11-14 PROCEDURE — 85004 AUTOMATED DIFF WBC COUNT: CPT | Performed by: NURSE PRACTITIONER

## 2022-11-14 PROCEDURE — G0463 HOSPITAL OUTPT CLINIC VISIT: HCPCS

## 2022-11-14 PROCEDURE — 96367 TX/PROPH/DG ADDL SEQ IV INF: CPT

## 2022-11-14 PROCEDURE — 96372 THER/PROPH/DIAG INJ SC/IM: CPT | Performed by: NURSE PRACTITIONER

## 2022-11-14 PROCEDURE — 99214 OFFICE O/P EST MOD 30 MIN: CPT | Performed by: NURSE PRACTITIONER

## 2022-11-14 PROCEDURE — 96413 CHEMO IV INFUSION 1 HR: CPT

## 2022-11-14 RX ORDER — HEPARIN SODIUM,PORCINE 10 UNIT/ML
5 VIAL (ML) INTRAVENOUS
Status: CANCELLED | OUTPATIENT
Start: 2022-11-14

## 2022-11-14 RX ORDER — DOXORUBICIN HYDROCHLORIDE 2 MG/ML
25 INJECTION, SOLUTION INTRAVENOUS ONCE
Status: CANCELLED | OUTPATIENT
Start: 2022-11-14

## 2022-11-14 RX ORDER — ACETAMINOPHEN 325 MG/1
650 TABLET ORAL
Status: CANCELLED
Start: 2022-11-14

## 2022-11-14 RX ORDER — PALONOSETRON 0.05 MG/ML
0.25 INJECTION, SOLUTION INTRAVENOUS ONCE
Status: COMPLETED | OUTPATIENT
Start: 2022-11-14 | End: 2022-11-14

## 2022-11-14 RX ORDER — ALBUTEROL SULFATE 0.83 MG/ML
2.5 SOLUTION RESPIRATORY (INHALATION)
Status: CANCELLED | OUTPATIENT
Start: 2022-11-28

## 2022-11-14 RX ORDER — ALBUTEROL SULFATE 90 UG/1
1-2 AEROSOL, METERED RESPIRATORY (INHALATION)
Status: CANCELLED
Start: 2022-11-14

## 2022-11-14 RX ORDER — DIPHENHYDRAMINE HCL 25 MG
50 CAPSULE ORAL
Status: CANCELLED
Start: 2022-11-14

## 2022-11-14 RX ORDER — NALOXONE HYDROCHLORIDE 0.4 MG/ML
0.2 INJECTION, SOLUTION INTRAMUSCULAR; INTRAVENOUS; SUBCUTANEOUS
Status: CANCELLED | OUTPATIENT
Start: 2022-11-28

## 2022-11-14 RX ORDER — DEXAMETHASONE 4 MG/1
8 TABLET ORAL
Qty: 12 TABLET | Refills: 0 | Status: SHIPPED | OUTPATIENT
Start: 2022-11-14 | End: 2023-07-06

## 2022-11-14 RX ORDER — MEPERIDINE HYDROCHLORIDE 25 MG/ML
25 INJECTION INTRAMUSCULAR; INTRAVENOUS; SUBCUTANEOUS EVERY 30 MIN PRN
Status: CANCELLED | OUTPATIENT
Start: 2022-11-28

## 2022-11-14 RX ORDER — HEPARIN SODIUM (PORCINE) LOCK FLUSH IV SOLN 100 UNIT/ML 100 UNIT/ML
5 SOLUTION INTRAVENOUS
Status: CANCELLED | OUTPATIENT
Start: 2022-11-14

## 2022-11-14 RX ORDER — HEPARIN SODIUM (PORCINE) LOCK FLUSH IV SOLN 100 UNIT/ML 100 UNIT/ML
5 SOLUTION INTRAVENOUS
Status: CANCELLED | OUTPATIENT
Start: 2022-11-28

## 2022-11-14 RX ORDER — MEPERIDINE HYDROCHLORIDE 25 MG/ML
25 INJECTION INTRAMUSCULAR; INTRAVENOUS; SUBCUTANEOUS EVERY 30 MIN PRN
Status: CANCELLED | OUTPATIENT
Start: 2022-11-14

## 2022-11-14 RX ORDER — ALBUTEROL SULFATE 0.83 MG/ML
2.5 SOLUTION RESPIRATORY (INHALATION)
Status: CANCELLED | OUTPATIENT
Start: 2022-11-14

## 2022-11-14 RX ORDER — METHYLPREDNISOLONE SODIUM SUCCINATE 125 MG/2ML
125 INJECTION, POWDER, LYOPHILIZED, FOR SOLUTION INTRAMUSCULAR; INTRAVENOUS
Status: CANCELLED
Start: 2022-11-28

## 2022-11-14 RX ORDER — PALONOSETRON 0.05 MG/ML
0.25 INJECTION, SOLUTION INTRAVENOUS ONCE
Status: CANCELLED
Start: 2022-11-28

## 2022-11-14 RX ORDER — HEPARIN SODIUM (PORCINE) LOCK FLUSH IV SOLN 100 UNIT/ML 100 UNIT/ML
5 SOLUTION INTRAVENOUS
Status: COMPLETED | OUTPATIENT
Start: 2022-11-14 | End: 2022-11-14

## 2022-11-14 RX ORDER — HEPARIN SODIUM (PORCINE) LOCK FLUSH IV SOLN 100 UNIT/ML 100 UNIT/ML
5 SOLUTION INTRAVENOUS
Status: DISCONTINUED | OUTPATIENT
Start: 2022-11-14 | End: 2022-11-14 | Stop reason: HOSPADM

## 2022-11-14 RX ORDER — HEPARIN SODIUM,PORCINE 10 UNIT/ML
5 VIAL (ML) INTRAVENOUS
Status: CANCELLED | OUTPATIENT
Start: 2022-11-28

## 2022-11-14 RX ORDER — EPINEPHRINE 1 MG/ML
0.3 INJECTION, SOLUTION INTRAMUSCULAR; SUBCUTANEOUS EVERY 5 MIN PRN
Status: CANCELLED | OUTPATIENT
Start: 2022-11-14

## 2022-11-14 RX ORDER — DIPHENHYDRAMINE HYDROCHLORIDE 50 MG/ML
50 INJECTION INTRAMUSCULAR; INTRAVENOUS
Status: CANCELLED
Start: 2022-11-14

## 2022-11-14 RX ORDER — DOXORUBICIN HYDROCHLORIDE 2 MG/ML
25 INJECTION, SOLUTION INTRAVENOUS ONCE
Status: COMPLETED | OUTPATIENT
Start: 2022-11-14 | End: 2022-11-14

## 2022-11-14 RX ORDER — DIPHENHYDRAMINE HYDROCHLORIDE 50 MG/ML
50 INJECTION INTRAMUSCULAR; INTRAVENOUS
Status: CANCELLED
Start: 2022-11-28

## 2022-11-14 RX ORDER — SULFAMETHOXAZOLE/TRIMETHOPRIM 800-160 MG
TABLET ORAL
Qty: 24 TABLET | Refills: 11 | Status: SHIPPED | OUTPATIENT
Start: 2022-11-14 | End: 2023-07-06

## 2022-11-14 RX ORDER — DOXORUBICIN HYDROCHLORIDE 2 MG/ML
25 INJECTION, SOLUTION INTRAVENOUS ONCE
Status: CANCELLED | OUTPATIENT
Start: 2022-11-28

## 2022-11-14 RX ORDER — PALONOSETRON 0.05 MG/ML
0.25 INJECTION, SOLUTION INTRAVENOUS ONCE
Status: CANCELLED
Start: 2022-11-14

## 2022-11-14 RX ORDER — EPINEPHRINE 1 MG/ML
0.3 INJECTION, SOLUTION INTRAMUSCULAR; SUBCUTANEOUS EVERY 5 MIN PRN
Status: CANCELLED | OUTPATIENT
Start: 2022-11-28

## 2022-11-14 RX ORDER — NALOXONE HYDROCHLORIDE 0.4 MG/ML
0.2 INJECTION, SOLUTION INTRAMUSCULAR; INTRAVENOUS; SUBCUTANEOUS
Status: CANCELLED | OUTPATIENT
Start: 2022-11-14

## 2022-11-14 RX ORDER — LORAZEPAM 2 MG/ML
0.5 INJECTION INTRAMUSCULAR EVERY 4 HOURS PRN
Status: CANCELLED | OUTPATIENT
Start: 2022-11-28

## 2022-11-14 RX ORDER — DIPHENHYDRAMINE HCL 25 MG
50 CAPSULE ORAL
Status: CANCELLED
Start: 2022-11-28

## 2022-11-14 RX ORDER — METHYLPREDNISOLONE SODIUM SUCCINATE 125 MG/2ML
125 INJECTION, POWDER, LYOPHILIZED, FOR SOLUTION INTRAMUSCULAR; INTRAVENOUS
Status: CANCELLED
Start: 2022-11-14

## 2022-11-14 RX ORDER — LORAZEPAM 2 MG/ML
0.5 INJECTION INTRAMUSCULAR EVERY 4 HOURS PRN
Status: CANCELLED | OUTPATIENT
Start: 2022-11-14

## 2022-11-14 RX ORDER — ACETAMINOPHEN 325 MG/1
650 TABLET ORAL
Status: CANCELLED
Start: 2022-11-28

## 2022-11-14 RX ORDER — ALBUTEROL SULFATE 90 UG/1
1-2 AEROSOL, METERED RESPIRATORY (INHALATION)
Status: CANCELLED
Start: 2022-11-28

## 2022-11-14 RX ADMIN — DACARBAZINE 600 MG: 10 INJECTION, POWDER, FOR SOLUTION INTRAVENOUS at 11:29

## 2022-11-14 RX ADMIN — PEGFILGRASTIM 6 MG: KIT SUBCUTANEOUS at 12:25

## 2022-11-14 RX ADMIN — VINBLASTINE SULFATE 4.7 MG: 1 INJECTION INTRAVENOUS at 11:12

## 2022-11-14 RX ADMIN — SODIUM CHLORIDE 250 ML: 9 INJECTION, SOLUTION INTRAVENOUS at 10:31

## 2022-11-14 RX ADMIN — BRENTUXIMAB VEDOTIN 66 MG: 50 INJECTION, POWDER, LYOPHILIZED, FOR SOLUTION INTRAVENOUS at 12:05

## 2022-11-14 RX ADMIN — DEXAMETHASONE SODIUM PHOSPHATE: 10 INJECTION, SOLUTION INTRAMUSCULAR; INTRAVENOUS at 10:34

## 2022-11-14 RX ADMIN — Medication 5 ML: at 09:16

## 2022-11-14 RX ADMIN — Medication 5 ML: at 12:41

## 2022-11-14 RX ADMIN — PALONOSETRON HYDROCHLORIDE 0.25 MG: 0.25 INJECTION INTRAVENOUS at 10:31

## 2022-11-14 RX ADMIN — DOXORUBICIN HYDROCHLORIDE 40 MG: 2 INJECTION, SOLUTION INTRAVENOUS at 11:04

## 2022-11-14 ASSESSMENT — PAIN SCALES - GENERAL: PAINLEVEL: NO PAIN (0)

## 2022-11-14 NOTE — PROGRESS NOTES
Infusion Nursing Note:  Amy Harris presents today for Cycle 6 Day 1 Adriamycin, Vinblastine, Dacarbazine, Brentuximab and Neulasta OnPro.    Patient seen by provider today: Yes: Aleena Sawyer NP   present during visit today: Not Applicable.    Note: Patient presents to infusion today doing well. No new questions or concerns following her visit with Aleena Sawyer NP.    Intravenous Access:  Implanted Port.    Treatment Conditions:  Lab Results   Component Value Date    HGB 12.8 11/14/2022    WBC 8.2 11/14/2022    ANEU 12.2 (H) 10/03/2022    ANEUTAUTO 6.0 11/14/2022     11/14/2022      Lab Results   Component Value Date     11/14/2022    POTASSIUM 4.1 11/14/2022    CR 0.64 11/14/2022    NICOLAS 9.5 11/14/2022    BILITOTAL 0.2 11/14/2022    ALBUMIN 4.4 11/14/2022    ALT 11 11/14/2022    AST 19 11/14/2022     Results reviewed, labs MET treatment parameters, ok to proceed with treatment.  ECHO/MUGA completed 6/15/2022 EF 65-70%.    Post Infusion Assessment:  Patient tolerated infusion without incident.  Blood return noted pre and post infusion.  Blood return noted during Adriamycin and Vinblastine administration every 2-3 cc.  Site patent and intact, free from redness, edema or discomfort.  No evidence of extravasations.  Access discontinued per protocol.     Neulasta Onpro On-Body injector applied to Left Arm at 1230.  Writer discussed Neulasta injection would start tomorrow 11/15 at 1530, approximately 27 hours after application applied today.  Written and Verbal instruction reviewed with patient.  Pt instructed when the dose delivery starts, it will take about 45 minutes to complete.  Pt aware Neulasta Onpro On-Body should have green flashing light and to call triage or on-call MD if injector flashes red or appears to be leaking. Pt aware to keep Onpro On-Body Neulasta 4 inches away from electrical equipment and to avoid showering 4 hours prior to injection.   Neulasta Onpro Lot  number: See MAR.    Discharge Plan:   Prescription refills given for Bactrim and Dexamethasone.  Discharge instructions reviewed with: Patient.  Patient and/or family verbalized understanding of discharge instructions and all questions answered.  AVS to patient via [employer].  Patient will return 11/28 for next appointment.   Patient discharged in stable condition accompanied by: mother.  Departure Mode: Ambulatory.      Eveline Casillas RN

## 2022-11-14 NOTE — NURSING NOTE
"Oncology Rooming Note    November 14, 2022 9:25 AM   Amy Harris is a 27 year old female who presents for:    Chief Complaint   Patient presents with     Oncology Clinic Visit     Nodular sclerosis Hodgkin lymphoma of lymph nodes of neck (H) (Primary Dx     Port Draw     Labs drawn from port by rn.  VS taken.     Initial Vitals: /85 (BP Location: Right arm, Patient Position: Sitting, Cuff Size: Adult Regular)   Pulse 105   Temp 98.6  F (37  C) (Oral)   Resp 16   Wt 54 kg (119 lb)   SpO2 99%   BMI 20.27 kg/m   Estimated body mass index is 20.27 kg/m  as calculated from the following:    Height as of 6/27/22: 1.632 m (5' 4.25\").    Weight as of this encounter: 54 kg (119 lb). Body surface area is 1.56 meters squared.  No Pain (0) Comment: Data Unavailable   No LMP recorded. (Menstrual status: Birth Control).  Allergies reviewed: Yes  Medications reviewed: Yes    Medications: MEDICATION REFILLS NEEDED TODAY. Provider was notified.  Pharmacy name entered into EPIC:    [place], [place] - [address] [id number] [place], [place] - [address]    Clinical concerns: Refill needed on Sulfamethoxazole, dexamethasone.     Susie Cardoza CMA            "

## 2022-11-14 NOTE — NURSING NOTE
"Chief Complaint   Patient presents with     Oncology Clinic Visit     Nodular sclerosis Hodgkin lymphoma of lymph nodes of neck (H) (Primary Dx     Port Draw     Labs drawn from port by rn.  VS taken.     Port accessed with 20 gauge 3/4\" gripper needle and labs drawn by rn.  Port flushed with NS and heparin.  Pt tolerated well.  VS taken.  Pt checked in for next appt.    Katarzyna Luna RN      "

## 2022-11-14 NOTE — PATIENT INSTRUCTIONS
Contact Numbers  [facility]: [phone] (for symptom and scheduling needs)    Please call the [facility] Triage line if you experience a temperature greater than or equal to 100.4, shaking chills, have uncontrolled nausea, vomiting and/or diarrhea, dizziness, shortness of breath, chest pain, bleeding, unexplained bruising, or if you have any other new/concerning symptoms, questions or concerns.     If you are having any concerning symptoms or wish to speak to a provider before your next infusion visit, please call your care coordinator or triage to notify them so we can adequately serve you.     If you need a refill on a narcotic prescription or other medication, please call triage before your infusion appointment.           November 2022 Sunday Monday Tuesday Wednesday Thursday Friday Saturday             1     2     3     4     5       6     7     8     9     10     11     12       13     14    LAB PERIPHERAL   8:45 AM   (15 min.)   [facility] LAB DRAW   [facility]    RETURN   9:00 AM   (45 min.)   Aleena Sawyer APRN CNP   [facility]    ONC INFUSION 4 HR (240 MIN)  10:00 AM   (240 min.)    ONC INFUSION NURSE   [facility] 15     16     17     18     19       20     21     22     23     24     25     26       27     28    LAB PERIPHERAL   9:45 AM   (15 min.)   [facility] LAB DRAW   [facility]    RETURN  10:00 AM   (45 min.)   Aleena Sawyer APRN CNP   [facility]    ONC INFUSION 4 HR (240 MIN)  11:30 AM   (240 min.)    ONC INFUSION NURSE   [facility] 29 30 December 2022 Sunday Monday Tuesday Wednesday Thursday Friday Saturday                       1     2     3       4     5     6     7     8     9     10       11     12     13     14     15     16     17       18     19     20      21     22     23     24       25     26     27     28     29     30     31                       Lab Results:  Recent Results (from the past 12 hour(s))   Comprehensive metabolic panel    Collection Time: 11/14/22  9:22 AM   Result Value Ref Range    Sodium 136 136 - 145 mmol/L    Potassium 4.1 3.4 - 5.3 mmol/L    Chloride 101 98 - 107 mmol/L    Carbon Dioxide (CO2) 22 22 - 29 mmol/L    Anion Gap 13 7 - 15 mmol/L    Urea Nitrogen 9.9 6.0 - 20.0 mg/dL    Creatinine 0.64 0.51 - 0.95 mg/dL    Calcium 9.5 8.6 - 10.0 mg/dL    Glucose 101 (H) 70 - 99 mg/dL    Alkaline Phosphatase 99 35 - 104 U/L    AST 19 10 - 35 U/L    ALT 11 10 - 35 U/L    Protein Total 7.1 6.4 - 8.3 g/dL    Albumin 4.4 3.5 - 5.2 g/dL    Bilirubin Total 0.2 <=1.2 mg/dL    GFR Estimate >90 >60 mL/min/1.73m2   CBC with platelets and differential    Collection Time: 11/14/22  9:22 AM   Result Value Ref Range    WBC Count 8.2 4.0 - 11.0 10e3/uL    RBC Count 4.27 3.80 - 5.20 10e6/uL    Hemoglobin 12.8 11.7 - 15.7 g/dL    Hematocrit 39.5 35.0 - 47.0 %    MCV 93 78 - 100 fL    MCH 30.0 26.5 - 33.0 pg    MCHC 32.4 31.5 - 36.5 g/dL    RDW 14.6 10.0 - 15.0 %    Platelet Count 287 150 - 450 10e3/uL    % Neutrophils 73 %    % Lymphocytes 14 %    % Monocytes 10 %    % Eosinophils 1 %    % Basophils 1 %    % Immature Granulocytes 1 %    NRBCs per 100 WBC 0 <1 /100    Absolute Neutrophils 6.0 1.6 - 8.3 10e3/uL    Absolute Lymphocytes 1.2 0.8 - 5.3 10e3/uL    Absolute Monocytes 0.8 0.0 - 1.3 10e3/uL    Absolute Eosinophils 0.1 0.0 - 0.7 10e3/uL    Absolute Basophils 0.1 0.0 - 0.2 10e3/uL    Absolute Immature Granulocytes 0.1 <=0.4 10e3/uL    Absolute NRBCs 0.0 10e3/uL

## 2022-11-14 NOTE — LETTER
11/14/2022         RE: Amy Harris  [address] [place] [address]        Dear Colleague,    Thank you for referring your patient, Amy Harris, to the [facility]. Please see a copy of my visit note below.    [facility] [place]  Date of visit: Nov 14, 2022  Oncologist: Dr. Mallory Mendez      Reason for Visit: follow up       Oncology HPI:   She has a past medical history of acne treated with spironolactone who is otherwise healthy with no notable past surgical history. She first noticed heartburn-like symptoms when drinking alcohol a year ago and has noticed progressive dysphagia, where now dry solids get caught when swallowing although it is not painful. She has had to stop eating mid-meal but denies appetite change or weight loss. She developed night sweats x2 in Fall 2021 where she woke up with a drenched shirt. Denies current night sweats, fever, chills. During that time, she first noticed an enlarged lymph node in her right clavicular region and as it was not bothersome or painful, did not pursue medical attention at that time. She noticed no other lumps or bumps at that time. She has also been increasingly itchy in all parts of her body for the past 2 months. Over the past two weeks, she has noticed a fast heart rate without chest pain or palpitations and shortness of breath on exertion like carrying laundry upstairs or walking and talking at the same time. She denies lower extremity or upper extremity edema. Her energy level has been slowly declining, where she sleeps for longer at night and needing to rest for 4 hours in the afternoon.      She had a routine physical on 5/16/22 where she brought attention to her growing mass on her right shoulder. 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         She udnerwent ECHO which showed EF of >50%.  PET scan done on  6/18/2022 showed hypermetabolic mediastinal mass with SUV max of 14.7 and 15.6 x 14.8 x 7.8 cm in size.  Mediastinal mass extends to right level 4 lateral to right thyroid lobe.  There is associated right retroclavicular lymphadenopathy.  Associated bone marrow activity is seen in the axial skeleton consistent with bone marrow infiltration.  Her [place] stage is stage IV.     She consulted with fertility specialist.  We do not have time to pursue pretreatment quality preservation.  She was advised to return in 6 months after completing treatment at that time fertility preservation would be considered.     Due to her ongoing itching, exertional dyspnea, I had prescribed prednisone 60 mg daily and allopurinol to prevent tumor lysis.     She underwent 2 cycles of AVD with rituximab.  Tolerated well without any side effects.  PET scan after 2 cycles shows complete metabolic response.         Nodular sclerosis classical Hodgkin lymphoma (H)    6/16/2022 Initial Diagnosis      Nodular sclerosis classical Hodgkin lymphoma (H)       6/27/2022 -  Chemotherapy      OP ONC Lymphoma and CLL - A + AVD (Brentuximab vedotin / DOXOrubicin / vinBLAStine / Dacarbazine)   Plan Provider: Mallory Mendez MD  Treatment goal: Curative  Line of treatment: [No plan line of treatment]       8/19/2022 No evidence of disease      PET scan after 2 cycles of AVD (map shows no evidence of disease.              Interval history:   Amy presents for oncology follow-up visit today   No major changes this past cycle. No new symptoms to report.   Neuropathy is stable-- persists in R thumb first joint as well as left finger tips. Nothing in feet-- tingling not so much numbness. No tripping/ balance concerns related to neuropathy.   She has not had fevers or chills. No cough or congestion. No skin concerns-- occ itch on her ankles but no open skin or obvious rash. Bowel pattern is stable- constipation first week and diarrhea second week. ROS is  otherwise negative.       Physical Exam:  /85 (BP Location: Right arm, Patient Position: Sitting, Cuff Size: Adult Regular)   Pulse 105   Temp 98.6  F (37  C) (Oral)   Resp 16   Wt 54 kg (119 lb)   SpO2 99%   BMI 20.27 kg/m    General: No acute distress. Young, pleasant female.   HEENT: EOMI, PERRL. Sclerae are anicteric.   Lymph: Neck is supple with no lymphadenopathy in the cervical or supraclavicular areas.   Heart: Regular rate and rhythm. Tachycardic  Lungs: Clear to auscultation bilaterally.   Abdomen: Bowel sounds present, soft, nontender with no palpable hepatosplenomegaly or masses.   Extremities: No lower extremity edema noted bilaterally.   Neuro: Alert and oriented x3, CN grossly intact, steady gait  Skin: No rashes, petechiae, or bruising noted on exposed skin. Port to right chest wall.       Labs:     I personally reviewed the following labs:    Most Recent 3 CBC's:  Recent Labs   Lab Test 11/14/22  0922 10/31/22  0901 10/17/22  0900   WBC 8.2 6.9 7.0   HGB 12.8 12.3 11.9   MCV 93 94 94    302 324     Most Recent 3 BMP's:  Recent Labs   Lab Test 11/14/22  0922 10/31/22  0901 10/17/22  0900    135* 139   POTASSIUM 4.1 3.8 3.6   CHLORIDE 101 100 102   CO2 22 23 22   BUN 9.9 13.2 8.3   CR 0.64 0.68 0.57   ANIONGAP 13 12 15   NICOLAS 9.5 9.5 9.5   * 119* 120*     Most Recent 2 LFT's:  Recent Labs   Lab Test 11/14/22  0922 10/31/22  0901   AST 19 21   ALT 11 14   ALKPHOS 99 98   BILITOTAL 0.2 0.3         Imaging: n/a      Impression/plan:    # Classic Hodgkin lymphoma advanced stage on AVD-BV with neulasta support.   - She has excellent response to treatment so far. PET/CT 8/19 after 2 cycles of treatment showed complete metabolic response by Lugano criteria.   - We will continue with total 6 cycles of treatment.  - Repeat scan after 6 cycles of chemotherapy.  -Today is C6D1 (11/14) AVD-BV-- continue with dose reduced Vinblastine by 50% given neuropathy  - Return to see BI on D1  and D15.   - Return to clinic to see Dr. Mendez 6 to 8 weeks after 6 cycles of chemotherapy to discuss PET scan results.  - Will arrange port removal in December    Ppx  - Continue acyclovir and Bactrim ppx      # Neuropathy-- Gr 1-- improved from Gr 2  Noted to fingertips only, numbness no pain. This is affecting fine motor skills (like texting). She had neuropathy with earlier cycles that had resolved. Now this is persistent.   - Will dose reduce vinblastine, consider dose reduction of BV if needed       # Leukocytosis-- resolved  - No signs/sxs of infection at this time. Likely related to recent Neulasta.       # Constipation  This started prior to her first infusion, however worsened post chemo. Enema x1 in ER (7/3/22) however has not had BM since. +flatus. No vomiting, mild nausea. Mag citrate eventually given and was successful.   - Continue miralax BID and senna 1-2 tab BID. She also has an Rx for milk of magnesium to use PRN that she has not started yet.  - She has tried an aloe vera concentrate and herbal tea which was effective-- advised to caution with herbal remedies. Reviewed with pharmacy and would prefer to hold off on this for now and have patient try milk of magnesium PRN since she has not tried this yet.      # Neulasta induced bony pain  - Claritin   - Tramadol PRN      # CINV  - PRN antiemetics for breakthrough nausea.       35 minutes spent on the date of the encounter doing chart review, review of test results, interpretation of tests, patient visit, documentation and discussion with other provider(s)     Aleena Sawyer [facility]-BC

## 2022-11-22 NOTE — PROGRESS NOTES
[facility]  Date of visit: Nov 28, 2022  Oncologist: Dr. Mallory Mendez      Reason for Visit: follow up HL      Oncology HPI:   She has a past medical history of acne treated with spironolactone who is otherwise healthy with no notable past surgical history. She first noticed heartburn-like symptoms when drinking alcohol a year ago and has noticed progressive dysphagia, where now dry solids get caught when swallowing although it is not painful. She has had to stop eating mid-meal but denies appetite change or weight loss. She developed night sweats x2 in Fall 2021 where she woke up with a drenched shirt. Denies current night sweats, fever, chills. During that time, she first noticed an enlarged lymph node in her right clavicular region and as it was not bothersome or painful, did not pursue medical attention at that time. She noticed no other lumps or bumps at that time. She has also been increasingly itchy in all parts of her body for the past 2 months. Over the past two weeks, she has noticed a fast heart rate without chest pain or palpitations and shortness of breath on exertion like carrying laundry upstairs or walking and talking at the same time. She denies lower extremity or upper extremity edema. Her energy level has been slowly declining, where she sleeps for longer at night and needing to rest for 4 hours in the afternoon.      She had a routine physical on 5/16/22 where she brought attention to her growing mass on her right shoulder. 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         She udnerwent ECHO which showed EF of >50%.  PET scan done on 6/18/2022 showed hypermetabolic mediastinal mass with SUV max of 14.7 and 15.6 x 14.8 x 7.8 cm in size.  Mediastinal mass extends to right level 4 lateral to right thyroid lobe.  There is associated right retroclavicular lymphadenopathy.  Associated bone  marrow activity is seen in the axial skeleton consistent with bone marrow infiltration.  Her [place] stage is stage IV.     She consulted with fertility specialist.  We do not have time to pursue pretreatment quality preservation.  She was advised to return in 6 months after completing treatment at that time fertility preservation would be considered.     Due to her ongoing itching, exertional dyspnea, I had prescribed prednisone 60 mg daily and allopurinol to prevent tumor lysis.     She underwent 2 cycles of AVD with rituximab.  Tolerated well without any side effects.  PET scan after 2 cycles shows complete metabolic response.         Nodular sclerosis classical Hodgkin lymphoma (H)    6/16/2022 Initial Diagnosis      Nodular sclerosis classical Hodgkin lymphoma (H)       6/27/2022 -  Chemotherapy      OP ONC Lymphoma and CLL - A + AVD (Brentuximab vedotin / DOXOrubicin / vinBLAStine / Dacarbazine)   Plan Provider: Mallory Mendez MD  Treatment goal: Curative  Line of treatment: [No plan line of treatment]       8/19/2022 No evidence of disease      PET scan after 2 cycles of AVD (map shows no evidence of disease.              Interval history:   Amy presents for oncology follow-up visit today     No major changes this past cycle. No new symptoms to report.   Neuropathy is stable-- persists in R thumb first joint as well as left finger tips. Nothing in feet-- tingling not so much numbness. No tripping/ balance concerns related to neuropathy.   Has some joint soreness/ stiffness  She has not had fevers or chills. No cough or congestion. No skin concerns/ Bowel pattern is stable- constipation first week and diarrhea second week. ROS is otherwise negative.       Physical Exam:  /88   Pulse (!) 135   Temp 97.8  F (36.6  C)   Resp 16   Wt 53.8 kg (118 lb 9.6 oz)   SpO2 98%   BMI 20.20 kg/m    General: No acute distress. Young, pleasant female.   HEENT: EOMI, PERRL. Sclerae are anicteric.   Lymph:  Neck is supple with no lymphadenopathy in the cervical or supraclavicular areas.   Heart: Regular rate and rhythm. Tachycardic  Lungs: Clear to auscultation bilaterally.   Abdomen: Bowel sounds present, soft, nontender with no palpable hepatosplenomegaly or masses.   Extremities: No lower extremity edema noted bilaterally.   Neuro: Alert and oriented x3, CN grossly intact, steady gait  Skin: No rashes, petechiae, or bruising noted on exposed skin. Port to right chest wall.       Labs:     I personally reviewed the following labs:    Most Recent 3 CBC's:  Recent Labs   Lab Test 11/28/22  1008 11/14/22  0922 10/31/22  0901   WBC 7.5 8.2 6.9   HGB 13.3 12.8 12.3   MCV 93 93 94    287 302     Most Recent 3 BMP's:  Recent Labs   Lab Test 11/28/22  1008 11/14/22  0922 10/31/22  0901    136 135*   POTASSIUM 3.6 4.1 3.8   CHLORIDE 99 101 100   CO2 24 22 23   BUN 10.8 9.9 13.2   CR 0.64 0.64 0.68   ANIONGAP 14 13 12   NICOLAS 9.5 9.5 9.5   * 101* 119*     Most Recent 2 LFT's:  Recent Labs   Lab Test 11/28/22  1008 11/14/22  0922   AST 23 19   ALT 19 11   ALKPHOS 103 99   BILITOTAL 0.3 0.2         Imaging: n/a      Impression/plan:    # Classic Hodgkin lymphoma advanced stage on AVD-BV with neulasta support.   - She has excellent response to treatment so far. PET/CT 8/19 after 2 cycles of treatment showed complete metabolic response by Lugano criteria.   - We will continue with total 6 cycles of treatment.  - Repeat scan after 6 cycles of chemotherapy.  -Today is C6D15 (11/28) AVD-BV-- continue with dose reduced Vinblastine by 50% given neuropathy  - Return to clinic to see Dr. Mendez 6 to 8 weeks after 6 cycles of chemotherapy to discuss PET scan results.  - Will arrange port removal in December -- scheduled    Ppx  - Continue acyclovir and Bactrim ppx      # Neuropathy-- Gr 1-- improved from Gr 2  Noted to fingertips only, numbness no pain. This is affecting fine motor skills (like texting). She had  neuropathy with earlier cycles that had resolved. Now this is persistent.   - Will dose reduce vinblastine, consider dose reduction of BV if needed       # Constipation  This started prior to her first infusion, however worsened post chemo. Enema x1 in ER (7/3/22) however has not had BM since. +flatus. No vomiting, mild nausea. Mag citrate eventually given and was successful.   - Continue miralax BID and senna 1-2 tab BID. She also has an Rx for milk of magnesium to use PRN that she has not started yet.  - She has tried an aloe vera concentrate and herbal tea which was effective-- advised to caution with herbal remedies. Reviewed with pharmacy and would prefer to hold off on this for now and have patient try milk of magnesium PRN since she has not tried this yet.      # Neulasta induced bony pain  - Claritin   - Tramadol PRN      # CINV  - PRN antiemetics for breakthrough nausea.       35 minutes spent on the date of the encounter doing chart review, review of test results, interpretation of tests, patient visit, documentation and discussion with other provider(s)     Aleena Sawyer ACNP-BC

## 2022-11-28 ENCOUNTER — APPOINTMENT (OUTPATIENT)
Dept: LAB | Facility: CLINIC | Age: 27
End: 2022-11-28
Attending: NURSE PRACTITIONER
Payer: COMMERCIAL

## 2022-11-28 ENCOUNTER — INFUSION THERAPY VISIT (OUTPATIENT)
Dept: ONCOLOGY | Facility: CLINIC | Age: 27
End: 2022-11-28
Attending: NURSE PRACTITIONER
Payer: COMMERCIAL

## 2022-11-28 VITALS
OXYGEN SATURATION: 98 % | DIASTOLIC BLOOD PRESSURE: 88 MMHG | BODY MASS INDEX: 20.2 KG/M2 | HEART RATE: 135 BPM | TEMPERATURE: 97.8 F | WEIGHT: 118.6 LBS | SYSTOLIC BLOOD PRESSURE: 127 MMHG | RESPIRATION RATE: 16 BRPM

## 2022-11-28 VITALS — HEART RATE: 122 BPM

## 2022-11-28 DIAGNOSIS — C81.10 NODULAR SCLEROSIS CLASSICAL HODGKIN LYMPHOMA (H): ICD-10-CM

## 2022-11-28 DIAGNOSIS — C81.11 NODULAR SCLEROSIS HODGKIN LYMPHOMA OF LYMPH NODES OF NECK (H): Primary | ICD-10-CM

## 2022-11-28 LAB
ALBUMIN SERPL BCG-MCNC: 4.6 G/DL (ref 3.5–5.2)
ALP SERPL-CCNC: 103 U/L (ref 35–104)
ALT SERPL W P-5'-P-CCNC: 19 U/L (ref 10–35)
ANION GAP SERPL CALCULATED.3IONS-SCNC: 14 MMOL/L (ref 7–15)
AST SERPL W P-5'-P-CCNC: 23 U/L (ref 10–35)
BASOPHILS # BLD AUTO: 0.1 10E3/UL (ref 0–0.2)
BASOPHILS NFR BLD AUTO: 1 %
BILIRUB SERPL-MCNC: 0.3 MG/DL
BUN SERPL-MCNC: 10.8 MG/DL (ref 6–20)
CALCIUM SERPL-MCNC: 9.5 MG/DL (ref 8.6–10)
CHLORIDE SERPL-SCNC: 99 MMOL/L (ref 98–107)
CREAT SERPL-MCNC: 0.64 MG/DL (ref 0.51–0.95)
DEPRECATED HCO3 PLAS-SCNC: 24 MMOL/L (ref 22–29)
EOSINOPHIL # BLD AUTO: 0.1 10E3/UL (ref 0–0.7)
EOSINOPHIL NFR BLD AUTO: 1 %
ERYTHROCYTE [DISTWIDTH] IN BLOOD BY AUTOMATED COUNT: 14.6 % (ref 10–15)
GFR SERPL CREATININE-BSD FRML MDRD: >90 ML/MIN/1.73M2
GLUCOSE SERPL-MCNC: 127 MG/DL (ref 70–99)
HCT VFR BLD AUTO: 41.1 % (ref 35–47)
HGB BLD-MCNC: 13.3 G/DL (ref 11.7–15.7)
IMM GRANULOCYTES # BLD: 0.1 10E3/UL
IMM GRANULOCYTES NFR BLD: 2 %
LYMPHOCYTES # BLD AUTO: 0.8 10E3/UL (ref 0.8–5.3)
LYMPHOCYTES NFR BLD AUTO: 11 %
MCH RBC QN AUTO: 30 PG (ref 26.5–33)
MCHC RBC AUTO-ENTMCNC: 32.4 G/DL (ref 31.5–36.5)
MCV RBC AUTO: 93 FL (ref 78–100)
MONOCYTES # BLD AUTO: 0.8 10E3/UL (ref 0–1.3)
MONOCYTES NFR BLD AUTO: 11 %
NEUTROPHILS # BLD AUTO: 5.7 10E3/UL (ref 1.6–8.3)
NEUTROPHILS NFR BLD AUTO: 74 %
NRBC # BLD AUTO: 0 10E3/UL
NRBC BLD AUTO-RTO: 0 /100
PLATELET # BLD AUTO: 318 10E3/UL (ref 150–450)
POTASSIUM SERPL-SCNC: 3.6 MMOL/L (ref 3.4–5.3)
PROT SERPL-MCNC: 7.2 G/DL (ref 6.4–8.3)
RBC # BLD AUTO: 4.43 10E6/UL (ref 3.8–5.2)
SODIUM SERPL-SCNC: 137 MMOL/L (ref 136–145)
WBC # BLD AUTO: 7.5 10E3/UL (ref 4–11)

## 2022-11-28 PROCEDURE — 96367 TX/PROPH/DG ADDL SEQ IV INF: CPT

## 2022-11-28 PROCEDURE — 96377 APPLICATON ON-BODY INJECTOR: CPT | Mod: 59

## 2022-11-28 PROCEDURE — 80053 COMPREHEN METABOLIC PANEL: CPT

## 2022-11-28 PROCEDURE — 250N000011 HC RX IP 250 OP 636: Performed by: NURSE PRACTITIONER

## 2022-11-28 PROCEDURE — 99214 OFFICE O/P EST MOD 30 MIN: CPT | Performed by: NURSE PRACTITIONER

## 2022-11-28 PROCEDURE — 96411 CHEMO IV PUSH ADDL DRUG: CPT

## 2022-11-28 PROCEDURE — 85004 AUTOMATED DIFF WBC COUNT: CPT

## 2022-11-28 PROCEDURE — 96375 TX/PRO/DX INJ NEW DRUG ADDON: CPT

## 2022-11-28 PROCEDURE — 36591 DRAW BLOOD OFF VENOUS DEVICE: CPT

## 2022-11-28 PROCEDURE — 96417 CHEMO IV INFUS EACH ADDL SEQ: CPT

## 2022-11-28 PROCEDURE — 258N000003 HC RX IP 258 OP 636: Performed by: NURSE PRACTITIONER

## 2022-11-28 PROCEDURE — 96372 THER/PROPH/DIAG INJ SC/IM: CPT | Performed by: NURSE PRACTITIONER

## 2022-11-28 PROCEDURE — 96413 CHEMO IV INFUSION 1 HR: CPT

## 2022-11-28 PROCEDURE — G0463 HOSPITAL OUTPT CLINIC VISIT: HCPCS

## 2022-11-28 RX ORDER — OMEPRAZOLE 20 MG/1
20 TABLET, DELAYED RELEASE ORAL DAILY
Qty: 30 TABLET | Refills: 0 | Status: SHIPPED | OUTPATIENT
Start: 2022-11-28 | End: 2023-03-27

## 2022-11-28 RX ORDER — HEPARIN SODIUM (PORCINE) LOCK FLUSH IV SOLN 100 UNIT/ML 100 UNIT/ML
5 SOLUTION INTRAVENOUS ONCE
Status: COMPLETED | OUTPATIENT
Start: 2022-11-28 | End: 2022-11-28

## 2022-11-28 RX ORDER — DOXORUBICIN HYDROCHLORIDE 2 MG/ML
25 INJECTION, SOLUTION INTRAVENOUS ONCE
Status: COMPLETED | OUTPATIENT
Start: 2022-11-28 | End: 2022-11-28

## 2022-11-28 RX ORDER — HEPARIN SODIUM (PORCINE) LOCK FLUSH IV SOLN 100 UNIT/ML 100 UNIT/ML
5 SOLUTION INTRAVENOUS
Status: DISCONTINUED | OUTPATIENT
Start: 2022-11-28 | End: 2022-11-28 | Stop reason: HOSPADM

## 2022-11-28 RX ORDER — PALONOSETRON 0.05 MG/ML
0.25 INJECTION, SOLUTION INTRAVENOUS ONCE
Status: COMPLETED | OUTPATIENT
Start: 2022-11-28 | End: 2022-11-28

## 2022-11-28 RX ADMIN — DACARBAZINE 600 MG: 200 INJECTION, POWDER, FOR SOLUTION INTRAVENOUS at 12:24

## 2022-11-28 RX ADMIN — VINBLASTINE SULFATE 4.7 MG: 1 INJECTION INTRAVENOUS at 12:12

## 2022-11-28 RX ADMIN — PEGFILGRASTIM 6 MG: KIT SUBCUTANEOUS at 13:36

## 2022-11-28 RX ADMIN — BRENTUXIMAB VEDOTIN 66 MG: 50 INJECTION, POWDER, LYOPHILIZED, FOR SOLUTION INTRAVENOUS at 12:59

## 2022-11-28 RX ADMIN — DEXAMETHASONE SODIUM PHOSPHATE: 10 INJECTION, SOLUTION INTRAMUSCULAR; INTRAVENOUS at 11:31

## 2022-11-28 RX ADMIN — DOXORUBICIN HYDROCHLORIDE 40 MG: 2 INJECTION, SOLUTION INTRAVENOUS at 12:01

## 2022-11-28 RX ADMIN — PALONOSETRON HYDROCHLORIDE 0.25 MG: 0.25 INJECTION INTRAVENOUS at 11:19

## 2022-11-28 RX ADMIN — SODIUM CHLORIDE 250 ML: 9 INJECTION, SOLUTION INTRAVENOUS at 11:19

## 2022-11-28 RX ADMIN — Medication 5 ML: at 10:10

## 2022-11-28 RX ADMIN — Medication 5 ML: at 13:39

## 2022-11-28 ASSESSMENT — PAIN SCALES - GENERAL: PAINLEVEL: NO PAIN (0)

## 2022-11-28 NOTE — PROGRESS NOTES
Infusion Nursing Note:  Amy Harris presents today for Cycle 6 Day 15 Adriamycin, Vinblastine, Dacarbazine, Brentuximab and Neulasta OnPro.    Patient seen by provider today: Yes: Aleena Sawyer NP   present during visit today: Not Applicable.    Note: Patient presents to infusion today doing well. No new questions or concerns following her visit with Aleena Sawyer NP.    Intravenous Access:  Implanted Port.    Treatment Conditions:  Lab Results   Component Value Date    HGB 13.3 11/28/2022    WBC 7.5 11/28/2022    ANEU 12.2 (H) 10/03/2022    ANEUTAUTO 5.7 11/28/2022     11/28/2022      Lab Results   Component Value Date     11/28/2022    POTASSIUM 3.6 11/28/2022    CR 0.64 11/28/2022    NICOLAS 9.5 11/28/2022    BILITOTAL 0.3 11/28/2022    ALBUMIN 4.6 11/28/2022    ALT 19 11/28/2022    AST 23 11/28/2022     Results reviewed, labs MET treatment parameters, ok to proceed with treatment.  ECHO/MUGA completed 6/15/2022 EF 65-70%.    Post Infusion Assessment:  Patient tolerated infusion without incident.  Blood return noted pre and post infusion.  Blood return noted during Adriamycin and Vinblastine administration every 2-3 cc.  Site patent and intact, free from redness, edema or discomfort.  No evidence of extravasations.  Access discontinued per protocol.     Neulasta Onpro On-Body injector applied to Left Arm at 1340.  Writer discussed Neulasta injection would start tomorrow 11/29 at 1640, approximately 27 hours after application applied today.  Written and Verbal instruction reviewed with patient.  Pt instructed when the dose delivery starts, it will take about 45 minutes to complete.  Pt aware Neulasta Onpro On-Body should have green flashing light and to call triage or on-call MD if injector flashes red or appears to be leaking. Pt aware to keep Onpro On-Body Neulasta 4 inches away from electrical equipment and to avoid showering 4 hours prior to injection.   Neulasta Onpro Lot  number: See MAR.    Discharge Plan:   Prescription refills given for Omeprazole.  Discharge instructions reviewed with: Patient.  Patient and/or family verbalized understanding of discharge instructions and all questions answered.  AVS to patient via [employer].  Patient will return 1/2 for PET scan and 1/5 for next appointment with Dr. Mendez.   Patient discharged in stable condition accompanied by: mother.  Departure Mode: Ambulatory.      Eveline Casillas RN

## 2022-11-28 NOTE — LETTER
11/28/2022         RE: Amy Harris  [address] [place] [address]        Dear Colleague,    Thank you for referring your patient, Amy Harris, to the [facility]. Please see a copy of my visit note below.    [facility] [place]  Date of visit: Nov 28, 2022  Oncologist: Dr. Mallory Mendez      Reason for Visit: follow up       Oncology HPI:   She has a past medical history of acne treated with spironolactone who is otherwise healthy with no notable past surgical history. She first noticed heartburn-like symptoms when drinking alcohol a year ago and has noticed progressive dysphagia, where now dry solids get caught when swallowing although it is not painful. She has had to stop eating mid-meal but denies appetite change or weight loss. She developed night sweats x2 in Fall 2021 where she woke up with a drenched shirt. Denies current night sweats, fever, chills. During that time, she first noticed an enlarged lymph node in her right clavicular region and as it was not bothersome or painful, did not pursue medical attention at that time. She noticed no other lumps or bumps at that time. She has also been increasingly itchy in all parts of her body for the past 2 months. Over the past two weeks, she has noticed a fast heart rate without chest pain or palpitations and shortness of breath on exertion like carrying laundry upstairs or walking and talking at the same time. She denies lower extremity or upper extremity edema. Her energy level has been slowly declining, where she sleeps for longer at night and needing to rest for 4 hours in the afternoon.      She had a routine physical on 5/16/22 where she brought attention to her growing mass on her right shoulder. 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         She udnerwent ECHO which showed EF of >50%.  PET scan done on  6/18/2022 showed hypermetabolic mediastinal mass with SUV max of 14.7 and 15.6 x 14.8 x 7.8 cm in size.  Mediastinal mass extends to right level 4 lateral to right thyroid lobe.  There is associated right retroclavicular lymphadenopathy.  Associated bone marrow activity is seen in the axial skeleton consistent with bone marrow infiltration.  Her [place] stage is stage IV.     She consulted with fertility specialist.  We do not have time to pursue pretreatment quality preservation.  She was advised to return in 6 months after completing treatment at that time fertility preservation would be considered.     Due to her ongoing itching, exertional dyspnea, I had prescribed prednisone 60 mg daily and allopurinol to prevent tumor lysis.     She underwent 2 cycles of AVD with rituximab.  Tolerated well without any side effects.  PET scan after 2 cycles shows complete metabolic response.         Nodular sclerosis classical Hodgkin lymphoma (H)    6/16/2022 Initial Diagnosis      Nodular sclerosis classical Hodgkin lymphoma (H)       6/27/2022 -  Chemotherapy      OP ONC Lymphoma and CLL - A + AVD (Brentuximab vedotin / DOXOrubicin / vinBLAStine / Dacarbazine)   Plan Provider: Mallory Mendez MD  Treatment goal: Curative  Line of treatment: [No plan line of treatment]       8/19/2022 No evidence of disease      PET scan after 2 cycles of AVD (map shows no evidence of disease.              Interval history:   Amy presents for oncology follow-up visit today     No major changes this past cycle. No new symptoms to report.   Neuropathy is stable-- persists in R thumb first joint as well as left finger tips. Nothing in feet-- tingling not so much numbness. No tripping/ balance concerns related to neuropathy.   Has some joint soreness/ stiffness  She has not had fevers or chills. No cough or congestion. No skin concerns/ Bowel pattern is stable- constipation first week and diarrhea second week. ROS is otherwise negative.        Physical Exam:  /88   Pulse (!) 135   Temp 97.8  F (36.6  C)   Resp 16   Wt 53.8 kg (118 lb 9.6 oz)   SpO2 98%   BMI 20.20 kg/m    General: No acute distress. Young, pleasant female.   HEENT: EOMI, PERRL. Sclerae are anicteric.   Lymph: Neck is supple with no lymphadenopathy in the cervical or supraclavicular areas.   Heart: Regular rate and rhythm. Tachycardic  Lungs: Clear to auscultation bilaterally.   Abdomen: Bowel sounds present, soft, nontender with no palpable hepatosplenomegaly or masses.   Extremities: No lower extremity edema noted bilaterally.   Neuro: Alert and oriented x3, CN grossly intact, steady gait  Skin: No rashes, petechiae, or bruising noted on exposed skin. Port to right chest wall.       Labs:     I personally reviewed the following labs:    Most Recent 3 CBC's:  Recent Labs   Lab Test 11/28/22  1008 11/14/22  0922 10/31/22  0901   WBC 7.5 8.2 6.9   HGB 13.3 12.8 12.3   MCV 93 93 94    287 302     Most Recent 3 BMP's:  Recent Labs   Lab Test 11/28/22  1008 11/14/22  0922 10/31/22  0901    136 135*   POTASSIUM 3.6 4.1 3.8   CHLORIDE 99 101 100   CO2 24 22 23   BUN 10.8 9.9 13.2   CR 0.64 0.64 0.68   ANIONGAP 14 13 12   NICOLAS 9.5 9.5 9.5   * 101* 119*     Most Recent 2 LFT's:  Recent Labs   Lab Test 11/28/22  1008 11/14/22  0922   AST 23 19   ALT 19 11   ALKPHOS 103 99   BILITOTAL 0.3 0.2         Imaging: n/a      Impression/plan:    # Classic Hodgkin lymphoma advanced stage on AVD-BV with neulasta support.   - She has excellent response to treatment so far. PET/CT 8/19 after 2 cycles of treatment showed complete metabolic response by Lugano criteria.   - We will continue with total 6 cycles of treatment.  - Repeat scan after 6 cycles of chemotherapy.  -Today is C6D15 (11/28) AVD-BV-- continue with dose reduced Vinblastine by 50% given neuropathy  - Return to clinic to see Dr. Mendez 6 to 8 weeks after 6 cycles of chemotherapy to discuss PET scan results.  -  Will arrange port removal in December -- scheduled    Ppx  - Continue acyclovir and Bactrim ppx      # Neuropathy-- Gr 1-- improved from Gr 2  Noted to fingertips only, numbness no pain. This is affecting fine motor skills (like texting). She had neuropathy with earlier cycles that had resolved. Now this is persistent.   - Will dose reduce vinblastine, consider dose reduction of BV if needed       # Constipation  This started prior to her first infusion, however worsened post chemo. Enema x1 in ER (7/3/22) however has not had BM since. +flatus. No vomiting, mild nausea. Mag citrate eventually given and was successful.   - Continue miralax BID and senna 1-2 tab BID. She also has an Rx for milk of magnesium to use PRN that she has not started yet.  - She has tried an aloe vera concentrate and herbal tea which was effective-- advised to caution with herbal remedies. Reviewed with pharmacy and would prefer to hold off on this for now and have patient try milk of magnesium PRN since she has not tried this yet.      # Neulasta induced bony pain  - Claritin   - Tramadol PRN      # CINV  - PRN antiemetics for breakthrough nausea.       35 minutes spent on the date of the encounter doing chart review, review of test results, interpretation of tests, patient visit, documentation and discussion with other provider(s)         Again, thank you for allowing me to participate in the care of your patient.      Sincerely,    Aleena Sawyer, YAJAIRA CNP

## 2022-11-28 NOTE — NURSING NOTE
"Oncology Rooming Note    November 28, 2022 10:33 AM   Amy Harris is a 27 year old female who presents for:    Chief Complaint   Patient presents with     Port Draw     Labs drawn by RN via port, vitals taken.     Oncology Clinic Visit     Nodular sclerosis classical Hodgkin lymphoma      Initial Vitals: /88   Pulse (!) 135   Temp 97.8  F (36.6  C)   Resp 16   Wt 53.8 kg (118 lb 9.6 oz)   SpO2 98%   BMI 20.20 kg/m   Estimated body mass index is 20.2 kg/m  as calculated from the following:    Height as of 6/27/22: 1.632 m (5' 4.25\").    Weight as of this encounter: 53.8 kg (118 lb 9.6 oz). Body surface area is 1.56 meters squared.  No Pain (0) Comment: Data Unavailable   No LMP recorded. (Menstrual status: Birth Control).  Allergies reviewed: Yes  Medications reviewed: Yes    Medications: MEDICATION REFILLS NEEDED TODAY. Provider was notified.  Pharmacy name entered into EPIC:    [place], [place] - [address] [facility] [address] [place], [place] - [address] [facility]    Clinical concerns: needs refill for omeprazole Sandra was notified.      Jarvis Decker"

## 2022-11-28 NOTE — PATIENT INSTRUCTIONS
Contact Numbers  [facility]: [phone] (for symptom and scheduling needs)    Please call the [facility] Triage line if you experience a temperature greater than or equal to 100.4, shaking chills, have uncontrolled nausea, vomiting and/or diarrhea, dizziness, shortness of breath, chest pain, bleeding, unexplained bruising, or if you have any other new/concerning symptoms, questions or concerns.     If you are having any concerning symptoms or wish to speak to a provider before your next infusion visit, please call your care coordinator or triage to notify them so we can adequately serve you.     If you need a refill on a narcotic prescription or other medication, please call triage before your infusion appointment.           November 2022 Sunday Monday Tuesday Wednesday Thursday Friday Saturday             1     2     3     4     5       6     7     8     9     10     11     12       13     14    LAB PERIPHERAL   8:45 AM   (15 min.)   [facility] LAB DRAW   [facility]    RETURN   9:00 AM   (45 min.)   Aleena Sawyer APRN CNP   [facility]    ONC INFUSION 4 HR (240 MIN)  10:00 AM   (240 min.)    ONC INFUSION NURSE   [facility] 15     16     17     18     19       20     21     22     23     24     25     26       27     28    LAB PERIPHERAL   9:45 AM   (15 min.)   [facility] LAB DRAW   [facility]    RETURN  10:00 AM   (45 min.)   Aleena Sawyer APRN CNP   [facility]    ONC INFUSION 4 HR (240 MIN)  11:30 AM   (240 min.)    ONC INFUSION NURSE   [facility] 29 30 December 2022 Sunday Monday Tuesday Wednesday Thursday Friday Saturday                       1     2     3       4     5     6     7     8     9     10       11     12     13     14     15     16    REMOVAL, VASCULAR ACCESS PORT    9:00 AM   Leo Velazquez [place]   [facility] [place] Visit   9:00 AM   [facility] [place] 17       18     19     20     21     22     23     24       25     26     27     28     29    IR PORT REMOVAL RIGHT   7:30 AM   (60 min.)   [id number]   [facility] [place] 30     31                       Lab Results:  Recent Results (from the past 12 hour(s))   Comprehensive metabolic panel    Collection Time: 11/28/22 10:08 AM   Result Value Ref Range    Sodium 137 136 - 145 mmol/L    Potassium 3.6 3.4 - 5.3 mmol/L    Chloride 99 98 - 107 mmol/L    Carbon Dioxide (CO2) 24 22 - 29 mmol/L    Anion Gap 14 7 - 15 mmol/L    Urea Nitrogen 10.8 6.0 - 20.0 mg/dL    Creatinine 0.64 0.51 - 0.95 mg/dL    Calcium 9.5 8.6 - 10.0 mg/dL    Glucose 127 (H) 70 - 99 mg/dL    Alkaline Phosphatase 103 35 - 104 U/L    AST 23 10 - 35 U/L    ALT 19 10 - 35 U/L    Protein Total 7.2 6.4 - 8.3 g/dL    Albumin 4.6 3.5 - 5.2 g/dL    Bilirubin Total 0.3 <=1.2 mg/dL    GFR Estimate >90 >60 mL/min/1.73m2   CBC with platelets and differential    Collection Time: 11/28/22 10:08 AM   Result Value Ref Range    WBC Count 7.5 4.0 - 11.0 10e3/uL    RBC Count 4.43 3.80 - 5.20 10e6/uL    Hemoglobin 13.3 11.7 - 15.7 g/dL    Hematocrit 41.1 35.0 - 47.0 %    MCV 93 78 - 100 fL    MCH 30.0 26.5 - 33.0 pg    MCHC 32.4 31.5 - 36.5 g/dL    RDW 14.6 10.0 - 15.0 %    Platelet Count 318 150 - 450 10e3/uL    % Neutrophils 74 %    % Lymphocytes 11 %    % Monocytes 11 %    % Eosinophils 1 %    % Basophils 1 %    % Immature Granulocytes 2 %    NRBCs per 100 WBC 0 <1 /100    Absolute Neutrophils 5.7 1.6 - 8.3 10e3/uL    Absolute Lymphocytes 0.8 0.8 - 5.3 10e3/uL    Absolute Monocytes 0.8 0.0 - 1.3 10e3/uL    Absolute Eosinophils 0.1 0.0 - 0.7 10e3/uL    Absolute Basophils 0.1 0.0 - 0.2 10e3/uL    Absolute Immature Granulocytes 0.1 <=0.4 10e3/uL    Absolute NRBCs 0.0 10e3/uL

## 2022-11-28 NOTE — NURSING NOTE
Chief Complaint   Patient presents with     Port Draw     Labs drawn by RN via port, vitals taken.     Port accessed with 20 gauge 3/4 inch flat needle by RN, labs collected, line flushed with saline and heparin.  Vitals taken. Pt checked in for appointment(s).    Ifrah Bolivar RN

## 2022-12-06 DIAGNOSIS — C81.12 NODULAR SCLEROSIS HODGKIN LYMPHOMA OF INTRATHORACIC LYMPH NODES (H): ICD-10-CM

## 2022-12-06 DIAGNOSIS — C81.10 NODULAR SCLEROSIS CLASSICAL HODGKIN LYMPHOMA (H): ICD-10-CM

## 2022-12-07 ENCOUNTER — MYC MEDICAL ADVICE (OUTPATIENT)
Dept: FAMILY MEDICINE | Facility: CLINIC | Age: 27
End: 2022-12-07

## 2022-12-16 ENCOUNTER — HOSPITAL ENCOUNTER (OUTPATIENT)
Facility: AMBULATORY SURGERY CENTER | Age: 27
Discharge: HOME | End: 2022-12-16
Attending: RADIOLOGY | Admitting: RADIOLOGY
Payer: COMMERCIAL

## 2022-12-16 ENCOUNTER — ANCILLARY PROCEDURE (OUTPATIENT)
Dept: RADIOLOGY | Facility: AMBULATORY SURGERY CENTER | Age: 27
End: 2022-12-16
Attending: NURSE PRACTITIONER
Payer: COMMERCIAL

## 2022-12-16 VITALS
HEART RATE: 105 BPM | HEIGHT: 64 IN | RESPIRATION RATE: 18 BRPM | SYSTOLIC BLOOD PRESSURE: 116 MMHG | TEMPERATURE: 97.9 F | OXYGEN SATURATION: 98 % | BODY MASS INDEX: 20.14 KG/M2 | WEIGHT: 118 LBS | DIASTOLIC BLOOD PRESSURE: 80 MMHG

## 2022-12-16 DIAGNOSIS — C81.11 NODULAR SCLEROSIS HODGKIN LYMPHOMA OF LYMPH NODES OF NECK (H): ICD-10-CM

## 2022-12-16 LAB
HCG UR QL: NEGATIVE
INR PPP: 1.02 (ref 0.85–1.15)
INTERNAL QC OK POCT: NORMAL
PLATELET # BLD AUTO: 324 10E3/UL (ref 150–450)
POCT KIT EXPIRATION DATE: NORMAL
POCT KIT LOT NUMBER: NORMAL

## 2022-12-16 PROCEDURE — 85610 PROTHROMBIN TIME: CPT | Performed by: PATHOLOGY

## 2022-12-16 PROCEDURE — 36590 REMOVAL TUNNELED CV CATH: CPT

## 2022-12-16 PROCEDURE — 81025 URINE PREGNANCY TEST: CPT | Performed by: PATHOLOGY

## 2022-12-16 PROCEDURE — 85049 AUTOMATED PLATELET COUNT: CPT | Performed by: PATHOLOGY

## 2022-12-16 PROCEDURE — 36590 REMOVAL TUNNELED CV CATH: CPT | Performed by: RADIOLOGY

## 2022-12-16 RX ORDER — LIDOCAINE HYDROCHLORIDE 10 MG/ML
INJECTION, SOLUTION EPIDURAL; INFILTRATION; INTRACAUDAL; PERINEURAL DAILY PRN
Status: DISCONTINUED | OUTPATIENT
Start: 2022-12-16 | End: 2022-12-16 | Stop reason: HOSPADM

## 2022-12-16 RX ORDER — LIDOCAINE 40 MG/G
CREAM TOPICAL
Status: DISCONTINUED | OUTPATIENT
Start: 2022-12-16 | End: 2022-12-17 | Stop reason: HOSPADM

## 2022-12-16 RX ORDER — SODIUM CHLORIDE 9 MG/ML
INJECTION, SOLUTION INTRAVENOUS CONTINUOUS
Status: DISCONTINUED | OUTPATIENT
Start: 2022-12-16 | End: 2022-12-17 | Stop reason: HOSPADM

## 2022-12-16 NOTE — OP NOTE
PRE-OPERATIVE DIAGNOSIS:  Classic Hodgkin lymphoma    POST-OPERATIVE DIAGNOSIS:  Same      PROCEDURE:  Chest port removal    FINAL IMPRESSION:  Port reservoir and catheter removed in their entirety.  No immediate complications.    ----------    CLINICAL HISTORY:  Port no longer needed; port removal requested.     Interventional radiologist: PRATEEK Hernadez, LIZETTE SINGH MD, attest that I was present in the procedure room for the entire procedure.    Assistant: Santosh Guillaume PA-C    CONSENT:  The patient understood the limitations, alternatives, and risks of the procedure and agreed to the procedure.  Written informed consent was obtained and is documented in the patient record.      MEDICATIONS: Procedure was performed under local anesthesia only.        NURSING:  The patient was placed on continuous vital signs monitoring.  Vital signs were monitored by nursing staff under my supervision.      DESCRIPTION:  After infiltration of local anesthesia to the skin overlying the port reservoir, an incision was made using a #15 blade.  Using a combination of dissection and traction, the catheter was isolated and removed with pressure held over the venipuncture site to obtain hemostasis.  Further dissection was used to allow the removal of the port reservoir itself.  The reservoir pocket was irrigated with antibiotic saline solution and then packed with gauze to obtain hemostasis.  The gauze was then removed.  The chest incision was closed with four 3-0 Vicryl interrupted sutures, and Dermabond.    COMPLICATIONS:  No immediate concerns; the patient remained stable throughout the procedure and tolerated it well.    ESTIMATED BLOOD LOSS:  Less than 5 mL    SPECIMENS:  Port and cathter removed per above    LIZETTE SINGH MD

## 2022-12-16 NOTE — DISCHARGE INSTRUCTIONS
A collaboration between [facility] and [facility]  Experts in minimally invasive, targeted treatments performed using imaging guidance    Venous Access Device, Port Catheter or Tunneled Central Line Removal    Today you had your existing venous access device removed, either because it was no longer needed or because there was malfunction or infection issues.    After you go home:  Drink plenty of fluids.  Generally 6-8 (8 ounce) glasses a day is recommended.  Resume your regular diet unless otherwise ordered by a medical provider.  Keep any applied tape/gauze dressings clean and dry.  Change tape/gauze dressings if they get wet or soiled.  You may shower the following day after procedure, however cover and protect from moisture any tape/gauze dressings.  You may let water hit and run over dried skin glue, but do not scrub.  Pat the area dry after showering.  Port removal incisions are closed with absorbable suture, meaning they do not need to be removed at a later date, and a topical skin adhesive (skin glue).  This glue will wear off in 7-14 days.  Do not remove before this time.  If 14 days have passed and residual glue is present, you may gently remove it.  You may remove tape/gauze dressings after 5 days if the site looks closed and in the process of healing.  Do not apply gels, lotions, or ointments to the glue site for the first 10 days as this may cause the glue to prematurely soften and fail.  Do not perform strenuous activities or lift greater than 10 pounds for the next three days.  If there is bleeding or oozing from the procedure site, apply firm pressure to the area for 5-10 minutes.  If the bleeding continues seek medical advice at the numbers below.  Mild procedure site discomfort can be treated with an ice pack and over-the-counter pain relievers.              For 24 hours after any sedation used:  Relax and take it easy.  No strenuous  activities.  Do not drive or operate machines at home or at work.  No alcohol consumption.  Do not make any important or legal decisions.    Call our Interventional Radiology (IR) service if:  If you start bleeding from the procedure site.  If you do start to bleed from the site, lie down and hold some pressure on the site.  Our radiology provider can help you decide if you need to return to the hospital.  If you have new or worsening pain related to the procedure.  If you have concerning swelling at the procedure site.  If you develop persistent nausea or vomiting.  If you develop hives or a rash or any unexplained itching.  If you have a fever of greater than 100.5  F and chills in the first 5 days after procedure.  Any other concerns related to your procedure.      [facility]  Interventional Radiology (IR)  [address]  [address] [facility] [place] [address]    Contact Number:  [phone]  (IR control desk)  Monday - Friday 8:00 am - 4:30 pm    After hours for urgent concerns:  [phone]  After 4:30 pm Monday - Friday, Weekends and Holidays.   Ask for Interventional Radiology on-call.  Someone is available 24 hours a day.  [facility] toll free number:  [phone]

## 2023-01-02 ENCOUNTER — ANCILLARY ORDERS (OUTPATIENT)
Dept: ONCOLOGY | Facility: CLINIC | Age: 28
End: 2023-01-02

## 2023-01-02 ENCOUNTER — HOSPITAL ENCOUNTER (OUTPATIENT)
Dept: PET IMAGING | Facility: CLINIC | Age: 28
Discharge: HOME | End: 2023-01-02
Attending: STUDENT IN AN ORGANIZED HEALTH CARE EDUCATION/TRAINING PROGRAM
Payer: COMMERCIAL

## 2023-01-02 DIAGNOSIS — C81.10 NODULAR SCLEROSING HODGKIN'S LYMPHOMA, UNSPECIFIED BODY REGION (H): ICD-10-CM

## 2023-01-02 PROCEDURE — 70491 CT SOFT TISSUE NECK W/DYE: CPT | Mod: 26 | Performed by: RADIOLOGY

## 2023-01-02 PROCEDURE — A9552 F18 FDG: HCPCS | Performed by: STUDENT IN AN ORGANIZED HEALTH CARE EDUCATION/TRAINING PROGRAM

## 2023-01-02 PROCEDURE — 74177 CT ABD & PELVIS W/CONTRAST: CPT

## 2023-01-02 PROCEDURE — 70491 CT SOFT TISSUE NECK W/DYE: CPT

## 2023-01-02 PROCEDURE — 250N000011 HC RX IP 250 OP 636: Performed by: STUDENT IN AN ORGANIZED HEALTH CARE EDUCATION/TRAINING PROGRAM

## 2023-01-02 PROCEDURE — 78816 PET IMAGE W/CT FULL BODY: CPT | Mod: 26 | Performed by: RADIOLOGY

## 2023-01-02 PROCEDURE — 343N000001 HC RX 343: Performed by: STUDENT IN AN ORGANIZED HEALTH CARE EDUCATION/TRAINING PROGRAM

## 2023-01-02 PROCEDURE — 78816 PET IMAGE W/CT FULL BODY: CPT | Mod: PS

## 2023-01-02 PROCEDURE — 74177 CT ABD & PELVIS W/CONTRAST: CPT | Mod: 26 | Performed by: RADIOLOGY

## 2023-01-02 PROCEDURE — 71260 CT THORAX DX C+: CPT | Mod: 26 | Performed by: RADIOLOGY

## 2023-01-02 RX ORDER — IOPAMIDOL 755 MG/ML
10-135 INJECTION, SOLUTION INTRAVASCULAR ONCE
Status: COMPLETED | OUTPATIENT
Start: 2023-01-02 | End: 2023-01-02

## 2023-01-02 RX ADMIN — FLUDEOXYGLUCOSE F-18 10.05 MCI.: 500 INJECTION, SOLUTION INTRAVENOUS at 09:39

## 2023-01-02 RX ADMIN — IOPAMIDOL 66 ML: 755 INJECTION, SOLUTION INTRAVENOUS at 10:38

## 2023-01-05 ENCOUNTER — APPOINTMENT (OUTPATIENT)
Dept: LAB | Facility: CLINIC | Age: 28
End: 2023-01-05
Attending: STUDENT IN AN ORGANIZED HEALTH CARE EDUCATION/TRAINING PROGRAM
Payer: COMMERCIAL

## 2023-01-05 ENCOUNTER — ONCOLOGY VISIT (OUTPATIENT)
Dept: ONCOLOGY | Facility: CLINIC | Age: 28
End: 2023-01-05
Attending: STUDENT IN AN ORGANIZED HEALTH CARE EDUCATION/TRAINING PROGRAM
Payer: COMMERCIAL

## 2023-01-05 VITALS
SYSTOLIC BLOOD PRESSURE: 125 MMHG | OXYGEN SATURATION: 99 % | TEMPERATURE: 98 F | WEIGHT: 118 LBS | RESPIRATION RATE: 16 BRPM | BODY MASS INDEX: 20.1 KG/M2 | DIASTOLIC BLOOD PRESSURE: 73 MMHG | HEART RATE: 84 BPM

## 2023-01-05 DIAGNOSIS — C81.12 NODULAR SCLEROSIS HODGKIN LYMPHOMA OF INTRATHORACIC LYMPH NODES (H): ICD-10-CM

## 2023-01-05 LAB
ALBUMIN SERPL BCG-MCNC: 4.3 G/DL (ref 3.5–5.2)
ALP SERPL-CCNC: 57 U/L (ref 35–104)
ALT SERPL W P-5'-P-CCNC: 10 U/L (ref 10–35)
ANION GAP SERPL CALCULATED.3IONS-SCNC: 11 MMOL/L (ref 7–15)
AST SERPL W P-5'-P-CCNC: 18 U/L (ref 10–35)
BASOPHILS # BLD AUTO: 0 10E3/UL (ref 0–0.2)
BASOPHILS NFR BLD AUTO: 1 %
BILIRUB SERPL-MCNC: 0.4 MG/DL
BUN SERPL-MCNC: 11.9 MG/DL (ref 6–20)
CALCIUM SERPL-MCNC: 9 MG/DL (ref 8.6–10)
CHLORIDE SERPL-SCNC: 103 MMOL/L (ref 98–107)
CREAT SERPL-MCNC: 0.64 MG/DL (ref 0.51–0.95)
DEPRECATED HCO3 PLAS-SCNC: 20 MMOL/L (ref 22–29)
EOSINOPHIL # BLD AUTO: 0.1 10E3/UL (ref 0–0.7)
EOSINOPHIL NFR BLD AUTO: 3 %
ERYTHROCYTE [DISTWIDTH] IN BLOOD BY AUTOMATED COUNT: 13.1 % (ref 10–15)
GFR SERPL CREATININE-BSD FRML MDRD: >90 ML/MIN/1.73M2
GLUCOSE SERPL-MCNC: 109 MG/DL (ref 70–99)
HCT VFR BLD AUTO: 39.5 % (ref 35–47)
HGB BLD-MCNC: 12.9 G/DL (ref 11.7–15.7)
IMM GRANULOCYTES # BLD: 0 10E3/UL
IMM GRANULOCYTES NFR BLD: 0 %
LYMPHOCYTES # BLD AUTO: 0.9 10E3/UL (ref 0.8–5.3)
LYMPHOCYTES NFR BLD AUTO: 26 %
MCH RBC QN AUTO: 29.9 PG (ref 26.5–33)
MCHC RBC AUTO-ENTMCNC: 32.7 G/DL (ref 31.5–36.5)
MCV RBC AUTO: 92 FL (ref 78–100)
MONOCYTES # BLD AUTO: 0.3 10E3/UL (ref 0–1.3)
MONOCYTES NFR BLD AUTO: 8 %
NEUTROPHILS # BLD AUTO: 2.2 10E3/UL (ref 1.6–8.3)
NEUTROPHILS NFR BLD AUTO: 62 %
NRBC # BLD AUTO: 0 10E3/UL
NRBC BLD AUTO-RTO: 0 /100
PLATELET # BLD AUTO: 307 10E3/UL (ref 150–450)
POTASSIUM SERPL-SCNC: 4.1 MMOL/L (ref 3.4–5.3)
PROT SERPL-MCNC: 7.1 G/DL (ref 6.4–8.3)
RBC # BLD AUTO: 4.31 10E6/UL (ref 3.8–5.2)
SODIUM SERPL-SCNC: 134 MMOL/L (ref 136–145)
WBC # BLD AUTO: 3.5 10E3/UL (ref 4–11)

## 2023-01-05 PROCEDURE — 36415 COLL VENOUS BLD VENIPUNCTURE: CPT | Performed by: STUDENT IN AN ORGANIZED HEALTH CARE EDUCATION/TRAINING PROGRAM

## 2023-01-05 PROCEDURE — G0463 HOSPITAL OUTPT CLINIC VISIT: HCPCS

## 2023-01-05 PROCEDURE — 99212 OFFICE O/P EST SF 10 MIN: CPT | Performed by: STUDENT IN AN ORGANIZED HEALTH CARE EDUCATION/TRAINING PROGRAM

## 2023-01-05 PROCEDURE — 85025 COMPLETE CBC W/AUTO DIFF WBC: CPT | Performed by: STUDENT IN AN ORGANIZED HEALTH CARE EDUCATION/TRAINING PROGRAM

## 2023-01-05 PROCEDURE — 80053 COMPREHEN METABOLIC PANEL: CPT | Performed by: STUDENT IN AN ORGANIZED HEALTH CARE EDUCATION/TRAINING PROGRAM

## 2023-01-05 PROCEDURE — 99214 OFFICE O/P EST MOD 30 MIN: CPT | Performed by: STUDENT IN AN ORGANIZED HEALTH CARE EDUCATION/TRAINING PROGRAM

## 2023-01-05 ASSESSMENT — PAIN SCALES - GENERAL: PAINLEVEL: NO PAIN (0)

## 2023-01-05 NOTE — NURSING NOTE
Chief Complaint   Patient presents with     Blood Draw     Labs drawn via  by RN. VS taken.     Labs drawn with  by rn.  Pt tolerated well.  VS taken.  Pt checked in for next appt.    Damien Solis RN

## 2023-01-05 NOTE — NURSING NOTE
"Oncology Rooming Note    January 5, 2023 11:18 AM   Amy Harris is a 27 year old female who presents for:    Chief Complaint   Patient presents with     Blood Draw     Labs drawn via  by RN. VS taken.     Oncology Clinic Visit     Nodular sclerosis classical Hodgkin lymphoma     Initial Vitals: /73   Pulse 84   Temp 98  F (36.7  C) (Oral)   Resp 16   Wt 53.5 kg (118 lb)   SpO2 99%   BMI 20.10 kg/m   Estimated body mass index is 20.1 kg/m  as calculated from the following:    Height as of 12/16/22: 1.632 m (5' 4.25\").    Weight as of this encounter: 53.5 kg (118 lb). Body surface area is 1.56 meters squared.  No Pain (0) Comment: Data Unavailable   No LMP recorded. (Menstrual status: Chemotherapy).  Allergies reviewed: Yes  Medications reviewed: Yes    Medications: Medication refills not needed today.  Pharmacy name entered into EPIC:    [place], [place] - [address] [address] [place], [place] - [address]    Clinical concerns: none       Karlie Horn            "

## 2023-01-05 NOTE — PROGRESS NOTES
Raysa Reyes is a 27 year old female coming in for follow up of Hodgkin lymphoma    HPI     Oncology History Overview Note   She has a past medical history of acne treated with spironolactone who is otherwise healthy with no notable past surgical history. She first noticed heartburn-like symptoms when drinking alcohol a year ago and has noticed progressive dysphagia, where now dry solids get caught when swallowing although it is not painful. She has had to stop eating mid-meal but denies appetite change or weight loss. She developed night sweats x2 in Fall 2021 where she woke up with a drenched shirt. Denies current night sweats, fever, chills. During that time, she first noticed an enlarged lymph node in her right clavicular region and as it was not bothersome or painful, did not pursue medical attention at that time. She noticed no other lumps or bumps at that time. She has also been increasingly itchy in all parts of her body for the past 2 months. Over the past two weeks, she has noticed a fast heart rate without chest pain or palpitations and shortness of breath on exertion like carrying laundry upstairs or walking and talking at the same time. She denies lower extremity or upper extremity edema. Her energy level has been slowly declining, where she sleeps for longer at night and needing to rest for 4 hours in the afternoon.      She had a routine physical on 5/16/22 where she brought attention to her growing mass on her right shoulder. 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       She udnerwent ECHO which showed EF of >50%.  PET scan done on 6/18/2022 showed hypermetabolic mediastinal mass with SUV max of 14.7 and 15.6 x 14.8 x 7.8 cm in size.  Mediastinal mass extends to right level 4 lateral to right thyroid lobe.  There is associated right retroclavicular lymphadenopathy.  Associated bone marrow activity is seen in the  axial skeleton consistent with bone marrow infiltration.  Her [place] stage is stage IV.    She consulted with fertility specialist.  We do not have time to pursue pretreatment quality preservation.  She was advised to return in 6 months after completing treatment at that time fertility preservation would be considered.    Due to her ongoing itching, exertional dyspnea, I had prescribed prednisone 60 mg daily and allopurinol to prevent tumor lysis.    She underwent 2 cycles of AVD with rituximab.  Tolerated well without any side effects.  PET scan after 2 cycles shows complete metabolic response. She went on to receive 6 cycles of ABVD. 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    End of treatment PET scan showed continued complete metabolic response.         Nodular sclerosis classical Hodgkin lymphoma (H)   6/16/2022 Initial Diagnosis    Nodular sclerosis classical Hodgkin lymphoma (H)     6/27/2022 -  Chemotherapy    OP ONC Lymphoma and CLL - A + AVD (Brentuximab vedotin / DOXOrubicin / vinBLAStine / Dacarbazine)   Plan Provider: Mallory Mendez MD  Treatment goal: Curative  Line of treatment: [No plan line of treatment]     8/19/2022 No evidence of disease    PET scan after 2 cycles of AVD (map shows no evidence of disease.       Patient comes today for follow up. No fevers, chills, night sweats or weight loss, no lymphadenopathy. No pain. Numbness in her fingers and toes is improving.      Review of Systems   8 point review of systems was negative except pertinent positives listed above.        Objective    /73   Pulse 84   Temp 98  F (36.7  C) (Oral)   Resp 16   Wt 53.5 kg (118 lb)   SpO2 99%   BMI 20.10 kg/m    Body mass index is 20.1 kg/m .  Physical Exam   GENERAL:  Alert oriented well nourished  SKIN:  no rash, hives, other lesions.  EYE: no icterus/pallor  ENT: no throat erythema, enlarged tonsills, no ulcers or mucositis in the mouth  LYMPHATIC: no  abnormal lymph nodes palable in cervical, axillary, supraclavicular or inguinal area.  RESP:  No rales or rhonchi, breath sounds bilaterally equal and vesicular  CV:  No tachycardia, S1 S2 normal No murmur.  GI:  Abdomen soft nontender no hepato or splenomegaly  MUSCULOSKELETAL:  No visible joint redness or swelling.  NEURO:  No gross weakness gait normal  PSYCH: pleasant affect    Labs reviewed. No cytopenias, renal or liver abnormalities.    Imaging: PETCT scan reviewed. Complete response to BV-AVD with residual non-FDG avid necrotic tissue left behind. Mildly FDG-Avid non-enlarged right axillary lymph nodes coincide with recent left arm Immunization with COVID-19 vaccine.      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  - I discussed with her that A+AVD has 6-year PFS of 83% and overall survival of 93.9%.  - In PET2- patients overall survival was upto 94.9% at 6 years and progression free survival was also significantly higher. (Jelly et al. 2022, NEJ).  - She was PET2- so likelihood of relapse is very low.  - I also discussed lymphoma surveillance with her. I discussed close surveillance with every 3 months history, physical exam and labs every 3 months and CT scan every 6 months for first 2 years.  - After 2 years, we will move to every 6 months history and physical exam. Scans can be deferred unless warranted by symptoms.  - She will finish her current supply of acyclovir and bactrim and then stop.  - Port is already removed.  - She wishes to continue to follow up with me. So I will see her in 3 months with labs.    2) FDG-avid axillary lymph nodes reactive to COVID-19 vaccine:  - Lymph nodes are non enlarged and mildly FDG avid.  - She had COVID vaccine on 12/27.  - I reassured her that reactive lymphadenopathy can be FDG avid and we can monitor for now.    3) Peripheral neuropathy:  - I discussed with her that I expect  it to improve with time now that chemotherapy is completed.    Total time spent on date of service in review of medical records, review of labs, history taking, physical exam, discussion of assessment and plan, counseling and patient education is 30 minutes.    Mallory Mendez MD  Attending Physician  Pager [phone]

## 2023-01-05 NOTE — LETTER
1/5/2023         RE: Amy Harris  [address] [place] [address]        Dear Colleague,    Thank you for referring your patient, Amy Harris, to the [facility]. Please see a copy of my visit note below.      Subjective   Amy is a 27 year old female coming in for follow up of Hodgkin lymphoma    HPI     Oncology History Overview Note   She has a past medical history of acne treated with spironolactone who is otherwise healthy with no notable past surgical history. She first noticed heartburn-like symptoms when drinking alcohol a year ago and has noticed progressive dysphagia, where now dry solids get caught when swallowing although it is not painful. She has had to stop eating mid-meal but denies appetite change or weight loss. She developed night sweats x2 in Fall 2021 where she woke up with a drenched shirt. Denies current night sweats, fever, chills. During that time, she first noticed an enlarged lymph node in her right clavicular region and as it was not bothersome or painful, did not pursue medical attention at that time. She noticed no other lumps or bumps at that time. She has also been increasingly itchy in all parts of her body for the past 2 months. Over the past two weeks, she has noticed a fast heart rate without chest pain or palpitations and shortness of breath on exertion like carrying laundry upstairs or walking and talking at the same time. She denies lower extremity or upper extremity edema. Her energy level has been slowly declining, where she sleeps for longer at night and needing to rest for 4 hours in the afternoon.      She had a routine physical on 5/16/22 where she brought attention to her growing mass on her right shoulder. 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       She udnerwent ECHO which showed EF of >50%.  PET scan done on 6/18/2022 showed hypermetabolic  mediastinal mass with SUV max of 14.7 and 15.6 x 14.8 x 7.8 cm in size.  Mediastinal mass extends to right level 4 lateral to right thyroid lobe.  There is associated right retroclavicular lymphadenopathy.  Associated bone marrow activity is seen in the axial skeleton consistent with bone marrow infiltration.  Her [place] stage is stage IV.    She consulted with fertility specialist.  We do not have time to pursue pretreatment quality preservation.  She was advised to return in 6 months after completing treatment at that time fertility preservation would be considered.    Due to her ongoing itching, exertional dyspnea, I had prescribed prednisone 60 mg daily and allopurinol to prevent tumor lysis.    She underwent 2 cycles of AVD with rituximab.  Tolerated well without any side effects.  PET scan after 2 cycles shows complete metabolic response. She went on to receive 6 cycles of ABVD. 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    End of treatment PET scan showed continued complete metabolic response.         Nodular sclerosis classical Hodgkin lymphoma (H)   6/16/2022 Initial Diagnosis    Nodular sclerosis classical Hodgkin lymphoma (H)     6/27/2022 -  Chemotherapy    OP ONC Lymphoma and CLL - A + AVD (Brentuximab vedotin / DOXOrubicin / vinBLAStine / Dacarbazine)   Plan Provider: Mallory Mendez MD  Treatment goal: Curative  Line of treatment: [No plan line of treatment]     8/19/2022 No evidence of disease    PET scan after 2 cycles of AVD (map shows no evidence of disease.       Patient comes today for follow up. No fevers, chills, night sweats or weight loss, no lymphadenopathy. No pain. Numbness in her fingers and toes is improving.      Review of Systems   8 point review of systems was negative except pertinent positives listed above.       Objective    /73   Pulse 84   Temp 98  F (36.7  C) (Oral)   Resp 16   Wt 53.5 kg (118 lb)   SpO2 99%   BMI 20.10 kg/m     Body mass index is 20.1 kg/m .  Physical Exam   GENERAL:  Alert oriented well nourished  SKIN:  no rash, hives, other lesions.  EYE: no icterus/pallor  ENT: no throat erythema, enlarged tonsills, no ulcers or mucositis in the mouth  LYMPHATIC: no abnormal lymph nodes palable in cervical, axillary, supraclavicular or inguinal area.  RESP:  No rales or rhonchi, breath sounds bilaterally equal and vesicular  CV:  No tachycardia, S1 S2 normal No murmur.  GI:  Abdomen soft nontender no hepato or splenomegaly  MUSCULOSKELETAL:  No visible joint redness or swelling.  NEURO:  No gross weakness gait normal  PSYCH: pleasant affect    Labs reviewed. No cytopenias, renal or liver abnormalities.    Imaging: PETCT scan reviewed. Complete response to BV-AVD with residual non-FDG avid necrotic tissue left behind. Mildly FDG-Avid non-enlarged right axillary lymph nodes coincide with recent left arm Immunization with COVID-19 vaccine.      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  - I discussed with her that A+AVD has 6-year PFS of 83% and overall survival of 93.9%.  - In PET2- patients overall survival was upto 94.9% at 6 years and progression free survival was also significantly higher. (Jelly et al. 2022, NEJ).  - She was PET2- so likelihood of relapse is very low.  - I also discussed lymphoma surveillance with her. I discussed close surveillance with every 3 months history, physical exam and labs every 3 months and CT scan every 6 months for first 2 years.  - After 2 years, we will move to every 6 months history and physical exam. Scans can be deferred unless warranted by symptoms.  - She will finish her current supply of acyclovir and bactrim and then stop.  - Port is already removed.  - She wishes to continue to follow up with me. So I will see her in 3 months with labs.    2) FDG-avid axillary lymph nodes reactive to COVID-19  vaccine:  - Lymph nodes are non enlarged and mildly FDG avid.  - She had COVID vaccine on 12/27.  - I reassured her that reactive lymphadenopathy can be FDG avid and we can monitor for now.    3) Peripheral neuropathy:  - I discussed with her that I expect it to improve with time now that chemotherapy is completed.    Total time spent on date of service in review of medical records, review of labs, history taking, physical exam, discussion of assessment and plan, counseling and patient education is 30 minutes.    Mallory Mendez MD  Attending Physician  Pager [phone]

## 2023-02-26 ENCOUNTER — MYC MEDICAL ADVICE (OUTPATIENT)
Dept: FAMILY MEDICINE | Facility: CLINIC | Age: 28
End: 2023-02-26
Payer: COMMERCIAL

## 2023-03-06 RX ORDER — ACYCLOVIR 400 MG/1
TABLET ORAL
Qty: 180 TABLET | Refills: 1 | OUTPATIENT
Start: 2023-03-06

## 2023-03-27 ENCOUNTER — ONCOLOGY VISIT (OUTPATIENT)
Dept: ONCOLOGY | Facility: CLINIC | Age: 28
End: 2023-03-27
Attending: NURSE PRACTITIONER
Payer: MEDICAID

## 2023-03-27 ENCOUNTER — APPOINTMENT (OUTPATIENT)
Dept: LAB | Facility: CLINIC | Age: 28
End: 2023-03-27
Attending: STUDENT IN AN ORGANIZED HEALTH CARE EDUCATION/TRAINING PROGRAM
Payer: MEDICAID

## 2023-03-27 VITALS
TEMPERATURE: 98.4 F | DIASTOLIC BLOOD PRESSURE: 79 MMHG | BODY MASS INDEX: 20.54 KG/M2 | RESPIRATION RATE: 16 BRPM | SYSTOLIC BLOOD PRESSURE: 116 MMHG | OXYGEN SATURATION: 100 % | HEART RATE: 77 BPM | WEIGHT: 120.6 LBS

## 2023-03-27 DIAGNOSIS — C81.12 NODULAR SCLEROSIS HODGKIN LYMPHOMA OF INTRATHORACIC LYMPH NODES (H): ICD-10-CM

## 2023-03-27 LAB
ALBUMIN SERPL BCG-MCNC: 4.2 G/DL (ref 3.5–5.2)
ALP SERPL-CCNC: 61 U/L (ref 35–104)
ALT SERPL W P-5'-P-CCNC: 7 U/L (ref 10–35)
ANION GAP SERPL CALCULATED.3IONS-SCNC: 10 MMOL/L (ref 7–15)
AST SERPL W P-5'-P-CCNC: 20 U/L (ref 10–35)
BASOPHILS # BLD AUTO: 0 10E3/UL (ref 0–0.2)
BASOPHILS NFR BLD AUTO: 1 %
BILIRUB SERPL-MCNC: 0.6 MG/DL
BUN SERPL-MCNC: 11.9 MG/DL (ref 6–20)
CALCIUM SERPL-MCNC: 9 MG/DL (ref 8.6–10)
CHLORIDE SERPL-SCNC: 103 MMOL/L (ref 98–107)
CREAT SERPL-MCNC: 0.67 MG/DL (ref 0.51–0.95)
DEPRECATED HCO3 PLAS-SCNC: 23 MMOL/L (ref 22–29)
EOSINOPHIL # BLD AUTO: 0.1 10E3/UL (ref 0–0.7)
EOSINOPHIL NFR BLD AUTO: 3 %
ERYTHROCYTE [DISTWIDTH] IN BLOOD BY AUTOMATED COUNT: 11.9 % (ref 10–15)
GFR SERPL CREATININE-BSD FRML MDRD: >90 ML/MIN/1.73M2
GLUCOSE SERPL-MCNC: 99 MG/DL (ref 70–99)
HCT VFR BLD AUTO: 38.9 % (ref 35–47)
HGB BLD-MCNC: 13.1 G/DL (ref 11.7–15.7)
IMM GRANULOCYTES # BLD: 0 10E3/UL
IMM GRANULOCYTES NFR BLD: 0 %
LYMPHOCYTES # BLD AUTO: 1 10E3/UL (ref 0.8–5.3)
LYMPHOCYTES NFR BLD AUTO: 32 %
MCH RBC QN AUTO: 29.3 PG (ref 26.5–33)
MCHC RBC AUTO-ENTMCNC: 33.7 G/DL (ref 31.5–36.5)
MCV RBC AUTO: 87 FL (ref 78–100)
MONOCYTES # BLD AUTO: 0.3 10E3/UL (ref 0–1.3)
MONOCYTES NFR BLD AUTO: 9 %
NEUTROPHILS # BLD AUTO: 1.8 10E3/UL (ref 1.6–8.3)
NEUTROPHILS NFR BLD AUTO: 55 %
NRBC # BLD AUTO: 0 10E3/UL
NRBC BLD AUTO-RTO: 0 /100
PLATELET # BLD AUTO: 272 10E3/UL (ref 150–450)
POTASSIUM SERPL-SCNC: 4.1 MMOL/L (ref 3.4–5.3)
PROT SERPL-MCNC: 7 G/DL (ref 6.4–8.3)
RBC # BLD AUTO: 4.47 10E6/UL (ref 3.8–5.2)
SODIUM SERPL-SCNC: 136 MMOL/L (ref 136–145)
WBC # BLD AUTO: 3.3 10E3/UL (ref 4–11)

## 2023-03-27 PROCEDURE — 99212 OFFICE O/P EST SF 10 MIN: CPT | Performed by: STUDENT IN AN ORGANIZED HEALTH CARE EDUCATION/TRAINING PROGRAM

## 2023-03-27 PROCEDURE — 85025 COMPLETE CBC W/AUTO DIFF WBC: CPT | Performed by: STUDENT IN AN ORGANIZED HEALTH CARE EDUCATION/TRAINING PROGRAM

## 2023-03-27 PROCEDURE — 99214 OFFICE O/P EST MOD 30 MIN: CPT | Performed by: STUDENT IN AN ORGANIZED HEALTH CARE EDUCATION/TRAINING PROGRAM

## 2023-03-27 PROCEDURE — 80053 COMPREHEN METABOLIC PANEL: CPT | Performed by: STUDENT IN AN ORGANIZED HEALTH CARE EDUCATION/TRAINING PROGRAM

## 2023-03-27 PROCEDURE — 36415 COLL VENOUS BLD VENIPUNCTURE: CPT | Performed by: STUDENT IN AN ORGANIZED HEALTH CARE EDUCATION/TRAINING PROGRAM

## 2023-03-27 ASSESSMENT — PAIN SCALES - GENERAL: PAINLEVEL: NO PAIN (0)

## 2023-03-27 NOTE — NURSING NOTE
"Oncology Rooming Note    March 27, 2023 12:12 PM   Amy Harris is a 28 year old female who presents for:    Chief Complaint   Patient presents with     Blood Draw     Labs drawn with  by rn.  VS taken.     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    Weight as of this encounter: 54.7 kg (120 lb 9.6 oz). Body surface area is 1.57 meters squared.  No Pain (0) Comment: Data Unavailable   No LMP recorded. (Menstrual status: Chemotherapy).  Allergies reviewed: Yes  Medications reviewed: Yes    Medications: Medication refills not needed today.  Pharmacy name entered into EPIC:    [place], [place] - [address] [address] [place], [place] - [address]        Susie Cardoza CMA            "

## 2023-03-27 NOTE — NURSING NOTE
Chief Complaint   Patient presents with     Blood Draw     Labs drawn with  by rn.  VS taken.     Labs drawn with  by rn.  Pt tolerated well.  VS taken.  Pt checked in for next appt.    Katarzyna Luna RN

## 2023-03-27 NOTE — LETTER
3/27/2023         RE: Amy Harris  [address] [place] [address]        Dear Colleague,    Thank you for referring your patient, Amy Harris, to the [facility]. Please see a copy of my visit note below.      Raysa Reyes is a 28 year old, presenting for the following health issues: Follow-up of classical Hodgkin lymphoma    HPI     Oncology History Overview Note   She has a past medical history of acne treated with spironolactone who is otherwise healthy with no notable past surgical history. She first noticed heartburn-like symptoms when drinking alcohol a year ago and has noticed progressive dysphagia, where now dry solids get caught when swallowing although it is not painful. She has had to stop eating mid-meal but denies appetite change or weight loss. She developed night sweats x2 in Fall 2021 where she woke up with a drenched shirt. Denies current night sweats, fever, chills. During that time, she first noticed an enlarged lymph node in her right clavicular region and as it was not bothersome or painful, did not pursue medical attention at that time. She noticed no other lumps or bumps at that time. She has also been increasingly itchy in all parts of her body for the past 2 months. Over the past two weeks, she has noticed a fast heart rate without chest pain or palpitations and shortness of breath on exertion like carrying laundry upstairs or walking and talking at the same time. She denies lower extremity or upper extremity edema. Her energy level has been slowly declining, where she sleeps for longer at night and needing to rest for 4 hours in the afternoon.      She had a routine physical on 5/16/22 where she brought attention to her growing mass on her right shoulder. 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       She udnerwent ECHO which showed EF of >50%.  PET scan done  on 6/18/2022 showed hypermetabolic mediastinal mass with SUV max of 14.7 and 15.6 x 14.8 x 7.8 cm in size.  Mediastinal mass extends to right level 4 lateral to right thyroid lobe.  There is associated right retroclavicular lymphadenopathy.  Associated bone marrow activity is seen in the axial skeleton consistent with bone marrow infiltration.  Her [place] stage is stage IV.    She consulted with fertility specialist.  We do not have time to pursue pretreatment quality preservation.  She was advised to return in 6 months after completing treatment at that time fertility preservation would be considered.    Due to her ongoing itching, exertional dyspnea, I had prescribed prednisone 60 mg daily and allopurinol to prevent tumor lysis.    She underwent 2 cycles of AVD with rituximab.  Tolerated well without any side effects.  PET scan after 2 cycles shows complete metabolic response. She went on to receive 6 cycles of ABVD. 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    End of treatment PET scan showed continued complete metabolic response.         Nodular sclerosis classical Hodgkin lymphoma (H)   6/16/2022 Initial Diagnosis    Nodular sclerosis classical Hodgkin lymphoma (H)     6/27/2022 -  Chemotherapy    OP ONC Lymphoma and CLL - A + AVD (Brentuximab vedotin / DOXOrubicin / vinBLAStine / Dacarbazine)   Plan Provider: Mallory Mendez MD  Treatment goal: Curative  Line of treatment: [No plan line of treatment]     8/19/2022 No evidence of disease    PET scan after 2 cycles of AVD (map shows no evidence of disease.       Patient comes today for follow up. No fevers, chills, night sweats or weight loss, no lymphadenopathy. No pain.        Review of Systems   8 point review of systems was negative except pertinent positives listed above.       Objective    /79 (BP Location: Right arm, Patient Position: Sitting, Cuff Size: Adult Regular)   Pulse 77   Temp 98.4  F (36.9  C)  (Oral)   Resp 16   Wt 54.7 kg (120 lb 9.6 oz)   SpO2 100%   BMI 20.54 kg/m    Body mass index is 20.54 kg/m .  Physical Exam   GENERAL:  Alert oriented well nourished  HEAD: normocephalic atraumatic  SKIN:  no rash, hives, other lesions.  EYE: no icterus/pallor  ENT: no throat erythema, enlarged tonsills, no ulcers or mucositis in the mouth  LYMPHATIC: no abnormal lymph nodes palable in cervical, axillary, supraclavicular or inguinal area.  RESP:  No rales or rhonchi, breath sounds bilaterally equal and vesicular  CV:  No tachycardia, S1 S2 normal No murmur.  GI:  Abdomen soft nontender no hepato or splenomegaly  MUSCULOSKELETAL:  No visible joint redness or swelling.  NEURO:  No gross weakness gait normal  PSYCH: pleasant affect    Labs reviewed. No cytopenias, renal or liver abnormalities, LDH normal.    Assessment and plan:    1) classic Hodgkin lymphoma advanced stage now in CR after brentuximab vedotin plus AVD:  -She comes in for 3 months follow-up.  Does not have any evidence of lymphoma recurrence or progression.  -I will see her back in 3 months with labs and scans.  It will be CT scan for lymphoma surveillance.  -I discussed with her common symptoms of lymphoma relapse.  They include persistent fevers night sweats chills painless and persistent lymphadenopathy.  She knows to call if any of these symptoms occur before her regular visit.    Total time spent on date of service in review of medical records, review of labs, history taking, physical exam, discussion of assessment and plan, counseling and patient education is 30 minutes.    Mallory Mendez MD  Attending Physician  Pager [phone]

## 2023-03-29 NOTE — PROGRESS NOTES
Raysa Reyes is a 28 year old, presenting for the following health issues: Follow-up of classical Hodgkin lymphoma    HPI     Oncology History Overview Note   She has a past medical history of acne treated with spironolactone who is otherwise healthy with no notable past surgical history. She first noticed heartburn-like symptoms when drinking alcohol a year ago and has noticed progressive dysphagia, where now dry solids get caught when swallowing although it is not painful. She has had to stop eating mid-meal but denies appetite change or weight loss. She developed night sweats x2 in Fall 2021 where she woke up with a drenched shirt. Denies current night sweats, fever, chills. During that time, she first noticed an enlarged lymph node in her right clavicular region and as it was not bothersome or painful, did not pursue medical attention at that time. She noticed no other lumps or bumps at that time. She has also been increasingly itchy in all parts of her body for the past 2 months. Over the past two weeks, she has noticed a fast heart rate without chest pain or palpitations and shortness of breath on exertion like carrying laundry upstairs or walking and talking at the same time. She denies lower extremity or upper extremity edema. Her energy level has been slowly declining, where she sleeps for longer at night and needing to rest for 4 hours in the afternoon.      She had a routine physical on 5/16/22 where she brought attention to her growing mass on her right shoulder. 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       She udnerwent ECHO which showed EF of >50%.  PET scan done on 6/18/2022 showed hypermetabolic mediastinal mass with SUV max of 14.7 and 15.6 x 14.8 x 7.8 cm in size.  Mediastinal mass extends to right level 4 lateral to right thyroid lobe.  There is associated right retroclavicular lymphadenopathy.  Associated bone  marrow activity is seen in the axial skeleton consistent with bone marrow infiltration.  Her [place] stage is stage IV.    She consulted with fertility specialist.  We do not have time to pursue pretreatment quality preservation.  She was advised to return in 6 months after completing treatment at that time fertility preservation would be considered.    Due to her ongoing itching, exertional dyspnea, I had prescribed prednisone 60 mg daily and allopurinol to prevent tumor lysis.    She underwent 2 cycles of AVD with rituximab.  Tolerated well without any side effects.  PET scan after 2 cycles shows complete metabolic response. She went on to receive 6 cycles of ABVD. 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    End of treatment PET scan showed continued complete metabolic response.         Nodular sclerosis classical Hodgkin lymphoma (H)   6/16/2022 Initial Diagnosis    Nodular sclerosis classical Hodgkin lymphoma (H)     6/27/2022 -  Chemotherapy    OP ONC Lymphoma and CLL - A + AVD (Brentuximab vedotin / DOXOrubicin / vinBLAStine / Dacarbazine)   Plan Provider: Mallory Mendez MD  Treatment goal: Curative  Line of treatment: [No plan line of treatment]     8/19/2022 No evidence of disease    PET scan after 2 cycles of AVD (map shows no evidence of disease.       Patient comes today for follow up. No fevers, chills, night sweats or weight loss, no lymphadenopathy. No pain.        Review of Systems   8 point review of systems was negative except pertinent positives listed above.        Objective    /79 (BP Location: Right arm, Patient Position: Sitting, Cuff Size: Adult Regular)   Pulse 77   Temp 98.4  F (36.9  C) (Oral)   Resp 16   Wt 54.7 kg (120 lb 9.6 oz)   SpO2 100%   BMI 20.54 kg/m    Body mass index is 20.54 kg/m .  Physical Exam   GENERAL:  Alert oriented well nourished  HEAD: normocephalic atraumatic  SKIN:  no rash, hives, other lesions.  EYE: no  icterus/pallor  ENT: no throat erythema, enlarged tonsills, no ulcers or mucositis in the mouth  LYMPHATIC: no abnormal lymph nodes palable in cervical, axillary, supraclavicular or inguinal area.  RESP:  No rales or rhonchi, breath sounds bilaterally equal and vesicular  CV:  No tachycardia, S1 S2 normal No murmur.  GI:  Abdomen soft nontender no hepato or splenomegaly  MUSCULOSKELETAL:  No visible joint redness or swelling.  NEURO:  No gross weakness gait normal  PSYCH: pleasant affect    Labs reviewed. No cytopenias, renal or liver abnormalities, LDH normal.    Assessment and plan:    1) classic Hodgkin lymphoma advanced stage now in CR after brentuximab vedotin plus AVD:  -She comes in for 3 months follow-up.  Does not have any evidence of lymphoma recurrence or progression.  -I will see her back in 3 months with labs and scans.  It will be CT scan for lymphoma surveillance.  -I discussed with her common symptoms of lymphoma relapse.  They include persistent fevers night sweats chills painless and persistent lymphadenopathy.  She knows to call if any of these symptoms occur before her regular visit.    Total time spent on date of service in review of medical records, review of labs, history taking, physical exam, discussion of assessment and plan, counseling and patient education is 30 minutes.    Mallory Mendez MD  Attending Physician  Pager [phone]

## 2023-05-08 ENCOUNTER — MYC REFILL (OUTPATIENT)
Dept: FAMILY MEDICINE | Facility: CLINIC | Age: 28
End: 2023-05-08
Payer: COMMERCIAL

## 2023-05-08 DIAGNOSIS — N92.0 MENORRHAGIA WITH REGULAR CYCLE: Primary | ICD-10-CM

## 2023-05-08 RX ORDER — DROSPIRENONE AND ETHINYL ESTRADIOL 0.02-3(28)
1 KIT ORAL EVERY MORNING
Qty: 84 TABLET | Refills: 0 | Status: SHIPPED | OUTPATIENT
Start: 2023-05-08 | End: 2023-06-26

## 2023-05-10 ENCOUNTER — MYC MEDICAL ADVICE (OUTPATIENT)
Dept: FAMILY MEDICINE | Facility: CLINIC | Age: 28
End: 2023-05-10
Payer: COMMERCIAL

## 2023-06-26 ENCOUNTER — OFFICE VISIT (OUTPATIENT)
Dept: FAMILY MEDICINE | Facility: CLINIC | Age: 28
End: 2023-06-26
Payer: COMMERCIAL

## 2023-06-26 VITALS
HEART RATE: 75 BPM | OXYGEN SATURATION: 99 % | HEIGHT: 64 IN | TEMPERATURE: 98.2 F | DIASTOLIC BLOOD PRESSURE: 81 MMHG | BODY MASS INDEX: 21.24 KG/M2 | RESPIRATION RATE: 20 BRPM | SYSTOLIC BLOOD PRESSURE: 117 MMHG | WEIGHT: 124.4 LBS

## 2023-06-26 DIAGNOSIS — Z00.00 ROUTINE HISTORY AND PHYSICAL EXAMINATION OF ADULT: Primary | ICD-10-CM

## 2023-06-26 DIAGNOSIS — N92.0 MENORRHAGIA WITH REGULAR CYCLE: ICD-10-CM

## 2023-06-26 DIAGNOSIS — L70.9 ACNE, UNSPECIFIED ACNE TYPE: ICD-10-CM

## 2023-06-26 DIAGNOSIS — C81.10 NODULAR SCLEROSING HODGKIN'S LYMPHOMA, UNSPECIFIED BODY REGION (H): ICD-10-CM

## 2023-06-26 DIAGNOSIS — Z11.1 SCREENING EXAMINATION FOR PULMONARY TUBERCULOSIS: ICD-10-CM

## 2023-06-26 PROCEDURE — 90677 PCV20 VACCINE IM: CPT | Performed by: FAMILY MEDICINE

## 2023-06-26 PROCEDURE — 36415 COLL VENOUS BLD VENIPUNCTURE: CPT | Performed by: FAMILY MEDICINE

## 2023-06-26 PROCEDURE — 86481 TB AG RESPONSE T-CELL SUSP: CPT | Performed by: FAMILY MEDICINE

## 2023-06-26 PROCEDURE — 90471 IMMUNIZATION ADMIN: CPT | Performed by: FAMILY MEDICINE

## 2023-06-26 PROCEDURE — 99395 PREV VISIT EST AGE 18-39: CPT | Mod: 25 | Performed by: FAMILY MEDICINE

## 2023-06-26 PROCEDURE — 99213 OFFICE O/P EST LOW 20 MIN: CPT | Mod: 25 | Performed by: FAMILY MEDICINE

## 2023-06-26 RX ORDER — SPIRONOLACTONE 100 MG/1
200 TABLET, FILM COATED ORAL EVERY MORNING
Qty: 180 TABLET | Refills: 3 | Status: SHIPPED | OUTPATIENT
Start: 2023-06-26 | End: 2024-04-23

## 2023-06-26 RX ORDER — DROSPIRENONE AND ETHINYL ESTRADIOL 0.02-3(28)
1 KIT ORAL EVERY MORNING
Qty: 84 TABLET | Refills: 3 | Status: SHIPPED | OUTPATIENT
Start: 2023-06-26 | End: 2024-04-23

## 2023-06-26 ASSESSMENT — ENCOUNTER SYMPTOMS
DYSURIA: 0
HEADACHES: 0
JOINT SWELLING: 0
ABDOMINAL PAIN: 0
PALPITATIONS: 0
MYALGIAS: 0
WEAKNESS: 0
HEARTBURN: 0
BREAST MASS: 0
SHORTNESS OF BREATH: 0
DIARRHEA: 0
EYE PAIN: 0
FREQUENCY: 0
NAUSEA: 0
SORE THROAT: 0
PARESTHESIAS: 0
FEVER: 0
CONSTIPATION: 0
NERVOUS/ANXIOUS: 0
HEMATURIA: 0
ARTHRALGIAS: 0
COUGH: 0
HEMATOCHEZIA: 0
DIZZINESS: 0
CHILLS: 0

## 2023-06-26 ASSESSMENT — PAIN SCALES - GENERAL: PAINLEVEL: NO PAIN (0)

## 2023-06-26 NOTE — PROGRESS NOTES
SUBJECTIVE:   CC: Amy is an 28 year old who presents for preventive health visit.   Seeing oncology for history lymphoma. In remission.  On spironoloactone for acne  No family history  Early mi/cva.MFM- aneurysm 50 cerebral. Mom with aneurysm.    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   vacines up to date.    Dermatology seen in past.   No chest pain or shortness of breath. No asthma. No nausea, vomiting or diarrhea or black/bloody stools.  Loose stools since chemo - last 7 months ago.   Emotionally doing ok. Dating going ok 10 years same boyfriends.family supportive.   Sleep ok. No std concerns. Menses regularly. No discharge.   No mole changes or rashes.   No breast lumps.   No urine changes or hematuria.   Dentist regularly.          6/26/2023     9:21 AM   Additional Questions   Roomed by ELICEO Kaur CMA     Forms 6/26/2023   Any forms needing to be completed Yes     Healthy Habits:     Getting at least 3 servings of Calcium per day:  Yes    Bi-annual eye exam:  Yes    Dental care twice a year:  Yes    Sleep apnea or symptoms of sleep apnea:  None    Diet:  Regular (no restrictions)    Frequency of exercise:  2-3 days/week    Duration of exercise:  30-45 minutes    Taking medications regularly:  Yes    Medication side effects:  None    PHQ-2 Total Score: 0    Additional concerns today:  No          Today's PHQ-2 Score:       6/26/2023     9:09 AM   PHQ-2 ( 1999 [employer])   Q1: Little interest or pleasure in doing things 0   Q2: Feeling down, depressed or hopeless 0   PHQ-2 Score 0   Q1: Little interest or pleasure in doing things Not at all   Q2: Feeling down, depressed or hopeless Not at all   PHQ-2 Score 0           Social History     Tobacco Use     Smoking status: Never     Smokeless tobacco: Never     Tobacco comments:     Dad smokes outside   Substance Use Topics     Alcohol use: Yes     Comment: 3-4 drinks a week             6/26/2023     9:08 AM   Alcohol Use   Prescreen: >3 drinks/day or >7  drinks/week? No     Reviewed orders with patient.  Reviewed health maintenance and updated orders accordingly - Yes  Lab work is in process    Breast Cancer Screening:    FHS-7:       5/16/2022     8:16 AM   Breast CA Risk Assessment (FHS-7)   Did any of your first-degree relatives have breast or ovarian cancer? No   Did any of your relatives have bilateral breast cancer? No   Did any man in your family have breast cancer? No   Did any woman in your family have breast and ovarian cancer? No   Did any woman in your family have breast cancer before age 50 y? Unknown   Do you have 2 or more relatives with breast and/or ovarian cancer? No   Do you have 2 or more relatives with breast and/or bowel cancer? No     History of abnormal Pap smear: NO - age 21-29 PAP every 3 years recommended      5/16/2022     8:45 AM 10/18/2019     8:04 AM   PAP / HPV   PAP Negative for Intraepithelial Lesion or Malignancy (NILM)     PAP (Historical)  NIL      Reviewed and updated as needed this visit by clinical staff   Tobacco  Allergies  Meds              Reviewed and updated as needed this visit by Provider                     Review of Systems   Constitutional: Negative for chills and fever.   HENT: Negative for congestion, ear pain, hearing loss and sore throat.    Eyes: Negative for pain and visual disturbance.   Respiratory: Negative for cough and shortness of breath.    Cardiovascular: Negative for chest pain, palpitations and peripheral edema.   Gastrointestinal: Negative for abdominal pain, constipation, diarrhea, heartburn, hematochezia and nausea.   Breasts:  Negative for tenderness, breast mass and discharge.   Genitourinary: Negative for dysuria, frequency, genital sores, hematuria, pelvic pain, urgency, vaginal bleeding and vaginal discharge.   Musculoskeletal: Negative for arthralgias, joint swelling and myalgias.   Skin: Negative for rash.   Neurological: Negative for dizziness, weakness, headaches and paresthesias.  "  Psychiatric/Behavioral: Negative for mood changes. The patient is not nervous/anxious.           OBJECTIVE:   /81   Pulse 75   Temp 98.2  F (36.8  C) (Oral)   Resp 20   Ht 1.632 m (5' 4.25\")   Wt 56.4 kg (124 lb 6.4 oz)   LMP 05/23/2023 (Approximate)   SpO2 99%   BMI 21.19 kg/m    Physical Exam  GENERAL: healthy, alert and no distress  EYES: Eyes grossly normal to inspection, PERRL and conjunctivae and sclerae normal  HENT: ear canals and TM's normal, nose and mouth without ulcers or lesions  NECK: no adenopathy, no asymmetry, masses, or scars and thyroid normal to palpation  RESP: lungs clear to auscultation - no rales, rhonchi or wheezes  BREAST: normal without masses, tenderness or nipple discharge and no palpable axillary masses or adenopathy  CV: regular rate and rhythm, normal S1 S2, no S3 or S4, no murmur, click or rub, no peripheral edema and peripheral pulses strong  ABDOMEN: soft, nontender, no hepatosplenomegaly, no masses and bowel sounds normal   (female): patient deferred exam  MS: no gross musculoskeletal defects noted, no edema  SKIN: no suspicious lesions or rashes  NEURO: Normal strength and tone, mentation intact and speech normal  PSYCH: mentation appears normal, affect normal/bright  LYMPH: no cervical, supraclavicular, axillary, or inguinal adenopathy    ASSESSMENT/PLAN:   ASSESSMENT / PLAN:  (Z00.00) Routine history and physical examination of adult  (primary encounter diagnosis)  Comment: generally healthy and normal exam  Plan: Pneumococcal 20 Valent Conjugate (Prevnar 20)        Reviewed self mole/breast check handout.      (N92.0) Menorrhagia with regular cycle  Comment: stable  Plan: drospirenone-ethinyl estradiol (IDANIA) 3-0.02 MG         tablet        Reveiwed risks and side effects of medication  No std concerns - same partner 10 years    (L70.9) Acne, unspecified acne type  Comment: stable  Plan: spironolactone (ALDACTONE) 100 MG tablet        Reveiwed risks and side " effects of medication  Back to derm if needed    (C81.10) Nodular sclerosing Hodgkin's lymphoma, unspecified body region (H)  Comment: in remission  Plan: per oncology    (Z11.1) Screening examination for pulmonary tuberculosis    Plan: Quantiferon TB Gold Plus        For PA school      Patient has been advised of split billing requirements and indicates understanding: Yes      COUNSELING:  Reviewed preventive health counseling, as reflected in patient instructions       Regular exercise       Healthy diet/nutrition       Vision screening       Hearing screening       Alcohol Use       Contraception       Family planning       Osteoporosis prevention/bone health       Safe sex practices/STD prevention        She reports that she has never smoked. She has never used smokeless tobacco.          Adrian Simms MD  [facility]

## 2023-06-26 NOTE — NURSING NOTE
Prior to immunization administration, verified patients identity using patient s name and date of birth. Please see Immunization Activity for additional information.     Screening Questionnaire for Adult Immunization    Are you sick today?   No   Do you have allergies to medications, food, a vaccine component or latex?   No   Have you ever had a serious reaction after receiving a vaccination?   No   Do you have a long-term health problem with heart, lung, kidney, or metabolic disease (e.g., diabetes), asthma, a blood disorder, no spleen, complement component deficiency, a cochlear implant, or a spinal fluid leak?  Are you on long-term aspirin therapy?   No   Do you have cancer, leukemia, HIV/AIDS, or any other immune system problem?   No   Do you have a parent, brother, or sister with an immune system problem?   No   In the past 3 months, have you taken medications that affect  your immune system, such as prednisone, other steroids, or anticancer drugs; drugs for the treatment of rheumatoid arthritis, Crohn s disease, or psoriasis; or have you had radiation treatments?   No   Have you had a seizure, or a brain or other nervous system problem?   No   During the past year, have you received a transfusion of blood or blood    products, or been given immune (gamma) globulin or antiviral drug?   No   For women: Are you pregnant or is there a chance you could become       pregnant during the next month?   No   Have you received any vaccinations in the past 4 weeks?   No     Immunization questionnaire answers were all negative.      Patient instructed to remain in clinic for 15 minutes afterwards, and to report any adverse reactions.     Screening performed by Vanesa Kaur on 6/26/2023 at 9:55 AM.

## 2023-06-27 ENCOUNTER — ANCILLARY PROCEDURE (OUTPATIENT)
Dept: CT IMAGING | Facility: CLINIC | Age: 28
End: 2023-06-27
Attending: STUDENT IN AN ORGANIZED HEALTH CARE EDUCATION/TRAINING PROGRAM
Payer: COMMERCIAL

## 2023-06-27 DIAGNOSIS — C81.12 NODULAR SCLEROSIS HODGKIN LYMPHOMA OF INTRATHORACIC LYMPH NODES (H): ICD-10-CM

## 2023-06-27 PROCEDURE — 74177 CT ABD & PELVIS W/CONTRAST: CPT | Performed by: RADIOLOGY

## 2023-06-27 PROCEDURE — 71260 CT THORAX DX C+: CPT | Performed by: RADIOLOGY

## 2023-06-27 RX ORDER — IOPAMIDOL 755 MG/ML
68 INJECTION, SOLUTION INTRAVASCULAR ONCE
Status: COMPLETED | OUTPATIENT
Start: 2023-06-27 | End: 2023-06-27

## 2023-06-27 RX ADMIN — IOPAMIDOL 68 ML: 755 INJECTION, SOLUTION INTRAVASCULAR at 09:12

## 2023-06-28 LAB
GAMMA INTERFERON BACKGROUND BLD IA-ACNC: 0.01 IU/ML
M TB IFN-G BLD-IMP: NEGATIVE
M TB IFN-G CD4+ BCKGRND COR BLD-ACNC: 9.99 IU/ML
MITOGEN IGNF BCKGRD COR BLD-ACNC: -0.01 IU/ML
MITOGEN IGNF BCKGRD COR BLD-ACNC: 0.01 IU/ML
QUANTIFERON MITOGEN: 10 IU/ML
QUANTIFERON NIL TUBE: 0.01 IU/ML
QUANTIFERON TB1 TUBE: 0 IU/ML
QUANTIFERON TB2 TUBE: 0.02

## 2023-07-06 ENCOUNTER — ONCOLOGY VISIT (OUTPATIENT)
Dept: ONCOLOGY | Facility: CLINIC | Age: 28
End: 2023-07-06
Attending: STUDENT IN AN ORGANIZED HEALTH CARE EDUCATION/TRAINING PROGRAM
Payer: COMMERCIAL

## 2023-07-06 ENCOUNTER — APPOINTMENT (OUTPATIENT)
Dept: LAB | Facility: CLINIC | Age: 28
End: 2023-07-06
Attending: STUDENT IN AN ORGANIZED HEALTH CARE EDUCATION/TRAINING PROGRAM
Payer: COMMERCIAL

## 2023-07-06 VITALS
BODY MASS INDEX: 21.07 KG/M2 | HEART RATE: 76 BPM | RESPIRATION RATE: 16 BRPM | WEIGHT: 123.7 LBS | OXYGEN SATURATION: 100 % | DIASTOLIC BLOOD PRESSURE: 75 MMHG | SYSTOLIC BLOOD PRESSURE: 114 MMHG | TEMPERATURE: 98.7 F

## 2023-07-06 DIAGNOSIS — C81.12 NODULAR SCLEROSIS HODGKIN LYMPHOMA OF INTRATHORACIC LYMPH NODES (H): ICD-10-CM

## 2023-07-06 LAB
ALBUMIN SERPL BCG-MCNC: 4.4 G/DL (ref 3.5–5.2)
ALP SERPL-CCNC: 56 U/L (ref 35–104)
ALT SERPL W P-5'-P-CCNC: 10 U/L (ref 0–50)
ANION GAP SERPL CALCULATED.3IONS-SCNC: 9 MMOL/L (ref 7–15)
AST SERPL W P-5'-P-CCNC: 19 U/L (ref 0–45)
BASOPHILS # BLD AUTO: 0 10E3/UL (ref 0–0.2)
BASOPHILS NFR BLD AUTO: 1 %
BILIRUB SERPL-MCNC: 0.4 MG/DL
BUN SERPL-MCNC: 13.9 MG/DL (ref 6–20)
CALCIUM SERPL-MCNC: 9.1 MG/DL (ref 8.6–10)
CHLORIDE SERPL-SCNC: 106 MMOL/L (ref 98–107)
CREAT SERPL-MCNC: 0.94 MG/DL (ref 0.51–0.95)
DEPRECATED HCO3 PLAS-SCNC: 23 MMOL/L (ref 22–29)
EOSINOPHIL # BLD AUTO: 0.1 10E3/UL (ref 0–0.7)
EOSINOPHIL NFR BLD AUTO: 2 %
ERYTHROCYTE [DISTWIDTH] IN BLOOD BY AUTOMATED COUNT: 11.6 % (ref 10–15)
GFR SERPL CREATININE-BSD FRML MDRD: 85 ML/MIN/1.73M2
GLUCOSE SERPL-MCNC: 96 MG/DL (ref 70–99)
HCT VFR BLD AUTO: 39.1 % (ref 35–47)
HGB BLD-MCNC: 13.5 G/DL (ref 11.7–15.7)
IMM GRANULOCYTES # BLD: 0 10E3/UL
IMM GRANULOCYTES NFR BLD: 0 %
LDH SERPL L TO P-CCNC: 152 U/L (ref 0–250)
LYMPHOCYTES # BLD AUTO: 1.1 10E3/UL (ref 0.8–5.3)
LYMPHOCYTES NFR BLD AUTO: 32 %
MCH RBC QN AUTO: 30.4 PG (ref 26.5–33)
MCHC RBC AUTO-ENTMCNC: 34.5 G/DL (ref 31.5–36.5)
MCV RBC AUTO: 88 FL (ref 78–100)
MONOCYTES # BLD AUTO: 0.3 10E3/UL (ref 0–1.3)
MONOCYTES NFR BLD AUTO: 10 %
NEUTROPHILS # BLD AUTO: 1.8 10E3/UL (ref 1.6–8.3)
NEUTROPHILS NFR BLD AUTO: 55 %
NRBC # BLD AUTO: 0 10E3/UL
NRBC BLD AUTO-RTO: 0 /100
PLATELET # BLD AUTO: 301 10E3/UL (ref 150–450)
POTASSIUM SERPL-SCNC: 4.2 MMOL/L (ref 3.4–5.3)
PROT SERPL-MCNC: 7.3 G/DL (ref 6.4–8.3)
RBC # BLD AUTO: 4.44 10E6/UL (ref 3.8–5.2)
SODIUM SERPL-SCNC: 138 MMOL/L (ref 136–145)
WBC # BLD AUTO: 3.3 10E3/UL (ref 4–11)

## 2023-07-06 PROCEDURE — 36415 COLL VENOUS BLD VENIPUNCTURE: CPT | Performed by: STUDENT IN AN ORGANIZED HEALTH CARE EDUCATION/TRAINING PROGRAM

## 2023-07-06 PROCEDURE — 83615 LACTATE (LD) (LDH) ENZYME: CPT | Performed by: STUDENT IN AN ORGANIZED HEALTH CARE EDUCATION/TRAINING PROGRAM

## 2023-07-06 PROCEDURE — 99214 OFFICE O/P EST MOD 30 MIN: CPT | Performed by: STUDENT IN AN ORGANIZED HEALTH CARE EDUCATION/TRAINING PROGRAM

## 2023-07-06 PROCEDURE — 85004 AUTOMATED DIFF WBC COUNT: CPT | Performed by: STUDENT IN AN ORGANIZED HEALTH CARE EDUCATION/TRAINING PROGRAM

## 2023-07-06 PROCEDURE — G0463 HOSPITAL OUTPT CLINIC VISIT: HCPCS | Performed by: STUDENT IN AN ORGANIZED HEALTH CARE EDUCATION/TRAINING PROGRAM

## 2023-07-06 PROCEDURE — 80053 COMPREHEN METABOLIC PANEL: CPT | Performed by: STUDENT IN AN ORGANIZED HEALTH CARE EDUCATION/TRAINING PROGRAM

## 2023-07-06 ASSESSMENT — PAIN SCALES - GENERAL: PAINLEVEL: NO PAIN (0)

## 2023-07-06 NOTE — NURSING NOTE
Chief Complaint   Patient presents with     Blood Draw     Labs drawn via  by RN in lab.  VS taken        Labs collected from venipuncture by RN. Vitals taken. Checked in for appointment(s).    Jessie Beasley RN

## 2023-07-06 NOTE — LETTER
7/6/2023         RE: Amy Harris  [address] [place] [address]        Dear Colleague,    Thank you for referring your patient, Amy Harris, to the [facility]. Please see a copy of my visit note below.    Raysa Reyes is a 28 year old, presenting for the following health issues:  Blood Draw (Labs drawn via  by RN in lab.  VS taken ) and Oncology Clinic Visit (Nodular sclerosis classical Hodgkin lymphoma )    HPI     Oncology History Overview Note   She has a past medical history of acne treated with spironolactone who is otherwise healthy with no notable past surgical history. She first noticed heartburn-like symptoms when drinking alcohol a year ago and has noticed progressive dysphagia, where now dry solids get caught when swallowing although it is not painful. She has had to stop eating mid-meal but denies appetite change or weight loss. She developed night sweats x2 in Fall 2021 where she woke up with a drenched shirt. Denies current night sweats, fever, chills. During that time, she first noticed an enlarged lymph node in her right clavicular region and as it was not bothersome or painful, did not pursue medical attention at that time. She noticed no other lumps or bumps at that time. She has also been increasingly itchy in all parts of her body for the past 2 months. Over the past two weeks, she has noticed a fast heart rate without chest pain or palpitations and shortness of breath on exertion like carrying laundry upstairs or walking and talking at the same time. She denies lower extremity or upper extremity edema. Her energy level has been slowly declining, where she sleeps for longer at night and needing to rest for 4 hours in the afternoon.      She had a routine physical on 5/16/22 where she brought attention to her growing mass on her right shoulder. An excisional biopsy was conducted by ENT on 6/9/22 with pathology showing sona sclerosis  classic Hodgkin lymphoma.       She udnerwent ECHO which showed EF of >50%.  PET scan done on 6/18/2022 showed hypermetabolic mediastinal mass with SUV max of 14.7 and 15.6 x 14.8 x 7.8 cm in size.  Mediastinal mass extends to right level 4 lateral to right thyroid lobe.  There is associated right retroclavicular lymphadenopathy.  Associated bone marrow activity is seen in the axial skeleton consistent with bone marrow infiltration.  Her [place] stage is stage IV.    She consulted with fertility specialist.  We do not have time to pursue pretreatment quality preservation.  She was advised to return in 6 months after completing treatment at that time fertility preservation would be considered.    Due to her ongoing itching, exertional dyspnea, I had prescribed prednisone 60 mg daily and allopurinol to prevent tumor lysis.    She underwent 2 cycles of AVD with rituximab.  Tolerated well without any side effects.  PET scan after 2 cycles shows complete metabolic response. She went on to receive 6 cycles of ABVD. 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    End of treatment PET scan showed continued complete metabolic response.         Nodular sclerosis classical Hodgkin lymphoma (H)   6/16/2022 Initial Diagnosis    Nodular sclerosis classical Hodgkin lymphoma (H)     6/27/2022 -  Chemotherapy    OP ONC Lymphoma and CLL - A + AVD (Brentuximab vedotin / DOXOrubicin / vinBLAStine / Dacarbazine)   Plan Provider: Mallory Mendez MD  Treatment goal: Curative  Line of treatment: [No plan line of treatment]     8/19/2022 No evidence of disease    PET scan after 2 cycles of AVD (map shows no evidence of disease.       Patient comes today for follow up. No fevers, chills, night sweats or weight loss, no lymphadenopathy. No pain.      Review of Systems   8 point review of systems was negative except pertinent positives listed above.           Objective    /75   Pulse 76   Temp 98.7  F  (37.1  C) (Oral)   Resp 16   Wt 56.1 kg (123 lb 11.2 oz)   LMP 05/23/2023 (Approximate)   SpO2 100%   BMI 21.07 kg/m    Body mass index is 21.07 kg/m .  Physical Exam   GENERAL:  Alert oriented well nourished  HEAD: normocephalic atraumatic  SKIN:  no rash, hives, other lesions.  EYE: anicteric Sclerae  ENT: no throat erythema, enlarged tonsills, no ulcers or mucositis in the mouth  LYMPHATIC: no abnormal lymph nodes palable in cervical, axillary, supraclavicular or inguinal area.  RESP:  No rales or rhonchi, breath sounds bilaterally equal and vesicular  CV:  No tachycardia, S1 S2 normal No murmur.  GI:  Abdomen soft nontender no hepato or splenomegaly  MUSCULOSKELETAL:  No visible joint redness or swelling.  NEURO:  No gross weakness gait normal  PSYCH: pleasant affect    Labs: Labs reviewed. No cytopenias, renal or liver abnormalities, LDH normal.    Imaging: Ct chest/abdomen/pelvis reviewed, no concern for progression/recurrence of lymphoma    Assessment and Plan:    1) Classic Hodgkin lymphoma, nodular sclerosis, in CR after frontline chemoimmunotherapy:  - She does not have any clinical or radiographic evidence of lymphoma recurrence or progression.  - I reviewed labs and CT scan with the patient. I explained that we will continue lymphoma surveillance with history, physical exam and labs in 3 months.  - CT scan will be in 6 months.    Total time spent on date of service in review of medical records, review of labs, history taking, physical exam, discussion of assessment and plan, counseling and patient education is 30 minutes.    Mallory Mendez MD  Attending Physician  Pager [phone]

## 2023-07-06 NOTE — NURSING NOTE
"Oncology Rooming Note    July 6, 2023 1:46 PM   Amy Harris is a 28 year old female who presents for:    Chief Complaint   Patient presents with     Blood Draw     Labs drawn via  by RN in lab.  VS taken      Oncology Clinic Visit     Nodular sclerosis classical Hodgkin lymphoma      Initial Vitals: /75   Pulse 76   Temp 98.7  F (37.1  C) (Oral)   Resp 16   Wt 56.1 kg (123 lb 11.2 oz)   LMP 05/23/2023 (Approximate)   SpO2 100%   BMI 21.07 kg/m   Estimated body mass index is 21.07 kg/m  as calculated from the following:    Height as of 6/26/23: 1.632 m (5' 4.25\").    Weight as of this encounter: 56.1 kg (123 lb 11.2 oz). Body surface area is 1.59 meters squared.  No Pain (0) Comment: Data Unavailable   Patient's last menstrual period was 05/23/2023 (approximate).  Allergies reviewed: Yes  Medications reviewed: Yes    Medications: Medication refills not needed today.  Pharmacy name entered into EPIC:    [place], [place] - [address] [facility]  [facility] [id number] [place] [facility] - [place], [place] - [address] [id number] - [place], [place] - [address]  [address]    Clinical concerns: No new clinical concerns other than reason for visit today.    Candice Jackson, EMT    "

## 2023-07-06 NOTE — PROGRESS NOTES
Raysa Reyes is a 28 year old, presenting for the following health issues:  Blood Draw (Labs drawn via  by RN in lab.  VS taken ) and Oncology Clinic Visit (Nodular sclerosis classical Hodgkin lymphoma )    HPI     Oncology History Overview Note   She has a past medical history of acne treated with spironolactone who is otherwise healthy with no notable past surgical history. She first noticed heartburn-like symptoms when drinking alcohol a year ago and has noticed progressive dysphagia, where now dry solids get caught when swallowing although it is not painful. She has had to stop eating mid-meal but denies appetite change or weight loss. She developed night sweats x2 in Fall 2021 where she woke up with a drenched shirt. Denies current night sweats, fever, chills. During that time, she first noticed an enlarged lymph node in her right clavicular region and as it was not bothersome or painful, did not pursue medical attention at that time. She noticed no other lumps or bumps at that time. She has also been increasingly itchy in all parts of her body for the past 2 months. Over the past two weeks, she has noticed a fast heart rate without chest pain or palpitations and shortness of breath on exertion like carrying laundry upstairs or walking and talking at the same time. She denies lower extremity or upper extremity edema. Her energy level has been slowly declining, where she sleeps for longer at night and needing to rest for 4 hours in the afternoon.      She had a routine physical on 5/16/22 where she brought attention to her growing mass on her right shoulder. 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       She udnerwent ECHO which showed EF of >50%.  PET scan done on 6/18/2022 showed hypermetabolic mediastinal mass with SUV max of 14.7 and 15.6 x 14.8 x 7.8 cm in size.  Mediastinal mass extends to right level 4 lateral to right  thyroid lobe.  There is associated right retroclavicular lymphadenopathy.  Associated bone marrow activity is seen in the axial skeleton consistent with bone marrow infiltration.  Her [place] stage is stage IV.    She consulted with fertility specialist.  We do not have time to pursue pretreatment quality preservation.  She was advised to return in 6 months after completing treatment at that time fertility preservation would be considered.    Due to her ongoing itching, exertional dyspnea, I had prescribed prednisone 60 mg daily and allopurinol to prevent tumor lysis.    She underwent 2 cycles of AVD with rituximab.  Tolerated well without any side effects.  PET scan after 2 cycles shows complete metabolic response. She went on to receive 6 cycles of ABVD. 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    End of treatment PET scan showed continued complete metabolic response.         Nodular sclerosis classical Hodgkin lymphoma (H)   6/16/2022 Initial Diagnosis    Nodular sclerosis classical Hodgkin lymphoma (H)     6/27/2022 -  Chemotherapy    OP ONC Lymphoma and CLL - A + AVD (Brentuximab vedotin / DOXOrubicin / vinBLAStine / Dacarbazine)   Plan Provider: Mallory Mendez MD  Treatment goal: Curative  Line of treatment: [No plan line of treatment]     8/19/2022 No evidence of disease    PET scan after 2 cycles of AVD (map shows no evidence of disease.       Patient comes today for follow up. No fevers, chills, night sweats or weight loss, no lymphadenopathy. No pain.      Review of Systems   8 point review of systems was negative except pertinent positives listed above.            Objective    /75   Pulse 76   Temp 98.7  F (37.1  C) (Oral)   Resp 16   Wt 56.1 kg (123 lb 11.2 oz)   LMP 05/23/2023 (Approximate)   SpO2 100%   BMI 21.07 kg/m    Body mass index is 21.07 kg/m .  Physical Exam   GENERAL:  Alert oriented well nourished  HEAD: normocephalic atraumatic  SKIN:  no  rash, hives, other lesions.  EYE: anicteric Sclerae  ENT: no throat erythema, enlarged tonsills, no ulcers or mucositis in the mouth  LYMPHATIC: no abnormal lymph nodes palable in cervical, axillary, supraclavicular or inguinal area.  RESP:  No rales or rhonchi, breath sounds bilaterally equal and vesicular  CV:  No tachycardia, S1 S2 normal No murmur.  GI:  Abdomen soft nontender no hepato or splenomegaly  MUSCULOSKELETAL:  No visible joint redness or swelling.  NEURO:  No gross weakness gait normal  PSYCH: pleasant affect    Labs: Labs reviewed. No cytopenias, renal or liver abnormalities, LDH normal.    Imaging: Ct chest/abdomen/pelvis reviewed, no concern for progression/recurrence of lymphoma    Assessment and Plan:    1) Classic Hodgkin lymphoma, nodular sclerosis, in CR after frontline chemoimmunotherapy:  - She does not have any clinical or radiographic evidence of lymphoma recurrence or progression.  - I reviewed labs and CT scan with the patient. I explained that we will continue lymphoma surveillance with history, physical exam and labs in 3 months.  - CT scan will be in 6 months.    Total time spent on date of service in review of medical records, review of labs, history taking, physical exam, discussion of assessment and plan, counseling and patient education is 30 minutes.    Mallory Mendez MD  Attending Physician  Pager [phone]

## 2023-09-14 ENCOUNTER — PATIENT OUTREACH (OUTPATIENT)
Dept: ONCOLOGY | Facility: CLINIC | Age: 28
End: 2023-09-14
Payer: COMMERCIAL

## 2023-09-14 NOTE — PROGRESS NOTES
[facility]: Cancer Care                                                                                          Patient chart reviewed by care coordinator.    Updated Cancer Care Coordination enrollment status to Graduated.    Signature:  Mirella Lau RN

## 2023-10-19 ENCOUNTER — ONCOLOGY VISIT (OUTPATIENT)
Dept: ONCOLOGY | Facility: CLINIC | Age: 28
End: 2023-10-19
Attending: STUDENT IN AN ORGANIZED HEALTH CARE EDUCATION/TRAINING PROGRAM
Payer: COMMERCIAL

## 2023-10-19 ENCOUNTER — APPOINTMENT (OUTPATIENT)
Dept: LAB | Facility: CLINIC | Age: 28
End: 2023-10-19
Attending: STUDENT IN AN ORGANIZED HEALTH CARE EDUCATION/TRAINING PROGRAM
Payer: COMMERCIAL

## 2023-10-19 VITALS
WEIGHT: 122 LBS | DIASTOLIC BLOOD PRESSURE: 76 MMHG | BODY MASS INDEX: 20.78 KG/M2 | OXYGEN SATURATION: 99 % | RESPIRATION RATE: 16 BRPM | TEMPERATURE: 98.6 F | HEART RATE: 94 BPM | SYSTOLIC BLOOD PRESSURE: 112 MMHG

## 2023-10-19 DIAGNOSIS — C81.12 NODULAR SCLEROSIS HODGKIN LYMPHOMA OF INTRATHORACIC LYMPH NODES (H): ICD-10-CM

## 2023-10-19 LAB
ALBUMIN SERPL BCG-MCNC: 4.5 G/DL (ref 3.5–5.2)
ALP SERPL-CCNC: 53 U/L (ref 35–104)
ALT SERPL W P-5'-P-CCNC: 11 U/L (ref 0–50)
ANION GAP SERPL CALCULATED.3IONS-SCNC: 10 MMOL/L (ref 7–15)
AST SERPL W P-5'-P-CCNC: 19 U/L (ref 0–45)
BASO+EOS+MONOS # BLD AUTO: NORMAL 10*3/UL
BASO+EOS+MONOS NFR BLD AUTO: NORMAL %
BASOPHILS # BLD AUTO: 0 10E3/UL (ref 0–0.2)
BASOPHILS NFR BLD AUTO: 1 %
BILIRUB SERPL-MCNC: 0.5 MG/DL
BUN SERPL-MCNC: 11.6 MG/DL (ref 6–20)
CALCIUM SERPL-MCNC: 9.2 MG/DL (ref 8.6–10)
CHLORIDE SERPL-SCNC: 100 MMOL/L (ref 98–107)
CREAT SERPL-MCNC: 0.67 MG/DL (ref 0.51–0.95)
DEPRECATED HCO3 PLAS-SCNC: 25 MMOL/L (ref 22–29)
EGFRCR SERPLBLD CKD-EPI 2021: >90 ML/MIN/1.73M2
EOSINOPHIL # BLD AUTO: 0.1 10E3/UL (ref 0–0.7)
EOSINOPHIL NFR BLD AUTO: 2 %
ERYTHROCYTE [DISTWIDTH] IN BLOOD BY AUTOMATED COUNT: 11.6 % (ref 10–15)
GLUCOSE SERPL-MCNC: 129 MG/DL (ref 70–99)
HCT VFR BLD AUTO: 39.3 % (ref 35–47)
HGB BLD-MCNC: 13.5 G/DL (ref 11.7–15.7)
IMM GRANULOCYTES # BLD: 0 10E3/UL
IMM GRANULOCYTES NFR BLD: 0 %
LYMPHOCYTES # BLD AUTO: 1.3 10E3/UL (ref 0.8–5.3)
LYMPHOCYTES NFR BLD AUTO: 28 %
MCH RBC QN AUTO: 29.9 PG (ref 26.5–33)
MCHC RBC AUTO-ENTMCNC: 34.4 G/DL (ref 31.5–36.5)
MCV RBC AUTO: 87 FL (ref 78–100)
MONOCYTES # BLD AUTO: 0.3 10E3/UL (ref 0–1.3)
MONOCYTES NFR BLD AUTO: 8 %
NEUTROPHILS # BLD AUTO: 2.8 10E3/UL (ref 1.6–8.3)
NEUTROPHILS NFR BLD AUTO: 61 %
NRBC # BLD AUTO: 0 10E3/UL
NRBC BLD AUTO-RTO: 0 /100
PLATELET # BLD AUTO: 267 10E3/UL (ref 150–450)
POTASSIUM SERPL-SCNC: 4 MMOL/L (ref 3.4–5.3)
PROT SERPL-MCNC: 7.4 G/DL (ref 6.4–8.3)
RBC # BLD AUTO: 4.51 10E6/UL (ref 3.8–5.2)
SODIUM SERPL-SCNC: 135 MMOL/L (ref 135–145)
WBC # BLD AUTO: 4.5 10E3/UL (ref 4–11)

## 2023-10-19 PROCEDURE — G0463 HOSPITAL OUTPT CLINIC VISIT: HCPCS | Performed by: STUDENT IN AN ORGANIZED HEALTH CARE EDUCATION/TRAINING PROGRAM

## 2023-10-19 PROCEDURE — 85025 COMPLETE CBC W/AUTO DIFF WBC: CPT | Performed by: STUDENT IN AN ORGANIZED HEALTH CARE EDUCATION/TRAINING PROGRAM

## 2023-10-19 PROCEDURE — 36415 COLL VENOUS BLD VENIPUNCTURE: CPT | Performed by: STUDENT IN AN ORGANIZED HEALTH CARE EDUCATION/TRAINING PROGRAM

## 2023-10-19 PROCEDURE — 99214 OFFICE O/P EST MOD 30 MIN: CPT | Performed by: STUDENT IN AN ORGANIZED HEALTH CARE EDUCATION/TRAINING PROGRAM

## 2023-10-19 PROCEDURE — 80053 COMPREHEN METABOLIC PANEL: CPT | Performed by: STUDENT IN AN ORGANIZED HEALTH CARE EDUCATION/TRAINING PROGRAM

## 2023-10-19 ASSESSMENT — PAIN SCALES - GENERAL: PAINLEVEL: NO PAIN (0)

## 2023-10-19 NOTE — NURSING NOTE
Chief Complaint   Patient presents with    Blood Draw     Vpt blood draw by lab RN. Vitals taken and appointment arrived     Viktoria Contreras RN

## 2023-10-19 NOTE — PROGRESS NOTES
Raysa Reyes is a 28 year old, presenting for the following health issues:  Blood Draw (Vpt blood draw by lab RN. 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 She first noticed heartburn-like symptoms when drinking alcohol a year ago and has noticed progressive dysphagia, where now dry solids get caught when swallowing although it is not painful. She has had to stop eating mid-meal but denies appetite change or weight loss. She developed night sweats x2 in Fall 2021 where she woke up with a drenched shirt. Denies current night sweats, fever, chills. During that time, she first noticed an enlarged lymph node in her right clavicular region and as it was not bothersome or painful, did not pursue medical attention at that time. She noticed no other lumps or bumps at that time. She has also been increasingly itchy in all parts of her body for the past 2 months. Over the past two weeks, she has noticed a fast heart rate without chest pain or palpitations and shortness of breath on exertion like carrying laundry upstairs or walking and talking at the same time. She denies lower extremity or upper extremity edema. Her energy level has been slowly declining, where she sleeps for longer at night and needing to rest for 4 hours in the afternoon.      She had a routine physical on 5/16/22 where she brought attention to her growing mass on her right shoulder. 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       She udnerwent ECHO which showed EF of >50%.  PET scan done on 6/18/2022 showed hypermetabolic mediastinal mass with SUV max of 14.7 and 15.6 x 14.8 x 7.8 cm in size.  Mediastinal mass  extends to right level 4 lateral to right thyroid lobe.  There is associated right retroclavicular lymphadenopathy.  Associated bone marrow activity is seen in the axial skeleton consistent with bone marrow infiltration.  Her [place] stage is stage IV.    She consulted with fertility specialist.  We do not have time to pursue pretreatment quality preservation.  She was advised to return in 6 months after completing treatment at that time fertility preservation would be considered.    Due to her ongoing itching, exertional dyspnea, I had prescribed prednisone 60 mg daily and allopurinol to prevent tumor lysis.    She underwent 2 cycles of AVD with rituximab.  Tolerated well without any side effects.  PET scan after 2 cycles shows complete metabolic response. She went on to receive 6 cycles of ABVD. 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    End of treatment PET scan showed continued complete metabolic response.         Nodular sclerosis classical Hodgkin lymphoma (H)   6/16/2022 Initial Diagnosis    Nodular sclerosis classical Hodgkin lymphoma (H)     6/27/2022 -  Chemotherapy    OP ONC Lymphoma and CLL - A + AVD (Brentuximab vedotin / DOXOrubicin / vinBLAStine / Dacarbazine)   Plan Provider: Mallory Mendez MD  Treatment goal: Curative  Line of treatment: [No plan line of treatment]     8/19/2022 No evidence of disease    PET scan after 2 cycles of AVD (map shows no evidence of disease.       Patient comes today for follow up. No fevers, chills, night sweats or weight loss, no lymphadenopathy. No pain.        Review of Systems   8 point review of systems was negative except pertinent positives listed above.        Objective    /76   Pulse 94   Temp 98.6  F (37  C) (Oral)   Resp 16   Wt 55.3 kg (122 lb)   LMP 09/19/2023   SpO2 99%   BMI 20.78 kg/m    Body mass index is 20.78 kg/m .  Physical Exam   GENERAL:  Alert oriented well nourished  HEAD: normocephalic  atraumatic  SKIN:  no rash, hives, other lesions.  EYE: anicteric Sclerae  ENT: no throat erythema, enlarged tonsills, no ulcers or mucositis in the mouth  LYMPHATIC: no abnormal lymph nodes palable in cervical, axillary, supraclavicular or inguinal area.  RESP:  No rales or rhonchi, breath sounds bilaterally equal and vesicular  CV:  No tachycardia, S1 S2 normal No murmur.  GI:  Abdomen soft nontender no hepato or splenomegaly  MUSCULOSKELETAL:  No visible joint redness or swelling.  NEURO:  No gross weakness gait normal  PSYCH: pleasant affect    Labs: Labs reviewed. No cytopenias, renal or liver abnormalities, LDH normal.    Assessment and Plan:    1) Classic Hodgkin lymphoma:  -She does not have any clinical or laboratory evidence of lymphoma recurrence or progression.  -I spent some time talking about lymphoma relapse.  I discussed that the regimen she received has 80 to 90% likelihood of cure.  So relapses are unlikely.  If she were to relapse, highest likelihood is within 2 years.  We will therefore continue every 3 months clinical exam and labs and every 6-month scans until she reaches 4-year agustin.  She knows to call if there are any concerning symptoms.    Total time spent on date of service in review of medical records, review of labs, history taking, physical exam, discussion of assessment and plan, counseling and patient education is 30 minutes.    Mallory Mendez MD  Attending Physician  Pager [phone]

## 2023-10-19 NOTE — NURSING NOTE
"Oncology Rooming Note    October 19, 2023 12:19 PM   Amy Harris is a 28 year old female who presents for:    Chief Complaint   Patient presents with    Blood Draw     Vpt blood draw by lab RN. Vitals taken and appointment arrived    Oncology Clinic Visit     Nodular sclerosis Hodgkin lymphoma of intrathoracic lymph nodes     Initial Vitals: /76   Pulse 94   Temp 98.6  F (37  C) (Oral)   Resp 16   Wt 55.3 kg (122 lb)   LMP 09/19/2023   SpO2 99%   BMI 20.78 kg/m   Estimated body mass index is 20.78 kg/m  as calculated from the following:    Height as of 6/26/23: 1.632 m (5' 4.25\").    Weight as of this encounter: 55.3 kg (122 lb). Body surface area is 1.58 meters squared.  No Pain (0) Comment: Data Unavailable   Patient's last menstrual period was 09/19/2023.  Allergies reviewed: Yes  Medications reviewed: Yes    Medications: Medication refills not needed today.  Pharmacy name entered into EPIC:    [place], [place] - [address] [facility] [id number] [place] [facility] - [place], [place] - [address] [id number] - [place], [place] - [address]  [address]    Clinical concerns: Patient states there are no new concerns to discuss with provider.       Willie Canas              "

## 2023-10-19 NOTE — LETTER
10/19/2023         RE: Amy Harris  [address]  [place] [address]        Dear Colleague,    Thank you for referring your patient, Amy Harris, to the [facility]. Please see a copy of my visit note below.      Subjective  Amy is a 28 year old, presenting for the following health issues:  Blood Draw (Vpt blood draw by lab RN. 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 She first noticed heartburn-like symptoms when drinking alcohol a year ago and has noticed progressive dysphagia, where now dry solids get caught when swallowing although it is not painful. She has had to stop eating mid-meal but denies appetite change or weight loss. She developed night sweats x2 in Fall 2021 where she woke up with a drenched shirt. Denies current night sweats, fever, chills. During that time, she first noticed an enlarged lymph node in her right clavicular region and as it was not bothersome or painful, did not pursue medical attention at that time. She noticed no other lumps or bumps at that time. She has also been increasingly itchy in all parts of her body for the past 2 months. Over the past two weeks, she has noticed a fast heart rate without chest pain or palpitations and shortness of breath on exertion like carrying laundry upstairs or walking and talking at the same time. She denies lower extremity or upper extremity edema. Her energy level has been slowly declining, where she sleeps for longer at night and needing to rest for 4 hours in the afternoon.      She had a routine physical on 5/16/22 where she brought attention to her growing mass on her right shoulder. An excisional biopsy was conducted by ENT on  6/9/22 with pathology showing sona sclerosis classic Hodgkin lymphoma.       She udnerwent ECHO which showed EF of >50%.  PET scan done on 6/18/2022 showed hypermetabolic mediastinal mass with SUV max of 14.7 and 15.6 x 14.8 x 7.8 cm in size.  Mediastinal mass extends to right level 4 lateral to right thyroid lobe.  There is associated right retroclavicular lymphadenopathy.  Associated bone marrow activity is seen in the axial skeleton consistent with bone marrow infiltration.  Her [place] stage is stage IV.    She consulted with fertility specialist.  We do not have time to pursue pretreatment quality preservation.  She was advised to return in 6 months after completing treatment at that time fertility preservation would be considered.    Due to her ongoing itching, exertional dyspnea, I had prescribed prednisone 60 mg daily and allopurinol to prevent tumor lysis.    She underwent 2 cycles of AVD with rituximab.  Tolerated well without any side effects.  PET scan after 2 cycles shows complete metabolic response. She went on to receive 6 cycles of ABVD. 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    End of treatment PET scan showed continued complete metabolic response.         Nodular sclerosis classical Hodgkin lymphoma (H)   6/16/2022 Initial Diagnosis    Nodular sclerosis classical Hodgkin lymphoma (H)     6/27/2022 -  Chemotherapy    OP ONC Lymphoma and CLL - A + AVD (Brentuximab vedotin / DOXOrubicin / vinBLAStine / Dacarbazine)   Plan Provider: Mallory Mendez MD  Treatment goal: Curative  Line of treatment: [No plan line of treatment]     8/19/2022 No evidence of disease    PET scan after 2 cycles of AVD (map shows no evidence of disease.       Patient comes today for follow up. No fevers, chills, night sweats or weight loss, no lymphadenopathy. No pain.        Review of Systems   8 point review of systems was negative except pertinent positives listed above.         Objective   /76   Pulse 94   Temp 98.6  F (37  C) (Oral)   Resp 16   Wt 55.3 kg (122 lb)   LMP 09/19/2023   SpO2 99%   BMI 20.78 kg/m    Body mass index is 20.78 kg/m .  Physical Exam   GENERAL:  Alert oriented well nourished  HEAD: normocephalic atraumatic  SKIN:  no rash, hives, other lesions.  EYE: anicteric Sclerae  ENT: no throat erythema, enlarged tonsills, no ulcers or mucositis in the mouth  LYMPHATIC: no abnormal lymph nodes palable in cervical, axillary, supraclavicular or inguinal area.  RESP:  No rales or rhonchi, breath sounds bilaterally equal and vesicular  CV:  No tachycardia, S1 S2 normal No murmur.  GI:  Abdomen soft nontender no hepato or splenomegaly  MUSCULOSKELETAL:  No visible joint redness or swelling.  NEURO:  No gross weakness gait normal  PSYCH: pleasant affect    Labs: Labs reviewed. No cytopenias, renal or liver abnormalities, LDH normal.    Assessment and Plan:    1) Classic Hodgkin lymphoma:  -She does not have any clinical or laboratory evidence of lymphoma recurrence or progression.  -I spent some time talking about lymphoma relapse.  I discussed that the regimen she received has 80 to 90% likelihood of cure.  So relapses are unlikely.  If she were to relapse, highest likelihood is within 2 years.  We will therefore continue every 3 months clinical exam and labs and every 6-month scans until she reaches 2-year agustin.  She knows to call if there are any concerning symptoms.    Total time spent on date of service in review of medical records, review of labs, history taking, physical exam, discussion of assessment and plan, counseling and patient education is 30 minutes.    Mallory Mendez MD  Attending Physician  Pager [phone]

## 2023-11-13 ENCOUNTER — IMMUNIZATION (OUTPATIENT)
Dept: FAMILY MEDICINE | Facility: CLINIC | Age: 28
End: 2023-11-13
Payer: COMMERCIAL

## 2023-11-13 DIAGNOSIS — Z23 NEED FOR PROPHYLACTIC VACCINATION AND INOCULATION AGAINST INFLUENZA: Primary | ICD-10-CM

## 2023-11-13 PROCEDURE — 90471 IMMUNIZATION ADMIN: CPT

## 2023-11-13 PROCEDURE — 90686 IIV4 VACC NO PRSV 0.5 ML IM: CPT

## 2024-01-08 ENCOUNTER — LAB (OUTPATIENT)
Dept: LAB | Facility: CLINIC | Age: 29
End: 2024-01-08
Payer: COMMERCIAL

## 2024-01-08 ENCOUNTER — ANCILLARY PROCEDURE (OUTPATIENT)
Dept: CT IMAGING | Facility: CLINIC | Age: 29
End: 2024-01-08
Attending: STUDENT IN AN ORGANIZED HEALTH CARE EDUCATION/TRAINING PROGRAM
Payer: COMMERCIAL

## 2024-01-08 DIAGNOSIS — C81.12 NODULAR SCLEROSIS HODGKIN LYMPHOMA OF INTRATHORACIC LYMPH NODES (H): ICD-10-CM

## 2024-01-08 LAB
ALBUMIN SERPL BCG-MCNC: 3.8 G/DL (ref 3.5–5.2)
ALP SERPL-CCNC: 42 U/L (ref 40–150)
ALT SERPL W P-5'-P-CCNC: 9 U/L (ref 0–50)
ANION GAP SERPL CALCULATED.3IONS-SCNC: 9 MMOL/L (ref 7–15)
AST SERPL W P-5'-P-CCNC: 17 U/L (ref 0–45)
BASOPHILS # BLD AUTO: 0 10E3/UL (ref 0–0.2)
BASOPHILS NFR BLD AUTO: 1 %
BILIRUB SERPL-MCNC: 0.7 MG/DL
BUN SERPL-MCNC: 10.5 MG/DL (ref 6–20)
CALCIUM SERPL-MCNC: 8.8 MG/DL (ref 8.6–10)
CHLORIDE SERPL-SCNC: 100 MMOL/L (ref 98–107)
CREAT SERPL-MCNC: 0.69 MG/DL (ref 0.51–0.95)
DEPRECATED HCO3 PLAS-SCNC: 24 MMOL/L (ref 22–29)
EGFRCR SERPLBLD CKD-EPI 2021: >90 ML/MIN/1.73M2
EOSINOPHIL # BLD AUTO: 0.1 10E3/UL (ref 0–0.7)
EOSINOPHIL NFR BLD AUTO: 4 %
ERYTHROCYTE [DISTWIDTH] IN BLOOD BY AUTOMATED COUNT: 11.6 % (ref 10–15)
GLUCOSE SERPL-MCNC: 87 MG/DL (ref 70–99)
HCT VFR BLD AUTO: 38.1 % (ref 35–47)
HGB BLD-MCNC: 12.5 G/DL (ref 11.7–15.7)
IMM GRANULOCYTES # BLD: 0 10E3/UL
IMM GRANULOCYTES NFR BLD: 0 %
LYMPHOCYTES # BLD AUTO: 0.9 10E3/UL (ref 0.8–5.3)
LYMPHOCYTES NFR BLD AUTO: 31 %
MCH RBC QN AUTO: 30 PG (ref 26.5–33)
MCHC RBC AUTO-ENTMCNC: 32.8 G/DL (ref 31.5–36.5)
MCV RBC AUTO: 92 FL (ref 78–100)
MONOCYTES # BLD AUTO: 0.4 10E3/UL (ref 0–1.3)
MONOCYTES NFR BLD AUTO: 13 %
NEUTROPHILS # BLD AUTO: 1.5 10E3/UL (ref 1.6–8.3)
NEUTROPHILS NFR BLD AUTO: 53 %
PLATELET # BLD AUTO: 232 10E3/UL (ref 150–450)
POTASSIUM SERPL-SCNC: 4.1 MMOL/L (ref 3.4–5.3)
PROT SERPL-MCNC: 6.4 G/DL (ref 6.4–8.3)
RBC # BLD AUTO: 4.16 10E6/UL (ref 3.8–5.2)
SODIUM SERPL-SCNC: 133 MMOL/L (ref 135–145)
WBC # BLD AUTO: 2.8 10E3/UL (ref 4–11)

## 2024-01-08 PROCEDURE — 36415 COLL VENOUS BLD VENIPUNCTURE: CPT

## 2024-01-08 PROCEDURE — 71260 CT THORAX DX C+: CPT | Mod: TC | Performed by: RADIOLOGY

## 2024-01-08 PROCEDURE — 80053 COMPREHEN METABOLIC PANEL: CPT

## 2024-01-08 PROCEDURE — 74177 CT ABD & PELVIS W/CONTRAST: CPT | Mod: TC | Performed by: RADIOLOGY

## 2024-01-08 PROCEDURE — 85025 COMPLETE CBC W/AUTO DIFF WBC: CPT

## 2024-01-08 RX ORDER — IOPAMIDOL 755 MG/ML
74 INJECTION, SOLUTION INTRAVASCULAR ONCE
Status: COMPLETED | OUTPATIENT
Start: 2024-01-08 | End: 2024-01-08

## 2024-01-08 RX ADMIN — IOPAMIDOL 74 ML: 755 INJECTION, SOLUTION INTRAVASCULAR at 09:32

## 2024-01-15 ENCOUNTER — ONCOLOGY VISIT (OUTPATIENT)
Dept: ONCOLOGY | Facility: CLINIC | Age: 29
End: 2024-01-15
Attending: STUDENT IN AN ORGANIZED HEALTH CARE EDUCATION/TRAINING PROGRAM
Payer: COMMERCIAL

## 2024-01-15 VITALS
HEART RATE: 80 BPM | OXYGEN SATURATION: 100 % | BODY MASS INDEX: 21.15 KG/M2 | SYSTOLIC BLOOD PRESSURE: 104 MMHG | TEMPERATURE: 98 F | RESPIRATION RATE: 16 BRPM | WEIGHT: 124.2 LBS | DIASTOLIC BLOOD PRESSURE: 71 MMHG

## 2024-01-15 DIAGNOSIS — C81.10 NODULAR SCLEROSING HODGKIN'S LYMPHOMA, UNSPECIFIED BODY REGION (H): Primary | ICD-10-CM

## 2024-01-15 PROCEDURE — G0463 HOSPITAL OUTPT CLINIC VISIT: HCPCS | Performed by: STUDENT IN AN ORGANIZED HEALTH CARE EDUCATION/TRAINING PROGRAM

## 2024-01-15 PROCEDURE — 99214 OFFICE O/P EST MOD 30 MIN: CPT | Performed by: STUDENT IN AN ORGANIZED HEALTH CARE EDUCATION/TRAINING PROGRAM

## 2024-01-15 ASSESSMENT — PAIN SCALES - GENERAL: PAINLEVEL: NO PAIN (0)

## 2024-01-15 NOTE — LETTER
1/15/2024         RE: mAy Harris  [address]  [place] [address]        Dear Colleague,    Thank you for referring your patient, Amy Harris, to the [facility]. Please see a copy of my visit note below.      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 She first noticed heartburn-like symptoms when drinking alcohol a year ago and has noticed progressive dysphagia, where now dry solids get caught when swallowing although it is not painful. She has had to stop eating mid-meal but denies appetite change or weight loss. She developed night sweats x2 in Fall 2021 where she woke up with a drenched shirt. Denies current night sweats, fever, chills. During that time, she first noticed an enlarged lymph node in her right clavicular region and as it was not bothersome or painful, did not pursue medical attention at that time. She noticed no other lumps or bumps at that time. She has also been increasingly itchy in all parts of her body for the past 2 months. Over the past two weeks, she has noticed a fast heart rate without chest pain or palpitations and shortness of breath on exertion like carrying laundry upstairs or walking and talking at the same time. She denies lower extremity or upper extremity edema. Her energy level has been slowly declining, where she sleeps for longer at night and needing to rest for 4 hours in the afternoon.      She had a routine physical on 5/16/22 where she brought attention to her growing mass on her right shoulder. 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       She  udnerwent ECHO which showed EF of >50%.  PET scan done on 6/18/2022 showed hypermetabolic mediastinal mass with SUV max of 14.7 and 15.6 x 14.8 x 7.8 cm in size.  Mediastinal mass extends to right level 4 lateral to right thyroid lobe.  There is associated right retroclavicular lymphadenopathy.  Associated bone marrow activity is seen in the axial skeleton consistent with bone marrow infiltration.  Her [place] stage is stage IV.    She consulted with fertility specialist.  We do not have time to pursue pretreatment quality preservation.  She was advised to return in 6 months after completing treatment at that time fertility preservation would be considered.    Due to her ongoing itching, exertional dyspnea, I had prescribed prednisone 60 mg daily and allopurinol to prevent tumor lysis.    She underwent 2 cycles of AVD with rituximab.  Tolerated well without any side effects.  PET scan after 2 cycles shows complete metabolic response. She went on to receive 6 cycles of ABVD. 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    End of treatment PET scan showed continued complete metabolic response.         Nodular sclerosis classical Hodgkin lymphoma (H)   6/16/2022 Initial Diagnosis    Nodular sclerosis classical Hodgkin lymphoma (H)     6/27/2022 -  Chemotherapy    OP ONC Lymphoma and CLL - A + AVD (Brentuximab vedotin / DOXOrubicin / vinBLAStine / Dacarbazine)   Plan Provider: Mallory Mendez MD  Treatment goal: Curative  Line of treatment: [No plan line of treatment]     8/19/2022 No evidence of disease    PET scan after 2 cycles of AVD (map shows no evidence of disease.         Patient comes today for follow up. No fevers, chills, night sweats or weight loss, no lymphadenopathy. No pain.        Review of Systems         Objective   /71   Pulse 80   Temp 98  F (36.7  C) (Oral)   Resp 16   Wt 56.3 kg (124 lb 3.2 oz)   SpO2 100%   BMI 21.15 kg/m    Body mass index is 21.15  kg/m .  Physical Exam   GENERAL:  Alert oriented well nourished  HEAD: normocephalic atraumatic  SKIN:  no rash, hives, other lesions.  LYMPHATIC: no abnormal lymph nodes palable in cervical, axillary, supraclavicular or inguinal area.  RESP:  No rales or rhonchi, breath sounds bilaterally equal and vesicular  CV:  No tachycardia, S1 S2 normal No murmur.  GI:  Abdomen soft nontender no hepato or splenomegaly  MUSCULOSKELETAL:  No visible joint redness or swelling.  NEURO:  No gross weakness gait normal  PSYCH: pleasant affect    Labs:I personally reviewed complete blood count, differential, renal function test, liver function tests LDH.  No cytopenias, LFTs within normal limits, renal function test within normal limits and LDH is normal as well.    Imaging:Imaging: I personally reviewed Ct chest/abdomen/pelvis and discussed with the patient., no concern for progression/recurrence of lymphoma    Assessment and plan:  1) classical Hodgkin lymphoma:  -She does not have any clinical or radiographic evidence of lymphoma recurrence.  -I will continue to see her every 3 month for labs and physical exam until 2-year agustin from her last treatment.  -CT scans will be every 6 months.    Total time spent on date of service in review of medical records, review of labs, history taking, physical exam, discussion of assessment and plan, counseling and patient education is 30 minutes.    Mallory Mendez MD  Attending Physician  Pager [phone]

## 2024-01-15 NOTE — PROGRESS NOTES
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 She first noticed heartburn-like symptoms when drinking alcohol a year ago and has noticed progressive dysphagia, where now dry solids get caught when swallowing although it is not painful. She has had to stop eating mid-meal but denies appetite change or weight loss. She developed night sweats x2 in Fall 2021 where she woke up with a drenched shirt. Denies current night sweats, fever, chills. During that time, she first noticed an enlarged lymph node in her right clavicular region and as it was not bothersome or painful, did not pursue medical attention at that time. She noticed no other lumps or bumps at that time. She has also been increasingly itchy in all parts of her body for the past 2 months. Over the past two weeks, she has noticed a fast heart rate without chest pain or palpitations and shortness of breath on exertion like carrying laundry upstairs or walking and talking at the same time. She denies lower extremity or upper extremity edema. Her energy level has been slowly declining, where she sleeps for longer at night and needing to rest for 4 hours in the afternoon.      She had a routine physical on 5/16/22 where she brought attention to her growing mass on her right shoulder. 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       She udnerwent ECHO which showed EF of >50%.  PET scan done on 6/18/2022 showed hypermetabolic mediastinal mass with SUV max of 14.7 and 15.6 x 14.8 x 7.8 cm in size.  Mediastinal mass extends to right level 4 lateral to right thyroid lobe.  There is associated right  retroclavicular lymphadenopathy.  Associated bone marrow activity is seen in the axial skeleton consistent with bone marrow infiltration.  Her [place] stage is stage IV.    She consulted with fertility specialist.  We do not have time to pursue pretreatment quality preservation.  She was advised to return in 6 months after completing treatment at that time fertility preservation would be considered.    Due to her ongoing itching, exertional dyspnea, I had prescribed prednisone 60 mg daily and allopurinol to prevent tumor lysis.    She underwent 2 cycles of AVD with rituximab.  Tolerated well without any side effects.  PET scan after 2 cycles shows complete metabolic response. She went on to receive 6 cycles of ABVD. 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    End of treatment PET scan showed continued complete metabolic response.         Nodular sclerosis classical Hodgkin lymphoma (H)   6/16/2022 Initial Diagnosis    Nodular sclerosis classical Hodgkin lymphoma (H)     6/27/2022 -  Chemotherapy    OP ONC Lymphoma and CLL - A + AVD (Brentuximab vedotin / DOXOrubicin / vinBLAStine / Dacarbazine)   Plan Provider: Mallory Mendez MD  Treatment goal: Curative  Line of treatment: [No plan line of treatment]     8/19/2022 No evidence of disease    PET scan after 2 cycles of AVD (map shows no evidence of disease.         Patient comes today for follow up. No fevers, chills, night sweats or weight loss, no lymphadenopathy. No pain.        Review of Systems         Objective    /71   Pulse 80   Temp 98  F (36.7  C) (Oral)   Resp 16   Wt 56.3 kg (124 lb 3.2 oz)   SpO2 100%   BMI 21.15 kg/m    Body mass index is 21.15 kg/m .  Physical Exam   GENERAL:  Alert oriented well nourished  HEAD: normocephalic atraumatic  SKIN:  no rash, hives, other lesions.  LYMPHATIC: no abnormal lymph nodes palable in cervical, axillary, supraclavicular or inguinal area.  RESP:  No rales or rhonchi,  breath sounds bilaterally equal and vesicular  CV:  No tachycardia, S1 S2 normal No murmur.  GI:  Abdomen soft nontender no hepato or splenomegaly  MUSCULOSKELETAL:  No visible joint redness or swelling.  NEURO:  No gross weakness gait normal  PSYCH: pleasant affect    Labs:I personally reviewed complete blood count, differential, renal function test, liver function tests LDH.  No cytopenias, LFTs within normal limits, renal function test within normal limits and LDH is normal as well.    Imaging:Imaging: I personally reviewed Ct chest/abdomen/pelvis and discussed with the patient., no concern for progression/recurrence of lymphoma    Assessment and plan:  1) classical Hodgkin lymphoma:  -She does not have any clinical or radiographic evidence of lymphoma recurrence.  -I will continue to see her every 3 month for labs and physical exam until 2-year agustin from her last treatment.  -CT scans will be every 6 months.    Total time spent on date of service in review of medical records, review of labs, history taking, physical exam, discussion of assessment and plan, counseling and patient education is 30 minutes.    Mallory Mendez MD  Attending Physician  Pager [phone]

## 2024-01-15 NOTE — NURSING NOTE
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    Weight as of this encounter: 56.3 kg (124 lb 3.2 oz). Body surface area is 1.6 meters squared.  No Pain (0) Comment: Data Unavailable   No LMP recorded. (Menstrual status: Birth Control).  Allergies reviewed: Yes  Medications reviewed: Yes    Medications: Medication refills not needed today.  Pharmacy name entered into EPIC:    [place], [place] - [address] [id number] [place], [place] - [address]    Frailty Screening:   Is the patient here for a new oncology consult visit in cancer care? 2. No      Clinical concerns: None       Marleen Azevedo CMA            "

## 2024-01-21 ENCOUNTER — OFFICE VISIT (OUTPATIENT)
Dept: URGENT CARE | Facility: URGENT CARE | Age: 29
End: 2024-01-21
Payer: COMMERCIAL

## 2024-01-21 ENCOUNTER — NURSE TRIAGE (OUTPATIENT)
Dept: NURSING | Facility: CLINIC | Age: 29
End: 2024-01-21
Payer: COMMERCIAL

## 2024-01-21 VITALS
RESPIRATION RATE: 14 BRPM | TEMPERATURE: 98.5 F | WEIGHT: 119 LBS | OXYGEN SATURATION: 99 % | SYSTOLIC BLOOD PRESSURE: 121 MMHG | DIASTOLIC BLOOD PRESSURE: 84 MMHG | BODY MASS INDEX: 20.27 KG/M2 | HEART RATE: 85 BPM

## 2024-01-21 DIAGNOSIS — S61.112A LACERATION OF LEFT THUMB WITHOUT FOREIGN BODY WITH DAMAGE TO NAIL, INITIAL ENCOUNTER: Primary | ICD-10-CM

## 2024-01-21 PROCEDURE — 12001 RPR S/N/AX/GEN/TRNK 2.5CM/<: CPT | Performed by: NURSE PRACTITIONER

## 2024-01-21 NOTE — TELEPHONE ENCOUNTER
Nurse Triage SBAR    Is this a 2nd Level Triage? NO    Situation: Pt calling with concerns for laceration.    Background: Pt cut her thumb with a knife while cutting onions around 1730 tonight.    Assessment: Pt states laceration is less than half an inch. It did cut through her nail. Injury is currently wrapped and presumed controlled bleeding.     Protocol Recommended Disposition:   Home Care    Recommendation: Recommendation to be seen if pt feels she may need stitches and/or an xray. She is aware of locations.    Reason for Disposition   Minor cut or scratch    Additional Information   Negative: Sounds like a life-threatening emergency to the triager   Negative: [1] Major bleeding (e.g., actively dripping or spurting) AND [2] can't be stopped   Negative: Amputation   Negative: [1] Knife wound (or other possibly deep cut) AND [2] to chest, abdomen, back, neck, or head   Negative: Shock suspected (e.g., cold/pale/clammy skin, too weak to stand, low BP, rapid pulse)   Negative: [1] Self-injury (e.g., cutting, self-harm) AND [2] suicidal or out-of-control   Negative: [1] Bleeding AND [2] won't stop after 10 minutes of direct pressure (using correct technique)   Negative: [1] Deep cut AND [2] can see bone or tendons   Negative: Cut over knuckle (MCP joint)   Negative: Sensation of something in the wound (i.e., retained object in wound)   Negative: [1] Dirt in the wound AND [2] not removed with 15 minutes of scrubbing   Negative: Skin is split open or gaping (or length > 1/2 inch or 12 mm on the skin, 1/4 inch or 6 mm on the face)   Negative: Wound causes numbness (i.e., loss of sensation)   Negative: Wound causes weakness (i.e., decreased ability to move hand, finger, toe)   Negative: [1] SEVERE pain AND [2] not improved 2 hours after pain medicine   Negative: [1] Looks infected AND [2] large red area (> 2 inches or 5 cm) or streak   Negative: [1] Fever AND [2] spreading red area or streak   Negative: Suspicious  history for the injury   Negative: [1] Looks infected (spreading redness, pus) AND [2] no fever   Negative: No prior tetanus shots (or is not fully vaccinated)   Negative: [1] HIV positive or severe immunodeficiency (severely weak immune system) AND [2] DIRTY cut   Negative: [1] Last tetanus shot > 5 years ago AND [2] DIRTY cut   Negative: [1] Last tetanus shot > 10 years ago AND [2] CLEAN cut   Negative: [1] After 14 days AND [2] wound isn't healed   Negative: [1] Diabetes mellitus AND [2] minor cut or scratch on foot   Negative: [1] Minor cut or scratch AND [2] from self-injury (e.g., cutting, self-harm) AND [3] stable (i.e., not suicidal, not out of control)    Protocols used: Cuts and Onnatvlvitr-O-KD    Leighann Zepeda RN  [facility] Nurse Advisor   1/21/2024  5:44 PM

## 2024-01-22 NOTE — PROGRESS NOTES
Chief Complaint:     Chief Complaint   Patient presents with    Urgent Care     Present for left thumb laceration from a kitchen knife while cutting an onion this evening.      HPI: Amy Harris is an 28 year old female that presents to clinic after cutting self on the left thumb with a kitchen knife this evening. She denies numbness of the thumb. She denies dysfunction of the thumb. Patient denies any loss of strength to the thumb. She is not on blood thinners. She took tylenol which helps temporarily. Pain is mild at rest, more severe with palpation. Patient is up to date on tetanus, last 7/6/2020.     Problem list, Medication list, Allergies, and Medical history reviewed in [facility] and updated as appropriate.    ROS:  Review of systems negative except for noted above      Vitals reviewed by Clarissa Johnson NP  /84   Pulse 85   Temp 98.5  F (36.9  C) (Tympanic)   Resp 14   Wt 54 kg (119 lb)   SpO2 99%   BMI 20.27 kg/m      Physical Exam:    Physical Exam  Constitutional:       General: She is not in acute distress.     Appearance: She is not toxic-appearing or diaphoretic.   Cardiovascular:      Pulses: Normal pulses.   Musculoskeletal:      Left hand: Tenderness present. No swelling or bony tenderness. Normal range of motion. Normal strength.   Skin:     General: Skin is warm.      Capillary Refill: Capillary refill takes less than 2 seconds.      Findings: No bruising or erythema.      Comments: 1 cm curved laceration left thumb into thumbnail without active bleeding   Neurological:      Mental Status: She is alert.      Sensory: No sensory deficit.         Laceration size: 1 cm  Tendon function intact: yes  Sensation to light touch intact: yes  Pulses intact: yes        Verbal consent obtained from patient to take photo for medical electronic health record    Medical Decision Making:  Laceration no evidence of neurovascular injury. The wound will be closed using dermabond.     Assessment:     1.  Laceration of left thumb without foreign body with damage to nail, initial encounter         Plan:    Imaging of the injured area for foreign body or fracture was not indicated    Wound closed per procedure note below. Tetanus up to date    Wound was irrigated with water and cleansed surgiscrub.  Dermabond and sterile dressing applied in clinic.     Discussed home wound care. Return to  with increased swelling, pain, redness, pus or fevers. Keep hand clean and dry.       Patient verbalized understanding and agreed with this plan. AVS reviewed with patient. Patient was discharged in stable condition.       Procedure Note - Wound Repair:     The wound, located on the left distal thumb, measured 1 cm and was clean wound edges, no foreign bodies involved thumb nail.  The level of complexity was: simple .  The neurovascular exam was in tact.  It was irrigated with water and cleansed with surgiscrub and explored.  The wound was closed using Dermabond     Clarissa Johnson NP 6:29 PM

## 2024-04-15 ENCOUNTER — APPOINTMENT (OUTPATIENT)
Dept: LAB | Facility: CLINIC | Age: 29
End: 2024-04-15
Attending: STUDENT IN AN ORGANIZED HEALTH CARE EDUCATION/TRAINING PROGRAM
Payer: COMMERCIAL

## 2024-04-15 ENCOUNTER — ONCOLOGY VISIT (OUTPATIENT)
Dept: ONCOLOGY | Facility: CLINIC | Age: 29
End: 2024-04-15
Attending: STUDENT IN AN ORGANIZED HEALTH CARE EDUCATION/TRAINING PROGRAM
Payer: COMMERCIAL

## 2024-04-15 VITALS
TEMPERATURE: 98.8 F | RESPIRATION RATE: 16 BRPM | DIASTOLIC BLOOD PRESSURE: 68 MMHG | SYSTOLIC BLOOD PRESSURE: 110 MMHG | OXYGEN SATURATION: 100 % | WEIGHT: 125.1 LBS | HEART RATE: 79 BPM | BODY MASS INDEX: 21.36 KG/M2 | HEIGHT: 64 IN

## 2024-04-15 DIAGNOSIS — C81.10 NODULAR SCLEROSING HODGKIN'S LYMPHOMA, UNSPECIFIED BODY REGION (H): ICD-10-CM

## 2024-04-15 LAB
ALBUMIN SERPL BCG-MCNC: 4 G/DL (ref 3.5–5.2)
ALP SERPL-CCNC: 48 U/L (ref 40–150)
ALT SERPL W P-5'-P-CCNC: 9 U/L (ref 0–50)
ANION GAP SERPL CALCULATED.3IONS-SCNC: 9 MMOL/L (ref 7–15)
AST SERPL W P-5'-P-CCNC: 21 U/L (ref 0–45)
BASOPHILS # BLD AUTO: 0 10E3/UL (ref 0–0.2)
BASOPHILS NFR BLD AUTO: 1 %
BILIRUB SERPL-MCNC: 0.7 MG/DL
BUN SERPL-MCNC: 13.5 MG/DL (ref 6–20)
CALCIUM SERPL-MCNC: 8.7 MG/DL (ref 8.6–10)
CHLORIDE SERPL-SCNC: 104 MMOL/L (ref 98–107)
CREAT SERPL-MCNC: 0.67 MG/DL (ref 0.51–0.95)
DEPRECATED HCO3 PLAS-SCNC: 23 MMOL/L (ref 22–29)
EGFRCR SERPLBLD CKD-EPI 2021: >90 ML/MIN/1.73M2
EOSINOPHIL # BLD AUTO: 0.2 10E3/UL (ref 0–0.7)
EOSINOPHIL NFR BLD AUTO: 4 %
ERYTHROCYTE [DISTWIDTH] IN BLOOD BY AUTOMATED COUNT: 12.3 % (ref 10–15)
GLUCOSE SERPL-MCNC: 86 MG/DL (ref 70–99)
HCT VFR BLD AUTO: 36.6 % (ref 35–47)
HGB BLD-MCNC: 12.3 G/DL (ref 11.7–15.7)
IMM GRANULOCYTES # BLD: 0 10E3/UL
IMM GRANULOCYTES NFR BLD: 0 %
LYMPHOCYTES # BLD AUTO: 1.1 10E3/UL (ref 0.8–5.3)
LYMPHOCYTES NFR BLD AUTO: 27 %
MCH RBC QN AUTO: 29.9 PG (ref 26.5–33)
MCHC RBC AUTO-ENTMCNC: 33.6 G/DL (ref 31.5–36.5)
MCV RBC AUTO: 89 FL (ref 78–100)
MONOCYTES # BLD AUTO: 0.4 10E3/UL (ref 0–1.3)
MONOCYTES NFR BLD AUTO: 10 %
NEUTROPHILS # BLD AUTO: 2.5 10E3/UL (ref 1.6–8.3)
NEUTROPHILS NFR BLD AUTO: 58 %
NRBC # BLD AUTO: 0 10E3/UL
NRBC BLD AUTO-RTO: 0 /100
PLATELET # BLD AUTO: 245 10E3/UL (ref 150–450)
POTASSIUM SERPL-SCNC: 3.9 MMOL/L (ref 3.4–5.3)
PROT SERPL-MCNC: 6.9 G/DL (ref 6.4–8.3)
RBC # BLD AUTO: 4.12 10E6/UL (ref 3.8–5.2)
SODIUM SERPL-SCNC: 136 MMOL/L (ref 135–145)
WBC # BLD AUTO: 4.3 10E3/UL (ref 4–11)

## 2024-04-15 PROCEDURE — G0463 HOSPITAL OUTPT CLINIC VISIT: HCPCS | Performed by: STUDENT IN AN ORGANIZED HEALTH CARE EDUCATION/TRAINING PROGRAM

## 2024-04-15 PROCEDURE — 82040 ASSAY OF SERUM ALBUMIN: CPT | Performed by: STUDENT IN AN ORGANIZED HEALTH CARE EDUCATION/TRAINING PROGRAM

## 2024-04-15 PROCEDURE — 82374 ASSAY BLOOD CARBON DIOXIDE: CPT | Performed by: STUDENT IN AN ORGANIZED HEALTH CARE EDUCATION/TRAINING PROGRAM

## 2024-04-15 PROCEDURE — 36591 DRAW BLOOD OFF VENOUS DEVICE: CPT | Performed by: STUDENT IN AN ORGANIZED HEALTH CARE EDUCATION/TRAINING PROGRAM

## 2024-04-15 PROCEDURE — 99214 OFFICE O/P EST MOD 30 MIN: CPT | Performed by: STUDENT IN AN ORGANIZED HEALTH CARE EDUCATION/TRAINING PROGRAM

## 2024-04-15 PROCEDURE — 85025 COMPLETE CBC W/AUTO DIFF WBC: CPT | Performed by: STUDENT IN AN ORGANIZED HEALTH CARE EDUCATION/TRAINING PROGRAM

## 2024-04-15 ASSESSMENT — PAIN SCALES - GENERAL: PAINLEVEL: NO PAIN (0)

## 2024-04-15 NOTE — PROGRESS NOTES
Raysa Reyes is a 29 year old, presenting for the following health issues:  Blood Draw (Labs drawn via ) and Oncology Clinic Visit (P RETURN - MODULAR SCLEROSIS CLASSICAL HODGKIN LYMPHOMA)    HPI     Oncology History Overview Note   She has a past medical history of acne treated with spironolactone who is otherwise healthy with no notable past surgical history. She first noticed heartburn-like symptoms when drinking alcohol a year ago and has noticed progressive dysphagia, where now dry solids get caught when swallowing although it is not painful. She has had to stop eating mid-meal but denies appetite change or weight loss. She developed night sweats x2 in Fall 2021 where she woke up with a drenched shirt. Denies current night sweats, fever, chills. During that time, she first noticed an enlarged lymph node in her right clavicular region and as it was not bothersome or painful, did not pursue medical attention at that time. She noticed no other lumps or bumps at that time. She has also been increasingly itchy in all parts of her body for the past 2 months. Over the past two weeks, she has noticed a fast heart rate without chest pain or palpitations and shortness of breath on exertion like carrying laundry upstairs or walking and talking at the same time. She denies lower extremity or upper extremity edema. Her energy level has been slowly declining, where she sleeps for longer at night and needing to rest for 4 hours in the afternoon.      She had a routine physical on 5/16/22 where she brought attention to her growing mass on her right shoulder. 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       She udnerwent ECHO which showed EF of >50%.  PET scan done on 6/18/2022 showed hypermetabolic mediastinal mass with SUV max of 14.7 and 15.6 x 14.8 x 7.8 cm in size.  Mediastinal mass extends to right level 4 lateral to right thyroid  "lobe.  There is associated right retroclavicular lymphadenopathy.  Associated bone marrow activity is seen in the axial skeleton consistent with bone marrow infiltration.  Her [place] stage is stage IV.    She consulted with fertility specialist.  We do not have time to pursue pretreatment quality preservation.  She was advised to return in 6 months after completing treatment at that time fertility preservation would be considered.    Due to her ongoing itching, exertional dyspnea, I had prescribed prednisone 60 mg daily and allopurinol to prevent tumor lysis.    She underwent 2 cycles of AVD with rituximab.  Tolerated well without any side effects.  PET scan after 2 cycles shows complete metabolic response. She went on to receive 6 cycles of ABVD. 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    End of treatment PET scan showed continued complete metabolic response.         Nodular sclerosis classical Hodgkin lymphoma (H)   6/16/2022 Initial Diagnosis    Nodular sclerosis classical Hodgkin lymphoma (H)     6/27/2022 -  Chemotherapy    OP ONC Lymphoma and CLL - A + AVD (Brentuximab vedotin / DOXOrubicin / vinBLAStine / Dacarbazine)   Plan Provider: Mallory Mendez MD  Treatment goal: Curative  Line of treatment: [No plan line of treatment]     8/19/2022 No evidence of disease    PET scan after 2 cycles of AVD (map shows no evidence of disease.     1/2023 No evidence of disease    She completed treatment December 22 and January 23 and of treatment PET scan showed no evidence of disease.       Patient comes today for follow up. No fevers, chills, night sweats or weight loss, no lymphadenopathy. No pain.            Objective    /68   Pulse 79   Temp 98.8  F (37.1  C) (Oral)   Resp 16   Ht 1.632 m (5' 4.25\")   Wt 56.7 kg (125 lb 1.6 oz)   SpO2 100%   BMI 21.31 kg/m    Body mass index is 21.31 kg/m .  Physical Exam   GENERAL:  Alert oriented well nourished  HEAD: " normocephalic atraumatic  SKIN:  no rash, hives, other lesions.  LYMPHATIC: no abnormal lymph nodes palable in cervical, axillary, supraclavicular or inguinal area.  RESP:  No rales or rhonchi, breath sounds bilaterally equal and vesicular  CV:  No tachycardia, S1 S2 normal No murmur.  GI:  Abdomen soft nontender no hepato or splenomegaly  MUSCULOSKELETAL:  No visible joint redness or swelling.  NEURO:  No gross weakness gait normal  PSYCH: pleasant affect    Labs: I personally reviewed complete blood count, differential, renal function test, liver function tests LDH.  No cytopenias, LFTs within normal limits, renal function test within normal limits and LDH is normal as well.    Assessment and Plan:    1) Classic Hodgkin lymphoma:  - She remains in remission with no clinical or laboratory evidence of lymphoma relapse.  - I will see her back in 3 months with labs.    Total time spent on date of service in review of medical records, review of labs, history taking, physical exam, discussion of assessment and plan, counseling and patient education is 30 minutes.    Mallory Mendez MD  Attending Physician  Pager [phone]            Signed Electronically by: Mallory Mendez MD

## 2024-04-15 NOTE — LETTER
4/15/2024         RE: Amy Harris  [address]  [place] [address]        Dear Colleague,    Thank you for referring your patient, Amy Harris, to the [facility]. Please see a copy of my visit note below.        Subjective  Amy is a 29 year old, presenting for the following health issues:  Blood Draw (Labs drawn via ) and Oncology Clinic Visit ([facility] RETURN - MODULAR SCLEROSIS CLASSICAL HODGKIN LYMPHOMA)    HPI     Oncology History Overview Note   She has a past medical history of acne treated with spironolactone who is otherwise healthy with no notable past surgical history. She first noticed heartburn-like symptoms when drinking alcohol a year ago and has noticed progressive dysphagia, where now dry solids get caught when swallowing although it is not painful. She has had to stop eating mid-meal but denies appetite change or weight loss. She developed night sweats x2 in Fall 2021 where she woke up with a drenched shirt. Denies current night sweats, fever, chills. During that time, she first noticed an enlarged lymph node in her right clavicular region and as it was not bothersome or painful, did not pursue medical attention at that time. She noticed no other lumps or bumps at that time. She has also been increasingly itchy in all parts of her body for the past 2 months. Over the past two weeks, she has noticed a fast heart rate without chest pain or palpitations and shortness of breath on exertion like carrying laundry upstairs or walking and talking at the same time. She denies lower extremity or upper extremity edema. Her energy level has been slowly declining, where she sleeps for longer at night and needing to rest for 4 hours in the afternoon.      She had a routine physical on 5/16/22 where she brought attention to her growing mass on her right shoulder. An excisional biopsy was conducted by ENT on 6/9/22 with pathology showing sona sclerosis classic  Hodgkin lymphoma.       She udnerwent ECHO which showed EF of >50%.  PET scan done on 6/18/2022 showed hypermetabolic mediastinal mass with SUV max of 14.7 and 15.6 x 14.8 x 7.8 cm in size.  Mediastinal mass extends to right level 4 lateral to right thyroid lobe.  There is associated right retroclavicular lymphadenopathy.  Associated bone marrow activity is seen in the axial skeleton consistent with bone marrow infiltration.  Her [place] stage is stage IV.    She consulted with fertility specialist.  We do not have time to pursue pretreatment quality preservation.  She was advised to return in 6 months after completing treatment at that time fertility preservation would be considered.    Due to her ongoing itching, exertional dyspnea, I had prescribed prednisone 60 mg daily and allopurinol to prevent tumor lysis.    She underwent 2 cycles of AVD with rituximab.  Tolerated well without any side effects.  PET scan after 2 cycles shows complete metabolic response. She went on to receive 6 cycles of ABVD. 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    End of treatment PET scan showed continued complete metabolic response.         Nodular sclerosis classical Hodgkin lymphoma (H)   6/16/2022 Initial Diagnosis    Nodular sclerosis classical Hodgkin lymphoma (H)     6/27/2022 -  Chemotherapy    OP ONC Lymphoma and CLL - A + AVD (Brentuximab vedotin / DOXOrubicin / vinBLAStine / Dacarbazine)   Plan Provider: Mallory Mendez MD  Treatment goal: Curative  Line of treatment: [No plan line of treatment]     8/19/2022 No evidence of disease    PET scan after 2 cycles of AVD (map shows no evidence of disease.     1/2023 No evidence of disease    She completed treatment December 22 and January 23 and of treatment PET scan showed no evidence of disease.       Patient comes today for follow up. No fevers, chills, night sweats or weight loss, no lymphadenopathy. No pain.            Objective   BP  "110/68   Pulse 79   Temp 98.8  F (37.1  C) (Oral)   Resp 16   Ht 1.632 m (5' 4.25\")   Wt 56.7 kg (125 lb 1.6 oz)   SpO2 100%   BMI 21.31 kg/m    Body mass index is 21.31 kg/m .  Physical Exam   GENERAL:  Alert oriented well nourished  HEAD: normocephalic atraumatic  SKIN:  no rash, hives, other lesions.  LYMPHATIC: no abnormal lymph nodes palable in cervical, axillary, supraclavicular or inguinal area.  RESP:  No rales or rhonchi, breath sounds bilaterally equal and vesicular  CV:  No tachycardia, S1 S2 normal No murmur.  GI:  Abdomen soft nontender no hepato or splenomegaly  MUSCULOSKELETAL:  No visible joint redness or swelling.  NEURO:  No gross weakness gait normal  PSYCH: pleasant affect    Labs: I personally reviewed complete blood count, differential, renal function test, liver function tests LDH.  No cytopenias, LFTs within normal limits, renal function test within normal limits and LDH is normal as well.    Assessment and Plan:    1) Classic Hodgkin lymphoma:  - She remains in remission with no clinical or laboratory evidence of lymphoma relapse.  - I will see her back in 3 months with labs.    Total time spent on date of service in review of medical records, review of labs, history taking, physical exam, discussion of assessment and plan, counseling and patient education is 30 minutes.    Mallory Mendez MD  Attending Physician  Pager [phone]            Signed Electronically by: Mallory Mendez MD    "

## 2024-04-15 NOTE — NURSING NOTE
Chief Complaint   Patient presents with    Blood Draw     Labs drawn via      Labs collected from venipuncture by RN. Vitals taken. Checked in for appointment(s).     Jesenia CHINCHILLA RN PHN BSN  BMT/Oncology Lab

## 2024-04-15 NOTE — NURSING NOTE
"Oncology Rooming Note    April 15, 2024 12:20 PM   Amy Harris is a 29 year old female who presents for:    Chief Complaint   Patient presents with    Blood Draw     Labs drawn via     Oncology Clinic Visit     [facility] RETURN - MODULAR SCLEROSIS CLASSICAL HODGKIN LYMPHOMA     Initial Vitals: /68   Pulse 79   Temp 98.8  F (37.1  C) (Oral)   Resp 16   Ht 1.632 m (5' 4.25\")   Wt 56.7 kg (125 lb 1.6 oz)   SpO2 100%   BMI 21.31 kg/m   Estimated body mass index is 21.31 kg/m  as calculated from the following:    Height as of this encounter: 1.632 m (5' 4.25\").    Weight as of this encounter: 56.7 kg (125 lb 1.6 oz). Body surface area is 1.6 meters squared.  No Pain (0) Comment: Data Unavailable   No LMP recorded. (Menstrual status: Birth Control).  Allergies reviewed: Yes  Medications reviewed: Yes    Medications: Medication refills not needed today.  Pharmacy name entered into EPIC:    [facility] [place], [place] - [address] [id number] - [place], [place] - [address] AT [address]    Frailty Screening:   Is the patient here for a new oncology consult visit in cancer care? 2. No    Jackson Santoyo LPN              "

## 2024-04-18 ENCOUNTER — PATIENT OUTREACH (OUTPATIENT)
Dept: ONCOLOGY | Facility: CLINIC | Age: 29
End: 2024-04-18

## 2024-04-18 NOTE — PROGRESS NOTES
[facility]: Cancer Care Chart Review                                                                                        Situation: Patient chart reviewed by care coordinator.     Background: Pt seen by Dr. Mendez on 4/15/24 for follow-up.      Assessment: No coordination needed at this time by RNCC. Status set as Maintenance for enrollment.     Plan/Recommendations: Follow up in 3 months with labs and imaging. RNCC sent [employer] message for RNCC outreach.     FABIO ReyesN, RN  RN Care Coordinator   [phone]

## 2024-04-22 SDOH — HEALTH STABILITY: PHYSICAL HEALTH: ON AVERAGE, HOW MANY DAYS PER WEEK DO YOU ENGAGE IN MODERATE TO STRENUOUS EXERCISE (LIKE A BRISK WALK)?: 4 DAYS

## 2024-04-22 SDOH — HEALTH STABILITY: PHYSICAL HEALTH: ON AVERAGE, HOW MANY MINUTES DO YOU ENGAGE IN EXERCISE AT THIS LEVEL?: 30 MIN

## 2024-04-22 ASSESSMENT — SOCIAL DETERMINANTS OF HEALTH (SDOH): HOW OFTEN DO YOU GET TOGETHER WITH FRIENDS OR RELATIVES?: ONCE A WEEK

## 2024-04-23 ENCOUNTER — OFFICE VISIT (OUTPATIENT)
Dept: FAMILY MEDICINE | Facility: CLINIC | Age: 29
End: 2024-04-23
Payer: COMMERCIAL

## 2024-04-23 VITALS
WEIGHT: 123 LBS | SYSTOLIC BLOOD PRESSURE: 121 MMHG | TEMPERATURE: 98.6 F | RESPIRATION RATE: 16 BRPM | BODY MASS INDEX: 21 KG/M2 | HEART RATE: 97 BPM | HEIGHT: 64 IN | OXYGEN SATURATION: 98 % | DIASTOLIC BLOOD PRESSURE: 76 MMHG

## 2024-04-23 DIAGNOSIS — Z00.00 ROUTINE GENERAL MEDICAL EXAMINATION AT A HEALTH CARE FACILITY: Primary | ICD-10-CM

## 2024-04-23 DIAGNOSIS — N92.0 MENORRHAGIA WITH REGULAR CYCLE: ICD-10-CM

## 2024-04-23 DIAGNOSIS — Z13.220 LIPID SCREENING: ICD-10-CM

## 2024-04-23 DIAGNOSIS — L70.9 ACNE, UNSPECIFIED ACNE TYPE: ICD-10-CM

## 2024-04-23 LAB
CHOLEST SERPL-MCNC: 197 MG/DL
FASTING STATUS PATIENT QL REPORTED: YES
HDLC SERPL-MCNC: 70 MG/DL
LDLC SERPL CALC-MCNC: 114 MG/DL
NONHDLC SERPL-MCNC: 127 MG/DL
TRIGL SERPL-MCNC: 63 MG/DL

## 2024-04-23 PROCEDURE — 80061 LIPID PANEL: CPT | Performed by: PHYSICIAN ASSISTANT

## 2024-04-23 PROCEDURE — 99395 PREV VISIT EST AGE 18-39: CPT | Performed by: PHYSICIAN ASSISTANT

## 2024-04-23 PROCEDURE — 36415 COLL VENOUS BLD VENIPUNCTURE: CPT | Performed by: PHYSICIAN ASSISTANT

## 2024-04-23 RX ORDER — DROSPIRENONE AND ETHINYL ESTRADIOL 0.02-3(28)
1 KIT ORAL EVERY MORNING
Qty: 84 TABLET | Refills: 3 | Status: SHIPPED | OUTPATIENT
Start: 2024-04-23

## 2024-04-23 RX ORDER — SPIRONOLACTONE 100 MG/1
200 TABLET, FILM COATED ORAL EVERY MORNING
Qty: 180 TABLET | Refills: 3 | Status: SHIPPED | OUTPATIENT
Start: 2024-04-23

## 2024-04-23 ASSESSMENT — PAIN SCALES - GENERAL: PAINLEVEL: NO PAIN (1)

## 2024-04-23 NOTE — PATIENT INSTRUCTIONS
Preventive Care Advice   This is general advice given by our system to help you stay healthy. However, your care team may have specific advice just for you. Please talk to your care team about your preventive care needs.  Nutrition  Eat 5 or more servings of fruits and vegetables each day.  Try wheat bread, brown rice and whole grain pasta (instead of white bread, rice, and pasta).  Get enough calcium and vitamin D. Check the label on foods and aim for 100% of the RDA (recommended daily allowance).  Lifestyle  Exercise at least 150 minutes each week   (30 minutes a day, 5 days a week).  Do muscle strengthening activities 2 days a week. These help control your weight and prevent disease.  No smoking.  Wear sunscreen to prevent skin cancer.  Have a dental exam and cleaning every 6 months.  Yearly exams  See your health care team every year to talk about:  Any changes in your health.  Any medicines your care team has prescribed.  Preventive care, family planning, and ways to prevent chronic diseases.  Shots (vaccines)   HPV shots (up to age 26), if you've never had them before.  Hepatitis B shots (up to age 59), if you've never had them before.  COVID-19 shot: Get this shot when it's due.  Flu shot: Get a flu shot every year.  Tetanus shot: Get a tetanus shot every 10 years.  Pneumococcal, hepatitis A, and RSV shots: Ask your care team if you need these based on your risk.  Shingles shot (for age 50 and up).  General health tests  Diabetes screening:  Starting at age 35, Get screened for diabetes at least every 3 years.  If you are younger than age 35, ask your care team if you should be screened for diabetes.  Cholesterol test: At age 39, start having a cholesterol test every 5 years, or more often if advised.  Bone density scan (DEXA): At age 50, ask your care team if you should have this scan for osteoporosis (brittle bones).  Hepatitis C: Get tested at least once in your life.  STIs (sexually transmitted  infections)  Before age 24: Ask your care team if you should be screened for STIs.  After age 24: Get screened for STIs if you're at risk. You are at risk for STIs (including HIV) if:  You are sexually active with more than one person.  You don't use condoms every time.  You or a partner was diagnosed with a sexually transmitted infection.  If you are at risk for HIV, ask about PrEP medicine to prevent HIV.  Get tested for HIV at least once in your life, whether you are at risk for HIV or not.  Cancer screening tests  Cervical cancer screening: If you have a cervix, begin getting regular cervical cancer screening tests at age 21. Most people who have regular screenings with normal results can stop after age 65. Talk about this with your provider.  Breast cancer scan (mammogram): If you've ever had breasts, begin having regular mammograms starting at age 40. This is a scan to check for breast cancer.  Colon cancer screening: It is important to start screening for colon cancer at age 45.  Have a colonoscopy test every 10 years (or more often if you're at risk) Or, ask your provider about stool tests like a FIT test every year or Cologuard test every 3 years.  To learn more about your testing options, visit: https://www.Eponym/103196.pdf.  For help making a decision, visit: https://bit.ly/dg27206.  Prostate cancer screening test: If you have a prostate and are age 55 to 69, ask your provider if you would benefit from a yearly prostate cancer screening test.  Lung cancer screening: If you are a current or former smoker age 50 to 80, ask your care team if ongoing lung cancer screenings are right for you.  For informational purposes only. Not to replace the advice of your health care provider. Copyright   2023 [employer]. All rights reserved. Clinically reviewed by the [facility] Transitions Program. [employer] [id number] - REV 01/24.

## 2024-04-23 NOTE — PROGRESS NOTES
Preventive Care Visit  [facility]  LIBERTAD HADDAD PA-C, Physician Assistant - Medical  Apr 23, 2024      Assessment & Plan     Routine general medical examination at a health care facility  Healthy  Continue varied healthy diet and exercise as able  Get plenty of calcium  Continue eye and dental care  Fasting labs ordered  - Lipid Profile (Chol, Trig, HDL, LDL calc)    F/U annually for wellness exam and in the interim as needed for problem visits.      Lipid screening  - Lipid Profile (Chol, Trig, HDL, LDL calc)    Menorrhagia with regular cycle  - drospirenone-ethinyl estradiol (IDANIA) 3-0.02 MG tablet; Take 1 tablet by mouth every morning    Acne, unspecified acne type  - spironolactone (ALDACTONE) 100 MG tablet; Take 2 tablets (200 mg) by mouth every morning      Counseling  Appropriate preventive services were discussed with this patient, including applicable screening as appropriate for fall prevention, nutrition, physical activity, Tobacco-use cessation, weight loss and cognition.  Checklist reviewing preventive services available has been given to the patient.  Reviewed patient's diet, addressing concerns and/or questions.       Subjective   Amy is a 29 year old, presenting for the following:  Physical        4/23/2024     7:49 AM   Additional Questions   Roomed by Juanita   Accompanied by self         4/23/2024     7:49 AM   Patient Reported Additional Medications   Patient reports taking the following new medications N/a        Health Care Directive  Patient does not have a Health Care Directive or Living Will:    Amy is a very pleasant 29 year old female who presents to clinic for her annual exam and to obtain medication refills. She has no concerns, just physical check-up and OCP. Engaged. No concern for STD. No fam hx breast cancer (does self breast exams). PAP due next year. Doing half dosing for spironolactone (now taking 100mg).    Dx with Hodgkins Lymphoma in June 2022.  Chemo November 2022. Doing well. Considered in remission. 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 Looking forward to it.     ROS negative except for intermittent diarrhea that she feels is r/t chemo. See oncology every 3 months with labs. Will be released later this year if clear PET scan. Would prefer not to have another CoVID vaccine. She felt the vaccine likely sped up the proliferative process with her cancer. She would be interested in a medical waiver if needed.                 4/22/2024   General Health   How would you rate your overall physical health? Excellent   Feel stress (tense, anxious, or unable to sleep) Not at all         4/22/2024   Nutrition   Three or more servings of calcium each day? Yes   Diet: Regular (no restrictions)   How many servings of fruit and vegetables per day? (!) 2-3   How many sweetened beverages each day? 0-1         4/22/2024   Exercise   Days per week of moderate/strenous exercise 4 days   Average minutes spent exercising at this level 30 min         4/22/2024   Social Factors   Frequency of gathering with friends or relatives Once a week   Worry food won't last until get money to buy more No   Food not last or not have enough money for food? No   Do you have housing?  Yes   Are you worried about losing your housing? No   Lack of transportation? No   Unable to get utilities (heat,electricity)? No         4/22/2024   Dental   Dentist two times every year? Yes            Today's PHQ-2 Score:       4/22/2024    11:10 AM   PHQ-2 ( 1999 [employer])   Q1: Little interest or pleasure in doing things 0   Q2: Feeling down, depressed or hopeless 0   PHQ-2 Score 0   Q1: Little interest or pleasure in doing things Not at all   Q2: Feeling down, depressed or hopeless Not at all   PHQ-2 Score 0           4/22/2024   Substance Use   Alcohol more than 3/day or more than 7/wk No   Do you use  any other substances recreationally? No     Social History     Tobacco Use    Smoking status: Never     Passive exposure: Past    Smokeless tobacco: Never    Tobacco comments:     Dad smokes outside   Vaping Use    Vaping status: Never Used   Substance Use Topics    Alcohol use: Yes     Comment: 3-4 drinks a week    Drug use: No           5/16/2022   LAST FHS-7 RESULTS   1st degree relative breast or ovarian cancer No   Any relative bilateral breast cancer No   Any male have breast cancer No   Any ONE woman have BOTH breast AND ovarian cancer No   Any woman with breast cancer before 50yrs Unknown   2 or more relatives with breast AND/OR ovarian cancer No   2 or more relatives with breast AND/OR bowel cancer No        Mammogram Screening - Patient under 40 years of age: Routine Mammogram Screening not recommended.         4/22/2024   STI Screening   New sexual partner(s) since last STI/HIV test? 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     BP Readings from Last 3 Encounters:   04/23/24 121/76   04/15/24 110/68   01/21/24 121/84    Wt Readings from Last 3 Encounters:   04/23/24 55.8 kg (123 lb)   04/15/24 56.7 kg (125 lb 1.6 oz)   01/21/24 54 kg (119 lb)                      Review of Systems  Constitutional, HEENT, cardiovascular, pulmonary, GI, , musculoskeletal, neuro, skin, endocrine and psych systems are negative, except as otherwise noted.     Objective    Exam  /76   Pulse 97   Temp 98.6  F (37  C) (Tympanic)   Resp 16   Ht 1.626 m (5' 4\")   Wt 55.8 kg (123 lb)   SpO2 98%   BMI 21.11 kg/m     Estimated body mass index is 21.11 kg/m  as calculated from the following:    Height as of this encounter: 1.626 m (5' 4\").    Weight as of this encounter: 55.8 kg (123 lb).    Physical Exam  Vitals reviewed.   Constitutional:       Appearance: Normal appearance. She is well-developed.   HENT:      Head: Normocephalic and atraumatic.      Jaw: No trismus.      Right Ear: Tympanic membrane, ear canal and external ear normal.      Left Ear: Tympanic membrane, ear canal and external ear normal.      Nose: Nose normal. No rhinorrhea.      Mouth/Throat:      Mouth: Mucous membranes are moist.      Dentition: Normal dentition.      Tongue: No lesions. Tongue does not deviate from midline.      Pharynx: Oropharynx is clear. Uvula midline.      Tonsils: No tonsillar exudate. 2+ on the right. 2+ on the left.   Eyes:      General: Lids are normal. No allergic shiner.     Extraocular Movements: Extraocular movements intact.      Conjunctiva/sclera: Conjunctivae normal.   Neck:      Thyroid: No thyroid mass, thyromegaly or thyroid tenderness.      Trachea: Trachea normal.        Comments: Scar from lymph node dissection.   Cardiovascular:      Rate and Rhythm: Normal rate and regular rhythm.      Pulses:           Posterior tibial pulses are 2+ on the right " side and 2+ on the left side.      Heart sounds: Normal heart sounds. No murmur heard.  Pulmonary:      Effort: Pulmonary effort is normal.      Breath sounds: Normal breath sounds.   Abdominal:      General: Abdomen is flat. Bowel sounds are normal.      Palpations: Abdomen is soft.      Tenderness: There is no abdominal tenderness.      Hernia: No hernia is present.   Musculoskeletal:      Cervical back: Normal range of motion.      Right lower leg: No edema.      Left lower leg: No edema.      Comments: Ambulatory without assistive device  Limbs with grossly normal AROM   Lymphadenopathy:      Head:      Right side of head: No submental, submandibular, tonsillar, preauricular or posterior auricular adenopathy.      Left side of head: No submental, submandibular, tonsillar, preauricular or posterior auricular adenopathy.      Cervical: No cervical adenopathy.      Right cervical: No superficial cervical adenopathy.     Left cervical: No superficial cervical adenopathy.      Upper Body:      Right upper body: No supraclavicular adenopathy.   Skin:     General: Skin is warm and dry.      Capillary Refill: Capillary refill takes 2 to 3 seconds.      Findings: No lesion, rash or wound.   Neurological:      General: No focal deficit present.      Mental Status: She is alert and oriented to person, place, and time.      Motor: Motor function is intact.      Coordination: Coordination is intact.      Gait: Gait is intact.      Deep Tendon Reflexes:      Reflex Scores:       Patellar reflexes are 2+ on the right side and 2+ on the left side.     Comments: CN II-XII grossly intact   Psychiatric:         Mood and Affect: Mood normal.         Speech: Speech normal.         Behavior: Behavior is cooperative.         Thought Content: Thought content normal.       Signed Electronically by: LIBERTAD HADDAD PA-C

## 2024-04-26 ENCOUNTER — MYC MEDICAL ADVICE (OUTPATIENT)
Dept: FAMILY MEDICINE | Facility: CLINIC | Age: 29
End: 2024-04-26
Payer: COMMERCIAL

## 2024-04-26 DIAGNOSIS — Z11.1 SCREENING EXAMINATION FOR PULMONARY TUBERCULOSIS: Primary | ICD-10-CM

## 2024-05-16 NOTE — TELEPHONE ENCOUNTER
Per patient request order for TB screening has been cancelled.     Monico Pulliam, [facility]

## 2024-06-20 DIAGNOSIS — Z12.83 SCREENING EXAM FOR SKIN CANCER: Primary | ICD-10-CM

## 2024-06-28 ENCOUNTER — LAB (OUTPATIENT)
Dept: LAB | Facility: CLINIC | Age: 29
End: 2024-06-28
Payer: COMMERCIAL

## 2024-06-28 ENCOUNTER — ANCILLARY PROCEDURE (OUTPATIENT)
Dept: CT IMAGING | Facility: CLINIC | Age: 29
End: 2024-06-28
Attending: STUDENT IN AN ORGANIZED HEALTH CARE EDUCATION/TRAINING PROGRAM
Payer: COMMERCIAL

## 2024-06-28 DIAGNOSIS — C81.10 NODULAR SCLEROSING HODGKIN'S LYMPHOMA, UNSPECIFIED BODY REGION (H): ICD-10-CM

## 2024-06-28 LAB
ALBUMIN SERPL BCG-MCNC: 4.3 G/DL (ref 3.5–5.2)
ALP SERPL-CCNC: 52 U/L (ref 40–150)
ALT SERPL W P-5'-P-CCNC: 15 U/L (ref 0–50)
ANION GAP SERPL CALCULATED.3IONS-SCNC: 8 MMOL/L (ref 7–15)
AST SERPL W P-5'-P-CCNC: 24 U/L (ref 0–45)
BASOPHILS # BLD AUTO: 0 10E3/UL (ref 0–0.2)
BASOPHILS NFR BLD AUTO: 1 %
BILIRUB SERPL-MCNC: 0.7 MG/DL
BUN SERPL-MCNC: 12.4 MG/DL (ref 6–20)
CALCIUM SERPL-MCNC: 9.1 MG/DL (ref 8.6–10)
CHLORIDE SERPL-SCNC: 103 MMOL/L (ref 98–107)
CREAT SERPL-MCNC: 0.75 MG/DL (ref 0.51–0.95)
DEPRECATED HCO3 PLAS-SCNC: 26 MMOL/L (ref 22–29)
EGFRCR SERPLBLD CKD-EPI 2021: >90 ML/MIN/1.73M2
EOSINOPHIL # BLD AUTO: 0.1 10E3/UL (ref 0–0.7)
EOSINOPHIL NFR BLD AUTO: 3 %
ERYTHROCYTE [DISTWIDTH] IN BLOOD BY AUTOMATED COUNT: 11.3 % (ref 10–15)
GLUCOSE SERPL-MCNC: 109 MG/DL (ref 70–99)
HCT VFR BLD AUTO: 39.3 % (ref 35–47)
HGB BLD-MCNC: 13.2 G/DL (ref 11.7–15.7)
IMM GRANULOCYTES # BLD: 0 10E3/UL
IMM GRANULOCYTES NFR BLD: 0 %
LYMPHOCYTES # BLD AUTO: 1.3 10E3/UL (ref 0.8–5.3)
LYMPHOCYTES NFR BLD AUTO: 28 %
MCH RBC QN AUTO: 30.2 PG (ref 26.5–33)
MCHC RBC AUTO-ENTMCNC: 33.6 G/DL (ref 31.5–36.5)
MCV RBC AUTO: 90 FL (ref 78–100)
MONOCYTES # BLD AUTO: 0.4 10E3/UL (ref 0–1.3)
MONOCYTES NFR BLD AUTO: 9 %
NEUTROPHILS # BLD AUTO: 2.8 10E3/UL (ref 1.6–8.3)
NEUTROPHILS NFR BLD AUTO: 60 %
PLATELET # BLD AUTO: 233 10E3/UL (ref 150–450)
POTASSIUM SERPL-SCNC: 4 MMOL/L (ref 3.4–5.3)
PROT SERPL-MCNC: 7.1 G/DL (ref 6.4–8.3)
RBC # BLD AUTO: 4.37 10E6/UL (ref 3.8–5.2)
SODIUM SERPL-SCNC: 137 MMOL/L (ref 135–145)
WBC # BLD AUTO: 4.6 10E3/UL (ref 4–11)

## 2024-06-28 PROCEDURE — 80053 COMPREHEN METABOLIC PANEL: CPT

## 2024-06-28 PROCEDURE — 36415 COLL VENOUS BLD VENIPUNCTURE: CPT

## 2024-06-28 PROCEDURE — 74177 CT ABD & PELVIS W/CONTRAST: CPT | Mod: TC | Performed by: RADIOLOGY

## 2024-06-28 PROCEDURE — 85025 COMPLETE CBC W/AUTO DIFF WBC: CPT

## 2024-06-28 PROCEDURE — 71260 CT THORAX DX C+: CPT | Mod: TC | Performed by: RADIOLOGY

## 2024-06-28 RX ORDER — IOPAMIDOL 755 MG/ML
100 INJECTION, SOLUTION INTRAVASCULAR ONCE
Status: COMPLETED | OUTPATIENT
Start: 2024-06-28 | End: 2024-06-28

## 2024-06-28 RX ADMIN — IOPAMIDOL 76 ML: 755 INJECTION, SOLUTION INTRAVASCULAR at 12:50

## 2024-06-28 RX ADMIN — Medication 62 ML: at 12:50

## 2024-07-11 ENCOUNTER — ONCOLOGY VISIT (OUTPATIENT)
Dept: ONCOLOGY | Facility: CLINIC | Age: 29
End: 2024-07-11
Attending: STUDENT IN AN ORGANIZED HEALTH CARE EDUCATION/TRAINING PROGRAM
Payer: COMMERCIAL

## 2024-07-11 VITALS
OXYGEN SATURATION: 100 % | HEIGHT: 64 IN | DIASTOLIC BLOOD PRESSURE: 73 MMHG | SYSTOLIC BLOOD PRESSURE: 114 MMHG | HEART RATE: 78 BPM | WEIGHT: 124 LBS | RESPIRATION RATE: 16 BRPM | TEMPERATURE: 98.5 F | BODY MASS INDEX: 21.17 KG/M2

## 2024-07-11 DIAGNOSIS — C81.10 NODULAR SCLEROSING HODGKIN'S LYMPHOMA, UNSPECIFIED BODY REGION (H): Primary | ICD-10-CM

## 2024-07-11 PROCEDURE — G0463 HOSPITAL OUTPT CLINIC VISIT: HCPCS | Performed by: STUDENT IN AN ORGANIZED HEALTH CARE EDUCATION/TRAINING PROGRAM

## 2024-07-11 PROCEDURE — 99214 OFFICE O/P EST MOD 30 MIN: CPT | Performed by: STUDENT IN AN ORGANIZED HEALTH CARE EDUCATION/TRAINING PROGRAM

## 2024-07-11 ASSESSMENT — PAIN SCALES - GENERAL: PAINLEVEL: NO PAIN (0)

## 2024-07-11 NOTE — NURSING NOTE
"Oncology Rooming Note    July 11, 2024 3:58 PM   Amy Harris is a 29 year old female who presents for:    Chief Complaint   Patient presents with    Oncology Clinic Visit     RTN for Nodular Sclerosis     Initial Vitals: /73 (BP Location: Right arm, Patient Position: Sitting, Cuff Size: Adult Regular)   Pulse 78   Temp 98.5  F (36.9  C) (Oral)   Resp 16   Ht 1.62 m (5' 3.78\")   Wt 56.2 kg (124 lb)   SpO2 100%   BMI 21.43 kg/m   Estimated body mass index is 21.43 kg/m  as calculated from the following:    Height as of this encounter: 1.62 m (5' 3.78\").    Weight as of this encounter: 56.2 kg (124 lb). Body surface area is 1.59 meters squared.  No Pain (0) Comment: Data Unavailable   No LMP recorded. (Menstrual status: Birth Control).  Allergies reviewed: Yes  Medications reviewed: Yes    Medications: Medication refills not needed today.  Pharmacy name entered into EPIC:    [facility] [place], [place] - [address] [place] [address] DRUG STORE #[id number] - [place], [place] - [facility]    Frailty Screening:   Is the patient here for a new oncology consult visit in cancer care? 2. No      Clinical concerns:  None      Russell Canas              "

## 2024-07-11 NOTE — LETTER
7/11/2024      Amy Harris  [address]  [place] [address]      Dear Colleague,    Thank you for referring your patient, Amy Harris, to the [facility]. Please see a copy of my visit note below.      Raysa Reyes is a 29 year old, presenting for the following health issues:  Oncology Clinic Visit (RTN for Nodular Sclerosis)    HPI     Oncology History Overview Note   She has a past medical history of acne treated with spironolactone who is otherwise healthy with no notable past surgical history. She first noticed heartburn-like symptoms when drinking alcohol a year ago and has noticed progressive dysphagia, where now dry solids get caught when swallowing although it is not painful. She has had to stop eating mid-meal but denies appetite change or weight loss. She developed night sweats x2 in Fall 2021 where she woke up with a drenched shirt. Denies current night sweats, fever, chills. During that time, she first noticed an enlarged lymph node in her right clavicular region and as it was not bothersome or painful, did not pursue medical attention at that time. She noticed no other lumps or bumps at that time. She has also been increasingly itchy in all parts of her body for the past 2 months. Over the past two weeks, she has noticed a fast heart rate without chest pain or palpitations and shortness of breath on exertion like carrying laundry upstairs or walking and talking at the same time. She denies lower extremity or upper extremity edema. Her energy level has been slowly declining, where she sleeps for longer at night and needing to rest for 4 hours in the afternoon.      She had a routine physical on 5/16/22 where she brought attention to her growing mass on her right shoulder. 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       She udnerwent ECHO which showed EF of >50%.  PET scan  done on 6/18/2022 showed hypermetabolic mediastinal mass with SUV max of 14.7 and 15.6 x 14.8 x 7.8 cm in size.  Mediastinal mass extends to right level 4 lateral to right thyroid lobe.  There is associated right retroclavicular lymphadenopathy.  Associated bone marrow activity is seen in the axial skeleton consistent with bone marrow infiltration.  Her [place] stage is stage IV.    She consulted with fertility specialist.  We do not have time to pursue pretreatment quality preservation.  She was advised to return in 6 months after completing treatment at that time fertility preservation would be considered.    Due to her ongoing itching, exertional dyspnea, I had prescribed prednisone 60 mg daily and allopurinol to prevent tumor lysis.    She underwent 2 cycles of AVD with rituximab.  Tolerated well without any side effects.  PET scan after 2 cycles shows complete metabolic response. She went on to receive 6 cycles of ABVD. 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    End of treatment PET scan showed continued complete metabolic response.         Nodular sclerosis classical Hodgkin lymphoma (H)   6/16/2022 Initial Diagnosis    Nodular sclerosis classical Hodgkin lymphoma (H)     6/27/2022 -  Chemotherapy    OP ONC Lymphoma and CLL - A + AVD (Brentuximab vedotin / DOXOrubicin / vinBLAStine / Dacarbazine)   Plan Provider: Mallory Mendez MD  Treatment goal: Curative  Line of treatment: [No plan line of treatment]     8/19/2022 No evidence of disease    PET scan after 2 cycles of AVD (map shows no evidence of disease.     1/2023 No evidence of disease    She completed treatment December 22 and January 23 and of treatment PET scan showed no evidence of disease.       Patient comes today for follow up. No fevers, chills, night sweats or weight loss, no lymphadenopathy. No pain.        Objective   /73 (BP Location: Right arm, Patient Position: Sitting, Cuff Size: Adult Regular)   " Pulse 78   Temp 98.5  F (36.9  C) (Oral)   Resp 16   Ht 1.62 m (5' 3.78\")   Wt 56.2 kg (124 lb)   SpO2 100%   BMI 21.43 kg/m    Body mass index is 21.43 kg/m .  Physical Exam   GENERAL:  Alert oriented well nourished  HEAD: normocephalic atraumatic  SKIN:  no rash, hives, other lesions.  EYE: anicteric Sclerae  ENT: no throat erythema, enlarged tonsills, no ulcers or mucositis in the mouth  LYMPHATIC: no abnormal lymph nodes palable in cervical, axillary, supraclavicular or inguinal area.  RESP:  No rales or rhonchi, breath sounds bilaterally equal and vesicular  CV:  No tachycardia, S1 S2 normal No murmur.  GI:  Abdomen soft nontender no hepato or splenomegaly  MUSCULOSKELETAL:  No visible joint redness or swelling.  NEURO:  No gross weakness gait normal  PSYCH: pleasant affect    Labs: I personally reviewed complete blood count, differential, renal function test, liver function tests LDH.  No cytopenias, LFTs within normal limits, renal function test within normal limits and LDH is normal as well.    Imaging: I personally reviewed Ct chest/abdomen/pelvis and discussed with the patient., no concern for progression/recurrence of lymphoma    Assessment and Plan:    1) classic Hodgkin lymphoma:  - She does not have clinical or laboratory evidence of lymphoma recurrence or progression.  - She remains in remission more than 1.5 years out of her diagnosis.   - I will see her back in 3 months with labs.    Total time spent on date of service in review of medical records, review of labs, history taking, physical exam, discussion of assessment and plan, counseling and patient education is 30 minutes.    Mallory Mendez MD  Attending Physician  Pager [phone]            Signed Electronically by: Mallory Mendez MD      Again, thank you for allowing me to participate in the care of your patient.        Sincerely,        Mallory Mendez MD  "

## 2024-07-11 NOTE — PROGRESS NOTES
Raysa Reyes is a 29 year old, presenting for the following health issues:  Oncology Clinic Visit (RTN for Nodular Sclerosis)    HPI     Oncology History Overview Note   She has a past medical history of acne treated with spironolactone who is otherwise healthy with no notable past surgical history. She first noticed heartburn-like symptoms when drinking alcohol a year ago and has noticed progressive dysphagia, where now dry solids get caught when swallowing although it is not painful. She has had to stop eating mid-meal but denies appetite change or weight loss. She developed night sweats x2 in Fall 2021 where she woke up with a drenched shirt. Denies current night sweats, fever, chills. During that time, she first noticed an enlarged lymph node in her right clavicular region and as it was not bothersome or painful, did not pursue medical attention at that time. She noticed no other lumps or bumps at that time. She has also been increasingly itchy in all parts of her body for the past 2 months. Over the past two weeks, she has noticed a fast heart rate without chest pain or palpitations and shortness of breath on exertion like carrying laundry upstairs or walking and talking at the same time. She denies lower extremity or upper extremity edema. Her energy level has been slowly declining, where she sleeps for longer at night and needing to rest for 4 hours in the afternoon.      She had a routine physical on 5/16/22 where she brought attention to her growing mass on her right shoulder. 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       She udnerwent ECHO which showed EF of >50%.  PET scan done on 6/18/2022 showed hypermetabolic mediastinal mass with SUV max of 14.7 and 15.6 x 14.8 x 7.8 cm in size.  Mediastinal mass extends to right level 4 lateral to right thyroid lobe.  There is associated right retroclavicular lymphadenopathy.   "Associated bone marrow activity is seen in the axial skeleton consistent with bone marrow infiltration.  Her [place] stage is stage IV.    She consulted with fertility specialist.  We do not have time to pursue pretreatment quality preservation.  She was advised to return in 6 months after completing treatment at that time fertility preservation would be considered.    Due to her ongoing itching, exertional dyspnea, I had prescribed prednisone 60 mg daily and allopurinol to prevent tumor lysis.    She underwent 2 cycles of AVD with rituximab.  Tolerated well without any side effects.  PET scan after 2 cycles shows complete metabolic response. She went on to receive 6 cycles of ABVD. 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    End of treatment PET scan showed continued complete metabolic response.         Nodular sclerosis classical Hodgkin lymphoma (H)   6/16/2022 Initial Diagnosis    Nodular sclerosis classical Hodgkin lymphoma (H)     6/27/2022 -  Chemotherapy    OP ONC Lymphoma and CLL - A + AVD (Brentuximab vedotin / DOXOrubicin / vinBLAStine / Dacarbazine)   Plan Provider: Mallory Mendez MD  Treatment goal: Curative  Line of treatment: [No plan line of treatment]     8/19/2022 No evidence of disease    PET scan after 2 cycles of AVD (map shows no evidence of disease.     1/2023 No evidence of disease    She completed treatment December 22 and January 23 and of treatment PET scan showed no evidence of disease.       Patient comes today for follow up. No fevers, chills, night sweats or weight loss, no lymphadenopathy. No pain.        Objective    /73 (BP Location: Right arm, Patient Position: Sitting, Cuff Size: Adult Regular)   Pulse 78   Temp 98.5  F (36.9  C) (Oral)   Resp 16   Ht 1.62 m (5' 3.78\")   Wt 56.2 kg (124 lb)   SpO2 100%   BMI 21.43 kg/m    Body mass index is 21.43 kg/m .  Physical Exam   GENERAL:  Alert oriented well nourished  HEAD: normocephalic " atraumatic  SKIN:  no rash, hives, other lesions.  EYE: anicteric Sclerae  ENT: no throat erythema, enlarged tonsills, no ulcers or mucositis in the mouth  LYMPHATIC: no abnormal lymph nodes palable in cervical, axillary, supraclavicular or inguinal area.  RESP:  No rales or rhonchi, breath sounds bilaterally equal and vesicular  CV:  No tachycardia, S1 S2 normal No murmur.  GI:  Abdomen soft nontender no hepato or splenomegaly  MUSCULOSKELETAL:  No visible joint redness or swelling.  NEURO:  No gross weakness gait normal  PSYCH: pleasant affect    Labs: I personally reviewed complete blood count, differential, renal function test, liver function tests LDH.  No cytopenias, LFTs within normal limits, renal function test within normal limits and LDH is normal as well.    Imaging: I personally reviewed Ct chest/abdomen/pelvis and discussed with the patient., no concern for progression/recurrence of lymphoma    Assessment and Plan:    1) classic Hodgkin lymphoma:  - She does not have clinical or laboratory evidence of lymphoma recurrence or progression.  - She remains in remission more than 1.5 years out of her diagnosis.   - I will see her back in 3 months with labs.    Total time spent on date of service in review of medical records, review of labs, history taking, physical exam, discussion of assessment and plan, counseling and patient education is 30 minutes.    Mallory Mendez MD  Attending Physician  Pager [phone]            Signed Electronically by: Mallory Mendez MD

## 2024-10-21 ENCOUNTER — ONCOLOGY VISIT (OUTPATIENT)
Dept: ONCOLOGY | Facility: CLINIC | Age: 29
End: 2024-10-21
Attending: STUDENT IN AN ORGANIZED HEALTH CARE EDUCATION/TRAINING PROGRAM
Payer: COMMERCIAL

## 2024-10-21 ENCOUNTER — APPOINTMENT (OUTPATIENT)
Dept: LAB | Facility: CLINIC | Age: 29
End: 2024-10-21
Attending: STUDENT IN AN ORGANIZED HEALTH CARE EDUCATION/TRAINING PROGRAM
Payer: COMMERCIAL

## 2024-10-21 VITALS
WEIGHT: 117.5 LBS | SYSTOLIC BLOOD PRESSURE: 122 MMHG | TEMPERATURE: 98.5 F | BODY MASS INDEX: 20.31 KG/M2 | DIASTOLIC BLOOD PRESSURE: 82 MMHG | HEART RATE: 82 BPM | RESPIRATION RATE: 18 BRPM | OXYGEN SATURATION: 100 %

## 2024-10-21 DIAGNOSIS — C81.10 NODULAR SCLEROSING HODGKIN'S LYMPHOMA, UNSPECIFIED BODY REGION (H): ICD-10-CM

## 2024-10-21 LAB
ALBUMIN SERPL BCG-MCNC: 4.3 G/DL (ref 3.5–5.2)
ALP SERPL-CCNC: 49 U/L (ref 40–150)
ALT SERPL W P-5'-P-CCNC: 10 U/L (ref 0–50)
ANION GAP SERPL CALCULATED.3IONS-SCNC: 10 MMOL/L (ref 7–15)
AST SERPL W P-5'-P-CCNC: 17 U/L (ref 0–45)
BASOPHILS # BLD AUTO: 0 10E3/UL (ref 0–0.2)
BASOPHILS NFR BLD AUTO: 1 %
BILIRUB SERPL-MCNC: 0.5 MG/DL
BUN SERPL-MCNC: 14.5 MG/DL (ref 6–20)
CALCIUM SERPL-MCNC: 8.9 MG/DL (ref 8.8–10.4)
CHLORIDE SERPL-SCNC: 103 MMOL/L (ref 98–107)
CREAT SERPL-MCNC: 0.72 MG/DL (ref 0.51–0.95)
EGFRCR SERPLBLD CKD-EPI 2021: >90 ML/MIN/1.73M2
EOSINOPHIL # BLD AUTO: 0.2 10E3/UL (ref 0–0.7)
EOSINOPHIL NFR BLD AUTO: 3 %
ERYTHROCYTE [DISTWIDTH] IN BLOOD BY AUTOMATED COUNT: 11.6 % (ref 10–15)
GLUCOSE SERPL-MCNC: 92 MG/DL (ref 70–99)
HCO3 SERPL-SCNC: 25 MMOL/L (ref 22–29)
HCT VFR BLD AUTO: 38.1 % (ref 35–47)
HGB BLD-MCNC: 12.9 G/DL (ref 11.7–15.7)
IMM GRANULOCYTES # BLD: 0 10E3/UL
IMM GRANULOCYTES NFR BLD: 0 %
LDH SERPL L TO P-CCNC: 131 U/L (ref 0–250)
LYMPHOCYTES # BLD AUTO: 1.4 10E3/UL (ref 0.8–5.3)
LYMPHOCYTES NFR BLD AUTO: 29 %
MCH RBC QN AUTO: 29.8 PG (ref 26.5–33)
MCHC RBC AUTO-ENTMCNC: 33.9 G/DL (ref 31.5–36.5)
MCV RBC AUTO: 88 FL (ref 78–100)
MONOCYTES # BLD AUTO: 0.4 10E3/UL (ref 0–1.3)
MONOCYTES NFR BLD AUTO: 8 %
NEUTROPHILS # BLD AUTO: 2.9 10E3/UL (ref 1.6–8.3)
NEUTROPHILS NFR BLD AUTO: 59 %
NRBC # BLD AUTO: 0 10E3/UL
NRBC BLD AUTO-RTO: 0 /100
PLATELET # BLD AUTO: 259 10E3/UL (ref 150–450)
POTASSIUM SERPL-SCNC: 3.7 MMOL/L (ref 3.4–5.3)
PROT SERPL-MCNC: 7 G/DL (ref 6.4–8.3)
RBC # BLD AUTO: 4.33 10E6/UL (ref 3.8–5.2)
SODIUM SERPL-SCNC: 138 MMOL/L (ref 135–145)
WBC # BLD AUTO: 5 10E3/UL (ref 4–11)

## 2024-10-21 PROCEDURE — 99214 OFFICE O/P EST MOD 30 MIN: CPT | Performed by: STUDENT IN AN ORGANIZED HEALTH CARE EDUCATION/TRAINING PROGRAM

## 2024-10-21 PROCEDURE — 83615 LACTATE (LD) (LDH) ENZYME: CPT | Performed by: STUDENT IN AN ORGANIZED HEALTH CARE EDUCATION/TRAINING PROGRAM

## 2024-10-21 PROCEDURE — 85025 COMPLETE CBC W/AUTO DIFF WBC: CPT | Performed by: STUDENT IN AN ORGANIZED HEALTH CARE EDUCATION/TRAINING PROGRAM

## 2024-10-21 PROCEDURE — 99213 OFFICE O/P EST LOW 20 MIN: CPT | Performed by: STUDENT IN AN ORGANIZED HEALTH CARE EDUCATION/TRAINING PROGRAM

## 2024-10-21 PROCEDURE — 80053 COMPREHEN METABOLIC PANEL: CPT | Performed by: STUDENT IN AN ORGANIZED HEALTH CARE EDUCATION/TRAINING PROGRAM

## 2024-10-21 PROCEDURE — 36415 COLL VENOUS BLD VENIPUNCTURE: CPT | Performed by: STUDENT IN AN ORGANIZED HEALTH CARE EDUCATION/TRAINING PROGRAM

## 2024-10-21 ASSESSMENT — PAIN SCALES - GENERAL: PAINLEVEL: NO PAIN (0)

## 2024-10-21 NOTE — NURSING NOTE
"Oncology Rooming Note    October 21, 2024 4:09 PM   Amy Harris is a 29 year old female who presents for:    Chief Complaint   Patient presents with    Blood Draw     Labs drawn via  by RN. VS taken.    Oncology Clinic Visit     Nodular sclerosis classical Hodgkin lymphoma     Initial Vitals: /82 (BP Location: Right arm, Patient Position: Sitting, Cuff Size: Adult Regular)   Pulse 82   Temp 98.5  F (36.9  C) (Oral)   Resp 18   Wt 53.3 kg (117 lb 8 oz)   SpO2 100%   BMI 20.31 kg/m   Estimated body mass index is 20.31 kg/m  as calculated from the following:    Height as of 7/11/24: 1.62 m (5' 3.78\").    Weight as of this encounter: 53.3 kg (117 lb 8 oz). Body surface area is 1.55 meters squared.  No Pain (0) Comment: Data Unavailable   No LMP recorded. (Menstrual status: Birth Control).  Allergies reviewed: Yes  Medications reviewed: Yes    Medications: Medication refills not needed today.  Pharmacy [facility] entered into EPIC:    [facility] [place] [place] - [address] [place] [address] DRUG STORE #[id number] [place], [place] - [facility]    Frailty Screening:   Is the patient here for a new oncology consult visit in cancer care? 2. No      Clinical concerns: none      Kalia Rodrigues LPN              "

## 2024-10-21 NOTE — LETTER
10/21/2024      Amy Harris  [address]  [place] [address]      Dear Colleague,    Thank you for referring your patient, Amy Harris, to the [facility]. Please see a copy of my visit note below.      Subjective  Amy is a 29 year old, presenting for the following health issues:  Blood Draw (Labs drawn via  by RN. VS taken.) and Oncology Clinic Visit (Nodular sclerosis classical Hodgkin lymphoma)    HPI     Oncology History Overview Note   She has a past medical history of acne treated with spironolactone who is otherwise healthy with no notable past surgical history. She first noticed heartburn-like symptoms when drinking alcohol a year ago and has noticed progressive dysphagia, where now dry solids get caught when swallowing although it is not painful. She has had to stop eating mid-meal but denies appetite change or weight loss. She developed night sweats x2 in Fall 2021 where she woke up with a drenched shirt. Denies current night sweats, fever, chills. During that time, she first noticed an enlarged lymph node in her right clavicular region and as it was not bothersome or painful, did not pursue medical attention at that time. She noticed no other lumps or bumps at that time. She has also been increasingly itchy in all parts of her body for the past 2 months. Over the past two weeks, she has noticed a fast heart rate without chest pain or palpitations and shortness of breath on exertion like carrying laundry upstairs or walking and talking at the same time. She denies lower extremity or upper extremity edema. Her energy level has been slowly declining, where she sleeps for longer at night and needing to rest for 4 hours in the afternoon.      She had a routine physical on 5/16/22 where she brought attention to her growing mass on her right shoulder. An excisional biopsy was conducted by ENT on 6/9/22 with pathology showing sona sclerosis classic Hodgkin  lymphoma.       She udnerwent ECHO which showed EF of >50%.  PET scan done on 6/18/2022 showed hypermetabolic mediastinal mass with SUV max of 14.7 and 15.6 x 14.8 x 7.8 cm in size.  Mediastinal mass extends to right level 4 lateral to right thyroid lobe.  There is associated right retroclavicular lymphadenopathy.  Associated bone marrow activity is seen in the axial skeleton consistent with bone marrow infiltration.  Her [place] stage is stage IV.    She consulted with fertility specialist.  We do not have time to pursue pretreatment quality preservation.  She was advised to return in 6 months after completing treatment at that time fertility preservation would be considered.    Due to her ongoing itching, exertional dyspnea, I had prescribed prednisone 60 mg daily and allopurinol to prevent tumor lysis.    She underwent 2 cycles of AVD with rituximab.  Tolerated well without any side effects.  PET scan after 2 cycles shows complete metabolic response. She went on to receive 6 cycles of ABVD. 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    End of treatment PET scan showed continued complete metabolic response.         Nodular sclerosis classical Hodgkin lymphoma (H)   6/16/2022 Initial Diagnosis    Nodular sclerosis classical Hodgkin lymphoma (H)     6/27/2022 - 11/28/2022 Chemotherapy    OP ONC Lymphoma and CLL - A + AVD (Brentuximab vedotin / DOXOrubicin / vinBLAStine / Dacarbazine)   Plan Provider: Mallory Mendez MD  Treatment goal: Curative  Line of treatment: [No plan line of treatment]     8/19/2022 No evidence of disease    PET scan after 2 cycles of AVD (map shows no evidence of disease.     1/2023 No evidence of disease    She completed treatment December 22 and January 23 and of treatment PET scan showed no evidence of disease.       Patient comes today for follow up. No fevers, chills, night sweats or clinically significant weight loss, no lymphadenopathy. No pain.         Objective   /82 (BP Location: Right arm, Patient Position: Sitting, Cuff Size: Adult Regular)   Pulse 82   Temp 98.5  F (36.9  C) (Oral)   Resp 18   Wt 53.3 kg (117 lb 8 oz)   SpO2 100%   BMI 20.31 kg/m    Body mass index is 20.31 kg/m .  Physical Exam   GENERAL:  Alert oriented well nourished  HEAD: normocephalic atraumatic  SKIN:  no rash, hives, other lesions.  LYMPHATIC: no abnormal lymph nodes palable in cervical, axillary, supraclavicular or inguinal area.  RESP:  No rales or rhonchi, breath sounds bilaterally equal and vesicular  CV:  No tachycardia, S1 S2 normal No murmur.  GI:  Abdomen soft nontender no hepato or splenomegaly  MUSCULOSKELETAL:  No visible joint redness or swelling.  NEURO:  No gross weakness gait normal  PSYCH: pleasant affect    Labs: I personally reviewed complete blood count, differential, renal function test, liver function tests LDH.  No cytopenias, LFTs within normal limits, renal function test within normal limits and LDH is normal as well.    Assessment and Plan:    1) classic Hodgkin lymphoma:  -she does not have any clinical or radiographic evidence of lymphoma recurrence or progression.  - I will see her back in 3 months with labs and scans.  - She has lost a few kgs in last few months. This seems to be related to beginning a new job, working long hours and being physically active at work. In absence of any other signs, I do not think weight loss is due to lymphoma recurrence. I will continue to monitor. She knows to reach out if there are any symptoms.    Total time spent on date of service in review of medical records, review of labs, history taking, physical exam, discussion of assessment and plan, counseling and patient education is 30 minutes.    Mallory Mendez MD  Attending Physician  Pager [phone]              Signed Electronically by: Mallory Mendez MD      Again, thank you for allowing me to participate in the care of your patient.         Sincerely,        Mallory Mendez MD

## 2024-10-21 NOTE — NURSING NOTE
Chief Complaint   Patient presents with    Blood Draw     Labs drawn via  by RN. VS taken.     Labs collected from venipuncture by RN. Vitals taken. Checked in for appointment(s).     Shelia Lemus RN

## 2024-10-22 NOTE — PROGRESS NOTES
Raysa Reyes is a 29 year old, presenting for the following health issues:  Blood Draw (Labs drawn via  by RN. VS taken.) and Oncology Clinic Visit (Nodular sclerosis classical Hodgkin lymphoma)    HPI     Oncology History Overview Note   She has a past medical history of acne treated with spironolactone who is otherwise healthy with no notable past surgical history. She first noticed heartburn-like symptoms when drinking alcohol a year ago and has noticed progressive dysphagia, where now dry solids get caught when swallowing although it is not painful. She has had to stop eating mid-meal but denies appetite change or weight loss. She developed night sweats x2 in Fall 2021 where she woke up with a drenched shirt. Denies current night sweats, fever, chills. During that time, she first noticed an enlarged lymph node in her right clavicular region and as it was not bothersome or painful, did not pursue medical attention at that time. She noticed no other lumps or bumps at that time. She has also been increasingly itchy in all parts of her body for the past 2 months. Over the past two weeks, she has noticed a fast heart rate without chest pain or palpitations and shortness of breath on exertion like carrying laundry upstairs or walking and talking at the same time. She denies lower extremity or upper extremity edema. Her energy level has been slowly declining, where she sleeps for longer at night and needing to rest for 4 hours in the afternoon.      She had a routine physical on 5/16/22 where she brought attention to her growing mass on her right shoulder. 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       She udnerwent ECHO which showed EF of >50%.  PET scan done on 6/18/2022 showed hypermetabolic mediastinal mass with SUV max of 14.7 and 15.6 x 14.8 x 7.8 cm in size.  Mediastinal mass extends to right level 4 lateral to right thyroid  lobe.  There is associated right retroclavicular lymphadenopathy.  Associated bone marrow activity is seen in the axial skeleton consistent with bone marrow infiltration.  Her [place] stage is stage IV.    She consulted with fertility specialist.  We do not have time to pursue pretreatment quality preservation.  She was advised to return in 6 months after completing treatment at that time fertility preservation would be considered.    Due to her ongoing itching, exertional dyspnea, I had prescribed prednisone 60 mg daily and allopurinol to prevent tumor lysis.    She underwent 2 cycles of AVD with rituximab.  Tolerated well without any side effects.  PET scan after 2 cycles shows complete metabolic response. She went on to receive 6 cycles of ABVD. 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    End of treatment PET scan showed continued complete metabolic response.         Nodular sclerosis classical Hodgkin lymphoma (H)   6/16/2022 Initial Diagnosis    Nodular sclerosis classical Hodgkin lymphoma (H)     6/27/2022 - 11/28/2022 Chemotherapy    OP ONC Lymphoma and CLL - A + AVD (Brentuximab vedotin / DOXOrubicin / vinBLAStine / Dacarbazine)   Plan Provider: Mallory Mednez MD  Treatment goal: Curative  Line of treatment: [No plan line of treatment]     8/19/2022 No evidence of disease    PET scan after 2 cycles of AVD (map shows no evidence of disease.     1/2023 No evidence of disease    She completed treatment December 22 and January 23 and of treatment PET scan showed no evidence of disease.       Patient comes today for follow up. No fevers, chills, night sweats or clinically significant weight loss, no lymphadenopathy. No pain.        Objective    /82 (BP Location: Right arm, Patient Position: Sitting, Cuff Size: Adult Regular)   Pulse 82   Temp 98.5  F (36.9  C) (Oral)   Resp 18   Wt 53.3 kg (117 lb 8 oz)   SpO2 100%   BMI 20.31 kg/m    Body mass index is 20.31  kg/m .  Physical Exam   GENERAL:  Alert oriented well nourished  HEAD: normocephalic atraumatic  SKIN:  no rash, hives, other lesions.  LYMPHATIC: no abnormal lymph nodes palable in cervical, axillary, supraclavicular or inguinal area.  RESP:  No rales or rhonchi, breath sounds bilaterally equal and vesicular  CV:  No tachycardia, S1 S2 normal No murmur.  GI:  Abdomen soft nontender no hepato or splenomegaly  MUSCULOSKELETAL:  No visible joint redness or swelling.  NEURO:  No gross weakness gait normal  PSYCH: pleasant affect    Labs: I personally reviewed complete blood count, differential, renal function test, liver function tests LDH.  No cytopenias, LFTs within normal limits, renal function test within normal limits and LDH is normal as well.    Assessment and Plan:    1) classic Hodgkin lymphoma:  -she does not have any clinical or radiographic evidence of lymphoma recurrence or progression.  - I will see her back in 3 months with labs and scans.  - She has lost a few kgs in last few months. This seems to be related to beginning a new job, working long hours and being physically active at work. In absence of any other signs, I do not think weight loss is due to lymphoma recurrence. I will continue to monitor. She knows to reach out if there are any symptoms.    Total time spent on date of service in review of medical records, review of labs, history taking, physical exam, discussion of assessment and plan, counseling and patient education is 30 minutes.    Mallory Mendez MD  Attending Physician  Pager [phone]              Signed Electronically by: Mallory Mendez MD

## 2024-11-11 ENCOUNTER — IMMUNIZATION (OUTPATIENT)
Dept: FAMILY MEDICINE | Facility: CLINIC | Age: 29
End: 2024-11-11
Payer: COMMERCIAL

## 2024-11-11 PROCEDURE — 90656 IIV3 VACC NO PRSV 0.5 ML IM: CPT

## 2024-11-11 PROCEDURE — 90471 IMMUNIZATION ADMIN: CPT

## 2025-01-16 ENCOUNTER — LAB (OUTPATIENT)
Dept: LAB | Facility: CLINIC | Age: 30
End: 2025-01-16
Payer: COMMERCIAL

## 2025-01-16 ENCOUNTER — OFFICE VISIT (OUTPATIENT)
Dept: DERMATOLOGY | Facility: CLINIC | Age: 30
End: 2025-01-16
Attending: PHYSICIAN ASSISTANT
Payer: COMMERCIAL

## 2025-01-16 DIAGNOSIS — D22.9 MULTIPLE BENIGN NEVI: ICD-10-CM

## 2025-01-16 DIAGNOSIS — D18.01 CHERRY ANGIOMA: ICD-10-CM

## 2025-01-16 DIAGNOSIS — L81.4 LENTIGO: Primary | ICD-10-CM

## 2025-01-16 DIAGNOSIS — C81.10 NODULAR SCLEROSING HODGKIN'S LYMPHOMA, UNSPECIFIED BODY REGION (H): ICD-10-CM

## 2025-01-16 DIAGNOSIS — Z12.83 SCREENING EXAM FOR SKIN CANCER: ICD-10-CM

## 2025-01-16 LAB
ALBUMIN SERPL BCG-MCNC: 4.2 G/DL (ref 3.5–5.2)
ALP SERPL-CCNC: 67 U/L (ref 40–150)
ALT SERPL W P-5'-P-CCNC: 16 U/L (ref 0–50)
ANION GAP SERPL CALCULATED.3IONS-SCNC: 11 MMOL/L (ref 7–15)
AST SERPL W P-5'-P-CCNC: 19 U/L (ref 0–45)
BASOPHILS # BLD AUTO: 0 10E3/UL (ref 0–0.2)
BASOPHILS NFR BLD AUTO: 1 %
BILIRUB SERPL-MCNC: 0.3 MG/DL
BUN SERPL-MCNC: 12.4 MG/DL (ref 6–20)
CALCIUM SERPL-MCNC: 8.4 MG/DL (ref 8.8–10.4)
CHLORIDE SERPL-SCNC: 100 MMOL/L (ref 98–107)
CREAT SERPL-MCNC: 0.65 MG/DL (ref 0.51–0.95)
EGFRCR SERPLBLD CKD-EPI 2021: >90 ML/MIN/1.73M2
EOSINOPHIL # BLD AUTO: 0.3 10E3/UL (ref 0–0.7)
EOSINOPHIL NFR BLD AUTO: 6 %
ERYTHROCYTE [DISTWIDTH] IN BLOOD BY AUTOMATED COUNT: 11.6 % (ref 10–15)
GLUCOSE SERPL-MCNC: 97 MG/DL (ref 70–99)
HCO3 SERPL-SCNC: 24 MMOL/L (ref 22–29)
HCT VFR BLD AUTO: 37.7 % (ref 35–47)
HGB BLD-MCNC: 12.7 G/DL (ref 11.7–15.7)
IMM GRANULOCYTES # BLD: 0 10E3/UL
IMM GRANULOCYTES NFR BLD: 0 %
LYMPHOCYTES # BLD AUTO: 1.3 10E3/UL (ref 0.8–5.3)
LYMPHOCYTES NFR BLD AUTO: 28 %
MCH RBC QN AUTO: 30 PG (ref 26.5–33)
MCHC RBC AUTO-ENTMCNC: 33.7 G/DL (ref 31.5–36.5)
MCV RBC AUTO: 89 FL (ref 78–100)
MONOCYTES # BLD AUTO: 0.5 10E3/UL (ref 0–1.3)
MONOCYTES NFR BLD AUTO: 10 %
NEUTROPHILS # BLD AUTO: 2.5 10E3/UL (ref 1.6–8.3)
NEUTROPHILS NFR BLD AUTO: 56 %
PLATELET # BLD AUTO: 270 10E3/UL (ref 150–450)
POTASSIUM SERPL-SCNC: 3.5 MMOL/L (ref 3.4–5.3)
PROT SERPL-MCNC: 6.9 G/DL (ref 6.4–8.3)
RBC # BLD AUTO: 4.23 10E6/UL (ref 3.8–5.2)
SODIUM SERPL-SCNC: 135 MMOL/L (ref 135–145)
WBC # BLD AUTO: 4.5 10E3/UL (ref 4–11)

## 2025-01-16 ASSESSMENT — PAIN SCALES - GENERAL: PAINLEVEL_OUTOF10: NO PAIN (0)

## 2025-01-16 NOTE — LETTER
1/16/2025      Amy Harris  [address]  [place] [address]      Dear Colleague,    Thank you for referring your patient, Amy Harris, to the [facility]. Please see a copy of my visit note below.    Amy Harris is a pleasant 29 year old year old female patient here today for skin check. It has been a few years since her last skin check. 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  Patient has no other skin complaints today.  Remainder of the HPI, Meds, PMH, Allergies, FH, and SH was reviewed in chart.    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     Seasonal allergic rhinitis      Tension headache        Past Surgical History:   Procedure Laterality Date     BIOPSY LYMPH NODE CERVICAL N/A 6/9/2022    Procedure: Excisional lymph node biopsy of right neck;  Surgeon: Jevon Peters MD;  Location: [facility] OR     IR CHEST PORT PLACEMENT > 5 YRS OF AGE  6/24/2022     IR PORT REMOVAL RIGHT  12/16/2022     REMOVE PORT VASCULAR ACCESS Right 12/16/2022    Procedure: REMOVAL, VASCULAR ACCESS PORT;  Surgeon: Leo Velazquez MD;  Location: [facility] OR     wisdom teeth extraction          Family History   Problem Relation Age of Onset     Lipids Mother         borderline     Lipids Father      Hyperlipidemia Father      Anxiety Disorder Sister      Asthma Brother      Substance Abuse Brother         Alcohol     Diabetes Maternal Grandmother      Circulatory Maternal Grandfather         brain aneurysm     Prostate Cancer Paternal Grandfather      C.A.D. No family hx of      Hypertension No family hx of      Anesthesia Reaction No family hx of      Bleeding Disorder No family hx of      Clotting Disorder No family hx of        Social History     Socioeconomic  History     Marital status: Single     Spouse name: Not on file     Number of children: Not on file     Years of education: Not on file     Highest education level: Not on file   Occupational History     Not on file   Tobacco Use     Smoking status: Never     Passive exposure: Past     Smokeless tobacco: Never     Tobacco comments:     Dad smokes outside   Vaping Use     Vaping status: Never Used   Substance and Sexual Activity     Alcohol use: Yes     Comment: 3-4 drinks a week     Drug use: No     Sexual activity: Yes     Partners: Male     Birth control/protection: Pill   Other Topics Concern     Parent/sibling w/ CABG, MI or angioplasty before 65F 55M? No   Social History Narrative     Not on file     Social Drivers of Health     Financial Resource Strain: Low Risk  (4/22/2024)    Financial Resource Strain      Within the past 12 months, have you or your family members you live with been unable to get utilities (heat, electricity) when it was really needed?: No   Food Insecurity: Low Risk  (4/22/2024)    Food Insecurity      Within the past 12 months, did you worry that your food would run out before you got money to buy more?: No      Within the past 12 months, did the food you bought just not last and you didn t have money to get more?: No   Transportation Needs: Low Risk  (4/22/2024)    Transportation Needs      Within the past 12 months, has lack of transportation kept you from medical appointments, getting your medicines, non-medical meetings or appointments, work, or from getting things that you need?: No   Physical Activity: Insufficiently Active (4/22/2024)    Exercise Vital Sign      Days of Exercise per Week: 4 days      Minutes of Exercise per Session: 30 min   Stress: No Stress Concern Present (4/22/2024)    [employer] [place] [employer] - Occupational Stress Questionnaire      Feeling of Stress : Not at all   Social Connections: Unknown (4/22/2024)    Social Connection and Isolation Panel  [NHANES]      Frequency of Communication with Friends and Family: Not on file      Frequency of Social Gatherings with Friends and Family: Once a week      Attends Yazidi Services: Not on file      Active Member of Clubs or Organizations: Not on file      Attends Club or Organization Meetings: Not on file      Marital Status: Not on file   Interpersonal Safety: Low Risk  (4/23/2024)    Interpersonal Safety      Do you feel physically and emotionally safe where you currently live?: Yes      Within the past 12 months, have you been hit, slapped, kicked or otherwise physically hurt by someone?: No      Within the past 12 months, have you been humiliated or emotionally abused in other ways by your partner or ex-partner?: No   Housing Stability: Low Risk  (4/22/2024)    Housing Stability      Do you have housing? : Yes      Are you worried about losing your housing?: No       Outpatient Encounter Medications as of 1/16/2025   Medication Sig Dispense Refill     drospirenone-ethinyl estradiol (IDANIA) 3-0.02 MG tablet Take 1 tablet by mouth every morning 84 tablet 3     spironolactone (ALDACTONE) 100 MG tablet Take 2 tablets (200 mg) by mouth every morning 180 tablet 3     No facility-administered encounter medications on file as of 1/16/2025.             O:   NAD, WDWN, Alert & Oriented, Mood & Affect wnl, Vitals stable   Here today alone   There were no vitals taken for this visit.   General appearance normal   Vitals stable   Alert, oriented and in no acute distress      Brown macules on shoulders   Brown papules and macules with regular pigment network and border on torso and extremities   Red papules on trunk    The remainder of skin exam is normal,      Eyes: Conjunctivae/lids:Normal     ENT: Lips: normal    MSK:Normal    Cardiovascular: peripheral edema none    Pulm: Breathing Normal    Neuro/Psych: Orientation:Alert and Orientedx3 ; Mood/Affect:normal     A/P:  1.  lentigo, angioma, benign nevi   It was a pleasure  speaking to Amy Harris today.  BENIGN LESIONS DISCUSSED WITH PATIENT:  I discussed the specifics of tumor, prognosis, and genetics of benign lesions.  I explained that treatment of these lesions would be purely cosmetic and not medically neccessary.  I discussed with patient different removal options including excision, cautery and /or laser.      Nature and genetics of benign skin lesions dicussed with patient.  Signs and Symptoms of skin cancer discussed with patient.  ABCDEs of melanoma reviewed with patient.  Patient encouraged to perform monthly skin exams.  UV precautions reviewed with patient.  Risks of non-melanoma skin cancer discussed with patient   Return to clinic in one year or sooner if needed.       Again, thank you for allowing me to participate in the care of your patient.        Sincerely,        Teresa Taylor PA-C    Electronically signed

## 2025-01-17 NOTE — PROGRESS NOTES
Amy Harris is a pleasant 29 year old year old female patient here today for skin check. It has been a few years since her last skin check. 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  Patient has no other skin complaints today.  Remainder of the HPI, Meds, PMH, Allergies, FH, and SH was reviewed in chart.    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    Seasonal allergic rhinitis     Tension headache        Past Surgical History:   Procedure Laterality Date    BIOPSY LYMPH NODE CERVICAL N/A 6/9/2022    Procedure: Excisional lymph node biopsy of right neck;  Surgeon: Jevon Peters MD;  Location: [facility] OR    IR CHEST PORT PLACEMENT > 5 YRS OF AGE  6/24/2022    IR PORT REMOVAL RIGHT  12/16/2022    REMOVE PORT VASCULAR ACCESS Right 12/16/2022    Procedure: REMOVAL, VASCULAR ACCESS PORT;  Surgeon: Leo Velazquez MD;  Location: [facility] OR    wisdom teeth extraction          Family History   Problem Relation Age of Onset    Lipids Mother         borderline    Lipids Father     Hyperlipidemia Father     Anxiety Disorder Sister     Asthma Brother     Substance Abuse Brother         Alcohol    Diabetes Maternal Grandmother     Circulatory Maternal Grandfather         brain aneurysm    Prostate Cancer Paternal Grandfather     C.A.D. No family hx of     Hypertension No family hx of     Anesthesia Reaction No family hx of     Bleeding Disorder No family hx of     Clotting Disorder No family hx of        Social History     Socioeconomic History    Marital status: Single     Spouse name: Not on file    Number of children: Not on file    Years of education: Not on file    Highest education level: Not on file   Occupational History    Not on file   Tobacco Use    Smoking status: Never     Passive exposure:  Past    Smokeless tobacco: Never    Tobacco comments:     Dad smokes outside   Vaping Use    Vaping status: Never Used   Substance and Sexual Activity    Alcohol use: Yes     Comment: 3-4 drinks a week    Drug use: No    Sexual activity: Yes     Partners: Male     Birth control/protection: Pill   Other Topics Concern    Parent/sibling w/ CABG, MI or angioplasty before 65F 55M? No   Social History Narrative    Not on file     Social Drivers of Health     Financial Resource Strain: Low Risk  (4/22/2024)    Financial Resource Strain     Within the past 12 months, have you or your family members you live with been unable to get utilities (heat, electricity) when it was really needed?: No   Food Insecurity: Low Risk  (4/22/2024)    Food Insecurity     Within the past 12 months, did you worry that your food would run out before you got money to buy more?: No     Within the past 12 months, did the food you bought just not last and you didn t have money to get more?: No   Transportation Needs: Low Risk  (4/22/2024)    Transportation Needs     Within the past 12 months, has lack of transportation kept you from medical appointments, getting your medicines, non-medical meetings or appointments, work, or from getting things that you need?: No   Physical Activity: Insufficiently Active (4/22/2024)    Exercise Vital Sign     Days of Exercise per Week: 4 days     Minutes of Exercise per Session: 30 min   Stress: No Stress Concern Present (4/22/2024)    [employer] - Occupational Stress Questionnaire     Feeling of Stress : Not at all   Social Connections: Unknown (4/22/2024)    Social Connection and Isolation Panel [NHANES]     Frequency of Communication with Friends and Family: Not on file     Frequency of Social Gatherings with Friends and Family: Once a week     Attends Confucianist Services: Not on file     Active Member of Clubs or Organizations: Not on file     Attends Club or Organization Meetings:  Not on file     Marital Status: Not on file   Interpersonal Safety: Low Risk  (4/23/2024)    Interpersonal Safety     Do you feel physically and emotionally safe where you currently live?: Yes     Within the past 12 months, have you been hit, slapped, kicked or otherwise physically hurt by someone?: No     Within the past 12 months, have you been humiliated or emotionally abused in other ways by your partner or ex-partner?: No   Housing Stability: Low Risk  (4/22/2024)    Housing Stability     Do you have housing? : Yes     Are you worried about losing your housing?: No       Outpatient Encounter Medications as of 1/16/2025   Medication Sig Dispense Refill    drospirenone-ethinyl estradiol (IDANIA) 3-0.02 MG tablet Take 1 tablet by mouth every morning 84 tablet 3    spironolactone (ALDACTONE) 100 MG tablet Take 2 tablets (200 mg) by mouth every morning 180 tablet 3     No facility-administered encounter medications on file as of 1/16/2025.             O:   NAD, WDWN, Alert & Oriented, Mood & Affect wnl, Vitals stable   Here today alone   There were no vitals taken for this visit.   General appearance normal   Vitals stable   Alert, oriented and in no acute distress      Brown macules on shoulders   Brown papules and macules with regular pigment network and border on torso and extremities   Red papules on trunk    The remainder of skin exam is normal,      Eyes: Conjunctivae/lids:Normal     ENT: Lips: normal    MSK:Normal    Cardiovascular: peripheral edema none    Pulm: Breathing Normal    Neuro/Psych: Orientation:Alert and Orientedx3 ; Mood/Affect:normal     A/P:  1.  lentigo, angioma, benign nevi   It was a pleasure speaking to Amy Harris today.  BENIGN LESIONS DISCUSSED WITH PATIENT:  I discussed the specifics of tumor, prognosis, and genetics of benign lesions.  I explained that treatment of these lesions would be purely cosmetic and not medically neccessary.  I discussed with patient different removal  options including excision, cautery and /or laser.      Nature and genetics of benign skin lesions dicussed with patient.  Signs and Symptoms of skin cancer discussed with patient.  ABCDEs of melanoma reviewed with patient.  Patient encouraged to perform monthly skin exams.  UV precautions reviewed with patient.  Risks of non-melanoma skin cancer discussed with patient   Return to clinic in one year or sooner if needed.

## 2025-01-23 ENCOUNTER — ONCOLOGY VISIT (OUTPATIENT)
Dept: ONCOLOGY | Facility: CLINIC | Age: 30
End: 2025-01-23
Attending: STUDENT IN AN ORGANIZED HEALTH CARE EDUCATION/TRAINING PROGRAM
Payer: COMMERCIAL

## 2025-01-23 VITALS
RESPIRATION RATE: 20 BRPM | HEART RATE: 68 BPM | BODY MASS INDEX: 21.05 KG/M2 | WEIGHT: 121.8 LBS | DIASTOLIC BLOOD PRESSURE: 84 MMHG | OXYGEN SATURATION: 99 % | SYSTOLIC BLOOD PRESSURE: 136 MMHG | TEMPERATURE: 98.2 F

## 2025-01-23 DIAGNOSIS — C81.9A HODGKIN'S DISEASE IN REMISSION, UNSPECIFIED HODGKIN LYMPHOMA TYPE: Primary | ICD-10-CM

## 2025-01-23 PROCEDURE — 99213 OFFICE O/P EST LOW 20 MIN: CPT | Performed by: STUDENT IN AN ORGANIZED HEALTH CARE EDUCATION/TRAINING PROGRAM

## 2025-01-23 ASSESSMENT — PAIN SCALES - GENERAL: PAINLEVEL_OUTOF10: NO PAIN (0)

## 2025-01-23 NOTE — PROGRESS NOTES
Raysa Reyes is a 29 year old, presenting for the following health issues:  Oncology Clinic Visit (Nodular sclerosing Hodgkin's lymphoma, unspecified body region )    HPI     Oncology History Overview Note   She has a past medical history of acne treated with spironolactone who is otherwise healthy with no notable past surgical history. She first noticed heartburn-like symptoms when drinking alcohol a year ago and has noticed progressive dysphagia, where now dry solids get caught when swallowing although it is not painful. She has had to stop eating mid-meal but denies appetite change or weight loss. She developed night sweats x2 in Fall 2021 where she woke up with a drenched shirt. Denies current night sweats, fever, chills. During that time, she first noticed an enlarged lymph node in her right clavicular region and as it was not bothersome or painful, did not pursue medical attention at that time. She noticed no other lumps or bumps at that time. She has also been increasingly itchy in all parts of her body for the past 2 months. Over the past two weeks, she has noticed a fast heart rate without chest pain or palpitations and shortness of breath on exertion like carrying laundry upstairs or walking and talking at the same time. She denies lower extremity or upper extremity edema. Her energy level has been slowly declining, where she sleeps for longer at night and needing to rest for 4 hours in the afternoon.      She had a routine physical on 5/16/22 where she brought attention to her growing mass on her right shoulder. 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       She udnerwent ECHO which showed EF of >50%.  PET scan done on 6/18/2022 showed hypermetabolic mediastinal mass with SUV max of 14.7 and 15.6 x 14.8 x 7.8 cm in size.  Mediastinal mass extends to right level 4 lateral to right thyroid lobe.  There is associated  right retroclavicular lymphadenopathy.  Associated bone marrow activity is seen in the axial skeleton consistent with bone marrow infiltration.  Her [place] stage is stage IV.    She consulted with fertility specialist.  We do not have time to pursue pretreatment quality preservation.  She was advised to return in 6 months after completing treatment at that time fertility preservation would be considered.    Due to her ongoing itching, exertional dyspnea, I had prescribed prednisone 60 mg daily and allopurinol to prevent tumor lysis.    She underwent 2 cycles of AVD with brentuximab  Tolerated well without any side effects.  PET scan after 2 cycles shows complete metabolic response. She went on to receive 6 cycles of ABVD. 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    End of treatment PET scan showed continued complete metabolic response.         Nodular sclerosis classical Hodgkin lymphoma (H)   6/16/2022 Initial Diagnosis    Nodular sclerosis classical Hodgkin lymphoma (H)     6/27/2022 - 11/28/2022 Chemotherapy    OP ONC Lymphoma and CLL - A + AVD (Brentuximab vedotin / DOXOrubicin / vinBLAStine / Dacarbazine)   Plan Provider: Mallory Mendez MD  Treatment goal: Curative  Line of treatment: [No plan line of treatment]     8/19/2022 No evidence of disease    PET scan after 2 cycles of AVD (map shows no evidence of disease.     1/2023 No evidence of disease    She completed treatment December 22 and January 23 and of treatment PET scan showed no evidence of disease.       She is doing well with no complaints. Denies fevers, chills, night sweats or clinically significant weight loss, no lymphadenopathy. No pain. She has been busy working. Endorses good energy levels.         Objective    /84 (BP Location: Right arm, Patient Position: Sitting, Cuff Size: Adult Regular)   Pulse 68   Temp 98.2  F (36.8  C) (Oral)   Resp 20   Wt 55.2 kg (121 lb 12.8 oz)   SpO2 99%   BMI  21.05 kg/m    Body mass index is 21.05 kg/m .  Physical Exam   GENERAL:  Alert oriented well nourished  HEAD: normocephalic atraumatic  SKIN:  no rash, hives, other lesions.  LYMPHATIC: no abnormal lymph nodes palable in cervical, axillary, supraclavicular or inguinal area.  RESP:  No rales or rhonchi, breath sounds bilaterally equal and vesicular  CV:  No tachycardia, S1 S2 normal No murmur.  GI:  Abdomen soft nontender no hepato or splenomegaly  MUSCULOSKELETAL:  No visible joint redness or swelling.  NEURO:  No gross weakness gait normal  PSYCH: pleasant affect    Labs: I personally reviewed complete blood count, differential, renal function test, and  liver function tests which are within normal limits.     Imaging:   CT CAP: 1/16/2025   -  No significant change in slightly heterogeneous soft tissue mass in the anterior mediastinum. Continued follow-up is recommended.  - No adenopathy through the chest, abdomen, and pelvis.  - Normal size spleen      Assessment and Plan:    1) Classic Hodgkin lymphoma  She does not have any clinical or radiographic evidence of lymphoma recurrence or progression. She is now 2 years from completion of therapy. We will transition to 6 months visits. She feels quite well and is agreeable to the plan.     Patient discussed with Dr. Andrea Mendez MD  Heme/Onc Fellow     Total time spent on date of service in review of medical records, review of labs, history taking, physical exam, discussion of assessment and plan, counseling and patient education is 30 minutes.    I was present with the resident/fellow during the history and exam.  I discussed the case with the resident and agree with the findings as documented in the assessment and plan.    Mallory Mendez MD  Attending Physician  Pager [phone]

## 2025-01-23 NOTE — LETTER
1/23/2025      Amy Harris  [address]  [place] [address]      Dear Colleague,    Thank you for referring your patient, Amy Harris, to the [facility]. Please see a copy of my visit note below.          Raysa Reyes is a 29 year old, presenting for the following health issues:  Oncology Clinic Visit (Nodular sclerosing Hodgkin's lymphoma, unspecified body region )    HPI     Oncology History Overview Note   She has a past medical history of acne treated with spironolactone who is otherwise healthy with no notable past surgical history. She first noticed heartburn-like symptoms when drinking alcohol a year ago and has noticed progressive dysphagia, where now dry solids get caught when swallowing although it is not painful. She has had to stop eating mid-meal but denies appetite change or weight loss. She developed night sweats x2 in Fall 2021 where she woke up with a drenched shirt. Denies current night sweats, fever, chills. During that time, she first noticed an enlarged lymph node in her right clavicular region and as it was not bothersome or painful, did not pursue medical attention at that time. She noticed no other lumps or bumps at that time. She has also been increasingly itchy in all parts of her body for the past 2 months. Over the past two weeks, she has noticed a fast heart rate without chest pain or palpitations and shortness of breath on exertion like carrying laundry upstairs or walking and talking at the same time. She denies lower extremity or upper extremity edema. Her energy level has been slowly declining, where she sleeps for longer at night and needing to rest for 4 hours in the afternoon.      She had a routine physical on 5/16/22 where she brought attention to her growing mass on her right shoulder. 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       She udnerwent  ECHO which showed EF of >50%.  PET scan done on 6/18/2022 showed hypermetabolic mediastinal mass with SUV max of 14.7 and 15.6 x 14.8 x 7.8 cm in size.  Mediastinal mass extends to right level 4 lateral to right thyroid lobe.  There is associated right retroclavicular lymphadenopathy.  Associated bone marrow activity is seen in the axial skeleton consistent with bone marrow infiltration.  Her [place] stage is stage IV.    She consulted with fertility specialist.  We do not have time to pursue pretreatment quality preservation.  She was advised to return in 6 months after completing treatment at that time fertility preservation would be considered.    Due to her ongoing itching, exertional dyspnea, I had prescribed prednisone 60 mg daily and allopurinol to prevent tumor lysis.    She underwent 2 cycles of AVD with brentuximab  Tolerated well without any side effects.  PET scan after 2 cycles shows complete metabolic response. She went on to receive 6 cycles of ABVD. 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    End of treatment PET scan showed continued complete metabolic response.         Nodular sclerosis classical Hodgkin lymphoma (H)   6/16/2022 Initial Diagnosis    Nodular sclerosis classical Hodgkin lymphoma (H)     6/27/2022 - 11/28/2022 Chemotherapy    OP ONC Lymphoma and CLL - A + AVD (Brentuximab vedotin / DOXOrubicin / vinBLAStine / Dacarbazine)   Plan Provider: Mallory Mendez MD  Treatment goal: Curative  Line of treatment: [No plan line of treatment]     8/19/2022 No evidence of disease    PET scan after 2 cycles of AVD (map shows no evidence of disease.     1/2023 No evidence of disease    She completed treatment December 22 and January 23 and of treatment PET scan showed no evidence of disease.       She is doing well with no complaints. Denies fevers, chills, night sweats or clinically significant weight loss, no lymphadenopathy. No pain. She has been busy working.  Endorses good energy levels.         Objective    /84 (BP Location: Right arm, Patient Position: Sitting, Cuff Size: Adult Regular)   Pulse 68   Temp 98.2  F (36.8  C) (Oral)   Resp 20   Wt 55.2 kg (121 lb 12.8 oz)   SpO2 99%   BMI 21.05 kg/m    Body mass index is 21.05 kg/m .  Physical Exam   GENERAL:  Alert oriented well nourished  HEAD: normocephalic atraumatic  SKIN:  no rash, hives, other lesions.  LYMPHATIC: no abnormal lymph nodes palable in cervical, axillary, supraclavicular or inguinal area.  RESP:  No rales or rhonchi, breath sounds bilaterally equal and vesicular  CV:  No tachycardia, S1 S2 normal No murmur.  GI:  Abdomen soft nontender no hepato or splenomegaly  MUSCULOSKELETAL:  No visible joint redness or swelling.  NEURO:  No gross weakness gait normal  PSYCH: pleasant affect    Labs: I personally reviewed complete blood count, differential, renal function test, and  liver function tests which are within normal limits.     Imaging:   CT CAP: 1/16/2025   -  No significant change in slightly heterogeneous soft tissue mass in the anterior mediastinum. Continued follow-up is recommended.  - No adenopathy through the chest, abdomen, and pelvis.  - Normal size spleen      Assessment and Plan:    1) Classic Hodgkin lymphoma  She does not have any clinical or radiographic evidence of lymphoma recurrence or progression. She is now 2 years from completion of therapy. We will transition to 6 months visits. She feels quite well and is agreeable to the plan.     Patient discussed with Dr. Andrea Mendez MD  Heme/Onc Fellow     Total time spent on date of service in review of medical records, review of labs, history taking, physical exam, discussion of assessment and plan, counseling and patient education is 30 minutes.    I was present with the resident/fellow during the history and exam.  I discussed the case with the resident and agree with the findings as documented in the assessment and  plan.    Mallory Mendez MD  Attending Physician  Pager [phone]      Again, thank you for allowing me to participate in the care of your patient.        Sincerely,        Mallory Mendez MD    Electronically signed

## 2025-04-14 DIAGNOSIS — N92.0 MENORRHAGIA WITH REGULAR CYCLE: ICD-10-CM

## 2025-04-15 RX ORDER — DROSPIRENONE AND ETHINYL ESTRADIOL 0.02-3(28)
1 KIT ORAL EVERY MORNING
Qty: 28 TABLET | Refills: 0 | Status: SHIPPED | OUTPATIENT
Start: 2025-04-15

## 2025-04-28 ENCOUNTER — OFFICE VISIT (OUTPATIENT)
Dept: FAMILY MEDICINE | Facility: CLINIC | Age: 30
End: 2025-04-28
Attending: PHYSICIAN ASSISTANT
Payer: COMMERCIAL

## 2025-04-28 VITALS
HEART RATE: 84 BPM | WEIGHT: 121.4 LBS | DIASTOLIC BLOOD PRESSURE: 79 MMHG | SYSTOLIC BLOOD PRESSURE: 133 MMHG | HEIGHT: 64 IN | BODY MASS INDEX: 20.73 KG/M2 | OXYGEN SATURATION: 98 % | RESPIRATION RATE: 18 BRPM | TEMPERATURE: 98.3 F

## 2025-04-28 DIAGNOSIS — Z00.00 ROUTINE GENERAL MEDICAL EXAMINATION AT A HEALTH CARE FACILITY: Primary | ICD-10-CM

## 2025-04-28 DIAGNOSIS — N92.0 MENORRHAGIA WITH REGULAR CYCLE: ICD-10-CM

## 2025-04-28 DIAGNOSIS — Z87.898 HX OF MOTION SICKNESS: ICD-10-CM

## 2025-04-28 DIAGNOSIS — Z12.4 CERVICAL CANCER SCREENING: ICD-10-CM

## 2025-04-28 PROCEDURE — 3075F SYST BP GE 130 - 139MM HG: CPT | Performed by: NURSE PRACTITIONER

## 2025-04-28 PROCEDURE — 99213 OFFICE O/P EST LOW 20 MIN: CPT | Mod: 25 | Performed by: NURSE PRACTITIONER

## 2025-04-28 PROCEDURE — 1126F AMNT PAIN NOTED NONE PRSNT: CPT | Performed by: NURSE PRACTITIONER

## 2025-04-28 PROCEDURE — 99395 PREV VISIT EST AGE 18-39: CPT | Performed by: NURSE PRACTITIONER

## 2025-04-28 PROCEDURE — 3078F DIAST BP <80 MM HG: CPT | Performed by: NURSE PRACTITIONER

## 2025-04-28 RX ORDER — DIMENHYDRINATE 50 MG
50-100 TABLET ORAL EVERY 6 HOURS PRN
Qty: 30 TABLET | Refills: 1 | Status: SHIPPED | OUTPATIENT
Start: 2025-04-28

## 2025-04-28 RX ORDER — DROSPIRENONE AND ETHINYL ESTRADIOL 0.02-3(28)
1 KIT ORAL EVERY MORNING
Qty: 84 TABLET | Refills: 4 | Status: SHIPPED | OUTPATIENT
Start: 2025-04-28

## 2025-04-28 SDOH — HEALTH STABILITY: PHYSICAL HEALTH: ON AVERAGE, HOW MANY MINUTES DO YOU ENGAGE IN EXERCISE AT THIS LEVEL?: 30 MIN

## 2025-04-28 SDOH — HEALTH STABILITY: PHYSICAL HEALTH: ON AVERAGE, HOW MANY DAYS PER WEEK DO YOU ENGAGE IN MODERATE TO STRENUOUS EXERCISE (LIKE A BRISK WALK)?: 7 DAYS

## 2025-04-28 ASSESSMENT — SOCIAL DETERMINANTS OF HEALTH (SDOH): HOW OFTEN DO YOU GET TOGETHER WITH FRIENDS OR RELATIVES?: TWICE A WEEK

## 2025-04-28 ASSESSMENT — PAIN SCALES - GENERAL: PAINLEVEL_OUTOF10: NO PAIN (0)

## 2025-04-28 NOTE — PROGRESS NOTES
Preventive Care Visit  [facility]  YAJAIRA Heard CNP, Family Medicine  Apr 28, 2025      Assessment & Plan     Routine general medical examination at a health care facility  -next preventative care visit in one year.     Cervical cancer screening  - Pap Screen Reflex to HPV if ASCUS - Recommended Age 25 - 29 Years    Menorrhagia with regular cycle  -doing well, continue  - drospirenone-ethinyl estradiol (VESTURA) 3-0.02 MG tablet; Take 1 tablet by mouth every morning. Skip placebo pills    Hx of motion sickness  - dimenhyDRINATE (DRAMAMINE) 50 MG tablet; Take 1-2 tablets ( mg) by mouth every 6 hours as needed for other (motion sickness).            Counseling  Appropriate preventive services were addressed with this patient via screening, questionnaire, or discussion as appropriate for fall prevention, nutrition, physical activity, Tobacco-use cessation, social engagement, weight loss and cognition.  Checklist reviewing preventive services available has been given to the patient.  Reviewed patient's diet, addressing concerns and/or questions.           Raysa Reeys is a 30 year old, presenting for the following:  Physical        4/28/2025    10:16 AM   Additional Questions   Roomed by Julia QUIROS   Accompanied by self     Patient here for yearly preventative care visit. In addition, they have the following concerns:    1. Refill on birth control, takes continuously for heavy periods, working well    2. Has upcoming deep sea fishing trip, requesting dramamine for seasickness.     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        4/28/2025   General Health   How would you rate your overall physical health? Excellent   Feel stress (tense, anxious, or unable to sleep) Not at all         4/28/2025   Nutrition   Three or more servings of calcium each  day? Yes   Diet: Regular (no restrictions)   How many servings of fruit and vegetables per day? (!) 2-3   How many sweetened beverages each day? 0-1         4/28/2025   Exercise   Days per week of moderate/strenous exercise 7 days   Average minutes spent exercising at this level 30 min         4/28/2025   Social Factors   Frequency of gathering with friends or relatives Twice a week   Worry food won't last until get money to buy more No   Food not last or not have enough money for food? No   Do you have housing? (Housing is defined as stable permanent housing and does not include staying outside in a car, in a tent, in an abandoned building, in an overnight shelter, or couch-surfing.) Yes   Are you worried about losing your housing? No   Lack of transportation? No   Unable to get utilities (heat,electricity)? No         4/28/2025   Dental   Dentist two times every year? Yes           Today's PHQ-2 Score:       1/16/2025     2:14 PM   PHQ-2 ( 1999 [employer])   Q1: Little interest or pleasure in doing things 0   Q2: Feeling down, depressed or hopeless 0   PHQ-2 Score 0         4/28/2025   Substance Use   Alcohol more than 3/day or more than 7/wk No   Do you use any other substances recreationally? No     Social History     Tobacco Use    Smoking status: Never     Passive exposure: Past    Smokeless tobacco: Never    Tobacco comments:     Dad smokes outside   Vaping Use    Vaping status: Never Used   Substance Use Topics    Alcohol use: Yes     Comment: 5-6 drinks a week    Drug use: No           5/16/2022   LAST FHS-7 RESULTS   1st degree relative breast or ovarian cancer No    Any relative bilateral breast cancer No    Any male have breast cancer No    Any ONE woman have BOTH breast AND ovarian cancer No    Any woman with breast cancer before 50yrs Unknown    2 or more relatives with breast AND/OR ovarian cancer No    2 or more relatives with breast AND/OR bowel cancer No        Proxy-reported        Mammogram Screening  "- Patient under 40 years of age: Routine Mammogram Screening not recommended.         4/28/2025   STI Screening   New sexual partner(s) since last STI/HIV test? No     History of abnormal Pap smear: No - age 21-29 PAP every 3 years recommended        5/16/2022     8:45 AM 10/18/2019     8:04 AM   PAP / HPV   PAP Negative for Intraepithelial Lesion or Malignancy (NILM)     PAP (Historical)  NIL            4/28/2025   Contraception/Family Planning   Questions about contraception or family planning No        Reviewed and updated as needed this visit by Provider   Tobacco   Meds  Problems  Med Hx  Surg Hx  Fam Hx  Soc Hx Sexual   Activity                   Objective    Exam  /79   Pulse 84   Temp 98.3  F (36.8  C) (Tympanic)   Resp 18   Ht 1.626 m (5' 4\")   Wt 55.1 kg (121 lb 6.4 oz)   SpO2 98%   BMI 20.84 kg/m     Estimated body mass index is 20.84 kg/m  as calculated from the following:    Height as of this encounter: 1.626 m (5' 4\").    Weight as of this encounter: 55.1 kg (121 lb 6.4 oz).    Physical Exam  Constitutional:       Appearance: Normal appearance. She is not ill-appearing.   HENT:      Right Ear: Tympanic membrane, ear canal and external ear normal.      Left Ear: Tympanic membrane, ear canal and external ear normal.      Nose: Nose normal. No congestion.      Mouth/Throat:      Mouth: Mucous membranes are moist.      Pharynx: Oropharynx is clear.   Eyes:      Pupils: Pupils are equal, round, and reactive to light.   Cardiovascular:      Rate and Rhythm: Normal rate and regular rhythm.      Pulses: Normal pulses.      Heart sounds: Normal heart sounds. No murmur heard.     No friction rub. No gallop.   Pulmonary:      Effort: Pulmonary effort is normal.      Breath sounds: Normal breath sounds.   Abdominal:      General: Abdomen is flat. Bowel sounds are normal.      Palpations: Abdomen is soft.   Genitourinary:     General: Normal vulva.      Labia:         Right: No rash, tenderness, " lesion or injury.         Left: No rash, tenderness, lesion or injury.       Vagina: Normal. No vaginal discharge.      Cervix: Normal. Eversion present.   Musculoskeletal:         General: Normal range of motion.      Cervical back: Normal range of motion.   Lymphadenopathy:      Cervical: No cervical adenopathy.   Skin:     General: Skin is warm and dry.   Neurological:      General: No focal deficit present.      Mental Status: She is alert and oriented to person, place, and time.   Psychiatric:         Mood and Affect: Mood normal.         Behavior: Behavior normal.         Thought Content: Thought content normal.         Judgment: Judgment normal.               Signed Electronically by: YAJAIRA Heard CNP

## 2025-04-28 NOTE — PATIENT INSTRUCTIONS
Patient Education   Preventive Care Advice   This is general advice given by our system to help you stay healthy. However, your care team may have specific advice just for you. Please talk to your care team about your preventive care needs.  Nutrition  Eat 5 or more servings of fruits and vegetables each day.  Try wheat bread, brown rice and whole grain pasta (instead of white bread, rice, and pasta).  Get enough calcium and vitamin D. Check the label on foods and aim for 100% of the RDA (recommended daily allowance).  Lifestyle  Exercise at least 150 minutes each week  (30 minutes a day, 5 days a week).  Do muscle strengthening activities 2 days a week. These help control your weight and prevent disease.  No smoking.  Wear sunscreen to prevent skin cancer.  Have a dental exam and cleaning every 6 months.  Yearly exams  See your health care team every year to talk about:  Any changes in your health.  Any medicines your care team has prescribed.  Preventive care, family planning, and ways to prevent chronic diseases.  Shots (vaccines)   HPV shots (up to age 26), if you've never had them before.  Hepatitis B shots (up to age 59), if you've never had them before.  COVID-19 shot: Get this shot when it's due.  Flu shot: Get a flu shot every year.  Tetanus shot: Get a tetanus shot every 10 years.  Pneumococcal, hepatitis A, and RSV shots: Ask your care team if you need these based on your risk.  Shingles shot (for age 50 and up)  General health tests  Diabetes screening:  Starting at age 35, Get screened for diabetes at least every 3 years.  If you are younger than age 35, ask your care team if you should be screened for diabetes.  Cholesterol test: At age 39, start having a cholesterol test every 5 years, or more often if advised.  Bone density scan (DEXA): At age 50, ask your care team if you should have this scan for osteoporosis (brittle bones).  Hepatitis C: Get tested at least once in your life.  STIs (sexually  transmitted infections)  Before age 24: Ask your care team if you should be screened for STIs.  After age 24: Get screened for STIs if you're at risk. You are at risk for STIs (including HIV) if:  You are sexually active with more than one person.  You don't use condoms every time.  You or a partner was diagnosed with a sexually transmitted infection.  If you are at risk for HIV, ask about PrEP medicine to prevent HIV.  Get tested for HIV at least once in your life, whether you are at risk for HIV or not.  Cancer screening tests  Cervical cancer screening: If you have a cervix, begin getting regular cervical cancer screening tests starting at age 21.  Breast cancer scan (mammogram): If you've ever had breasts, begin having regular mammograms starting at age 40. This is a scan to check for breast cancer.  Colon cancer screening: It is important to start screening for colon cancer at age 45.  Have a colonoscopy test every 10 years (or more often if you're at risk) Or, ask your provider about stool tests like a FIT test every year or Cologuard test every 3 years.  To learn more about your testing options, visit:   .  For help making a decision, visit:   https://bit.ly/ho61410.  Prostate cancer screening test: If you have a prostate, ask your care team if a prostate cancer screening test (PSA) at age 55 is right for you.  Lung cancer screening: If you are a current or former smoker ages 50 to 80, ask your care team if ongoing lung cancer screenings are right for you.  For informational purposes only. Not to replace the advice of your health care provider. Copyright   2023 [employer]. All rights reserved. Clinically reviewed by the [facility]. [employer] [id number] - REV 01/24.

## 2025-05-05 LAB
BKR LAB AP GYN ADEQUACY: ABNORMAL
BKR LAB AP GYN INTERPRETATION: ABNORMAL
BKR LAB AP HPV REFLEX: ABNORMAL
BKR LAB AP PREVIOUS ABNORMAL: ABNORMAL
PATH REPORT.COMMENTS IMP SPEC: ABNORMAL
PATH REPORT.COMMENTS IMP SPEC: ABNORMAL
PATH REPORT.RELEVANT HX SPEC: ABNORMAL

## 2025-05-06 LAB
HPV HR 12 DNA CVX QL NAA+PROBE: NEGATIVE
HPV16 DNA CVX QL NAA+PROBE: NEGATIVE
HPV18 DNA CVX QL NAA+PROBE: NEGATIVE
HUMAN PAPILLOMA VIRUS FINAL DIAGNOSIS: NORMAL

## 2025-05-07 ENCOUNTER — RESULTS FOLLOW-UP (OUTPATIENT)
Dept: OBGYN | Facility: CLINIC | Age: 30
End: 2025-05-07

## 2025-05-07 PROBLEM — R87.610 ASCUS OF CERVIX WITH NEGATIVE HIGH RISK HPV: Status: ACTIVE | Noted: 2025-05-07

## 2025-07-21 ENCOUNTER — ONCOLOGY VISIT (OUTPATIENT)
Dept: ONCOLOGY | Facility: CLINIC | Age: 30
End: 2025-07-21
Attending: STUDENT IN AN ORGANIZED HEALTH CARE EDUCATION/TRAINING PROGRAM
Payer: COMMERCIAL

## 2025-07-21 ENCOUNTER — APPOINTMENT (OUTPATIENT)
Dept: LAB | Facility: CLINIC | Age: 30
End: 2025-07-21
Attending: STUDENT IN AN ORGANIZED HEALTH CARE EDUCATION/TRAINING PROGRAM
Payer: COMMERCIAL

## 2025-07-21 VITALS
HEART RATE: 91 BPM | WEIGHT: 125.5 LBS | TEMPERATURE: 98.3 F | BODY MASS INDEX: 21.54 KG/M2 | DIASTOLIC BLOOD PRESSURE: 76 MMHG | SYSTOLIC BLOOD PRESSURE: 114 MMHG | RESPIRATION RATE: 18 BRPM | OXYGEN SATURATION: 100 %

## 2025-07-21 DIAGNOSIS — C81.9A: ICD-10-CM

## 2025-07-21 LAB
ALBUMIN SERPL BCG-MCNC: 4.3 G/DL (ref 3.5–5.2)
ALP SERPL-CCNC: 81 U/L (ref 40–150)
ALT SERPL W P-5'-P-CCNC: 40 U/L (ref 0–50)
ANION GAP SERPL CALCULATED.3IONS-SCNC: 11 MMOL/L (ref 7–15)
AST SERPL W P-5'-P-CCNC: 31 U/L (ref 0–45)
BASOPHILS # BLD AUTO: 0 10E3/UL (ref 0–0.2)
BASOPHILS NFR BLD AUTO: 1 %
BILIRUB SERPL-MCNC: 0.6 MG/DL
BUN SERPL-MCNC: 12.4 MG/DL (ref 6–20)
CALCIUM SERPL-MCNC: 9 MG/DL (ref 8.8–10.4)
CHLORIDE SERPL-SCNC: 103 MMOL/L (ref 98–107)
CREAT SERPL-MCNC: 0.63 MG/DL (ref 0.51–0.95)
EGFRCR SERPLBLD CKD-EPI 2021: >90 ML/MIN/1.73M2
EOSINOPHIL # BLD AUTO: 0.3 10E3/UL (ref 0–0.7)
EOSINOPHIL NFR BLD AUTO: 5 %
ERYTHROCYTE [DISTWIDTH] IN BLOOD BY AUTOMATED COUNT: 12.1 % (ref 10–15)
GLUCOSE SERPL-MCNC: 112 MG/DL (ref 70–99)
HCO3 SERPL-SCNC: 24 MMOL/L (ref 22–29)
HCT VFR BLD AUTO: 38.7 % (ref 35–47)
HGB BLD-MCNC: 12.9 G/DL (ref 11.7–15.7)
IMM GRANULOCYTES # BLD: 0 10E3/UL
IMM GRANULOCYTES NFR BLD: 0 %
LDH SERPL L TO P-CCNC: 154 U/L (ref 0–250)
LYMPHOCYTES # BLD AUTO: 1.5 10E3/UL (ref 0.8–5.3)
LYMPHOCYTES NFR BLD AUTO: 29 %
MCH RBC QN AUTO: 29.3 PG (ref 26.5–33)
MCHC RBC AUTO-ENTMCNC: 33.3 G/DL (ref 31.5–36.5)
MCV RBC AUTO: 88 FL (ref 78–100)
MONOCYTES # BLD AUTO: 0.4 10E3/UL (ref 0–1.3)
MONOCYTES NFR BLD AUTO: 8 %
NEUTROPHILS # BLD AUTO: 3 10E3/UL (ref 1.6–8.3)
NEUTROPHILS NFR BLD AUTO: 57 %
NRBC # BLD AUTO: 0 10E3/UL
NRBC BLD AUTO-RTO: 0 /100
PLATELET # BLD AUTO: 262 10E3/UL (ref 150–450)
POTASSIUM SERPL-SCNC: 3.4 MMOL/L (ref 3.4–5.3)
PROT SERPL-MCNC: 7 G/DL (ref 6.4–8.3)
RBC # BLD AUTO: 4.4 10E6/UL (ref 3.8–5.2)
SODIUM SERPL-SCNC: 138 MMOL/L (ref 135–145)
WBC # BLD AUTO: 5.2 10E3/UL (ref 4–11)

## 2025-07-21 PROCEDURE — 83615 LACTATE (LD) (LDH) ENZYME: CPT | Performed by: STUDENT IN AN ORGANIZED HEALTH CARE EDUCATION/TRAINING PROGRAM

## 2025-07-21 PROCEDURE — 84155 ASSAY OF PROTEIN SERUM: CPT | Performed by: STUDENT IN AN ORGANIZED HEALTH CARE EDUCATION/TRAINING PROGRAM

## 2025-07-21 PROCEDURE — 85025 COMPLETE CBC W/AUTO DIFF WBC: CPT | Performed by: STUDENT IN AN ORGANIZED HEALTH CARE EDUCATION/TRAINING PROGRAM

## 2025-07-21 PROCEDURE — 36415 COLL VENOUS BLD VENIPUNCTURE: CPT | Performed by: STUDENT IN AN ORGANIZED HEALTH CARE EDUCATION/TRAINING PROGRAM

## 2025-07-21 PROCEDURE — 99213 OFFICE O/P EST LOW 20 MIN: CPT | Performed by: STUDENT IN AN ORGANIZED HEALTH CARE EDUCATION/TRAINING PROGRAM

## 2025-07-21 ASSESSMENT — PAIN SCALES - GENERAL: PAINLEVEL_OUTOF10: NO PAIN (0)

## 2025-07-21 NOTE — PROGRESS NOTES
Raysa Reyes is a 30 year old, presenting for the following health issues:  Blood Draw (Labs drawn via  by RN in lab, vitals taken. ) and Oncology Clinic Visit (Nodular sclerosis)    HPI      Oncology History Overview Note   She has a past medical history of acne treated with spironolactone who is otherwise healthy with no notable past surgical history. She first noticed heartburn-like symptoms when drinking alcohol a year ago and has noticed progressive dysphagia, where now dry solids get caught when swallowing although it is not painful. She has had to stop eating mid-meal but denies appetite change or weight loss. She developed night sweats x2 in Fall 2021 where she woke up with a drenched shirt. Denies current night sweats, fever, chills. During that time, she first noticed an enlarged lymph node in her right clavicular region and as it was not bothersome or painful, did not pursue medical attention at that time. She noticed no other lumps or bumps at that time. She has also been increasingly itchy in all parts of her body for the past 2 months. Over the past two weeks, she has noticed a fast heart rate without chest pain or palpitations and shortness of breath on exertion like carrying laundry upstairs or walking and talking at the same time. She denies lower extremity or upper extremity edema. Her energy level has been slowly declining, where she sleeps for longer at night and needing to rest for 4 hours in the afternoon.      She had a routine physical on 5/16/22 where she brought attention to her growing mass on her right shoulder. 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       She udnerwent ECHO which showed EF of >50%.  PET scan done on 6/18/2022 showed hypermetabolic mediastinal mass with SUV max of 14.7 and 15.6 x 14.8 x 7.8 cm in size.  Mediastinal mass extends to right level 4 lateral to right thyroid lobe.  There is  associated right retroclavicular lymphadenopathy.  Associated bone marrow activity is seen in the axial skeleton consistent with bone marrow infiltration.  Her [place] stage is stage IV.    She consulted with fertility specialist.  We do not have time to pursue pretreatment quality preservation.  She was advised to return in 6 months after completing treatment at that time fertility preservation would be considered.    Due to her ongoing itching, exertional dyspnea, I had prescribed prednisone 60 mg daily and allopurinol to prevent tumor lysis.    She underwent 2 cycles of AVD with brentuximab  Tolerated well without any side effects.  PET scan after 2 cycles shows complete metabolic response. She went on to receive 6 cycles of ABVD. 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    End of treatment PET scan showed continued complete metabolic response.         Nodular sclerosis classical Hodgkin lymphoma (H)   6/16/2022 Initial Diagnosis    Nodular sclerosis classical Hodgkin lymphoma (H)     6/27/2022 - 11/28/2022 Chemotherapy    OP ONC Lymphoma and CLL - A + AVD (Brentuximab vedotin / DOXOrubicin / vinBLAStine / Dacarbazine)   Plan Provider: Mallory Mendez MD  Treatment goal: Curative  Line of treatment: [No plan line of treatment]     8/19/2022 No evidence of disease    PET scan after 2 cycles of AVD (map shows no evidence of disease.     1/2023 No evidence of disease    She completed treatment December 22 and January 23 and of treatment PET scan showed no evidence of disease.       Patient comes today for follow up. No fevers, chills, night sweats or weight loss, no lymphadenopathy. C/o some fatigue and backache, but attributes it to her job related stress.        Objective    /76 (BP Location: Right arm, Patient Position: Sitting, Cuff Size: Adult Regular)   Pulse 91   Temp 98.3  F (36.8  C) (Oral)   Resp 18   Wt 56.9 kg (125 lb 8 oz)   SpO2 100%   BMI 21.54 kg/m     Body mass index is 21.54 kg/m .  Physical Exam   GENERAL:  Alert oriented well nourished  HEAD: normocephalic atraumatic  SKIN:  no rash, hives, other lesions.  LYMPHATIC: no abnormal lymph nodes palable in cervical, axillary, supraclavicular or inguinal area.  RESP:  No rales or rhonchi, breath sounds bilaterally equal and vesicular  CV:  No tachycardia, S1 S2 normal No murmur.  GI:  Abdomen soft nontender no hepato or splenomegaly  MUSCULOSKELETAL:  No visible joint redness or swelling.  NEURO:  No gross weakness gait normal  PSYCH: pleasant affect    Labs: I personally reviewed complete blood count, differential, renal function test, liver function tests LDH.  No cytopenias, LFTs within normal limits, renal function test within normal limits and LDH is normal as well.    Assessment and Plan:    1) Classic Hodgkin lymphoma:  - She remains in remission without any evidence of lymphoma recurrence or progression.  - I will see her back in 6 months.  - At this point I would watch her fatigue and backache. If worsens, she knows to reach out to me and we will do further investigations.    Total time spent on date of service in review of medical records, review of labs, history taking, physical exam, discussion of assessment and plan, counseling and patient education is 30 minutes.    Mallory Mendez MD  Attending Physician  Pager [phone]            Signed Electronically by: Mallory Mendez MD

## 2025-07-21 NOTE — NURSING NOTE
"Oncology Rooming Note    July 21, 2025 4:46 PM   Amy Harris is a 30 year old female who presents for:    Chief Complaint   Patient presents with    Blood Draw     Labs drawn via  by RN in lab, vitals taken.     Oncology Clinic Visit     Nodular sclerosis     Initial Vitals: /76 (BP Location: Right arm, Patient Position: Sitting, Cuff Size: Adult Regular)   Pulse 91   Temp 98.3  F (36.8  C) (Oral)   Resp 18   Wt 56.9 kg (125 lb 8 oz)   SpO2 100%   BMI 21.54 kg/m   Estimated body mass index is 21.54 kg/m  as calculated from the following:    Height as of 4/28/25: 1.626 m (5' 4\").    Weight as of this encounter: 56.9 kg (125 lb 8 oz). Body surface area is 1.6 meters squared.  No Pain (0) Comment: Data Unavailable   No LMP recorded. (Menstrual status: Birth Control).  Allergies reviewed: Yes  Medications reviewed: Yes    Medications: Medication refills not needed today.  Pharmacy name entered into [employer]:    [id number] - [facility] [address]  [address]  [facility] [id number] [place] [facility] - [place], [place] - [address] [facility]    Frailty Screening:   Is the patient here for a new oncology consult visit in cancer care? 2. No      Clinical concerns: Pt reports no new concerns.       Karolyn Ortiz, EMT     "

## 2025-07-21 NOTE — LETTER
7/21/2025      Amy Harris  [address]  [place] [address]      Dear Colleague,    Thank you for referring your patient, Amy Harris, to the [facility]. Please see a copy of my visit note below.    Subjective   Amy is a 30 year old, presenting for the following health issues:  Blood Draw (Labs drawn via  by RN in lab, vitals taken. ) and Oncology Clinic Visit (Nodular sclerosis)    HPI      Oncology History Overview Note   She has a past medical history of acne treated with spironolactone who is otherwise healthy with no notable past surgical history. She first noticed heartburn-like symptoms when drinking alcohol a year ago and has noticed progressive dysphagia, where now dry solids get caught when swallowing although it is not painful. She has had to stop eating mid-meal but denies appetite change or weight loss. She developed night sweats x2 in Fall 2021 where she woke up with a drenched shirt. Denies current night sweats, fever, chills. During that time, she first noticed an enlarged lymph node in her right clavicular region and as it was not bothersome or painful, did not pursue medical attention at that time. She noticed no other lumps or bumps at that time. She has also been increasingly itchy in all parts of her body for the past 2 months. Over the past two weeks, she has noticed a fast heart rate without chest pain or palpitations and shortness of breath on exertion like carrying laundry upstairs or walking and talking at the same time. She denies lower extremity or upper extremity edema. Her energy level has been slowly declining, where she sleeps for longer at night and needing to rest for 4 hours in the afternoon.      She had a routine physical on 5/16/22 where she brought attention to her growing mass on her right shoulder. 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        She udnerwent ECHO which showed EF of >50%.  PET scan done on 6/18/2022 showed hypermetabolic mediastinal mass with SUV max of 14.7 and 15.6 x 14.8 x 7.8 cm in size.  Mediastinal mass extends to right level 4 lateral to right thyroid lobe.  There is associated right retroclavicular lymphadenopathy.  Associated bone marrow activity is seen in the axial skeleton consistent with bone marrow infiltration.  Her [place] stage is stage IV.    She consulted with fertility specialist.  We do not have time to pursue pretreatment quality preservation.  She was advised to return in 6 months after completing treatment at that time fertility preservation would be considered.    Due to her ongoing itching, exertional dyspnea, I had prescribed prednisone 60 mg daily and allopurinol to prevent tumor lysis.    She underwent 2 cycles of AVD with brentuximab  Tolerated well without any side effects.  PET scan after 2 cycles shows complete metabolic response. She went on to receive 6 cycles of ABVD. 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    End of treatment PET scan showed continued complete metabolic response.         Nodular sclerosis classical Hodgkin lymphoma (H)   6/16/2022 Initial Diagnosis    Nodular sclerosis classical Hodgkin lymphoma (H)     6/27/2022 - 11/28/2022 Chemotherapy    OP ONC Lymphoma and CLL - A + AVD (Brentuximab vedotin / DOXOrubicin / vinBLAStine / Dacarbazine)   Plan Provider: Mallory Mendez MD  Treatment goal: Curative  Line of treatment: [No plan line of treatment]     8/19/2022 No evidence of disease    PET scan after 2 cycles of AVD (map shows no evidence of disease.     1/2023 No evidence of disease    She completed treatment December 22 and January 23 and of treatment PET scan showed no evidence of disease.       Patient comes today for follow up. No fevers, chills, night sweats or weight loss, no lymphadenopathy. C/o some fatigue and backache, but attributes  it to her job related stress.        Objective    /76 (BP Location: Right arm, Patient Position: Sitting, Cuff Size: Adult Regular)   Pulse 91   Temp 98.3  F (36.8  C) (Oral)   Resp 18   Wt 56.9 kg (125 lb 8 oz)   SpO2 100%   BMI 21.54 kg/m    Body mass index is 21.54 kg/m .  Physical Exam   GENERAL:  Alert oriented well nourished  HEAD: normocephalic atraumatic  SKIN:  no rash, hives, other lesions.  LYMPHATIC: no abnormal lymph nodes palable in cervical, axillary, supraclavicular or inguinal area.  RESP:  No rales or rhonchi, breath sounds bilaterally equal and vesicular  CV:  No tachycardia, S1 S2 normal No murmur.  GI:  Abdomen soft nontender no hepato or splenomegaly  MUSCULOSKELETAL:  No visible joint redness or swelling.  NEURO:  No gross weakness gait normal  PSYCH: pleasant affect    Labs: I personally reviewed complete blood count, differential, renal function test, liver function tests LDH.  No cytopenias, LFTs within normal limits, renal function test within normal limits and LDH is normal as well.    Assessment and Plan:    1) Classic Hodgkin lymphoma:  - She remains in remission without any evidence of lymphoma recurrence or progression.  - I will see her back in 6 months.  - At this point I would watch her fatigue and backache. If worsens, she knows to reach out to me and we will do further investigations.    Total time spent on date of service in review of medical records, review of labs, history taking, physical exam, discussion of assessment and plan, counseling and patient education is 30 minutes.    Mallory Mendez MD  Attending Physician  Pager [phone]            Signed Electronically by: Mallory Mendez MD      Again, thank you for allowing me to participate in the care of your patient.        Sincerely,        Mallory Mendez MD    Electronically signed

## 2025-07-21 NOTE — NURSING NOTE
Chief Complaint   Patient presents with    Blood Draw     Labs drawn via  by RN in lab, vitals taken.      Labs collected from venipuncture by RN. Vitals taken. Checked in for appointment(s).    Della Beasley RN

## (undated) DEVICE — RETR ELASTIC STAYS LONE STAR BLUNT DUAL LEAD 3550-1G

## (undated) DEVICE — SUCTION MANIFOLD NEPTUNE 2 SYS 4 PORT 0702-020-000

## (undated) DEVICE — PREP POVIDONE IODINE SOLUTION 10% 4OZ BOTTLE 29906-004

## (undated) DEVICE — SOL WATER IRRIG 1000ML BOTTLE 2F7114

## (undated) DEVICE — SPONGE LAP 18X18" X8435

## (undated) DEVICE — CLIP HORIZON SM RED WIDE SLOT 001201

## (undated) DEVICE — SU SILK 2-0 SH CR 5X18" C0125

## (undated) DEVICE — DECANTER BAG 2002S

## (undated) DEVICE — PACK CENTRAL LINE INSERTION SAN32CLFCG

## (undated) DEVICE — DRSG STERI STRIP 1/2X4" R1547

## (undated) DEVICE — PREP SKIN SCRUB TRAY 4461A

## (undated) DEVICE — SOL NACL 0.9% INJ 250ML BAG 2B1322Q

## (undated) DEVICE — SU SILK 2-0 TIE 12X30" A305H

## (undated) DEVICE — GLOVE PROTEXIS BLUE W/NEU-THERA 8.0  2D73EB80

## (undated) DEVICE — GLOVE PROTEXIS POWDER FREE SMT 7.5  2D72PT75X

## (undated) DEVICE — SYR 10ML FINGER CONTROL W/O NDL 309695

## (undated) DEVICE — LINEN TOWEL PACK X5 5464

## (undated) DEVICE — GOWN XLG DISP 9545

## (undated) DEVICE — SU VICRYL 3-0 SH 8X18" UND J864D

## (undated) DEVICE — SPONGE KITTNER 30-101

## (undated) DEVICE — LINEN GOWN LG 5406

## (undated) DEVICE — SU MONOCRYL 4-0 PS-2 27" UND Y426H

## (undated) DEVICE — ESU GROUND PAD ADULT W/CORD E7507

## (undated) DEVICE — SU SILK 3-0 TIE 12X30" A304H

## (undated) DEVICE — PREP POVIDONE-IODINE 7.5% SCRUB 4OZ BOTTLE MDS093945

## (undated) DEVICE — KNIFE HANDLE W/15 BLADE 371615

## (undated) DEVICE — SOL NACL 0.9% IRRIG 1000ML BOTTLE 2F7124

## (undated) DEVICE — Device

## (undated) DEVICE — LINEN GOWN XLG 5407

## (undated) DEVICE — ADH SKIN CLOSURE PREMIERPRO EXOFIN 1.0ML 3470

## (undated) DEVICE — CLIP HORIZON MED BLUE 002200

## (undated) DEVICE — PACK NEURO MINOR UMMC SNE32MNMU4

## (undated) DEVICE — SU MONOCRYL 4-0 P-3 18" UND Y494G

## (undated) DEVICE — DECANTER VIAL 2006S

## (undated) RX ORDER — ALBUTEROL SULFATE 0.83 MG/ML
SOLUTION RESPIRATORY (INHALATION)
Status: DISPENSED
Start: 2022-06-17